# Patient Record
Sex: FEMALE | Race: WHITE | NOT HISPANIC OR LATINO | Employment: OTHER | URBAN - METROPOLITAN AREA
[De-identification: names, ages, dates, MRNs, and addresses within clinical notes are randomized per-mention and may not be internally consistent; named-entity substitution may affect disease eponyms.]

---

## 2017-02-13 ENCOUNTER — TELEPHONE (OUTPATIENT)
Dept: FAMILY MEDICINE | Facility: CLINIC | Age: 47
End: 2017-02-13

## 2017-02-13 ENCOUNTER — HOSPITAL ENCOUNTER (OUTPATIENT)
Dept: CT IMAGING | Facility: CLINIC | Age: 47
Discharge: HOME | End: 2017-02-13
Attending: NURSE PRACTITIONER | Admitting: NURSE PRACTITIONER
Payer: COMMERCIAL

## 2017-02-13 ENCOUNTER — OFFICE VISIT (OUTPATIENT)
Dept: FAMILY MEDICINE | Facility: CLINIC | Age: 47
End: 2017-02-13
Payer: COMMERCIAL

## 2017-02-13 VITALS
SYSTOLIC BLOOD PRESSURE: 137 MMHG | HEART RATE: 81 BPM | TEMPERATURE: 98.4 F | WEIGHT: 171 LBS | HEIGHT: 66 IN | BODY MASS INDEX: 27.48 KG/M2 | DIASTOLIC BLOOD PRESSURE: 82 MMHG

## 2017-02-13 DIAGNOSIS — R31.0 GROSS HEMATURIA: Primary | ICD-10-CM

## 2017-02-13 DIAGNOSIS — R31.0 GROSS HEMATURIA: ICD-10-CM

## 2017-02-13 DIAGNOSIS — R82.90 NONSPECIFIC FINDING ON EXAMINATION OF URINE: ICD-10-CM

## 2017-02-13 LAB
ALBUMIN UR-MCNC: 100 MG/DL
APPEARANCE UR: ABNORMAL
BILIRUB UR QL STRIP: NEGATIVE
COLOR UR AUTO: ABNORMAL
GLUCOSE UR STRIP-MCNC: NEGATIVE MG/DL
HGB UR QL STRIP: ABNORMAL
KETONES UR STRIP-MCNC: NEGATIVE MG/DL
LEUKOCYTE ESTERASE UR QL STRIP: ABNORMAL
NITRATE UR QL: NEGATIVE
PH UR STRIP: 6 PH (ref 5–7)
RBC #/AREA URNS AUTO: ABNORMAL /HPF (ref 0–2)
SP GR UR STRIP: 1.02 (ref 1–1.03)
URN SPEC COLLECT METH UR: ABNORMAL
UROBILINOGEN UR STRIP-ACNC: 0.2 EU/DL (ref 0.2–1)
WBC #/AREA URNS AUTO: ABNORMAL /HPF (ref 0–2)

## 2017-02-13 PROCEDURE — 87086 URINE CULTURE/COLONY COUNT: CPT | Performed by: NURSE PRACTITIONER

## 2017-02-13 PROCEDURE — 74176 CT ABD & PELVIS W/O CONTRAST: CPT

## 2017-02-13 PROCEDURE — 99214 OFFICE O/P EST MOD 30 MIN: CPT | Performed by: NURSE PRACTITIONER

## 2017-02-13 PROCEDURE — 81001 URINALYSIS AUTO W/SCOPE: CPT | Performed by: NURSE PRACTITIONER

## 2017-02-13 NOTE — PATIENT INSTRUCTIONS
Thank you for choosing [facility].  You may be receiving a survey in the mail from [employer] regarding your visit today.  Please take a few minutes to complete and return the survey to let us know how we are doing.      If you have questions or concerns, please contact us via [employer] or you can contact your care team at [phone].    Our Clinic hours are:  Monday 6:40 am  to 7:00 pm  Tuesday -Friday 6:40 am to 5:00 pm    The [place] outpatient lab hours are:  Monday - Friday 6:10 am to 4:45 pm  Saturdays 7:00 am to 11:00 am  Appointments are required, call [phone]    If you have clinical questions after hours or would like to schedule an appointment,  call the clinic at [phone].

## 2017-02-13 NOTE — TELEPHONE ENCOUNTER
"I called her, \"my urologist is only there on Tuesdays and Thursdays. I saw him last week and he said I have a stone and don't worry about it, but this is a lot of blood, and he is not in the office today, and I am freaking. \"  \"I can't get in with Maribeth until Wednesday, Dr Francisco always used to just order the CT if I called. She had talked to my urologist a lot. \"  Has noted blood today since the morning, \"it is getting worse as the day goes on, \" no pain anywhere. \"you would think if it is a stone there would be pain. \"    No urgency, no burning , no frequency, no urine sx, no fever. Just gross red blood. \"if I peed in a cup you wouldn't be able to see through it there is all red blood. \"    \"I saw the blood on the cysto, and I don't know why the urologist didn't order a CT scan\" \"I could call his office, they would page him and he would call me after he is done with surgery tonight, It is just easier if I get it done up here, \" \"I just don't want to go downtown, I would rather come up there for the test. \"    Advised she be seen and she declined to present to the ER.   Suggested she contact her urologist since she just saw him last week, and had the same then.      \"Well, Maribeth said she would order it, and she just needs to see me, but I can't see her til Wednesday and the blood might be gone. KUB's don't show up my stones, because they are phosphorus, and they need to be done on a CT,\"   A; gross blood in the urine, has kidney issues, and hx of stones. Is asking for a CT scan to be done, asap.   R: advised an appt/ eval or to contact her urologist who is very familiar with this situation, and she is declining. \"please ask Maribeth, it would just be so much easier for me, Dr Francisco always used to do it for me, \"  Encouraged her to schedule with a provider, and she agreed, \" she did tell me I would need to be seen, I will make the Wednesday appt with Maribeth then, I prefer to see her, let me know what she " "says. \"    Maribeth what to recommend? Is asking for a CT asap for gross hematuria. She did schedule an appt with you for Wednesday at 8 am. \"but the blood may be gone by then. \"    Leanna Hussein RNC        "

## 2017-02-13 NOTE — NURSING NOTE
"Chief Complaint   Patient presents with     Hematuria       Initial /82 (Cuff Size: Adult Regular)  Pulse 81  Temp 98.4  F (36.9  C) (Tympanic)  Ht 5' 6\" (1.676 m)  Wt 171 lb (77.6 kg)  LMP 07/02/2013  BMI 27.6 kg/m2 Estimated body mass index is 27.6 kg/(m^2) as calculated from the following:    Height as of this encounter: 5' 6\" (1.676 m).    Weight as of this encounter: 171 lb (77.6 kg).  Medication Reconciliation: complete  "

## 2017-02-13 NOTE — TELEPHONE ENCOUNTER
Reason for call:  Patient reporting a symptom    Symptom or request: hematuria    Duration (how long have symptoms been present): 1 week ago    Have you been treated for this before? Yes    Additional comments: pt calling stating she had blood in her urine last Monday the 6th. She went and seen her urologist who did a cysto and told her she was probably passing a stone. The blood stopped and then started up again today. She has no pain which she thought was odd because she said there is a lot of blood. She was wondering if she could get a ct ordered to see where the stone is.    Phone Number patient can be reached at:  Home number on file [phone] (home)    Best Time:  any    Can we leave a detailed message on this number:  YES    Call taken on 2/13/2017 at 1:51 PM by Peggy Maldonado

## 2017-02-13 NOTE — MR AVS SNAPSHOT
After Visit Summary   2/13/2017    Latoya Riddle    MRN: [id number]           Patient Information     Date Of Birth          1970        Visit Information        Provider Department      2/13/2017 3:20 PM Maribeth Avila APRN CNP [facility]        Today's Diagnoses     Gross hematuria    -  1    Nonspecific finding on examination of urine          Care Instructions          Thank you for choosing [facility].  You may be receiving a survey in the mail from [facility] regarding your visit today.  Please take a few minutes to complete and return the survey to let us know how we are doing.      If you have questions or concerns, please contact us via [employer] or you can contact your care team at [phone].    Our Clinic hours are:  Monday 6:40 am  to 7:00 pm  Tuesday -Friday 6:40 am to 5:00 pm    The [place] outpatient lab hours are:  Monday - Friday 6:10 am to 4:45 pm  Saturdays 7:00 am to 11:00 am  Appointments are required, call [phone]    If you have clinical questions after hours or would like to schedule an appointment,  call the clinic at [phone].          Follow-ups after your visit        Your next 10 appointments already scheduled     Feb 15, 2017  8:00 AM CST   SHORT with MICHELLE Jade CNP   [facility] ([facility])    [address] [facility] [id number] [phone]              Future tests that were ordered for you today     Open Future Orders        Priority Expected Expires Ordered    CT Abdomen Pelvis w/o Contrast STAT  2/13/2018 2/13/2017            Who to contact     If you have questions or need follow up information about today's clinic visit or your schedule please contact [facility] directly at [phone].  Normal or non-critical lab and imaging results will be communicated to you by MyChart, letter or phone within 4 business days after the clinic has  "received the results. If you do not hear from us within 7 days, please contact the clinic through [employer] or phone. If you have a critical or abnormal lab result, we will notify you by phone as soon as possible.  Submit refill requests through [employer] or call your pharmacy and they will forward the refill request to us. Please allow 3 business days for your refill to be completed.          Additional Information About Your Visit        [employer] Information     [employer] gives you secure access to your electronic health record. If you see a primary care provider, you can also send messages to your care team and make appointments. If you have questions, please call your primary care clinic.  If you do not have a primary care provider, please call [phone] and they will assist you.        Care EveryWhere ID     This is your Care EveryWhere ID. This could be used by other organizations to access your [place] medical records  [id number]        Your Vitals Were     Pulse Temperature Height Last Period BMI (Body Mass Index)       81 98.4  F (36.9  C) (Tympanic) 5' 6\" (1.676 m) 07/02/2013 27.6 kg/m2        Blood Pressure from Last 3 Encounters:   02/13/17 137/82   10/28/16 99/68   09/20/16 109/72    Weight from Last 3 Encounters:   02/13/17 171 lb (77.6 kg)   11/07/16 164 lb (74.4 kg)   10/28/16 164 lb (74.4 kg)              We Performed the Following     *UA reflex to Microscopic and Culture ([facility] (except [place] and [place])     Urine Culture Aerobic Bacterial     Urine Microscopic        Primary Care Provider Office Phone # Fax #    Maribeth MICHELLE Cm [phone]       [facility] [address] [facility] [address]        Thank you!     Thank you for choosing [facility]  for your care. Our goal is always to provide you with excellent care. Hearing back from our patients is one way we can continue to improve our services. " Please take a few minutes to complete the written survey that you may receive in the mail after your visit with us. Thank you!             Your Updated Medication List - Protect others around you: Learn how to safely use, store and throw away your medicines at www.disposemymeds.org.          This list is accurate as of: 2/13/17  5:08 PM.  Always use your most recent med list.                   Brand Name Dispense Instructions for use    ALEVE 220 MG tablet   Generic drug:  naproxen sodium      Take 220 mg by mouth 2 times daily as needed.       LORazepam 0.5 MG tablet    ATIVAN    30 tablet    Take 1 tablet (0.5 mg) by mouth every 8 hours as needed for anxiety       oxyCODONE-acetaminophen 5-325 MG per tablet    PERCOCET    60 tablet    Take 1-2 tablets by mouth every 4 hours as needed for pain (moderate to severe)       TORADOL ORAL 10 MG tablet   Generic drug:  ketorolac      1 TABLET EVERY 4 TO 6 HOURS AS NEEDED       ZYRTEC 10 MG tablet   Generic drug:  cetirizine      1 TABLET DAILY

## 2017-02-13 NOTE — PROGRESS NOTES
"  SUBJECTIVE:                                                    Latoya Riddle is a 46 year old female who presents to clinic today for the following health issues:      URINARY TRACT SYMPTOMS     Onset: this morning    Description:   Painful urination (Dysuria): no   Blood in urine (Hematuria): YES  Delay in urine (Hesitency): no     Intensity: severe    Progression of Symptoms: worse    Accompanying Signs & Symptoms:  Fever/chills: no   Flank pain no   Nausea and vomiting: no   Any vaginal symptoms: none  Abdominal/Pelvic Pain: no    History:   History of frequent UTI's: no   History of kidney stones: YES- saw urologist last week who confirmed a stone - usually passes one stone per week.  Sexually Active: YES  Possibility of pregnancy: No    Precipitating factors:   Kidney stone history         Therapies Tried and outcome:  Increase fluid intake    Hematuria from 2/6-2/8.  UA - showed RBCs, no WBCs.  Saw urologist 2/7 - had cystoscopy - saw blood coming from right ureter.    Hematuria restarted this morning - almost as bad as last week.    No pain last week; twinges on right side that are minor and intermittent.      Urologist is Dr Contreras - from [facility].    She is requesting a CT scan - wants results sent to her urologist.          Problem list and histories reviewed & adjusted, as indicated.  Additional history: as documented    Problem list, Medication list, Allergies, and Medical/Social/Surgical histories reviewed in [facility] and updated as appropriate.    ROS:  Constitutional, HEENT, cardiovascular, pulmonary, gi and gu systems are negative, except as otherwise noted.    OBJECTIVE:                                                    /82 (Cuff Size: Adult Regular)  Pulse 81  Temp 98.4  F (36.9  C) (Tympanic)  Ht 5' 6\" (1.676 m)  Wt 171 lb (77.6 kg)  LMP 07/02/2013  BMI 27.6 kg/m2  Body mass index is 27.6 kg/(m^2).  GENERAL: healthy, alert and no distress  NECK: no adenopathy, no asymmetry, " masses, or scars and thyroid normal to palpation  RESP: lungs clear to auscultation - no rales, rhonchi or wheezes  CV: regular rate and rhythm, normal S1 S2, no S3 or S4, no murmur, click or rub, no peripheral edema and peripheral pulses strong  ABDOMEN: soft, nontender, no hepatosplenomegaly, no masses and bowel sounds normal. No CVA tenderness    Diagnostic Test Results:  Results for orders placed or performed in visit on 02/13/17 (from the past 24 hour(s))   *UA reflex to Microscopic and Culture ([facility] (except [place] and [place])   Result Value Ref Range    Color Urine Red     Appearance Urine Cloudy     Glucose Urine Negative NEG mg/dL    Bilirubin Urine Negative NEG    Ketones Urine Negative NEG mg/dL    Specific Gravity Urine 1.025 1.003 - 1.035    Blood Urine Large (A) NEG    pH Urine 6.0 5.0 - 7.0 pH    Protein Albumin Urine 100 (A) NEG mg/dL    Urobilinogen Urine 0.2 0.2 - 1.0 EU/dL    Nitrite Urine Negative NEG    Leukocyte Esterase Urine Moderate (A) NEG    Source Midstream Urine    Urine Microscopic   Result Value Ref Range    WBC Urine 2-5 (A) 0 - 2 /HPF    RBC Urine >100  EXTREME NUMBERS   (A) 0 - 2 /HPF            CT ABDOMEN PELVIS W/O CONTRAST 2/13/2017 3:39 PM     HISTORY: Gross hematuria. Prior stones.     TECHNIQUE: CT imaging of the abdomen and pelvis is performed without  IV contrast.     This study is particularly suited for assessing for urolithiasis or  other pathological calcifications. Abdominal organs, pelvic organs,  bowel, aorta, retroperitoneum, and abdominal wall are also assessed to  the limits of no IV contrast. Pelvic anatomy is also assessed to the  limits of no IV contrast.     Radiation dose for this scan is reduced using automated exposure  control, adjustment of the mA and/ kV according to patient size, or  iterative reconstruction technique.      COMPARISON: 5/25/2013.     FINDINGS:     Abdomen: Scarring is present in the mid to lower pole  of the right  kidney. There is some mild intrarenal collecting system prominence on  the right but it is much less impressive compared to the dilatation  seen on the prior study. There is high density within the the mid and  lower aspect of this is modestly dilated intrarenal collecting system.  This could relate to debris, very tiny granular stones, and/or  hemorrhage. Trace medullary nephrocalcinosis involving the right  kidney is also again seen. Multiple nonobstructing calculi are present  in the left kidney. The largest one measures 5-6 mm in the lower pole.  Overall stone burden in the left kidney is similar to the prior study.  A cortical cyst in the left kidney is again demonstrated.     Pelvis : No bladder calculi are seen. Sigmoid diverticulosis is  present.         IMPRESSION:   1. There is mild dilatation of the right intrarenal collecting system.  This is much less impressive compared to the prior study and could  relate to some residual capaciousness from prior stone disease. No  ureteral calculi are demonstrated. There is high density material in  the right intrarenal collecting system, as described above.  2. Stable nonobstructing calculi in the left kidney.     DAVID OBRIEN MD      ASSESSMENT/PLAN:                                                          ICD-10-CM    1. Gross hematuria R31.0 Etiology unclear.  UA today has mostly RBCs, few WBC - will send for culture.  CT showing high density material in the right intrarenal collecting system - stones vs hemorrhage.  I faxed the CT to her urologist's office per her request.  We will wait for the urologist instructions.    *UA reflex to Microscopic and Culture ([facility] and [facility] (except [place] and [place])     CT Abdomen Pelvis w/o Contrast     Urine Microscopic   2. Nonspecific finding on examination of urine R82.90 Urine Culture Aerobic Bacterial           The risks, benefits and treatment options of prescribed medications  or other treatments have been discussed with the patient. The patient verbalized their understanding and should call or follow up if no improvement or if they develop further problems.    MICHELLE Lagos [facility]

## 2017-02-15 LAB
BACTERIA SPEC CULT: NORMAL
MICRO REPORT STATUS: NORMAL
SPECIMEN SOURCE: NORMAL

## 2017-02-17 ENCOUNTER — OFFICE VISIT (OUTPATIENT)
Dept: FAMILY MEDICINE | Facility: CLINIC | Age: 47
End: 2017-02-17
Payer: COMMERCIAL

## 2017-02-17 VITALS
BODY MASS INDEX: 27.06 KG/M2 | OXYGEN SATURATION: 98 % | HEART RATE: 80 BPM | DIASTOLIC BLOOD PRESSURE: 75 MMHG | HEIGHT: 66 IN | WEIGHT: 168.4 LBS | SYSTOLIC BLOOD PRESSURE: 105 MMHG | TEMPERATURE: 98.7 F

## 2017-02-17 DIAGNOSIS — R31.9 HEMATURIA: ICD-10-CM

## 2017-02-17 DIAGNOSIS — Z01.818 PREOP GENERAL PHYSICAL EXAM: Primary | ICD-10-CM

## 2017-02-17 DIAGNOSIS — R06.83 SNORING: ICD-10-CM

## 2017-02-17 DIAGNOSIS — E78.00 HYPERCHOLESTEROLEMIA: ICD-10-CM

## 2017-02-17 LAB — HGB BLD-MCNC: 15.3 G/DL (ref 11.7–15.7)

## 2017-02-17 PROCEDURE — 85018 HEMOGLOBIN: CPT | Performed by: INTERNAL MEDICINE

## 2017-02-17 PROCEDURE — 36416 COLLJ CAPILLARY BLOOD SPEC: CPT | Performed by: INTERNAL MEDICINE

## 2017-02-17 PROCEDURE — 99214 OFFICE O/P EST MOD 30 MIN: CPT | Performed by: INTERNAL MEDICINE

## 2017-02-17 RX ORDER — ATORVASTATIN CALCIUM 80 MG/1
80 TABLET, FILM COATED ORAL DAILY
Qty: 30 TABLET | Refills: 11 | Status: SHIPPED | OUTPATIENT
Start: 2017-02-17 | End: 2017-11-10

## 2017-02-17 NOTE — NURSING NOTE
"Chief Complaint   Patient presents with     Pre-Op Exam     DOS 2/20/17, cystoscopy @ [facility] w/ Dr. Tavo Contreras       Initial /75 (BP Location: Right arm, Patient Position: Chair, Cuff Size: Adult Regular)  Pulse 80  Temp 98.7  F (37.1  C) (Tympanic)  Ht 5' 6\" (1.676 m)  Wt 168 lb 6.4 oz (76.4 kg)  LMP 07/02/2013  SpO2 98%  BMI 27.18 kg/m2 Estimated body mass index is 27.18 kg/(m^2) as calculated from the following:    Height as of this encounter: 5' 6\" (1.676 m).    Weight as of this encounter: 168 lb 6.4 oz (76.4 kg).  Medication Reconciliation: complete   Zulma LANGSTON CMA (AAMA)    "

## 2017-02-17 NOTE — MR AVS SNAPSHOT
After Visit Summary   2/17/2017    Latoya Riddle    MRN: [id number]           Patient Information     Date Of Birth          1970        Visit Information        Provider Department      2/17/2017 12:40 PM Alvarez Gray MD [facility]        Today's Diagnoses     Preop general physical exam    -  1    Snoring        Hematuria        Hypercholesterolemia          Care Instructions      Before Your Surgery      Call your surgeon if there is any change in your health. This includes signs of a cold or flu (such as a sore throat, runny nose, cough, rash or fever).    Do not smoke, drink alcohol or take over the counter medicine (unless your surgeon or primary care doctor tells you to) for the 24 hours before and after surgery.    If you take prescribed drugs: Follow your doctor s orders about which medicines to take and which to stop until after surgery.    Eating and drinking prior to surgery: follow the instructions from your surgeon    Take a shower or bath the night before surgery. Use the soap your surgeon gave you to gently clean your skin. If you do not have soap from your surgeon, use your regular soap. Do not shave or scrub the surgery site.  Wear clean pajamas and have clean sheets on your bed.         Follow-ups after your visit        Additional Services     SLEEP EVALUATION & MANAGEMENT REFERRAL - ADULT       Please be aware that coverage of these services is subject to the terms and limitations of your health insurance plan.  Call member services at your health plan with any benefit or coverage questions.      Please bring the following to your appointment:    >>   List of current medications   >>   This referral request   >>   Any documents/labs given to you for this referral    [facility] / Lab  Ph [phone] (Age 2 and up)                  Future tests that were ordered for you today     Open Future Orders        Priority Expected Expires Ordered  "   **ALT FUTURE 2mo Routine 4/18/2017 6/17/2017 2/17/2017    **Lipid panel reflex to direct LDL FUTURE 2mo Routine 4/18/2017 6/17/2017 2/17/2017    SLEEP EVALUATION & MANAGEMENT REFERRAL - ADULT Routine  2/17/2018 2/17/2017            Who to contact     If you have questions or need follow up information about today's clinic visit or your schedule please contact [facility] directly at [phone].  Normal or non-critical lab and imaging results will be communicated to you by [employer], letter or phone within 4 business days after the clinic has received the results. If you do not hear from us within 7 days, please contact the clinic through [employer] or phone. If you have a critical or abnormal lab result, we will notify you by phone as soon as possible.  Submit refill requests through [employer] or call your pharmacy and they will forward the refill request to us. Please allow 3 business days for your refill to be completed.          Additional Information About Your Visit        [employer] Information     [employer] gives you secure access to your electronic health record. If you see a primary care provider, you can also send messages to your care team and make appointments. If you have questions, please call your primary care clinic.  If you do not have a primary care provider, please call [phone] and they will assist you.        Care EveryWhere ID     This is your Care EveryWhere ID. This could be used by other organizations to access your [place] medical records  [id number]        Your Vitals Were     Pulse Temperature Height Last Period Pulse Oximetry BMI (Body Mass Index)    80 98.7  F (37.1  C) (Tympanic) 5' 6\" (1.676 m) 07/02/2013 98% 27.18 kg/m2       Blood Pressure from Last 3 Encounters:   02/17/17 105/75   02/13/17 137/82   10/28/16 99/68    Weight from Last 3 Encounters:   02/17/17 168 lb 6.4 oz (76.4 kg)   02/13/17 171 lb (77.6 kg)   11/07/16 164 lb (74.4 kg)              We Performed the " Following     Hemoglobin          Today's Medication Changes          These changes are accurate as of: 2/17/17  1:17 PM.  If you have any questions, ask your nurse or doctor.               Start taking these medicines.        Dose/Directions    atorvastatin 80 MG tablet   Commonly known as:  LIPITOR   Used for:  Hypercholesterolemia   Started by:  Alvarez Gray MD        Dose:  80 mg   Take 1 tablet (80 mg) by mouth daily   Quantity:  30 tablet   Refills:  11            Where to get your medicines      These medications were sent to [facility] - [place], [place] - [address]   [address] , [facility] [address]     Phone:  [phone]     atorvastatin 80 MG tablet                Primary Care Provider Office Phone # Fax #    Maribeth MICHELLE Cm [facility] [phone]       [facility] [address] [facility] [address]        Thank you!     Thank you for choosing [facility]  for your care. Our goal is always to provide you with excellent care. Hearing back from our patients is one way we can continue to improve our services. Please take a few minutes to complete the written survey that you may receive in the mail after your visit with us. Thank you!             Your Updated Medication List - Protect others around you: Learn how to safely use, store and throw away your medicines at www.disposemymeds.org.          This list is accurate as of: 2/17/17  1:17 PM.  Always use your most recent med list.                   Brand Name Dispense Instructions for use    ALEVE 220 MG tablet   Generic drug:  naproxen sodium      Take 220 mg by mouth 2 times daily as needed.       atorvastatin 80 MG tablet    LIPITOR    30 tablet    Take 1 tablet (80 mg) by mouth daily       LORazepam 0.5 MG tablet    ATIVAN    30 tablet    Take 1 tablet (0.5 mg) by mouth every 8 hours as needed for anxiety       oxyCODONE-acetaminophen 5-325 MG per tablet    PERCOCET    60  tablet    Take 1-2 tablets by mouth every 4 hours as needed for pain (moderate to severe)       TORADOL ORAL 10 MG tablet   Generic drug:  ketorolac      1 TABLET EVERY 4 TO 6 HOURS AS NEEDED       ZYRTEC 10 MG tablet   Generic drug:  cetirizine      1 TABLET DAILY

## 2017-02-17 NOTE — PROGRESS NOTES
[facility]  [address] [facility] [address]  [phone]  Dept: [phone]    PRE-OP EVALUATION:  Today's date: 2017    Latoya Riddle (: 1970) presents for pre-operative evaluation assessment as requested by Dr. Tavo Contreras.  She requires evaluation and anesthesia risk assessment prior to undergoing surgery/procedure for treatment of kidney stone .  Proposed procedure: cystoscopy, stone removal     Date of Surgery/ Procedure: 17  Time of Surgery/ Procedure: 3:05 p.m.  Hospital/Surgical Facility: [place]   Fax number for surgical facility: [phone]  Primary Physician: Maribeth Avila  Type of Anesthesia Anticipated: General    Patient has a Health Care Directive or Living Will:  NO    1. NO - Do you have a history of heart attack, stroke, stent, bypass or surgery on an artery in the head, neck, heart or legs?  2. NO - Do you ever have any pain or discomfort in your chest?  3. NO - Do you have a history of  Heart Failure?  4. YES - ARE YOUR TROUBLED BY SHORTNESS OF BREATH WHEN WALKING ON THE LEVEL, UP A SLIGHT HILL OR AT NIGHT? Currently, for the past week, patient reports having shortness of breath.   5. NO - Do you currently have a cold, bronchitis or other respiratory infection?  6. YES - DO YOU HAVE A COUGH, SHORTNESS OF BREATH OR WHEEZING? Always has a cough, tickle in throat all the time, Shortness of breath  7. NO - Do you sometimes get pains in the calves of your legs when you walk?  8. YES - DO YOU OR ANYONE IN YOUR FAMILY HAVE PREVIOUS HISTORY OF BLOOD CLOTS? Father w/ history of blood clots   9. NO - Do you or does anyone in your family have a serious bleeding problem such as prolonged bleeding following surgeries or cuts?  10. NO - Have you ever had problems with anemia or been told to take iron pills?  11. YES - HAVE YOU HAD ANY ABNORMAL BLOOD LOSS SUCH AS BLACK, TARRY OR BLOODY STOOLS, OR ABNORMAL VAGINAL BLEEDING? Currently having  abnormal blood loss w/ urination   12. NO - Have you ever had a blood transfusion?  13. NO - Have you or any of your relatives ever had problems with anesthesia?  14. YES - DO YOU HAVE SLEEP APNEA, EXCESSIVE SNORING OR DAYTIME DROWSINESS? Excessive snoring  15. NO - Do you have any prosthetic heart valves?  16. NO - Do you have prosthetic joints?  17. NO - Is there any chance that you may be pregnant?      HPI:                                                      Brief HPI related to upcoming procedure:   Latoya has a history of recurrent kidney stones and recently has been having worsening hematuria.  She actually doesn't have much pain.  Recent CT scan shows trevor density material in the right intrarenal collecting system, so a cystoscopy is planned to investigate this further.  Latoya says that the hematuria is present with every urination and the blood is very dense in the urine.  She is feeling very fatigued, weak, and SOB with activity, and she wonders if this may be due to blood loss.          MEDICAL HISTORY:                                                      Patient Active Problem List    Diagnosis Date Noted     Anxiety 01/11/2016     Priority: Medium     Health Care Home 05/06/2013     Priority: Medium     EMERGENCY CARE PLAN  May 6, 2013: No current Care Coordination follow up planned. Please refer if Care Coordination services are needed.    Presenting Problem Signs and Symptoms Treatment Plan   Questions or concerns   during clinic hours   I will call my clinic directly:  [address] [address] [phone].   Questions or concerns outside clinic hours   I will call the 24 hour nurse line at   [phone] or [address] [place] [address].   Need to schedule an appointment   I will call the 24 hour scheduling team at [phone] or my clinic directly at [phone].   Same day treatment     I will call my clinic first, nurse line if after hours, urgent care and express care  if needed.   Clinic care coordination services (regular clinic hours)   I will call a clinic care coordinator directly:     Italia Keith, RN  [phone]    CHELSI Iglesias:    [phone]    Or call my clinic at [phone] and ask to speak with care coordination.   Crisis Services: Behavioral or Mental Health  [facility] 24 Hour Phone Line  [phone]    [facility] 24 Hour Crisis Services  [phone]    [facility] (Behavioral Health Providers) Network [phone]    [facility]   [phone]     Emergency treatment -- Immediately    [facility]All 911              UTI (urinary tract infection) 2012     Priority: Medium     Hyperlipidemia LDL goal <130 2011     Priority: Medium     Tobacco abuse 2010     Priority: Medium     Dyspnea and respiratory abnormality 2008     Priority: Medium     Sleep study 08: No CATHY. .5 minutes, sleep latency  normal 11.7 minutes. REM latency 154.5 minutes. Sleep efficiency at 89.4%. The sleep architecture was periodically disrupted with frequent sleep stage changes and arousals. Snoring: moderately loud. RDI 41.0,  AHI of 0.3. Lowest O2 93.0%. PLM index 0.0.  Problem list name updated by automated process. Provider to review       Calculus of kidney 2005     Priority: Medium     Due to RTA and medullary sponge kidney. [id number] ( See's Dr. Tavo Contreras)- calcium oxalate and calcium phosphate. S/p multiple procedures, >50       Congenital medullary sponge kidney 2005     Priority: Medium     Renal osteodystrophy 2005     Priority: Medium     Tubular acidosis        Past Medical History   Diagnosis Date     Hyperlipidemia      Kidney stone      Past Surgical History   Procedure Laterality Date     Surgical history of -             Surgical history of -   -present     Lithothypsy X 49     Surgical history of -        Percutaneous nephrostomy X2     Surgical history of -   2011      "Cystoscopy with bilateral ureteral pyeloscopy, stone basketing and stent placement     Genitourinary surgery       Excise toenail(s) Left 9/20/2016     Procedure: EXCISE TOENAIL(S);  Surgeon: Ady Mejia DPM;  Location: [facility] OR     Current Outpatient Prescriptions   Medication Sig Dispense Refill     atorvastatin (LIPITOR) 80 MG tablet Take 1 tablet (80 mg) by mouth daily 30 tablet 11     ZYRTEC 10 MG OR TABS 1 TABLET DAILY       oxyCODONE-acetaminophen (PERCOCET) 5-325 MG per tablet Take 1-2 tablets by mouth every 4 hours as needed for pain (moderate to severe) 60 tablet 0     LORazepam (ATIVAN) 0.5 MG tablet Take 1 tablet (0.5 mg) by mouth every 8 hours as needed for anxiety 30 tablet 1     naproxen sodium (ALEVE) 220 MG tablet Take 220 mg by mouth 2 times daily as needed.       TORADOL ORAL 10 MG OR TABS 1 TABLET EVERY 4 TO 6 HOURS AS NEEDED       OTC products: None, except as noted above    Allergies   Allergen Reactions     Gentamycin [Gentamicin Sulfate]       Latex Allergy: NO    Social History   Substance Use Topics     Smoking status: Current Every Day Smoker     Packs/day: 1.00     Years: 20.00     Types: Cigarettes     Smokeless tobacco: Never Used     Alcohol use No     History   Drug Use No       REVIEW OF SYSTEMS:                                                    Constitutional, HEENT, cardiovascular, pulmonary, gi and gu systems are negative, except as otherwise noted.    EXAM:                                                    /75 (BP Location: Right arm, Patient Position: Chair, Cuff Size: Adult Regular)  Pulse 80  Temp 98.7  F (37.1  C) (Tympanic)  Ht 5' 6\" (1.676 m)  Wt 168 lb 6.4 oz (76.4 kg)  LMP 07/02/2013  SpO2 98%  BMI 27.18 kg/m2      GENERAL APPEARANCE: healthy, alert, appears fatigued     HENT: normal ear canals and TMs, nose and mouth without ulcers or lesions     NECK: no adenopathy, no asymmetry, masses, or scars     RESP: lungs clear to auscultation - no rales, rhonchi " or wheezes     CV: regular rates and rhythm, normal S1 S2, no S3 or S4 and no murmur, click or rub -     ABDOMEN:  soft, nontender, no HSM or masses and bowel sounds normal     MS: extremities normal- no gross deformities noted, no evidence of inflammation in joints       NEURO: mentation intact and speech normal     PSYCH: mentation appears normal. and affect normal/bright     LYMPHATICS: No cervical or supraclavicular nodes      DIAGNOSTICS:                                                      Labs Resulted Today:   Results for orders placed or performed in visit on 02/17/17   Hemoglobin   Result Value Ref Range    Hemoglobin 15.3 11.7 - 15.7 g/dL       Recent Labs   Lab Test  10/28/16   1046  09/19/16   1001  10/16/14   1438   HGB  15.6   --   15.5   PLT  154   --   143*   INR   --    --   1.03   NA  142  140  143   POTASSIUM  3.9  4.3  3.9   CR  1.20*  1.27*  1.10*        IMPRESSION:                                                    Reason for surgery/procedure: Cystoscopy for hematuria  Diagnosis/reason for consult: Pre-op evel    The proposed surgical procedure is considered LOW risk.    REVISED CARDIAC RISK INDEX  The patient has the following serious cardiovascular risks for perioperative complications such as (MI, PE, VFib and 3  AV Block):  No serious cardiac risks  INTERPRETATION: 0 risks: Class I (very low risk - 0.4% complication rate)    The patient has the following additional risks for perioperative complications:  No identified additional risks      ICD-10-CM    1. Preop general physical exam Z01.818    2. Snoring R06.83 SLEEP EVALUATION & MANAGEMENT REFERRAL - ADULT   3. Hematuria R31.9 Hemoglobin   4. Hypercholesterolemia E78.00 atorvastatin (LIPITOR) 80 MG tablet     **ALT FUTURE 2mo     **Lipid panel reflex to direct LDL FUTURE 2mo     Recently cholesterol level returned elevated and she would like to restart cholesterol medication- atorvastatin ordered.      It has been 10 years she had a  sleep study (previously normal), and she would like this repeated, so referral placed.      RECOMMENDATIONS:                                                        Cardiovascular Risk  Performs at least 4 METs exercise without symptoms.  Had a normal stress test Sept 2016.  No further work-up needed.         --Patient is to take all scheduled medications on the day of surgery EXCEPT for modifications listed below.    -Hold naproxen 2-3 days prior.  Hold toradol one day prior.      Hemoglobin was checked due to bleeding and symptoms of fatigue and PRYOR, but this was normal.      Urine culture done on 2/13 showed 50,000-100,000 mixed facundo- likely contamination.      APPROVAL GIVEN to proceed with proposed procedure, without further diagnostic evaluation       Signed Electronically by: Alvarez Gray MD    Copy of this evaluation report is provided to requesting physician.    [place] Preop Guidelines

## 2017-02-20 ENCOUNTER — TRANSFERRED RECORDS (OUTPATIENT)
Dept: HEALTH INFORMATION MANAGEMENT | Facility: CLINIC | Age: 47
End: 2017-02-20

## 2017-02-22 ENCOUNTER — TRANSFERRED RECORDS (OUTPATIENT)
Dept: HEALTH INFORMATION MANAGEMENT | Facility: CLINIC | Age: 47
End: 2017-02-22

## 2017-02-23 ENCOUNTER — MYC MEDICAL ADVICE (OUTPATIENT)
Dept: FAMILY MEDICINE | Facility: CLINIC | Age: 47
End: 2017-02-23

## 2017-02-24 ENCOUNTER — TRANSFERRED RECORDS (OUTPATIENT)
Dept: HEALTH INFORMATION MANAGEMENT | Facility: CLINIC | Age: 47
End: 2017-02-24

## 2017-02-24 NOTE — TELEPHONE ENCOUNTER
Maribeth,    Patient sent a FYI for you to update you on her health.  Thank you, Ara VALENTINE RN

## 2017-02-27 NOTE — TELEPHONE ENCOUNTER
Thank you.  Please have her surgeon send us op reports and path reports so we can update her chart when a final diagnosis is made.  Maribeth Avila, CNP

## 2017-03-01 ENCOUNTER — TELEPHONE (OUTPATIENT)
Dept: NURSING | Facility: CLINIC | Age: 47
End: 2017-03-01

## 2017-03-01 NOTE — TELEPHONE ENCOUNTER
Call Type: Triage Call    Presenting Problem: Pt calling she just got out of the hospital for  having kidney surgery and she was told to call if her temp was over  101.5 and it is and she went to [facility] and I instructed her on how  to reach her on call provider.  Triage Note:  Guideline Title: No Guideline - Advice Per Reference (Adult)  Recommended Disposition: Call Provider Immediately  Original Inclination: Would have called clinic  Override Disposition:  Intended Action: Call PCP/HCP  Physician Contacted: No  CALL PROVIDER IMMEDIATELY ?  YES  SEE ED IMMEDIATELY ? NO  CALL POISON CENTER IMMEDIATELY ? NO  CALL LOCAL AGENCY IMMEDIATELY ? NO  SEE DENTIST WITHIN 4 HOURS ? NO  ACTIVATE  ? NO  SEE PROVIDER WITHIN 4 HOURS ? NO  Physician Instructions:  Care Advice:

## 2017-03-17 ENCOUNTER — OFFICE VISIT (OUTPATIENT)
Dept: FAMILY MEDICINE | Facility: CLINIC | Age: 47
End: 2017-03-17
Payer: COMMERCIAL

## 2017-03-17 VITALS
HEART RATE: 74 BPM | DIASTOLIC BLOOD PRESSURE: 60 MMHG | WEIGHT: 163 LBS | SYSTOLIC BLOOD PRESSURE: 93 MMHG | TEMPERATURE: 98.2 F | BODY MASS INDEX: 26.31 KG/M2

## 2017-03-17 DIAGNOSIS — L08.9 LOCAL INFECTION OF SKIN AND SUBCUTANEOUS TISSUE: Primary | ICD-10-CM

## 2017-03-17 LAB
ANION GAP SERPL CALCULATED.3IONS-SCNC: 8 MMOL/L (ref 3–14)
BUN SERPL-MCNC: 23 MG/DL (ref 7–30)
CALCIUM SERPL-MCNC: 9.5 MG/DL (ref 8.5–10.1)
CHLORIDE SERPL-SCNC: 107 MMOL/L (ref 94–109)
CO2 SERPL-SCNC: 25 MMOL/L (ref 20–32)
CREAT SERPL-MCNC: 1.72 MG/DL (ref 0.52–1.04)
GFR SERPL CREATININE-BSD FRML MDRD: 32 ML/MIN/1.7M2
GLUCOSE SERPL-MCNC: 97 MG/DL (ref 70–99)
POTASSIUM SERPL-SCNC: 4.6 MMOL/L (ref 3.4–5.3)
SODIUM SERPL-SCNC: 140 MMOL/L (ref 133–144)

## 2017-03-17 PROCEDURE — 36415 COLL VENOUS BLD VENIPUNCTURE: CPT | Performed by: NURSE PRACTITIONER

## 2017-03-17 PROCEDURE — 99213 OFFICE O/P EST LOW 20 MIN: CPT | Performed by: NURSE PRACTITIONER

## 2017-03-17 PROCEDURE — 80048 BASIC METABOLIC PNL TOTAL CA: CPT | Performed by: NURSE PRACTITIONER

## 2017-03-17 NOTE — PROGRESS NOTES
SUBJECTIVE:                                                    Latoya Riddle is a 46 year old female who presents to clinic today for the following health issues:    Infection at surgical site    Noticed drainage and redness at incision site today - not present prior to today.  Right Kidney removal 2/25/17 at [facility]  Discussed issue with surgeon, was told to see PCP  No pain at site according to patient - but states that she is numb from hip to hip since surgery.  No fever or chills.  No body aches.  Drainage wasn't present yesterday - clear yellow.  Incision is getting itchy.      Problem list and histories reviewed & adjusted, as indicated.  Additional history: as documented      Reviewed and updated as needed this visit by clinical staff       Reviewed and updated as needed this visit by Provider         ROS:  Constitutional, HEENT, cardiovascular, pulmonary, gi and gu systems are negative, except as otherwise noted.    OBJECTIVE:                                                    BP 93/60  Pulse 74  Temp 98.2  F (36.8  C) (Tympanic)  Wt 163 lb (73.9 kg)  LMP 07/02/2013  BMI 26.31 kg/m2  Body mass index is 26.31 kg/(m^2).  GENERAL: healthy, alert and no distress  ABDOMEN: soft, nondistended. 3 lap sites are clean, dry and intact. One longer incision in the RLQ is intact - area of erythema surrounding the top half of the incision. No fluctuance palpable.          ASSESSMENT/PLAN:                                                        ICD-10-CM    1. Local infection of skin and subcutaneous tissue L08.9 cephalexin (KEFLEX) 250 MG capsule     Basic metabolic panel     Likely early skin infection surrounding incision.  Treat with Keflex.  Area marked.  Appointment with surgery scheduled for next week.      The risks, benefits and treatment options of prescribed medications or other treatments have been discussed with the patient. The patient verbalized their understanding and should call or follow up  if no improvement or if they develop further problems.    MICHELLE Lagos [facility]

## 2017-03-17 NOTE — MR AVS SNAPSHOT
After Visit Summary   3/17/2017    Latoya Riddle    MRN: [id number]           Patient Information     Date Of Birth          1970        Visit Information        Provider Department      3/17/2017 11:20 AM Maribeth Avila APRN CNP [facility]        Today's Diagnoses     Local infection of skin and subcutaneous tissue    -  1       Follow-ups after your visit        Your next 10 appointments already scheduled     Mar 20, 2017 12:40 PM CDT   SHORT with MICHELLE Jade CNP   [facility] ([facility]    [address] [address]   [phone]              Who to contact     If you have questions or need follow up information about today's clinic visit or your schedule please contact [facility] directly at [phone].  Normal or non-critical lab and imaging results will be communicated to you by MyChart, letter or phone within 4 business days after the clinic has received the results. If you do not hear from us within 7 days, please contact the clinic through MyChart or phone. If you have a critical or abnormal lab result, we will notify you by phone as soon as possible.  Submit refill requests through [facility] or call your pharmacy and they will forward the refill request to us. Please allow 3 business days for your refill to be completed.          Additional Information About Your Visit        MyChart Information     [facility] gives you secure access to your electronic health record. If you see a primary care provider, you can also send messages to your care team and make appointments. If you have questions, please call your primary care clinic.  If you do not have a primary care provider, please call [phone] and they will assist you.        Care EveryWhere ID     This is your Care EveryWhere ID. This could be used by other organizations to access your [facility]  records  [id number]        Your Vitals Were     Pulse Temperature Last Period BMI (Body Mass Index)          74 98.2  F (36.8  C) (Tympanic) 07/02/2013 26.31 kg/m2         Blood Pressure from Last 3 Encounters:   03/17/17 93/60   02/17/17 105/75   02/13/17 137/82    Weight from Last 3 Encounters:   03/17/17 163 lb (73.9 kg)   02/17/17 168 lb 6.4 oz (76.4 kg)   02/13/17 171 lb (77.6 kg)              We Performed the Following     Basic metabolic panel          Today's Medication Changes          These changes are accurate as of: 3/17/17 12:03 PM.  If you have any questions, ask your nurse or doctor.               Start taking these medicines.        Dose/Directions    cephalexin 250 MG capsule   Commonly known as:  KEFLEX   Used for:  Local infection of skin and subcutaneous tissue   Started by:  Maribeth Avila APRN CNP        Dose:  250 mg   Take 1 capsule (250 mg) by mouth 4 times daily   Quantity:  40 capsule   Refills:  0            Where to get your medicines      These medications were sent to [facility] [address]   [address] [facility] [address]     Phone:  [phone]     cephalexin 250 MG capsule                Primary Care Provider Office Phone # Fax #    MICHELLE Jade [phone]       [address] [address]        Thank you!     Thank you for choosing [facility]  for your care. Our goal is always to provide you with excellent care. Hearing back from our patients is one way we can continue to improve our services. Please take a few minutes to complete the written survey that you may receive in the mail after your visit with us. Thank you!             Your Updated Medication List - Protect others around you: Learn how to safely use, store and throw away your medicines at www.disposemymeds.org.          This list is accurate as of: 3/17/17 12:03 PM.  Always  use your most recent med list.                   Brand Name Dispense Instructions for use    ALEVE 220 MG tablet   Generic drug:  naproxen sodium      Take 220 mg by mouth 2 times daily as needed.       atorvastatin 80 MG tablet    LIPITOR    30 tablet    Take 1 tablet (80 mg) by mouth daily       cephalexin 250 MG capsule    KEFLEX    40 capsule    Take 1 capsule (250 mg) by mouth 4 times daily       LORazepam 0.5 MG tablet    ATIVAN    30 tablet    Take 1 tablet (0.5 mg) by mouth every 8 hours as needed for anxiety       oxyCODONE-acetaminophen 5-325 MG per tablet    PERCOCET    60 tablet    Take 1-2 tablets by mouth every 4 hours as needed for pain (moderate to severe)       STOOL SOFTENER PO          TORADOL ORAL 10 MG tablet   Generic drug:  ketorolac      Reported on 3/17/2017       ZYRTEC 10 MG tablet   Generic drug:  cetirizine      1 TABLET DAILY

## 2017-07-17 ENCOUNTER — MYC REFILL (OUTPATIENT)
Dept: FAMILY MEDICINE | Facility: CLINIC | Age: 47
End: 2017-07-17

## 2017-07-17 DIAGNOSIS — F41.9 ANXIETY: ICD-10-CM

## 2017-07-17 RX ORDER — LORAZEPAM 0.5 MG/1
0.5 TABLET ORAL EVERY 8 HOURS PRN
Qty: 30 TABLET | Refills: 1 | Status: CANCELLED | OUTPATIENT
Start: 2017-07-17

## 2017-07-18 NOTE — TELEPHONE ENCOUNTER
Message from MyChart:  Original authorizing provider: MICHELLE Lagos CNP would like a refill of the following medications:  LORazepam (ATIVAN) 0.5 MG tablet [MICHELLE Lagos CNP]    Preferred pharmacy: [id number] - [place], [place] - [address]    Comment:

## 2017-07-25 ENCOUNTER — OFFICE VISIT (OUTPATIENT)
Dept: FAMILY MEDICINE | Facility: CLINIC | Age: 47
End: 2017-07-25
Payer: COMMERCIAL

## 2017-07-25 VITALS
HEART RATE: 65 BPM | BODY MASS INDEX: 26.73 KG/M2 | WEIGHT: 166.3 LBS | SYSTOLIC BLOOD PRESSURE: 100 MMHG | TEMPERATURE: 97.9 F | HEIGHT: 66 IN | DIASTOLIC BLOOD PRESSURE: 69 MMHG

## 2017-07-25 DIAGNOSIS — F41.9 ANXIETY: ICD-10-CM

## 2017-07-25 PROCEDURE — 99213 OFFICE O/P EST LOW 20 MIN: CPT | Performed by: NURSE PRACTITIONER

## 2017-07-25 RX ORDER — LORAZEPAM 0.5 MG/1
0.5 TABLET ORAL EVERY 8 HOURS PRN
Qty: 30 TABLET | Refills: 1 | Status: SHIPPED | OUTPATIENT
Start: 2017-07-25 | End: 2019-04-05

## 2017-07-25 ASSESSMENT — ANXIETY QUESTIONNAIRES
6. BECOMING EASILY ANNOYED OR IRRITABLE: NEARLY EVERY DAY
IF YOU CHECKED OFF ANY PROBLEMS ON THIS QUESTIONNAIRE, HOW DIFFICULT HAVE THESE PROBLEMS MADE IT FOR YOU TO DO YOUR WORK, TAKE CARE OF THINGS AT HOME, OR GET ALONG WITH OTHER PEOPLE: SOMEWHAT DIFFICULT
7. FEELING AFRAID AS IF SOMETHING AWFUL MIGHT HAPPEN: NOT AT ALL
2. NOT BEING ABLE TO STOP OR CONTROL WORRYING: SEVERAL DAYS
3. WORRYING TOO MUCH ABOUT DIFFERENT THINGS: SEVERAL DAYS
5. BEING SO RESTLESS THAT IT IS HARD TO SIT STILL: NOT AT ALL
GAD7 TOTAL SCORE: 7
1. FEELING NERVOUS, ANXIOUS, OR ON EDGE: SEVERAL DAYS

## 2017-07-25 ASSESSMENT — PATIENT HEALTH QUESTIONNAIRE - PHQ9: 5. POOR APPETITE OR OVEREATING: SEVERAL DAYS

## 2017-07-25 NOTE — NURSING NOTE
"Chief Complaint   Patient presents with     Anxiety     refill of lorazepam needed     Medication Reconciliation     patient has not statted taking Atorvastatin 80mg daily        Initial /69 (BP Location: Left arm, Patient Position: Chair, Cuff Size: Adult Regular)  Pulse 65  Temp 97.9  F (36.6  C) (Tympanic)  Ht 5' 6\" (1.676 m)  Wt 166 lb 4.8 oz (75.4 kg)  LMP 07/02/2013  Breastfeeding? No  BMI 26.84 kg/m2 Estimated body mass index is 26.84 kg/(m^2) as calculated from the following:    Height as of this encounter: 5' 6\" (1.676 m).    Weight as of this encounter: 166 lb 4.8 oz (75.4 kg).  Medication Reconciliation: complete    "

## 2017-07-25 NOTE — MR AVS SNAPSHOT
After Visit Summary   7/25/2017    Latoya Riddle    MRN: [id number]           Patient Information     Date Of Birth          1970        Visit Information        Provider Department      7/25/2017 10:00 AM Maribeth Avila APRN CNP [facility]        Today's Diagnoses     Anxiety          Care Instructions          Thank you for choosing [facility].  You may be receiving a survey in the mail from Ramu Wick regarding your visit today.  Please take a few minutes to complete and return the survey to let us know how we are doing.      If you have questions or concerns, please contact us via COPsync or you can contact your care team at [phone].    Our Clinic hours are:  Monday 6:40 am  to 7:00 pm  Tuesday -Friday 6:40 am to 5:00 pm    The [place] outpatient lab hours are:  Monday - Friday 6:10 am to 4:45 pm  Saturdays 7:00 am to 11:00 am  Appointments are required, call [phone]    If you have clinical questions after hours or would like to schedule an appointment,  call the clinic at [phone].            Follow-ups after your visit        Who to contact     If you have questions or need follow up information about today's clinic visit or your schedule please contact [facility] directly at [phone].  Normal or non-critical lab and imaging results will be communicated to you by Listnerdhart, letter or phone within 4 business days after the clinic has received the results. If you do not hear from us within 7 days, please contact the clinic through joizt or phone. If you have a critical or abnormal lab result, we will notify you by phone as soon as possible.  Submit refill requests through COPsync or call your pharmacy and they will forward the refill request to us. Please allow 3 business days for your refill to be completed.          Additional Information About Your Visit        ListnerdHudson Information     COPsync gives you secure access to  "your electronic health record. If you see a primary care provider, you can also send messages to your care team and make appointments. If you have questions, please call your primary care clinic.  If you do not have a primary care provider, please call [phone] and they will assist you.        Care EveryWhere ID     This is your Care EveryWhere ID. This could be used by other organizations to access your [place] medical records  [id number]        Your Vitals Were     Pulse Temperature Height Last Period Breastfeeding? BMI (Body Mass Index)    65 97.9  F (36.6  C) (Tympanic) 5' 6\" (1.676 m) 07/02/2013 No 26.84 kg/m2       Blood Pressure from Last 3 Encounters:   07/25/17 100/69   03/17/17 93/60   02/17/17 105/75    Weight from Last 3 Encounters:   07/25/17 166 lb 4.8 oz (75.4 kg)   03/17/17 163 lb (73.9 kg)   02/17/17 168 lb 6.4 oz (76.4 kg)              Today, you had the following     No orders found for display         Where to get your medicines      Some of these will need a paper prescription and others can be bought over the counter.  Ask your nurse if you have questions.     Bring a paper prescription for each of these medications     LORazepam 0.5 MG tablet          Primary Care Provider Office Phone # Fax #    Maribeth Pino Kalie Avila, APRN -[phone]       [place] [address]        Equal Access to Services     LUIZA PAREDES AH: Hadii aad ku hadasho Soomaali, waaxda luqadaha, qaybta kaalmada adeegyada, carmen concepcion. So [facility] [phone].    ATENCIÓN: Si habla español, tiene a bustos disposición servicios gratuitos de asistencia lingüística. Llame al [phone].    We comply with applicable federal civil rights laws and [place] laws. We do not discriminate on the basis of race, color, national origin, age, disability sex, sexual orientation or gender identity.            Thank you!     Thank you for choosing " [facility]  for your care. Our goal is always to provide you with excellent care. Hearing back from our patients is one way we can continue to improve our services. Please take a few minutes to complete the written survey that you may receive in the mail after your visit with us. Thank you!             Your Updated Medication List - Protect others around you: Learn how to safely use, store and throw away your medicines at www.disposemymeds.org.          This list is accurate as of: 7/25/17 10:21 AM.  Always use your most recent med list.                   Brand Name Dispense Instructions for use Diagnosis    ALEVE 220 MG tablet   Generic drug:  naproxen sodium      Take 220 mg by mouth 2 times daily as needed.        atorvastatin 80 MG tablet    LIPITOR    30 tablet    Take 1 tablet (80 mg) by mouth daily    Hypercholesterolemia       LORazepam 0.5 MG tablet    ATIVAN    30 tablet    Take 1 tablet (0.5 mg) by mouth every 8 hours as needed for anxiety    Anxiety       oxyCODONE-acetaminophen 5-325 MG per tablet    PERCOCET    60 tablet    Take 1-2 tablets by mouth every 4 hours as needed for pain (moderate to severe)    Ingrowing left great toenail       TORADOL ORAL 10 MG tablet   Generic drug:  ketorolac      Reported on 3/17/2017 1 tablet daily when needed per patient        ZYRTEC 10 MG tablet   Generic drug:  cetirizine      1 TABLET DAILY

## 2017-07-25 NOTE — PATIENT INSTRUCTIONS
Thank you for choosing [facility].  You may be receiving a survey in the mail from Ramu Wick regarding your visit today.  Please take a few minutes to complete and return the survey to let us know how we are doing.      If you have questions or concerns, please contact us via [employer] or you can contact your care team at [phone].    Our Clinic hours are:  Monday 6:40 am  to 7:00 pm  Tuesday -Friday 6:40 am to 5:00 pm    The [place] outpatient lab hours are:  Monday - Friday 6:10 am to 4:45 pm  Saturdays 7:00 am to 11:00 am  Appointments are required, call [phone]    If you have clinical questions after hours or would like to schedule an appointment,  call the clinic at [phone].

## 2017-07-26 ASSESSMENT — PATIENT HEALTH QUESTIONNAIRE - PHQ9: SUM OF ALL RESPONSES TO PHQ QUESTIONS 1-9: 7

## 2017-07-26 ASSESSMENT — ANXIETY QUESTIONNAIRES: GAD7 TOTAL SCORE: 7

## 2017-11-10 ENCOUNTER — APPOINTMENT (OUTPATIENT)
Dept: CT IMAGING | Facility: CLINIC | Age: 47
End: 2017-11-10
Attending: PHYSICIAN ASSISTANT
Payer: COMMERCIAL

## 2017-11-10 ENCOUNTER — HOSPITAL ENCOUNTER (EMERGENCY)
Facility: CLINIC | Age: 47
Discharge: ANOTHER HOSPITAL | End: 2017-11-10
Attending: PHYSICIAN ASSISTANT | Admitting: PHYSICIAN ASSISTANT
Payer: COMMERCIAL

## 2017-11-10 ENCOUNTER — SURGERY (OUTPATIENT)
Age: 47
End: 2017-11-10

## 2017-11-10 ENCOUNTER — ANESTHESIA (OUTPATIENT)
Dept: SURGERY | Facility: CLINIC | Age: 47
DRG: 660 | End: 2017-11-10
Payer: COMMERCIAL

## 2017-11-10 ENCOUNTER — HOSPITAL ENCOUNTER (INPATIENT)
Facility: CLINIC | Age: 47
LOS: 1 days | Discharge: HOME | DRG: 660 | End: 2017-11-11
Attending: UROLOGY | Admitting: UROLOGY
Payer: COMMERCIAL

## 2017-11-10 ENCOUNTER — APPOINTMENT (OUTPATIENT)
Dept: GENERAL RADIOLOGY | Facility: CLINIC | Age: 47
DRG: 660 | End: 2017-11-10
Attending: UROLOGY
Payer: COMMERCIAL

## 2017-11-10 ENCOUNTER — ANESTHESIA EVENT (OUTPATIENT)
Dept: SURGERY | Facility: CLINIC | Age: 47
DRG: 660 | End: 2017-11-10
Payer: COMMERCIAL

## 2017-11-10 VITALS
TEMPERATURE: 98.4 F | DIASTOLIC BLOOD PRESSURE: 61 MMHG | OXYGEN SATURATION: 98 % | SYSTOLIC BLOOD PRESSURE: 108 MMHG | RESPIRATION RATE: 18 BRPM

## 2017-11-10 DIAGNOSIS — N17.9 ACUTE KIDNEY INJURY (H): ICD-10-CM

## 2017-11-10 DIAGNOSIS — E87.5 HYPERKALEMIA: ICD-10-CM

## 2017-11-10 DIAGNOSIS — N13.2 HYDRONEPHROSIS WITH URINARY OBSTRUCTION DUE TO URETERAL CALCULUS: Primary | ICD-10-CM

## 2017-11-10 DIAGNOSIS — N20.0 CALCULUS OF KIDNEY: Primary | ICD-10-CM

## 2017-11-10 PROBLEM — N20.1 URETERAL STONE: Status: ACTIVE | Noted: 2017-11-10

## 2017-11-10 LAB
ALBUMIN SERPL-MCNC: 3.3 G/DL (ref 3.4–5)
ALP SERPL-CCNC: 168 U/L (ref 40–150)
ALT SERPL W P-5'-P-CCNC: 27 U/L (ref 0–50)
ANION GAP SERPL CALCULATED.3IONS-SCNC: 8 MMOL/L (ref 3–14)
AST SERPL W P-5'-P-CCNC: 49 U/L (ref 0–45)
BASOPHILS # BLD AUTO: 0 10E9/L (ref 0–0.2)
BASOPHILS NFR BLD AUTO: 0.1 %
BILIRUB SERPL-MCNC: 0.6 MG/DL (ref 0.2–1.3)
BUN SERPL-MCNC: 29 MG/DL (ref 7–30)
CALCIUM SERPL-MCNC: 8.8 MG/DL (ref 8.5–10.1)
CHLORIDE SERPL-SCNC: 110 MMOL/L (ref 94–109)
CO2 SERPL-SCNC: 19 MMOL/L (ref 20–32)
CREAT SERPL-MCNC: 2.32 MG/DL (ref 0.52–1.04)
DIFFERENTIAL METHOD BLD: ABNORMAL
EOSINOPHIL # BLD AUTO: 0 10E9/L (ref 0–0.7)
EOSINOPHIL NFR BLD AUTO: 0.1 %
ERYTHROCYTE [DISTWIDTH] IN BLOOD BY AUTOMATED COUNT: 15.1 % (ref 10–15)
GFR SERPL CREATININE-BSD FRML MDRD: 23 ML/MIN/1.7M2
GLUCOSE SERPL-MCNC: 122 MG/DL (ref 70–99)
HCT VFR BLD AUTO: 46.6 % (ref 35–47)
HGB BLD-MCNC: 15.2 G/DL (ref 11.7–15.7)
IMM GRANULOCYTES # BLD: 0 10E9/L (ref 0–0.4)
IMM GRANULOCYTES NFR BLD: 0.2 %
LYMPHOCYTES # BLD AUTO: 1.4 10E9/L (ref 0.8–5.3)
LYMPHOCYTES NFR BLD AUTO: 8.6 %
MCH RBC QN AUTO: 28.5 PG (ref 26.5–33)
MCHC RBC AUTO-ENTMCNC: 32.6 G/DL (ref 31.5–36.5)
MCV RBC AUTO: 87 FL (ref 78–100)
MONOCYTES # BLD AUTO: 0.7 10E9/L (ref 0–1.3)
MONOCYTES NFR BLD AUTO: 4.4 %
NEUTROPHILS # BLD AUTO: 14.2 10E9/L (ref 1.6–8.3)
NEUTROPHILS NFR BLD AUTO: 86.6 %
PLATELET # BLD AUTO: 156 10E9/L (ref 150–450)
POTASSIUM SERPL-SCNC: 5.6 MMOL/L (ref 3.4–5.3)
PROT SERPL-MCNC: 7.4 G/DL (ref 6.8–8.8)
RBC # BLD AUTO: 5.33 10E12/L (ref 3.8–5.2)
SODIUM SERPL-SCNC: 137 MMOL/L (ref 133–144)
WBC # BLD AUTO: 16.4 10E9/L (ref 4–11)

## 2017-11-10 PROCEDURE — 51798 US URINE CAPACITY MEASURE: CPT

## 2017-11-10 PROCEDURE — 25000128 H RX IP 250 OP 636: Performed by: NURSE ANESTHETIST, CERTIFIED REGISTERED

## 2017-11-10 PROCEDURE — 96374 THER/PROPH/DIAG INJ IV PUSH: CPT

## 2017-11-10 PROCEDURE — 25000128 H RX IP 250 OP 636: Performed by: PHYSICIAN ASSISTANT

## 2017-11-10 PROCEDURE — 40000170 ZZH STATISTIC PRE-PROCEDURE ASSESSMENT II: Performed by: UROLOGY

## 2017-11-10 PROCEDURE — 71000014 ZZH RECOVERY PHASE 1 LEVEL 2 FIRST HR: Performed by: UROLOGY

## 2017-11-10 PROCEDURE — 85025 COMPLETE CBC W/AUTO DIFF WBC: CPT | Performed by: PHYSICIAN ASSISTANT

## 2017-11-10 PROCEDURE — 80053 COMPREHEN METABOLIC PANEL: CPT | Performed by: PHYSICIAN ASSISTANT

## 2017-11-10 PROCEDURE — 96375 TX/PRO/DX INJ NEW DRUG ADDON: CPT

## 2017-11-10 PROCEDURE — C1769 GUIDE WIRE: HCPCS | Performed by: UROLOGY

## 2017-11-10 PROCEDURE — 27210794 ZZH OR GENERAL SUPPLY STERILE: Performed by: UROLOGY

## 2017-11-10 PROCEDURE — 25000128 H RX IP 250 OP 636

## 2017-11-10 PROCEDURE — 74176 CT ABD & PELVIS W/O CONTRAST: CPT

## 2017-11-10 PROCEDURE — 40000278 XR SURGERY CARM FLUORO LESS THAN 5 MIN: Mod: TC

## 2017-11-10 PROCEDURE — 36000053 ZZH SURGERY LEVEL 2 EA 15 ADDTL MIN - UMMC: Performed by: UROLOGY

## 2017-11-10 PROCEDURE — 99285 EMERGENCY DEPT VISIT HI MDM: CPT | Mod: 25

## 2017-11-10 PROCEDURE — 37000008 ZZH ANESTHESIA TECHNICAL FEE, 1ST 30 MIN: Performed by: UROLOGY

## 2017-11-10 PROCEDURE — 25000566 ZZH SEVOFLURANE, EA 15 MIN: Performed by: UROLOGY

## 2017-11-10 PROCEDURE — 37000009 ZZH ANESTHESIA TECHNICAL FEE, EACH ADDTL 15 MIN: Performed by: UROLOGY

## 2017-11-10 PROCEDURE — 0T748DZ DILATION OF LEFT KIDNEY PELVIS WITH INTRALUMINAL DEVICE, VIA NATURAL OR ARTIFICIAL OPENING ENDOSCOPIC: ICD-10-PCS | Performed by: UROLOGY

## 2017-11-10 PROCEDURE — 36000055 ZZH SURGERY LEVEL 2 W FLUORO 1ST 30 MIN - UMMC: Performed by: UROLOGY

## 2017-11-10 PROCEDURE — 25800025 ZZH RX 258: Performed by: UROLOGY

## 2017-11-10 PROCEDURE — 87088 URINE BACTERIA CULTURE: CPT | Performed by: UROLOGY

## 2017-11-10 PROCEDURE — 96361 HYDRATE IV INFUSION ADD-ON: CPT | Mod: 59

## 2017-11-10 PROCEDURE — C2617 STENT, NON-COR, TEM W/O DEL: HCPCS | Performed by: UROLOGY

## 2017-11-10 PROCEDURE — 25500064 ZZH RX 255 OP 636: Performed by: UROLOGY

## 2017-11-10 PROCEDURE — 25000128 H RX IP 250 OP 636: Performed by: UROLOGY

## 2017-11-10 PROCEDURE — 87186 SC STD MICRODIL/AGAR DIL: CPT | Performed by: UROLOGY

## 2017-11-10 PROCEDURE — 71000015 ZZH RECOVERY PHASE 1 LEVEL 2 EA ADDTL HR: Performed by: UROLOGY

## 2017-11-10 PROCEDURE — 87086 URINE CULTURE/COLONY COUNT: CPT | Performed by: UROLOGY

## 2017-11-10 PROCEDURE — 12000001 ZZH R&B MED SURG/OB UMMC

## 2017-11-10 PROCEDURE — 99285 EMERGENCY DEPT VISIT HI MDM: CPT | Mod: Z6 | Performed by: EMERGENCY MEDICINE

## 2017-11-10 PROCEDURE — 96376 TX/PRO/DX INJ SAME DRUG ADON: CPT

## 2017-11-10 PROCEDURE — 25000125 ZZHC RX 250: Performed by: NURSE ANESTHETIST, CERTIFIED REGISTERED

## 2017-11-10 DEVICE — STENT URETERAL PERCUFLEX PLUS 7FRX26CM M0061752730: Type: IMPLANTABLE DEVICE | Site: URETER | Status: FUNCTIONAL

## 2017-11-10 RX ORDER — LIDOCAINE HYDROCHLORIDE 20 MG/ML
INJECTION, SOLUTION INFILTRATION; PERINEURAL PRN
Status: DISCONTINUED | OUTPATIENT
Start: 2017-11-10 | End: 2017-11-10

## 2017-11-10 RX ORDER — ACETAMINOPHEN 325 MG/1
975 TABLET ORAL EVERY 8 HOURS
Status: DISCONTINUED | OUTPATIENT
Start: 2017-11-10 | End: 2017-11-11 | Stop reason: HOSPADM

## 2017-11-10 RX ORDER — SODIUM CHLORIDE 9 MG/ML
1000 INJECTION, SOLUTION INTRAVENOUS CONTINUOUS
Status: DISCONTINUED | OUTPATIENT
Start: 2017-11-10 | End: 2017-11-10 | Stop reason: HOSPADM

## 2017-11-10 RX ORDER — NALOXONE HYDROCHLORIDE 0.4 MG/ML
.1-.4 INJECTION, SOLUTION INTRAMUSCULAR; INTRAVENOUS; SUBCUTANEOUS
Status: DISCONTINUED | OUTPATIENT
Start: 2017-11-10 | End: 2017-11-11 | Stop reason: HOSPADM

## 2017-11-10 RX ORDER — LORAZEPAM 0.5 MG/1
0.5 TABLET ORAL EVERY 8 HOURS PRN
Status: DISCONTINUED | OUTPATIENT
Start: 2017-11-10 | End: 2017-11-11 | Stop reason: HOSPADM

## 2017-11-10 RX ORDER — ONDANSETRON 2 MG/ML
INJECTION INTRAMUSCULAR; INTRAVENOUS PRN
Status: DISCONTINUED | OUTPATIENT
Start: 2017-11-10 | End: 2017-11-10

## 2017-11-10 RX ORDER — MEPERIDINE HYDROCHLORIDE 25 MG/ML
12.5 INJECTION INTRAMUSCULAR; INTRAVENOUS; SUBCUTANEOUS
Status: DISCONTINUED | OUTPATIENT
Start: 2017-11-10 | End: 2017-11-10 | Stop reason: HOSPADM

## 2017-11-10 RX ORDER — PROCHLORPERAZINE MALEATE 5 MG
10 TABLET ORAL EVERY 6 HOURS PRN
Status: DISCONTINUED | OUTPATIENT
Start: 2017-11-10 | End: 2017-11-11 | Stop reason: HOSPADM

## 2017-11-10 RX ORDER — ONDANSETRON 4 MG/1
4 TABLET, ORALLY DISINTEGRATING ORAL EVERY 6 HOURS PRN
Status: DISCONTINUED | OUTPATIENT
Start: 2017-11-10 | End: 2017-11-11 | Stop reason: HOSPADM

## 2017-11-10 RX ORDER — OXYCODONE HYDROCHLORIDE 5 MG/1
5-10 TABLET ORAL
Status: DISCONTINUED | OUTPATIENT
Start: 2017-11-10 | End: 2017-11-11 | Stop reason: HOSPADM

## 2017-11-10 RX ORDER — ONDANSETRON 4 MG/1
4 TABLET, ORALLY DISINTEGRATING ORAL EVERY 30 MIN PRN
Status: DISCONTINUED | OUTPATIENT
Start: 2017-11-10 | End: 2017-11-10 | Stop reason: HOSPADM

## 2017-11-10 RX ORDER — CEFAZOLIN SODIUM 1 G/3ML
1 INJECTION, POWDER, FOR SOLUTION INTRAMUSCULAR; INTRAVENOUS
Status: COMPLETED | OUTPATIENT
Start: 2017-11-10 | End: 2017-11-10

## 2017-11-10 RX ORDER — SODIUM CHLORIDE 9 MG/ML
INJECTION, SOLUTION INTRAVENOUS CONTINUOUS PRN
Status: DISCONTINUED | OUTPATIENT
Start: 2017-11-10 | End: 2017-11-10

## 2017-11-10 RX ORDER — CIPROFLOXACIN 500 MG/1
500 TABLET, FILM COATED ORAL EVERY 12 HOURS SCHEDULED
Status: DISCONTINUED | OUTPATIENT
Start: 2017-11-11 | End: 2017-11-11

## 2017-11-10 RX ORDER — HYDROMORPHONE HYDROCHLORIDE 1 MG/ML
.3-.5 INJECTION, SOLUTION INTRAMUSCULAR; INTRAVENOUS; SUBCUTANEOUS
Status: DISCONTINUED | OUTPATIENT
Start: 2017-11-10 | End: 2017-11-11 | Stop reason: HOSPADM

## 2017-11-10 RX ORDER — PHENYLEPHRINE HCL IN 0.9% NACL 1 MG/10 ML
SYRINGE (ML) INTRAVENOUS PRN
Status: DISCONTINUED | OUTPATIENT
Start: 2017-11-10 | End: 2017-11-10

## 2017-11-10 RX ORDER — EPHEDRINE SULFATE 50 MG/ML
INJECTION, SOLUTION INTRAMUSCULAR; INTRAVENOUS; SUBCUTANEOUS PRN
Status: DISCONTINUED | OUTPATIENT
Start: 2017-11-10 | End: 2017-11-10

## 2017-11-10 RX ORDER — HYDROMORPHONE HYDROCHLORIDE 1 MG/ML
0.5 INJECTION, SOLUTION INTRAMUSCULAR; INTRAVENOUS; SUBCUTANEOUS
Status: COMPLETED | OUTPATIENT
Start: 2017-11-10 | End: 2017-11-10

## 2017-11-10 RX ORDER — CEFAZOLIN SODIUM 1 G/3ML
1 INJECTION, POWDER, FOR SOLUTION INTRAMUSCULAR; INTRAVENOUS SEE ADMIN INSTRUCTIONS
Status: DISCONTINUED | OUTPATIENT
Start: 2017-11-10 | End: 2017-11-10 | Stop reason: HOSPADM

## 2017-11-10 RX ORDER — LIDOCAINE 40 MG/G
CREAM TOPICAL
Status: DISCONTINUED | OUTPATIENT
Start: 2017-11-10 | End: 2017-11-11 | Stop reason: HOSPADM

## 2017-11-10 RX ORDER — PROPOFOL 10 MG/ML
INJECTION, EMULSION INTRAVENOUS PRN
Status: DISCONTINUED | OUTPATIENT
Start: 2017-11-10 | End: 2017-11-10

## 2017-11-10 RX ORDER — FENTANYL CITRATE 50 UG/ML
25-50 INJECTION, SOLUTION INTRAMUSCULAR; INTRAVENOUS
Status: DISCONTINUED | OUTPATIENT
Start: 2017-11-10 | End: 2017-11-10 | Stop reason: HOSPADM

## 2017-11-10 RX ORDER — KETOROLAC TROMETHAMINE 30 MG/ML
30 INJECTION, SOLUTION INTRAMUSCULAR; INTRAVENOUS ONCE
Status: COMPLETED | OUTPATIENT
Start: 2017-11-10 | End: 2017-11-10

## 2017-11-10 RX ORDER — HYDROMORPHONE HYDROCHLORIDE 1 MG/ML
INJECTION, SOLUTION INTRAMUSCULAR; INTRAVENOUS; SUBCUTANEOUS
Status: COMPLETED
Start: 2017-11-10 | End: 2017-11-10

## 2017-11-10 RX ORDER — ACETAMINOPHEN 325 MG/1
650 TABLET ORAL EVERY 4 HOURS PRN
Status: DISCONTINUED | OUTPATIENT
Start: 2017-11-13 | End: 2017-11-11 | Stop reason: HOSPADM

## 2017-11-10 RX ORDER — ONDANSETRON 2 MG/ML
4 INJECTION INTRAMUSCULAR; INTRAVENOUS EVERY 6 HOURS PRN
Status: DISCONTINUED | OUTPATIENT
Start: 2017-11-10 | End: 2017-11-11 | Stop reason: HOSPADM

## 2017-11-10 RX ORDER — NALOXONE HYDROCHLORIDE 0.4 MG/ML
.1-.4 INJECTION, SOLUTION INTRAMUSCULAR; INTRAVENOUS; SUBCUTANEOUS
Status: DISCONTINUED | OUTPATIENT
Start: 2017-11-10 | End: 2017-11-10 | Stop reason: HOSPADM

## 2017-11-10 RX ORDER — METOCLOPRAMIDE HYDROCHLORIDE 5 MG/ML
10 INJECTION INTRAMUSCULAR; INTRAVENOUS EVERY 6 HOURS PRN
Status: DISCONTINUED | OUTPATIENT
Start: 2017-11-10 | End: 2017-11-11 | Stop reason: HOSPADM

## 2017-11-10 RX ORDER — FENTANYL CITRATE 50 UG/ML
INJECTION, SOLUTION INTRAMUSCULAR; INTRAVENOUS PRN
Status: DISCONTINUED | OUTPATIENT
Start: 2017-11-10 | End: 2017-11-10

## 2017-11-10 RX ORDER — SODIUM CHLORIDE 9 MG/ML
INJECTION, SOLUTION INTRAVENOUS CONTINUOUS
Status: DISCONTINUED | OUTPATIENT
Start: 2017-11-10 | End: 2017-11-11 | Stop reason: HOSPADM

## 2017-11-10 RX ORDER — METOCLOPRAMIDE 5 MG/1
10 TABLET ORAL EVERY 6 HOURS PRN
Status: DISCONTINUED | OUTPATIENT
Start: 2017-11-10 | End: 2017-11-11 | Stop reason: HOSPADM

## 2017-11-10 RX ORDER — SODIUM CHLORIDE, SODIUM LACTATE, POTASSIUM CHLORIDE, CALCIUM CHLORIDE 600; 310; 30; 20 MG/100ML; MG/100ML; MG/100ML; MG/100ML
INJECTION, SOLUTION INTRAVENOUS CONTINUOUS
Status: DISCONTINUED | OUTPATIENT
Start: 2017-11-10 | End: 2017-11-10 | Stop reason: HOSPADM

## 2017-11-10 RX ORDER — ONDANSETRON 2 MG/ML
4 INJECTION INTRAMUSCULAR; INTRAVENOUS EVERY 30 MIN PRN
Status: DISCONTINUED | OUTPATIENT
Start: 2017-11-10 | End: 2017-11-10 | Stop reason: HOSPADM

## 2017-11-10 RX ORDER — HYDROMORPHONE HYDROCHLORIDE 1 MG/ML
.3-.5 INJECTION, SOLUTION INTRAMUSCULAR; INTRAVENOUS; SUBCUTANEOUS EVERY 10 MIN PRN
Status: DISCONTINUED | OUTPATIENT
Start: 2017-11-10 | End: 2017-11-10 | Stop reason: HOSPADM

## 2017-11-10 RX ORDER — DIPHENHYDRAMINE HYDROCHLORIDE 50 MG/ML
25 INJECTION INTRAMUSCULAR; INTRAVENOUS EVERY 6 HOURS PRN
Status: DISCONTINUED | OUTPATIENT
Start: 2017-11-10 | End: 2017-11-11 | Stop reason: HOSPADM

## 2017-11-10 RX ORDER — DIPHENHYDRAMINE HCL 25 MG
25 CAPSULE ORAL EVERY 6 HOURS PRN
Status: DISCONTINUED | OUTPATIENT
Start: 2017-11-10 | End: 2017-11-11 | Stop reason: HOSPADM

## 2017-11-10 RX ADMIN — Medication 50 MCG: at 19:54

## 2017-11-10 RX ADMIN — KETOROLAC TROMETHAMINE 30 MG: 30 INJECTION, SOLUTION INTRAMUSCULAR at 11:01

## 2017-11-10 RX ADMIN — CEFAZOLIN 1 G: 1 INJECTION, POWDER, FOR SOLUTION INTRAMUSCULAR; INTRAVENOUS at 19:38

## 2017-11-10 RX ADMIN — MIDAZOLAM HYDROCHLORIDE 2 MG: 1 INJECTION, SOLUTION INTRAMUSCULAR; INTRAVENOUS at 19:24

## 2017-11-10 RX ADMIN — Medication 100 MCG: at 19:41

## 2017-11-10 RX ADMIN — ONDANSETRON 4 MG: 2 INJECTION INTRAMUSCULAR; INTRAVENOUS at 11:01

## 2017-11-10 RX ADMIN — IOTHALAMATE MEGLUMINE 5 ML: 600 INJECTION INTRAVASCULAR at 20:15

## 2017-11-10 RX ADMIN — Medication 100 MCG: at 19:51

## 2017-11-10 RX ADMIN — Medication 100 MCG: at 19:48

## 2017-11-10 RX ADMIN — Medication 50 MCG: at 19:55

## 2017-11-10 RX ADMIN — HYDROMORPHONE HYDROCHLORIDE 0.5 MG: 1 INJECTION, SOLUTION INTRAMUSCULAR; INTRAVENOUS; SUBCUTANEOUS at 15:28

## 2017-11-10 RX ADMIN — SODIUM CHLORIDE 900 ML: 900 IRRIGANT IRRIGATION at 20:15

## 2017-11-10 RX ADMIN — HYDROMORPHONE HYDROCHLORIDE 0.5 MG: 1 INJECTION, SOLUTION INTRAMUSCULAR; INTRAVENOUS; SUBCUTANEOUS at 13:36

## 2017-11-10 RX ADMIN — SODIUM CHLORIDE 1000 ML: 9 INJECTION, SOLUTION INTRAVENOUS at 13:33

## 2017-11-10 RX ADMIN — Medication 100 MCG: at 19:42

## 2017-11-10 RX ADMIN — HYDROMORPHONE HYDROCHLORIDE 0.5 MG: 1 INJECTION, SOLUTION INTRAMUSCULAR; INTRAVENOUS; SUBCUTANEOUS at 17:43

## 2017-11-10 RX ADMIN — SODIUM CHLORIDE 1000 ML: 9 INJECTION, SOLUTION INTRAVENOUS at 11:00

## 2017-11-10 RX ADMIN — SODIUM CHLORIDE: 9 INJECTION, SOLUTION INTRAVENOUS at 19:24

## 2017-11-10 RX ADMIN — FENTANYL CITRATE 50 MCG: 50 INJECTION, SOLUTION INTRAMUSCULAR; INTRAVENOUS at 19:34

## 2017-11-10 RX ADMIN — LIDOCAINE HYDROCHLORIDE 100 MG: 20 INJECTION, SOLUTION INFILTRATION; PERINEURAL at 19:34

## 2017-11-10 RX ADMIN — PROPOFOL 150 MG: 10 INJECTION, EMULSION INTRAVENOUS at 19:34

## 2017-11-10 RX ADMIN — ONDANSETRON 4 MG: 2 INJECTION INTRAMUSCULAR; INTRAVENOUS at 19:59

## 2017-11-10 RX ADMIN — Medication 5 MG: at 19:42

## 2017-11-10 RX ADMIN — Medication 100 MCG: at 19:39

## 2017-11-10 RX ADMIN — HYDROMORPHONE HYDROCHLORIDE 0.5 MG: 1 INJECTION, SOLUTION INTRAMUSCULAR; INTRAVENOUS; SUBCUTANEOUS at 11:04

## 2017-11-10 ASSESSMENT — ENCOUNTER SYMPTOMS
CONSTITUTIONAL NEGATIVE: 1
FEVER: 0
FLANK PAIN: 1
NEUROLOGICAL NEGATIVE: 1
DIFFICULTY URINATING: 1
NAUSEA: 1
RESPIRATORY NEGATIVE: 1
VOMITING: 0
ABDOMINAL PAIN: 0
CARDIOVASCULAR NEGATIVE: 1
BACK PAIN: 1

## 2017-11-10 ASSESSMENT — LIFESTYLE VARIABLES: TOBACCO_USE: 1

## 2017-11-10 NOTE — ED NOTES
PT OOB to bathroom to attempt to provide urine sample.  PT unable to urinate at this time.   All needs are being met and all comfort measures are being addressed. Awaiting Transfer at this time. I will continue to assess and monitor PT.

## 2017-11-10 NOTE — IP AVS SNAPSHOT
[facility]    [address]    [place] [address]    Phone:  [phone]                                       After Visit Summary   11/10/2017    Latoya Riddle    MRN: [id number]           After Visit Summary Signature Page     I have received my discharge instructions, and my questions have been answered. I have discussed any challenges I see with this plan with the nurse or doctor.    ..........................................................................................................................................  Patient/Patient Representative Signature      ..........................................................................................................................................  Patient Representative Print Name and Relationship to Patient    ..................................................               ................................................  Date                                            Time    ..........................................................................................................................................  Reviewed by Signature/Title    ...................................................              ..............................................  Date                                                            Time

## 2017-11-10 NOTE — ED NOTES
Report called to J[facility] C[facility]SAR [facility]  and all questions answered. She is to assume care of PT in [facility] upon arrival.

## 2017-11-10 NOTE — IP AVS SNAPSHOT
MRN:[id number]                      After Visit Summary   11/10/2017    Latoya Riddle    MRN: [id number]           Thank you!     Thank you for choosing [facility] for your care. Our goal is always to provide you with excellent care. Hearing back from our patients is one way we can continue to improve our services. Please take a few minutes to complete the written survey that you may receive in the mail after you visit with us. Thank you!        Patient Information     Date Of Birth          1970        About your hospital stay     You were admitted on:  November 10, 2017 You last received care in the:  [facility]    You were discharged on:  November 11, 2017        Reason for your hospital stay       Kidney stone                  Who to Call     For medical emergencies, please call 911.  For non-urgent questions about your medical care, please call your primary care provider or clinic, [phone]  For questions related to your surgery, please call your surgery clinic        Attending Provider     Provider Yg Stuart MD Urology       Primary Care Provider Office Phone # Fax #    Maribeth MICHELLE Cm [facility] [phone]      After Care Instructions     Discharge Instructions       DIET:  -Regular    DISCHARGE ACTIVITY  - return to normal activity only limited by hematuria (blood in urine) that gets worse with increased activity due to the stent - this is normal  - Do not drive until you can press the brake pedal quickly and fully without pain.   - Do not operate a motor vehicle while taking narcotic pain medications.     MEDICATIONS   - Do not take any additional narcotics while using oxycodone or dilaudid   - Do not take more than 4,000mg of Tylenol (acetaminophen) in any 24 hour period, as this can cause liver damage.  - Take stool softeners such as Senna while you are using narcotics, but stop if you develop diarrhea.   - Wean yourself off  of narcotic pain medications.   - flomax may help you with your stent symptoms    STENT  INSTRUCTIONS:   - You have a ureteral stent in place. You can expect some flank discomfort on that side, possibly worse with urination, and you may experience the urge to void more frequently. You may experience burning in your urethra with urination as well. Continue to take medications as prescribed to reduce stent discomfort. Drink 2-3L of water a day to keep your urine clear to light pink in color. Some blood in your urine can be expected but should clear with increased water consumption as instructed. Call for thickening red urine, or inability to void.     FOLLOW-UP:   - Follow up with Dr. Rodriguez in 2-4 weeks for removal of your ureteral stone. Ureteral stents, if left in long term, can result in numerous complications, including encrustation, infection, renal failure and even possibly death. Hence it is critical that you follow up to have your stent removed.  - Call your primary care provider to touch base regarding your recent admission.     - Call or return sooner than your regularly scheduled visit if you develop any of the following:  fever, uncontrolled pain, uncontrolled nausea or vomiting, as well as worsening blood in the urine or inability to urinate (pee). You may call the Urology Clinic during daytime hours at [phone].  If after hours, call [phone] and ask to speak with the Urology resident on call.                  Pending Results     Date and Time Order Name Status Description    11/10/2017 1952 Urine Culture Aerobic Bacterial Preliminary             Statement of Approval     Ordered          11/11/17 1118  I have reviewed and agree with all the recommendations and orders detailed in this document.  EFFECTIVE NOW     Approved and electronically signed by:  Misael Winston MD             Admission Information     Date & Time Provider Department Dept. Phone    11/10/2017 Yg Rodriguez MD [facility]  [facility] [phone]      Your Vitals Were     Blood Pressure Temperature Respirations Last Period Pulse Oximetry       116/67 (BP Location: Right arm) 98.8  F (37.1  C) (Oral) 18 07/02/2013 97%       [employer] Information     [employer] gives you secure access to your electronic health record. If you see a primary care provider, you can also send messages to your care team and make appointments. If you have questions, please call your primary care clinic.  If you do not have a primary care provider, please call [phone] and they will assist you.        Care EveryWhere ID     This is your Care EveryWhere ID. This could be used by other organizations to access your [place] medical records  [id number]        Equal Access to Services     LUIZA PAREDES : Candis Dennis, fabiana butler, eboni mckenna, carmen concepcion. So [facility] [phone].    ATENCIÓN: Si habla español, tiene a bustos disposición servicios gratuitos de asistencia lingüística. Llame al [phone].    We comply with applicable federal civil rights laws and [place] laws. We do not discriminate on the basis of race, color, national origin, age, disability, sex, sexual orientation, or gender identity.               Review of your medicines      START taking        Dose / Directions    acetaminophen 325 MG tablet   Commonly known as:  TYLENOL   Used for:  Calculus of kidney        Dose:  325-650 mg   Take 1-2 tablets (325-650 mg) by mouth every 6 hours as needed for other (surgical pain)   Quantity:  60 tablet   Refills:  0       ciprofloxacin 250 MG tablet   Commonly known as:  CIPRO   Indication:  Urinary Tract Infection   Used for:  Calculus of kidney        Dose:  250 mg   Take 1 tablet (250 mg) by mouth daily for 10 days   Quantity:  10 tablet   Refills:  0       oxyCODONE IR 5 MG tablet   Commonly known as:  ROXICODONE   Used for:  Calculus of kidney        Dose:  5-10 mg   Take 1-2 tablets (5-10 mg) by  mouth every 6 hours as needed for moderate to severe pain Do NOT drive or use additional narcotics while using this medication   Quantity:  20 tablet   Refills:  0         CONTINUE these medicines which have NOT CHANGED        Dose / Directions    ALEVE 220 MG tablet   Generic drug:  naproxen sodium        Dose:  220 mg   Take 220 mg by mouth 2 times daily as needed.   Refills:  0       LORazepam 0.5 MG tablet   Commonly known as:  ATIVAN   Used for:  Anxiety        Dose:  0.5 mg   Take 1 tablet (0.5 mg) by mouth every 8 hours as needed for anxiety   Quantity:  30 tablet   Refills:  1       ZYRTEC 10 MG tablet   Generic drug:  cetirizine        1 TABLET DAILY   Refills:  0         STOP taking     oxyCODONE-acetaminophen 5-325 MG per tablet   Commonly known as:  PERCOCET                Where to get your medicines      These medications were sent to [facility] [place], [place] - [address]  [address] [facility] [address] [address]     Phone:  [phone]     acetaminophen 325 MG tablet    ciprofloxacin 250 MG tablet         Some of these will need a paper prescription and others can be bought over the counter. Ask your nurse if you have questions.     Bring a paper prescription for each of these medications     oxyCODONE IR 5 MG tablet               ANTIBIOTIC INSTRUCTION     You've Been Prescribed an Antibiotic - Now What?  Your healthcare team thinks that you or your loved one might have an infection. Some infections can be treated with antibiotics, which are powerful, life-saving drugs. Like all medications, antibiotics have side effects and should only be used when necessary. There are some important things you should know about your antibiotic treatment.      Your healthcare team may run tests before you start taking an antibiotic.    Your team may take samples (e.g., from your blood, urine or other areas) to run tests to look for bacteria. These test can be important to determine if  you need an antibiotic at all and, if you do, which antibiotic will work best.      Within a few days, your healthcare team might change or even stop your antibiotic.    Your team may start you on an antibiotic while they are working to find out what is making you sick.    Your team might change your antibiotic because test results show that a different antibiotic would be better to treat your infection.    In some cases, once your team has more information, they learn that you do not need an antibiotic at all. They may find out that you don't have an infection, or that the antibiotic you're taking won't work against your infection. For example, an infection caused by a virus can't be treated with antibiotics. Staying on an antibiotic when you don't need it is more likely to be harmful than helpful.      You may experience side effects from your antibiotic.    Like all medications, antibiotics have side effects. Some of these can be serious.    Let you healthcare team know if you have any known allergies when you are admitted to the hospital.    One significant side effect of nearly all antibiotics is the risk of severe and sometimes deadly diarrhea caused by Clostridium difficile (C. Difficile). This occurs when a person takes antibiotics because some good germs are destroyed. Antibiotic use allows C. diificile to take over, putting patients at high risk for this serious infection.    As a patient or caregiver, it is important to understand your or your loved one's antibiotic treatment. It is especially important for caregivers to speak up when patients can't speak for themselves. Here are some important questions to ask your healthcare team.    What infection is this antibiotic treating and how do you know I have that infection?    What side effects might occur from this antibiotic?    How long will I need to take this antibiotic?    Is it safe to take this antibiotic with other medications or supplements (e.g.,  vitamins) that I am taking?     Are there any special directions I need to know about taking this antibiotic? For example, should I take it with food?    How will I be monitored to know whether my infection is responding to the antibiotic?    What tests may help to make sure the right antibiotic is prescribed for me?      Information provided by:  www.cdc.gov/getsmart  U.S. Department of Health and Human Services  Centers for disease Control and Prevention  National Center for Emerging and Zoonotic Infectious Diseases  Division of Healthcare Quality Promotion         Protect others around you: Learn how to safely use, store and throw away your medicines at www.disposemymeds.org.             Medication List: This is a list of all your medications and when to take them. Check marks below indicate your daily home schedule. Keep this list as a reference.      Medications           Morning Afternoon Evening Bedtime As Needed    acetaminophen 325 MG tablet   Commonly known as:  TYLENOL   Take 1-2 tablets (325-650 mg) by mouth every 6 hours as needed for other (surgical pain)   Last time this was given:  975 mg on 11/11/2017  5:30 AM                                   ALEVE 220 MG tablet   Take 220 mg by mouth 2 times daily as needed.   Generic drug:  naproxen sodium                                   ciprofloxacin 250 MG tablet   Commonly known as:  CIPRO   Take 1 tablet (250 mg) by mouth daily for 10 days   Last time this was given:  500 mg on 11/11/2017  8:12 AM                                   LORazepam 0.5 MG tablet   Commonly known as:  ATIVAN   Take 1 tablet (0.5 mg) by mouth every 8 hours as needed for anxiety                                   oxyCODONE IR 5 MG tablet   Commonly known as:  ROXICODONE   Take 1-2 tablets (5-10 mg) by mouth every 6 hours as needed for moderate to severe pain Do NOT drive or use additional narcotics while using this medication                                   ZYRTEC 10 MG tablet   1  TABLET DAILY   Generic drug:  cetirizine

## 2017-11-10 NOTE — ED NOTES
Report received from JULIO CÉSAR Thornton and all questions answered.   Entered PT room and found PT resting in in position of comfort. Spouse at bedside. PT is noted to be A&OX4, denies having pain at this time. Plan of care to transfer Patient has been discussed with patient and spouse and both have verbalized understanding.

## 2017-11-10 NOTE — ED PROVIDER NOTES
"  History     Chief Complaint   Patient presents with     Flank Pain     L side flank pain, feels like kidney stone has had multiple ones before.  Pain pills not working.       HPI  Latoya Riddle is a 46 year old female with long history of recurrent nephrolithiasis who presents with complaints of sudden-onset left flank pain at 5 AM this morning.  Patient states the flank pain has been constant since its onset and is nonradiating.  She complains of associated nausea but no vomiting.  Pt denies any abdominal pain.  She states she has only urinated once since the onset of her pain and there is no hematuria or dysuria.  She has not had the urge to urinate since that time.  Pt denies fevers or chills.  Patient states she has a long history of nephrolithiasis and surgeries related to this including stenting and lithotripsy.  She also had her right kidney removed in March of this year as she was having persisting kidney stones and gross hematuria and there was a mass in this kidney (concerning for malignancy).  This mass ended up being benign.  Patient is followed by a urologist through [facility].  Patient has had infected kidney stones in the past.  She states that she \"always has an infection in my bladder.\"  She states she has Cipro at home to take at the first sign of urinary symptoms.  She states she has not taken any antibiotics for about the past week.    Problem List:    Patient Active Problem List    Diagnosis Date Noted     Anxiety 01/11/2016     Priority: Medium     Health Care Home 05/06/2013     Priority: Medium     EMERGENCY CARE PLAN  May 6, 2013: No current Care Coordination follow up planned. Please refer if Care Coordination services are needed.    Presenting Problem Signs and Symptoms Treatment Plan   Questions or concerns   during clinic hours   I will call my clinic directly:  [facility]  [address], [place] [id number] [phone].   Questions or concerns " outside clinic hours   I will call the 24 hour nurse line at   [phone] or [phone].   Need to schedule an appointment   I will call the 24 hour scheduling team at [phone] or my clinic directly at [phone].   Same day treatment     I will call my clinic first, nurse line if after hours, urgent care and express care if needed.   Clinic care coordination services (regular clinic hours)   I will call a clinic care coordinator directly:     Italia Keith RN  [phone]    Rosette Valle SW:    [phone]    Or call my clinic at [phone] and ask to speak with care coordination.   Crisis Services: Behavioral or Mental Health  [facility] 24 Hour Phone Line  [phone]    [facility] 24 Hour Crisis Services  [phone]    [facility] [phone]    [facility]   [phone]     Emergency treatment -- Immediately    [facility]All 911              UTI (urinary tract infection) 09/04/2012     Priority: Medium     Hyperlipidemia LDL goal <130 01/06/2011     Priority: Medium     Tobacco abuse 04/19/2010     Priority: Medium     Dyspnea and respiratory abnormality 04/07/2008     Priority: Medium     Sleep study 4/1/08: No CATHY. .5 minutes, sleep latency  normal 11.7 minutes. REM latency 154.5 minutes. Sleep efficiency at 89.4%. The sleep architecture was periodically disrupted with frequent sleep stage changes and arousals. Snoring: moderately loud. RDI 41.0,  AHI of 0.3. Lowest O2 93.0%. PLM index 0.0.  Problem list name updated by automated process. Provider to review       Calculus of kidney 06/20/2005     Priority: Medium     Due to RTA and medullary sponge kidney. [id number] ( See's Dr. Tavo Contreras)- calcium oxalate and calcium phosphate. S/p multiple procedures, >50       Congenital medullary sponge kidney 06/20/2005     Priority: Medium     Renal osteodystrophy 06/20/2005     Priority: Medium     Tubular acidosis          Past  Medical History:    Past Medical History:   Diagnosis Date     Hyperlipidemia      Kidney stone        Past Surgical History:    Past Surgical History:   Procedure Laterality Date     EXCISE TOENAIL(S) Left 2016    Procedure: EXCISE TOENAIL(S);  Surgeon: Ady Mejia DPM;  Location: [facility] OR     GENITOURINARY SURGERY       SURGICAL HISTORY OF -            SURGICAL HISTORY OF -   -present    Lithothypsy X 49     SURGICAL HISTORY OF -       Percutaneous nephrostomy X2     SURGICAL HISTORY OF -   2011    Cystoscopy with bilateral ureteral pyeloscopy, stone basketing and stent placement       Family History:    Family History   Problem Relation Age of Onset     Respiratory Father      CATHY     C.A.D. Father      Heart Failure Father      Coronary Artery Disease Father      Hypertension Father      Depression Father      Anxiety Disorder Father      DIABETES Father      type 2     Lipids Mother      high cholesterol     Allergies Mother      Arthritis Mother      RA     Hyperlipidemia Mother      Lipids Sister      high cholesterol     Allergies Sister      Asthma Sister      Hypertension Sister      Allergies Sister      Respiratory Sister      cathy     Genitourinary Problems Maternal Grandmother      passed from complications from renal failure     Arthritis Maternal Grandfather      rheumatoid     DIABETES Paternal Grandmother      adult onset     Cardiovascular Paternal Grandfather      AAA     Hypertension Sister      Hyperlipidemia Sister      Anxiety Disorder Sister      Asthma Sister        Social History:  Marital Status:   [2]  Social History   Substance Use Topics     Smoking status: Current Every Day Smoker     Packs/day: 1.00     Years: 20.00     Types: Cigarettes     Smokeless tobacco: Never Used      Comment: 18 declined , tried quit plan- did not help per patient      Alcohol use No        Medications:      LORazepam (ATIVAN) 0.5 MG tablet    oxyCODONE-acetaminophen (PERCOCET) 5-325 MG per tablet   naproxen sodium (ALEVE) 220 MG tablet   ZYRTEC 10 MG OR TABS         Review of Systems   Constitutional: Negative.  Negative for fever.   Respiratory: Negative.    Cardiovascular: Negative.    Gastrointestinal: Positive for nausea. Negative for abdominal pain and vomiting.   Genitourinary: Positive for difficulty urinating and flank pain (left).   Musculoskeletal: Positive for back pain (left).   Skin: Negative.    Neurological: Negative.    All other systems reviewed and are negative.      Physical Exam   BP: 114/70  Heart Rate: 72  Temp: 98  F (36.7  C)  Resp: 16  SpO2: 93 %      Physical Exam   Constitutional: She appears well-developed and well-nourished. She appears distressed.   Patient appears uncomfortable, in pain   HENT:   Head: Normocephalic and atraumatic.   Eyes: Conjunctivae are normal.   Neck: Normal range of motion. Neck supple.   Cardiovascular: Normal rate, regular rhythm and normal heart sounds.    Pulmonary/Chest: Effort normal and breath sounds normal.   Abdominal: Soft. She exhibits no distension. There is no tenderness. There is CVA tenderness. There is no rebound and no guarding.   Diffuse left flank tenderness to palpation   Musculoskeletal: Normal range of motion.   Neurological: She is alert.   Skin: Skin is warm and dry. No rash noted.       ED Course     ED Course     Procedures      Results for orders placed or performed during the hospital encounter of 11/10/17   CT Abdomen Pelvis WITHOUT Contrast (stone protocol)    Narrative    CT ABDOMEN AND PELVIS WITHOUT CONTRAST  11/10/2017 12:28 PM     HISTORY: Left flank pain, unable to urinate. Long history  nephrolithiasis.    COMPARISON: CT abdomen and pelvis 2/13/2017.    TECHNIQUE: Axial images are obtained from the lung bases to the  symphysis without oral or IV contrast.  Coronal reformatted images are  also generated.  Radiation dose for this scan was reduced using  automated  exposure control, adjustment of the mA and/or kV according  to patient size, or iterative reconstruction technique.    FINDINGS: The lung bases are clear.    Abdomen: There is marked left hydronephrosis with five nonobstructing  left renal collecting system stones in the left kidney measuring up to  0.3 cm. The hydroureter leads down to a distal left ureteral stone  measuring 0.4 cm on series 2, image 66. Right kidney is not  visualized. Patient has undergone interval right nephrectomy.    The liver, spleen, gallbladder, pancreas and adrenal glands are  otherwise unremarkable allowing for the noncontrast technique. No  enlarged lymph nodes. Scattered calcified plaque is noted in the  aorta. No evidence of aneurysm. The bowel is normal in caliber without  obstruction or diverticulitis. Appendix is normal.    Pelvis: The bladder is decompressed. The uterus, adnexal regions and  rectum are unremarkable. No enlarged pelvic lymph nodes or free fluid.  Bone window examination is within normal limits.      Impression    IMPRESSION:  1. Obstructing 0.4 cm distal left ureteral stone. Marked left  hydronephrosis and perinephric stranding is present. Additional tiny  nonobstructing left renal collecting system stones are noted.  2. Interval right nephrectomy when compared to prior CT.  3. Abdominal and pelvic organs are otherwise within normal limits. No  bowel obstruction, diverticulitis or appendicitis.    ELIZABETH ENRIQUEZ MD   CBC with platelets differential   Result Value Ref Range    WBC 16.4 (H) 4.0 - 11.0 10e9/L    RBC Count 5.33 (H) 3.8 - 5.2 10e12/L    Hemoglobin 15.2 11.7 - 15.7 g/dL    Hematocrit 46.6 35.0 - 47.0 %    MCV 87 78 - 100 fl    MCH 28.5 26.5 - 33.0 pg    MCHC 32.6 31.5 - 36.5 g/dL    RDW 15.1 (H) 10.0 - 15.0 %    Platelet Count 156 150 - 450 10e9/L    Diff Method Automated Method     % Neutrophils 86.6 %    % Lymphocytes 8.6 %    % Monocytes 4.4 %    % Eosinophils 0.1 %    % Basophils 0.1 %    % Immature  Granulocytes 0.2 %    Absolute Neutrophil 14.2 (H) 1.6 - 8.3 10e9/L    Absolute Lymphocytes 1.4 0.8 - 5.3 10e9/L    Absolute Monocytes 0.7 0.0 - 1.3 10e9/L    Absolute Eosinophils 0.0 0.0 - 0.7 10e9/L    Absolute Basophils 0.0 0.0 - 0.2 10e9/L    Abs Immature Granulocytes 0.0 0 - 0.4 10e9/L   Comprehensive metabolic panel   Result Value Ref Range    Sodium 137 133 - 144 mmol/L    Potassium 5.6 (H) 3.4 - 5.3 mmol/L    Chloride 110 (H) 94 - 109 mmol/L    Carbon Dioxide 19 (L) 20 - 32 mmol/L    Anion Gap 8 3 - 14 mmol/L    Glucose 122 (H) 70 - 99 mg/dL    Urea Nitrogen 29 7 - 30 mg/dL    Creatinine 2.32 (H) 0.52 - 1.04 mg/dL    GFR Estimate 23 (L) >60 mL/min/1.7m2    GFR Estimate If Black 27 (L) >60 mL/min/1.7m2    Calcium 8.8 8.5 - 10.1 mg/dL    Bilirubin Total 0.6 0.2 - 1.3 mg/dL    Albumin 3.3 (L) 3.4 - 5.0 g/dL    Protein Total 7.4 6.8 - 8.8 g/dL    Alkaline Phosphatase 168 (H) 40 - 150 U/L    ALT 27 0 - 50 U/L    AST 49 (H) 0 - 45 U/L         Assessments & Plan (with Medical Decision Making)     Pt is a 46 year old female with long history of recurrent nephrolithiasis who presents with complaints of sudden-onset left flank pain at 5 AM this morning.  Patient states the flank pain has been constant since its onset and is nonradiating.  She complains of associated nausea but no vomiting.  Pt denies any abdominal pain.  She states she has only urinated once since the onset of her pain and there is no hematuria or dysuria.  She has not had the urge to urinate since that time.  Pt denies fevers or chills.  Patient states she has a long history of nephrolithiasis and surgeries related to this including stenting and lithotripsy.  She also had her right kidney removed in March of this year as she was having persisting kidney stones and gross hematuria and there was a mass in this kidney (concerning for malignancy).  This mass ended up being benign.  Patient is followed by a urologist through [facility].  Pt is  afebrile on arrival.  Exam as above.  The patient appears uncomfortable on arrival.  Her labs were immediately obtained and pain was controlled with Dilaudid.  Concern for kidney stone as patient has a long history of this and has similar symptoms today.  She has been unable to provide a urine sample and there is concern for possible infected kidney stone given an elevated WBC count of 16,000, and her current symptoms suggestive of a stone (as well as her history).  Therefore a CT abdomen/pelvis without contrast was obtained and revealed an obstructing 0.4 cm distal left ureteral stone with marked left hydronephrosis and perinephric stranding.  Patient's potassium was elevated at 5.6.  Her creatinine was bumped to 2.32 (up from 1.2) a month ago.  Her GFR was 23.  She was still unable to urinate despite receiving just over a liter of normal saline and a bladder scan continued to show only 13 cc of urine in her bladder.  Patient only has one kidney, and the ureter on this side is currently obstructed by her ureteral stone.  She also is showing evidence of acute kidney injury to this kidney given her bump in creatinine, and she is unable to urinate.  Therefore discussed with patient's urologist, Dr. Contreras, via phone, and he recommended patient be taken to the OR by one of our urologists in order to place a stent.  I therefore contacted the [facility] urologist on-call, Dr. Rodriguez, and discussed patient's case.  He accepted transfer and care of the patient and will bring her directly to the operating room for management of the stone upon her arrival.  Discussed results and plan for transfer with patient, and she is in agreement with this plan.    Discussed pt's case with the ED physician on staff (Dr. BRIANNA Leong).  He agreed with the assessment, diagnosis, treatment, and plan of the patient.    I have reviewed the nursing notes.    I have reviewed the findings, diagnosis, plan with the patient.       ED to  Inpatient Handoff:    Discussed with Dr. Rodriguez (Urologist)  Patient accepted for Transfer  Pending studies include None  Code Status: Full Code           Discharge Medication List as of 11/10/2017  5:47 PM          Final diagnoses:   Hydronephrosis with urinary obstruction due to ureteral calculus   Acute kidney injury (H)   Hyperkalemia       11/10/2017   [facility] EMERGENCY DEPARTMENT     Ashley Joyner PA-C  11/10/17 1842       Ashley Joyner PA-C  11/10/17 1849

## 2017-11-11 VITALS
RESPIRATION RATE: 18 BRPM | SYSTOLIC BLOOD PRESSURE: 116 MMHG | DIASTOLIC BLOOD PRESSURE: 67 MMHG | TEMPERATURE: 98.8 F | OXYGEN SATURATION: 97 %

## 2017-11-11 LAB
ANION GAP SERPL CALCULATED.3IONS-SCNC: 8 MMOL/L (ref 3–14)
BUN SERPL-MCNC: 37 MG/DL (ref 7–30)
CALCIUM SERPL-MCNC: 8.2 MG/DL (ref 8.5–10.1)
CHLORIDE SERPL-SCNC: 114 MMOL/L (ref 94–109)
CO2 SERPL-SCNC: 20 MMOL/L (ref 20–32)
CREAT SERPL-MCNC: 3.54 MG/DL (ref 0.52–1.04)
ERYTHROCYTE [DISTWIDTH] IN BLOOD BY AUTOMATED COUNT: 15.1 % (ref 10–15)
GFR SERPL CREATININE-BSD FRML MDRD: 14 ML/MIN/1.7M2
GLUCOSE SERPL-MCNC: 112 MG/DL (ref 70–99)
HCT VFR BLD AUTO: 41.5 % (ref 35–47)
HGB BLD-MCNC: 13.3 G/DL (ref 11.7–15.7)
MCH RBC QN AUTO: 28.4 PG (ref 26.5–33)
MCHC RBC AUTO-ENTMCNC: 32 G/DL (ref 31.5–36.5)
MCV RBC AUTO: 89 FL (ref 78–100)
PLATELET # BLD AUTO: 105 10E9/L (ref 150–450)
POTASSIUM SERPL-SCNC: 5.1 MMOL/L (ref 3.4–5.3)
RBC # BLD AUTO: 4.68 10E12/L (ref 3.8–5.2)
SODIUM SERPL-SCNC: 142 MMOL/L (ref 133–144)
WBC # BLD AUTO: 14.6 10E9/L (ref 4–11)

## 2017-11-11 PROCEDURE — 25000128 H RX IP 250 OP 636: Performed by: UROLOGY

## 2017-11-11 PROCEDURE — 85027 COMPLETE CBC AUTOMATED: CPT | Performed by: UROLOGY

## 2017-11-11 PROCEDURE — 36415 COLL VENOUS BLD VENIPUNCTURE: CPT | Performed by: UROLOGY

## 2017-11-11 PROCEDURE — 80048 BASIC METABOLIC PNL TOTAL CA: CPT | Performed by: UROLOGY

## 2017-11-11 PROCEDURE — 25000132 ZZH RX MED GY IP 250 OP 250 PS 637: Performed by: UROLOGY

## 2017-11-11 RX ORDER — OXYCODONE HYDROCHLORIDE 5 MG/1
5-10 TABLET ORAL EVERY 6 HOURS PRN
Qty: 20 TABLET | Refills: 0 | Status: SHIPPED | OUTPATIENT
Start: 2017-11-11 | End: 2021-05-10

## 2017-11-11 RX ORDER — OXYCODONE HYDROCHLORIDE 5 MG/1
5-10 TABLET ORAL EVERY 6 HOURS PRN
Qty: 30 TABLET | Refills: 0 | Status: SHIPPED | OUTPATIENT
Start: 2017-11-11 | End: 2017-11-11

## 2017-11-11 RX ORDER — ACETAMINOPHEN 325 MG/1
325-650 TABLET ORAL EVERY 6 HOURS PRN
Qty: 60 TABLET | Refills: 0 | Status: SHIPPED | OUTPATIENT
Start: 2017-11-11 | End: 2017-11-29

## 2017-11-11 RX ORDER — CIPROFLOXACIN 250 MG/1
250 TABLET, FILM COATED ORAL DAILY
Qty: 10 TABLET | Refills: 0 | Status: SHIPPED | OUTPATIENT
Start: 2017-11-11 | End: 2017-11-21

## 2017-11-11 RX ORDER — CIPROFLOXACIN 250 MG/1
250 TABLET, FILM COATED ORAL DAILY
Status: DISCONTINUED | OUTPATIENT
Start: 2017-11-12 | End: 2017-11-11 | Stop reason: HOSPADM

## 2017-11-11 RX ADMIN — ACETAMINOPHEN 975 MG: 325 TABLET, FILM COATED ORAL at 05:30

## 2017-11-11 RX ADMIN — CIPROFLOXACIN HYDROCHLORIDE 500 MG: 500 TABLET, FILM COATED ORAL at 08:12

## 2017-11-11 RX ADMIN — ACETAMINOPHEN 975 MG: 325 TABLET, FILM COATED ORAL at 12:02

## 2017-11-11 RX ADMIN — SODIUM CHLORIDE: 9 INJECTION, SOLUTION INTRAVENOUS at 10:31

## 2017-11-11 ASSESSMENT — ACTIVITIES OF DAILY LIVING (ADL)
ADLS_ACUITY_SCORE: 15

## 2017-11-11 NOTE — PROGRESS NOTES
Urology Progress Note    No acute overnight events. Feeling well this morning. Eager for discharge home. Afebrile overnight.     Afebrile, mildly tachycardic, soft BPs  UOP: 500/950    NAD  RRR  Breathing non-labored on room air  Abdomen soft, NT/ND.   Kay with clear urine in bag    Labs: Cr 3.1 (from 2.7). WBC decreased to 14 from 16.     A/P:   47F POD#1 from stent placement for left ureteral stone in a solitary kidney. LINDEN+ and leukocytosis+ on presentation    -Kay out this morning  -Will discharge on cipro 250 mg PO daily x5 days  -Patient was offered follow up in our system but prefers to follow up with Dr. Contreras, her home urologist. She will arrange this herself on Monday. No further follow up needed with [facility] urology.     Patient seen on rounds with Dr Michael Augustine, PGY-5    Please use the following job codes to reach the Urology Team. Note that you must use an in house phone and that job codes cannot receive text pages.    On weekdays, dial [phone] (or star-star-star [phone] on the new [employer] telephones) then [phone] to reach the Adult Urology resident or PA on call    On weekdays, dial [phone] (or star-star-star [phone] on the new [employer] telephones) then [phone] to reach the Pediatric Urology resident    On weeknights and weekends, dial [phone] (or star-star-star 777 on the new [employer] telephones) then [phone] to reach the Urology resident on call (for both Adult and Pediatrics)

## 2017-11-11 NOTE — OR NURSING
PACU to Inpatient Nursing Handoff    Patient Latoya Riddle is a 46 year old female who speaks English.   Procedure Procedure(s):  Cystoscopy, Left Ureteral Stent Placement - Wound Class: II-Clean Contaminated   Surgeon(s) Primary: Yg Rodriguez MD  Resident - Assisting: Misael Winston MD     Allergies   Allergen Reactions     Gentamycin [Gentamicin Sulfate]        Isolation  No active isolations    Past Medical History   has a past medical history of Hyperlipidemia and Kidney stone. She also has no past medical history of Basal cell carcinoma or Squamous cell carcinoma.    Anesthesia General   Dermatome Level     Preop Meds ketorolac (Toradol) - time given: 1101  2000 ml bolus of normal saline - time given: 1100 and 1300   Nerve block Not applicable   Intraop Meds 50 mg of fentanyl at 1934, and dilaudid 1.5 at 1743, epedrine 5mg and phenylphrine    Local Meds No   Antibiotics cefazolin (Ancef) - last given at 1938     Pain Patient Currently in Pain: denies   PACU meds  Not applicable   PCA / epidural No   Capnography     Telemetry ECG Rhythm: Sinus tachycardia      Labs Glucose Lab Results   Component Value Date     11/10/2017       Hgb Lab Results   Component Value Date    HGB 15.2 11/10/2017       INR Lab Results   Component Value Date    INR 1.03 10/16/2014      PACU Imaging Not applicable     Wound/Incision     CMS Peripheral Neurovascular WDL: WDL (11/10/17 2010)      Equipment simmons cath in place, sediment   Other LDA       IV Access Peripheral IV 11/10/17 Left Hand (Active)   Site Assessment [facility] 11/10/2017  9:00 PM   Line Status Infusing;Checked every 1 hour 11/10/2017  9:00 PM   Phlebitis Scale 0-->no symptoms 11/10/2017  9:00 PM   Number of days:0      Blood Products Not applicable EBL 1   mL   Intake/Output Date 11/10/17 0700 - 11/11/17 0659   Shift 3288-7150 2270-8365 9541-7706 24 Hour Total   I  N  T  A  K  E   P.O.  440  440    I.V.  100  100    Shift Total  540  540   O  U  T  P  U  T    Blood  1  1    Shift Total  1  1   Weight (kg)            Drains / Kay Urethral Catheter Straight-tip;Silver coated 16 fr (Active)   Tube Description UTV 11/10/2017  9:00 PM   Collection Container Standard 11/10/2017  9:00 PM   Securement Method Securing device (Describe) 11/10/2017  9:00 PM   Rationale for Continued Use /GI/GYN Pelvic Procedure 11/10/2017  9:00 PM   Number of days:0      Time of void PreOp Void Prior to Procedure: 0700 (11/10/17 1902)    PostOp     Bladder Scan      mL (11/10/17 2100)  tolerating sips and crackers     Vitals    B/P: 98/68  T: 98.8  F (37.1  C)    Temp src: Oral  P:       Heart Rate: 95 (11/10/17 2115)     R: 13  O2:  SpO2: 98 %    O2 Device: None (Room air) (11/10/17 2115)    Oxygen Delivery: 8 LPM (11/10/17 2010)         Family/support present no   Patient belongings Patient Belongings: clothing;shoes   Patient transported on cart and air mat   DC meds/scripts (obs/outpt) Not applicable     Special needs/considerations None   Tasks needing completion None       Anel Ge, JULIO CÉSAR  ASCOM [id number]

## 2017-11-11 NOTE — PLAN OF CARE
Problem: Patient Care Overview  Goal: Plan of Care/Patient Progress Review  Outcome: No Change  Pt. admitted from PACU at 2200. Pt. accompanied by staff and arrived with personal belongings. Report was taken from Anel in PACU.      Pt. is A&Ox4.  VSS. Anterior lung sounds are clear bilaterally. Bowel sounds are normoactive in all 4 quadrants. CMS and Neuros are intact. Denies numbness and tingling in all extremities. Pt denies pain. Pt. denies nausea, CP, SOB, lightheadedness, and dizziness. Pt is on a full liquid.      Kay Catheter is patent and had pink colored urine output. PIV is patent and infusing in L arm. PCDs are in place to BLEs. Pt. educated on use and purpose of the incentive spirometer. Pt. is oriented to the room and call light system and the call light is within reach. Continue to monitor.

## 2017-11-11 NOTE — DISCHARGE SUMMARY
Urology Discharge Summary    Patient:   Latoya Riddle  MRN:   [id number]  :   1970    Date of Admission: 11/10/2017  Date of Discharge: 2017  1:00 PM  Admitting Physician: Yg Rodriguez MD  Discharge Physician: Misael Winston MD          Admission Diagnoses:   Ureteral stone          Discharge Diagnosis:   Ureteral stone          Procedures:   11/10/17: cysto, left ureteral stent placement         Consultations:   No consultations were requested during this admission         Brief History of Illness:   Latoya Riddle is a(n) 46 year old female with a history of solitary left kidney and recurrent nephrolithiasis (has passed >50 stones) who presented to an outside ER earlier today with left flank pain. Workup at [facility] revealed a 4mm distal obstructing ureteral stone with leukocytosis to 16 and LINDEN. She was subsequently transferred here for stent placement.          Hospital Course:   The patient was admitted to the hospital and underwent the above named procedures.  For specific details, please refer to the dictated operative report in the patient's chart.  In summary, she tolerated the procedure well and there were no intraoperative complications.  Following the procedure she was admitted to the floor for routine post operative care.      The patient's overall hospital course was uneventful, and she remained hemodynamically stable and afebrile for the hospital stay.  She had downtrending leukocytosis.  She had excellent UOP overnight and her simmons was removed.  At this point she was deemed stable for discharge on a 1 week course of ciprofloxacin.            Discharge Medications:     Discharge Medication List as of 2017 11:37 AM      START taking these medications    Details   acetaminophen (TYLENOL) 325 MG tablet Take 1-2 tablets (325-650 mg) by mouth every 6 hours as needed for other (surgical pain), Disp-60 tablet, R-0, E-Prescribe      ciprofloxacin (CIPRO) 250 MG tablet  Take 1 tablet (250 mg) by mouth daily for 10 days, Disp-10 tablet, R-0, E-PrescribePharmacy to renally dose for 10 days         CONTINUE these medications which have CHANGED    Details   oxyCODONE IR (ROXICODONE) 5 MG tablet Take 1-2 tablets (5-10 mg) by mouth every 6 hours as needed for moderate to severe pain Do NOT drive or use additional narcotics while using this medication, Disp-20 tablet, R-0, Local Print         CONTINUE these medications which have NOT CHANGED    Details   LORazepam (ATIVAN) 0.5 MG tablet Take 1 tablet (0.5 mg) by mouth every 8 hours as needed for anxiety, Disp-30 tablet, R-1, Local Print      naproxen sodium (ALEVE) 220 MG tablet Take 220 mg by mouth 2 times daily as needed., 220 mg, Oral, 2 TIMES DAILY PRN, Until Discontinued, Historical      ZYRTEC 10 MG OR TABS 1 TABLET DAILY, Oral, Historical         STOP taking these medications       oxyCODONE-acetaminophen (PERCOCET) 5-325 MG per tablet Comments:   Reason for Stopping:                     Discharge Instructions:   See discharge navigator         Discharge Follow-Up:   Follow up with her outside urologist; patient declined establishing urologic care within the [facility]         Discharge Disposition:   Discharged to home         Misael Winston MD  Urology PGY4

## 2017-11-11 NOTE — ANESTHESIA CARE TRANSFER NOTE
Patient: Latoya Riddle    Procedure(s):  Cystoscopy, Left Ureteral Stent Placement - Wound Class: II-Clean Contaminated    Diagnosis: See H&P  Diagnosis Additional Information: No value filed.    Anesthesia Type:   No value filed.     Note:  Airway :Face Mask  Patient transferred to:PACU  Comments: Patient awake and comfortable.  Transferred to peds pacu.  Monitors attached.  VSS.  Transfer of care with report to JULIO CÉSAR Peralta.    Italia Tanner CRNAHandoff Report: Identifed the Patient, Identified the Reponsible Provider, Reviewed the pertinent medical history, Discussed the surgical course, Reviewed Intra-OP anesthesia mangement and issues during anesthesia, Set expectations for post-procedure period and Allowed opportunity for questions and acknowledgement of understanding      Vitals: (Last set prior to Anesthesia Care Transfer)    CRNA VITALS  11/10/2017 1935 - 11/10/2017 2011      11/10/2017             Pulse: 107    SpO2: 100 %                Electronically Signed By: MICHELLE Tomlinson CRNA  November 10, 2017  8:11 PM

## 2017-11-11 NOTE — PLAN OF CARE
Problem: Patient Care Overview  Goal: Plan of Care/Patient Progress Review  Outcome: No Change  Alert and oriented X4. Appears to be resting comfortably between cares. Vitals are stable. Bowel sounds are normoactive- full liquid diet. CMS/neuros intact. Denies pain. Kay patent and draining clear urine. PIV in left arm infusing Normal saline at 100mL/hr. Able to make needs known. Will continue with plan of care.

## 2017-11-11 NOTE — PLAN OF CARE
Problem: Patient Care Overview  Goal: Plan of Care/Patient Progress Review  Outcome: Adequate for Discharge Date Met:  11/11/17  Patient A&O, lungs CTA, bowel sounds active-passing gas. Pt denies pain. No numbness, tingling, weakness or SOB per pt report. Fluids promoted, IV removed for discharge.  Able to make needs known and uses call light appropriately.  Will continue to monitor through discharge.     Pt stated clear understanding of all discharge teachings and medication administration instructions. Pt discharging to home with family.

## 2017-11-11 NOTE — OP NOTE
OPERATIVE NOTE    PREOPERATIVE DIAGNOSIS:  Left ureteral stone    POSTOPERATIVE DIAGNOSIS:  Same    PROCEDURES PERFORMED:   1. Cystourethroscopy  2. Left  retrograde pyelogram with interpretation of intraoperative fluoroscopic imaging  3. Left ureteral stent placement    STAFF SURGEON:  Dr. Yg Rodriguez, present for the entire case.   ASSISTANT:   Misael Winston MD  ANESTHESIA:  General    ESTIMATED BLOOD LOSS: 1 cc  DRAINS/TUBES:  7 Somali x 26 cm double-J ureteral stent     16 Fr simmons catheter with 10cc in balloon    IV FLUIDS:   Please see dictated anesthesia record  COMPLICATIONS:  None.   SPECIMEN:   Left renal pelvis urine for culture    SIGNIFICANT FINDINGS: Significant left hydronephrosis with immediate release of purulent urine upon advancement of wire.  Proximal curl of the ureteral stent seen in the renal pelvis under fluoroscopy and distal curl seen in the bladder fluoroscopically and under direct vision.     BRIEF OPERATIVE INDICATIONS:  Latoya Riddle is a(n) 46 year old female with a history of solitary left kidney and recurrent nephrolithiasis who presented to an outside ER with flank pain and was found to have a 4mm obstructing ureteral stone with LINDEN and leukocytosis. After a discussion of all risks, benefits, and alternatives, the patient elected to proceed with ureteral stent placement.     DESCRIPTION OF PROCEDURE:  After informed consent was obtained, the patient was transported to the operating room & placed supine on the table. Pneumoboots were applied.  After adequate anesthesia was induced, she was placed in lithotomy and prepped and draped in the usual sterile fashion. A timeout was taken to confirm correct patient, procedure and laterality. Pre-operative IV antibiotics were administered.     A 22-Somali rigid cystoscope was inserted into a well-lubricated urethra. Upon entry into the bladder the urine was noted to be turbid with debris. The left ureteral orifice was identified and  cannulated with a Sensor wire and 5 Senegalese open-ended catheter. There was immediate drainage of purulent urine from the kidney. A urine specimen was obtained from the renal pelvis and sent for culture. A retrograde pyelogram was obtained which showed severe hydronephrosis. The wire was replaced and a 7 Fr x 26-cm double-J stent was advanced over the wire. Good proximal curl was seen in the renal pelvis fluoroscopically and the distal curl was seen in the bladder fluoroscopically and under direct vision.     The scope was removed and a simmons catheter placed with 10cc in the balloon.  The patient tolerated the procedure well and there were no apparent complications. The patient  was transported to the postanesthesia care unit in stable condition.     I was present for the entire procedure.  Yg Rodriguez MD  Urology Staff

## 2017-11-11 NOTE — ANESTHESIA POSTPROCEDURE EVALUATION
Patient: Latoya Riddle    Procedure(s):  Cystoscopy, Left Ureteral Stent Placement - Wound Class: II-Clean Contaminated    Diagnosis:See H&P  Diagnosis Additional Information: No value filed.    Anesthesia Type:  General, LMA    Note:  Anesthesia Post Evaluation    Patient location during evaluation: PACU  Patient participation: Able to fully participate in evaluation  Level of consciousness: awake and alert  Pain management: adequate  Airway patency: patent  Cardiovascular status: acceptable  Respiratory status: acceptable  Hydration status: acceptable  PONV: none             Last vitals:  Vitals:    11/10/17 2010 11/10/17 2015 11/10/17 2045   BP: 114/64 99/63 99/63   Resp: 16 18 18   Temp: 38.1  C (100.5  F)  37.1  C (98.8  F)   SpO2: 100% 99% 96%         Electronically Signed By: Anel Banuelos MD  November 10, 2017  9:20 PM

## 2017-11-11 NOTE — ANESTHESIA PREPROCEDURE EVALUATION
Anesthesia Evaluation     . Pt has had prior anesthetic. Type: General    No history of anesthetic complications          ROS/MED HX    ENT/Pulmonary:     (+)tobacco use, Current use , . .    Neurologic:       Cardiovascular:     (+) Dyslipidemia, ----. : . . . :. .       METS/Exercise Tolerance:  >4 METS   Hematologic:         Musculoskeletal:         GI/Hepatic:         Renal/Genitourinary:     (+) chronic renal disease, Nephrolithiasis , Other Renal/ Genitourinary, UTI      Endo:         Psychiatric:     (+) psychiatric history anxiety      Infectious Disease:         Malignancy:         Other:                     Physical Exam  Normal systems: cardiovascular and pulmonary    Airway   Mallampati: I  Neck ROM: full    Dental   (+) missing  Comment: Poor dentition     Cardiovascular       Pulmonary                     Anesthesia Plan      History & Physical Review      ASA Status:  2 .    NPO Status:  > 8 hours    Plan for General and LMA with Intravenous induction. Maintenance will be Inhalation.    PONV prophylaxis:  Ondansetron (or other 5HT-3) and Dexamethasone or Solumedrol       Postoperative Care  Postoperative pain management:  Multi-modal analgesia.      Consents  Anesthetic plan, risks, benefits and alternatives discussed with:  Patient..                          .

## 2017-11-13 ENCOUNTER — TELEPHONE (OUTPATIENT)
Dept: FAMILY MEDICINE | Facility: CLINIC | Age: 47
End: 2017-11-13

## 2017-11-13 LAB
BACTERIA SPEC CULT: ABNORMAL
SPECIMEN SOURCE: ABNORMAL

## 2017-11-13 NOTE — PROGRESS NOTES
Dear Latoya,   Here are your recent results.     A small amount of bacteria were seen in the urine.  Please forward results to your urologist who will be treating the stone!  Thank You  Please let me know if you have any questions.     Yg Rodriguez MD

## 2017-11-13 NOTE — TELEPHONE ENCOUNTER
"ED / Discharge Outreach Protocol    Patient Contact    Attempt # 1    Was call answered?  Yes.  \"May I please speak with <patient name>\"  Is patient available?   Yes    Hospital/TCU/ED for chronic condition Discharge Protocol    \"Hi, my name is Jadyn Guillory, a registered nurse, and I am calling from [facility].  I am calling to follow up and see how things are going for you after your recent emergency visit/hospital/TCU stay.\"    Tell me how you are doing now that you are home?\" Doing much better.  Had ureteral stent placed for kidney stones.  Pt was discharged on Cipro and she is to follow up with her urologist, [facility].        Discharge Instructions    \"Let's review your discharge instructions.  What is/are the follow-up recommendations?  Pt. Response: Reviewed discharge instructions with pt.  She is supposed to follow up with her urologist from [facility], Dr Tavo Contreras.    \"Has an appointment with your primary care provider been scheduled?\"   No (schedule appointment)  Pt says that she called Dr Contreras's office today but that she is waiting for them to call her back.  Reminded pt that she should be seen this week.    \"When you see the provider, I would recommend that you bring your medications with you.\"    Medications    \"Tell me what changed about your medicines when you discharged?\"    Changes to chronic meds?    0-1    \"What questions do you have about your medications?\"    None     New diagnoses of heart failure, COPD, diabetes, or MI?    No    Medication reconciliation completed? Yes  Was MTM referral placed (*Make sure to put transitions as reason for referral)?   No    Call Summary    \"What questions or concerns do you have about your recent visit and your follow-up care?\"     none    \"If you have questions or things don't continue to improve, we encourage you contact us through the main clinic number (give number).  Even if the clinic is not open, triage nurses are available 24/7 to " "help you.     We would like you to know that our clinic has extended hours (provide information).  We also have urgent care (provide details on closest location and hours/contact info)\"      \"Thank you for your time and take care!\"           "

## 2017-11-29 ENCOUNTER — OFFICE VISIT (OUTPATIENT)
Dept: FAMILY MEDICINE | Facility: CLINIC | Age: 47
End: 2017-11-29
Payer: COMMERCIAL

## 2017-11-29 VITALS
SYSTOLIC BLOOD PRESSURE: 108 MMHG | HEART RATE: 77 BPM | HEIGHT: 66 IN | TEMPERATURE: 98.2 F | BODY MASS INDEX: 26.84 KG/M2 | DIASTOLIC BLOOD PRESSURE: 70 MMHG | WEIGHT: 167 LBS

## 2017-11-29 DIAGNOSIS — Z01.818 PREOP GENERAL PHYSICAL EXAM: Primary | ICD-10-CM

## 2017-11-29 DIAGNOSIS — N20.1 URETERAL STONE: ICD-10-CM

## 2017-11-29 LAB
ANION GAP SERPL CALCULATED.3IONS-SCNC: 8 MMOL/L (ref 3–14)
BUN SERPL-MCNC: 22 MG/DL (ref 7–30)
CALCIUM SERPL-MCNC: 9.1 MG/DL (ref 8.5–10.1)
CHLORIDE SERPL-SCNC: 109 MMOL/L (ref 94–109)
CO2 SERPL-SCNC: 23 MMOL/L (ref 20–32)
CREAT SERPL-MCNC: 2.15 MG/DL (ref 0.52–1.04)
ERYTHROCYTE [DISTWIDTH] IN BLOOD BY AUTOMATED COUNT: 14.9 % (ref 10–15)
GFR SERPL CREATININE-BSD FRML MDRD: 25 ML/MIN/1.7M2
GLUCOSE SERPL-MCNC: 86 MG/DL (ref 70–99)
HCT VFR BLD AUTO: 45.7 % (ref 35–47)
HGB BLD-MCNC: 14.5 G/DL (ref 11.7–15.7)
MCH RBC QN AUTO: 28.3 PG (ref 26.5–33)
MCHC RBC AUTO-ENTMCNC: 31.7 G/DL (ref 31.5–36.5)
MCV RBC AUTO: 89 FL (ref 78–100)
PLATELET # BLD AUTO: 178 10E9/L (ref 150–450)
POTASSIUM SERPL-SCNC: 4.1 MMOL/L (ref 3.4–5.3)
RBC # BLD AUTO: 5.13 10E12/L (ref 3.8–5.2)
SODIUM SERPL-SCNC: 140 MMOL/L (ref 133–144)
WBC # BLD AUTO: 9.3 10E9/L (ref 4–11)

## 2017-11-29 PROCEDURE — 85027 COMPLETE CBC AUTOMATED: CPT | Performed by: NURSE PRACTITIONER

## 2017-11-29 PROCEDURE — 80048 BASIC METABOLIC PNL TOTAL CA: CPT | Performed by: NURSE PRACTITIONER

## 2017-11-29 PROCEDURE — 99214 OFFICE O/P EST MOD 30 MIN: CPT | Performed by: NURSE PRACTITIONER

## 2017-11-29 PROCEDURE — 36415 COLL VENOUS BLD VENIPUNCTURE: CPT | Performed by: NURSE PRACTITIONER

## 2017-11-29 RX ORDER — CIPROFLOXACIN 250 MG/1
250 TABLET, FILM COATED ORAL 2 TIMES DAILY
COMMUNITY
Start: 2017-11-21 | End: 2017-12-05

## 2017-11-29 NOTE — MR AVS SNAPSHOT
After Visit Summary   11/29/2017    Latoya Riddle    MRN: [id number]           Patient Information     Date Of Birth          1970        Visit Information        Provider Department      11/29/2017 2:00 PM Maribeth Avila APRN CNP [facility]        Today's Diagnoses     Preop general physical exam    -  1    Ureteral stone          Care Instructions      Before Your Surgery      Call your surgeon if there is any change in your health. This includes signs of a cold or flu (such as a sore throat, runny nose, cough, rash or fever).    Do not smoke, drink alcohol or take over the counter medicine (unless your surgeon or primary care doctor tells you to) for the 24 hours before and after surgery.    If you take prescribed drugs: Follow your doctor s orders about which medicines to take and which to stop until after surgery.    Eating and drinking prior to surgery: follow the instructions from your surgeon    Take a shower or bath the night before surgery. Use the soap your surgeon gave you to gently clean your skin. If you do not have soap from your surgeon, use your regular soap. Do not shave or scrub the surgery site.  Wear clean pajamas and have clean sheets on your bed.           Follow-ups after your visit        Who to contact     If you have questions or need follow up information about today's clinic visit or your schedule please contact [facility] directly at [phone].  Normal or non-critical lab and imaging results will be communicated to you by MyChart, letter or phone within 4 business days after the clinic has received the results. If you do not hear from us within 7 days, please contact the clinic through MyChart or phone. If you have a critical or abnormal lab result, we will notify you by phone as soon as possible.  Submit refill requests through [employer] or call your pharmacy and they will forward the refill request to us. Please allow  "3 business days for your refill to be completed.          Additional Information About Your Visit        MyChart Information     [facility] [place] gives you secure access to your electronic health record. If you see a primary care provider, you can also send messages to your care team and make appointments. If you have questions, please call your primary care clinic.  If you do not have a primary care provider, please call [phone] and they will assist you.        Care EveryWhere ID     This is your Care EveryWhere ID. This could be used by other organizations to access your [place] medical records  [id number]        Your Vitals Were     Pulse Temperature Height Last Period BMI (Body Mass Index)       77 98.2  F (36.8  C) (Oral) 5' 6\" (1.676 m) 07/02/2013 26.95 kg/m2        Blood Pressure from Last 3 Encounters:   11/29/17 108/70   11/11/17 116/67   11/10/17 108/61    Weight from Last 3 Encounters:   11/29/17 167 lb (75.8 kg)   07/25/17 166 lb 4.8 oz (75.4 kg)   03/17/17 163 lb (73.9 kg)              We Performed the Following     Basic metabolic panel     CBC with platelets        Primary Care Provider Office Phone # Fax #    Maribeth Pino Kalie Avila, MICHELLE [facility] [phone] [address] [facility] [address]        Equal Access to Services     LUIZA PAREDES : Hadii aad ku hadasho Soomaali, waaxda luqadaha, qaybta kaalmada adeegyada, carmen concepcion. So [facility] [phone].    ATENCIÓN: Si habla español, tiene a bustos disposición servicios gratuitos de asistencia lingüística. Llame al [phone].    We comply with applicable federal civil rights laws and [place] laws. We do not discriminate on the basis of race, color, national origin, age, disability, sex, sexual orientation, or gender identity.            Thank you!     Thank you for choosing [facility]  for your care. Our goal is always to provide you with excellent care. Hearing back from our patients is " one way we can continue to improve our services. Please take a few minutes to complete the written survey that you may receive in the mail after your visit with us. Thank you!             Your Updated Medication List - Protect others around you: Learn how to safely use, store and throw away your medicines at www.disposemymeds.org.          This list is accurate as of: 11/29/17  9:14 PM.  Always use your most recent med list.                   Brand Name Dispense Instructions for use Diagnosis    ALEVE 220 MG tablet   Generic drug:  naproxen sodium      Take 220 mg by mouth 2 times daily as needed.        ciprofloxacin 250 MG tablet    CIPRO     Take 250 mg by mouth 2 times daily    Preop general physical exam, Ureteral stone       LORazepam 0.5 MG tablet    ATIVAN    30 tablet    Take 1 tablet (0.5 mg) by mouth every 8 hours as needed for anxiety    Anxiety       oxyCODONE IR 5 MG tablet    ROXICODONE    20 tablet    Take 1-2 tablets (5-10 mg) by mouth every 6 hours as needed for moderate to severe pain Do NOT drive or use additional narcotics while using this medication    Calculus of kidney       ZYRTEC 10 MG tablet   Generic drug:  cetirizine      1 TABLET DAILY

## 2017-11-29 NOTE — PROGRESS NOTES
[facility]  [address] [address]  [phone]  Dept: [phone]    PRE-OP EVALUATION:  Today's date: 2017    Latoya Riddle (: 1970) presents for pre-operative evaluation assessment as requested by Dr. Tavo Contreras.  She requires evaluation and anesthesia risk assessment prior to undergoing surgery/procedure for treatment of left ureteral stone.  Proposed procedure: Cystoscopy with left ureteroscopy, holmium laser lithotripsy, stone basketing and left ureteral stent placement      Date of Surgery/ Procedure: 2017  Time of Surgery/ Procedure: 730  Hospital/Surgical Facility: [facility]  Fax number for surgical facility: [phone]  Primary Physician: Maribeth Avila  Type of Anesthesia Anticipated: General    Patient has a Health Care Directive or Living Will:  NO    1. NO - Do you have a history of heart attack, stroke, stent, bypass or surgery on an artery in the head, neck, heart or legs?  2. NO - Do you ever have any pain or discomfort in your chest?  3. NO - Do you have a history of  Heart Failure?  4. YES - ARE YOUR TROUBLED BY SHORTNESS OF BREATH WHEN WALKING ON THE LEVEL, UP A SLIGHT HILL OR AT NIGHT? smoking  5. NO - Do you currently have a cold, bronchitis or other respiratory infection?  6. YES - DO YOU HAVE A COUGH, SHORTNESS OF BREATH OR WHEEZING? smoker  7. NO - Do you sometimes get pains in the calves of your legs when you walk?  8. YES - DO YOU OR ANYONE IN YOUR FAMILY HAVE PREVIOUS HISTORY OF BLOOD CLOTS? Father with h/o blood clots  9. NO - Do you or does anyone in your family have a serious bleeding problem such as prolonged bleeding following surgeries or cuts?  10. NO - Have you ever had problems with anemia or been told to take iron pills?  11. NO - Have you had any abnormal blood loss such as black, tarry or bloody stools, or abnormal vaginal bleeding?  12. NO - Have you ever had a blood transfusion?  13. NO - Have  you or any of your relatives ever had problems with anesthesia?  14. YES - DO YOU HAVE SLEEP APNEA, EXCESSIVE SNORING OR DAYTIME DROWSINESS? snores  15. NO - Do you have any prosthetic heart valves?  16. NO - Do you have prosthetic joints?  17. NO - Is there any chance that you may be pregnant?        HPI:                                                      Brief HPI related to upcoming procedure:   Recurrent stones.        MEDICAL HISTORY:                                                    Patient Active Problem List    Diagnosis Date Noted     Ureteral stone 11/10/2017     Priority: Medium     Anxiety 01/11/2016     Priority: Medium     Health Care Home 05/06/2013     Priority: Medium     EMERGENCY CARE PLAN  May 6, 2013: No current Care Coordination follow up planned. Please refer if Care Coordination services are needed.    Presenting Problem Signs and Symptoms Treatment Plan   Questions or concerns   during clinic hours   I will call my clinic directly:  [facility]  [address] [place] [address] [id number] [phone].   Questions or concerns outside clinic hours   I will call the 24 hour nurse line at   [phone] or [address].   Need to schedule an appointment   I will call the 24 hour scheduling team at [phone] or my clinic directly at [phone].   Same day treatment     I will call my clinic first, nurse line if after hours, urgent care and express care if needed.   Clinic care coordination services (regular clinic hours)   I will call a clinic care coordinator directly:     Italia Keith RN  [phone]    Rosette Valle SW:    [phone]    Or call my clinic at [phone] and ask to speak with care coordination.   Crisis Services: Behavioral or Mental Health  [facility] Connection 24 Hour Phone Line  [phone]    [facility] 24 Hour Crisis Services  [phone]    [facility] ([facility] Network [phone]    [facility]    [phone]     Emergency treatment -- Immediately    CAll 911              UTI (urinary tract infection) 2012     Priority: Medium     Hyperlipidemia LDL goal <130 2011     Priority: Medium     Tobacco abuse 2010     Priority: Medium     Dyspnea and respiratory abnormality 2008     Priority: Medium     Sleep study 08: No CATHY. .5 minutes, sleep latency  normal 11.7 minutes. REM latency 154.5 minutes. Sleep efficiency at 89.4%. The sleep architecture was periodically disrupted with frequent sleep stage changes and arousals. Snoring: moderately loud. RDI 41.0,  AHI of 0.3. Lowest O2 93.0%. PLM index 0.0.  Problem list name updated by automated process. Provider to review       Calculus of kidney 2005     Priority: Medium     Due to RTA and medullary sponge kidney. [id number] ( See's Dr. Tavo Contreras)- calcium oxalate and calcium phosphate. S/p multiple procedures, >50       Congenital medullary sponge kidney 2005     Priority: Medium     Renal osteodystrophy 2005     Priority: Medium     Tubular acidosis        Past Medical History:   Diagnosis Date     Hyperlipidemia      Kidney stone      Past Surgical History:   Procedure Laterality Date     COMBINED CYSTOSCOPY, INSERT STENT URETER(S) Left 11/10/2017    Procedure: COMBINED CYSTOSCOPY, INSERT STENT URETER(S);  Cystoscopy, Left Ureteral Stent Placement;  Surgeon: Yg Rodriguez MD;  [facility]     EXCISE TOENAIL(S) Left 2016    Procedure: EXCISE TOENAIL(S);  Surgeon: Ady Mejia DPM;  Location: [facility]     GENITOURINARY SURGERY       SURGICAL HISTORY OF -            SURGICAL HISTORY OF -   -present    Lithothypsy X 49     SURGICAL HISTORY OF -       Percutaneous nephrostomy X2     SURGICAL HISTORY OF -   2011    Cystoscopy with bilateral ureteral pyeloscopy, stone basketing and stent placement     Current Outpatient Prescriptions   Medication Sig Dispense Refill  "    ciprofloxacin (CIPRO) 250 MG tablet Take 250 mg by mouth 2 times daily       oxyCODONE IR (ROXICODONE) 5 MG tablet Take 1-2 tablets (5-10 mg) by mouth every 6 hours as needed for moderate to severe pain Do NOT drive or use additional narcotics while using this medication 20 tablet 0     LORazepam (ATIVAN) 0.5 MG tablet Take 1 tablet (0.5 mg) by mouth every 8 hours as needed for anxiety 30 tablet 1     naproxen sodium (ALEVE) 220 MG tablet Take 220 mg by mouth 2 times daily as needed.       ZYRTEC 10 MG OR TABS 1 TABLET DAILY       OTC products: None, except as noted above    Allergies   Allergen Reactions     Gentamycin [Gentamicin Sulfate]       Latex Allergy: NO    Social History   Substance Use Topics     Smoking status: Current Every Day Smoker     Packs/day: 1.00     Years: 20.00     Types: Cigarettes     Smokeless tobacco: Never Used      Comment: 7/25/17 declined , tried quit plan- did not help per patient      Alcohol use No     History   Drug Use No       REVIEW OF SYSTEMS:                                                    Constitutional, neuro, ENT, endocrine, pulmonary, cardiac, gastrointestinal, genitourinary, musculoskeletal, integument and psychiatric systems are negative, except as otherwise noted.      EXAM:                                                    /70 (BP Location: Right arm)  Pulse 77  Temp 98.2  F (36.8  C) (Oral)  Ht 5' 6\" (1.676 m)  Wt 167 lb (75.8 kg)  LMP 07/02/2013  BMI 26.95 kg/m2    GENERAL APPEARANCE: healthy, alert and no distress     EYES: EOMI, PERRL     HENT: ear canals and TM's normal and nose and mouth without ulcers or lesions     NECK: no adenopathy, no asymmetry, masses, or scars and thyroid normal to palpation     RESP: lungs clear to auscultation - no rales, rhonchi or wheezes     CV: regular rates and rhythm, normal S1 S2, no S3 or S4 and no murmur, click or rub     ABDOMEN:  soft, nontender, no HSM or masses and bowel sounds normal     MS: " extremities normal- no gross deformities noted, no evidence of inflammation in joints, FROM in all extremities.     SKIN: no suspicious lesions or rashes     NEURO: Normal strength and tone, sensory exam grossly normal, mentation intact and speech normal     PSYCH: mentation appears normal. and affect normal/bright     LYMPHATICS: No axillary, cervical, or supraclavicular nodes    DIAGNOSTICS:                                                      Results for orders placed or performed in visit on 11/29/17   CBC with platelets   Result Value Ref Range    WBC 9.3 4.0 - 11.0 10e9/L    RBC Count 5.13 3.8 - 5.2 10e12/L    Hemoglobin 14.5 11.7 - 15.7 g/dL    Hematocrit 45.7 35.0 - 47.0 %    MCV 89 78 - 100 fl    MCH 28.3 26.5 - 33.0 pg    MCHC 31.7 31.5 - 36.5 g/dL    RDW 14.9 10.0 - 15.0 %    Platelet Count 178 150 - 450 10e9/L   Basic metabolic panel   Result Value Ref Range    Sodium 140 133 - 144 mmol/L    Potassium 4.1 3.4 - 5.3 mmol/L    Chloride 109 94 - 109 mmol/L    Carbon Dioxide 23 20 - 32 mmol/L    Anion Gap 8 3 - 14 mmol/L    Glucose 86 70 - 99 mg/dL    Urea Nitrogen 22 7 - 30 mg/dL    Creatinine 2.15 (H) 0.52 - 1.04 mg/dL    GFR Estimate 25 (L) >60 mL/min/1.7m2    GFR Estimate If Black 30 (L) >60 mL/min/1.7m2    Calcium 9.1 8.5 - 10.1 mg/dL         Recent Labs   Lab Test  11/11/17   0513  11/10/17   1040   10/16/14   1438   HGB  13.3  15.2   < >  15.5   PLT  105*  156   < >  143*   INR   --    --    --   1.03   NA  142  137   < >  143   POTASSIUM  5.1  5.6*   < >  3.9   CR  3.54*  2.32*   < >  1.10*    < > = values in this interval not displayed.      Normal stress test Sept 2016    IMPRESSION:                                                    Reason for surgery/procedure: ureteral stone  Diagnosis/reason for consult: pre-op evaluation    The proposed surgical procedure is considered LOW risk.    REVISED CARDIAC RISK INDEX  The patient has the following serious cardiovascular risks for perioperative  complications such as (MI, PE, VFib and 3  AV Block):  No serious cardiac risks  INTERPRETATION: 0 risks: Class I (very low risk - 0.4% complication rate)    The patient has the following additional risks for perioperative complications:  smoker      ICD-10-CM    1. Preop general physical exam Z01.818 ciprofloxacin (CIPRO) 250 MG tablet     CBC with platelets     Basic metabolic panel   2. Ureteral stone N20.1 ciprofloxacin (CIPRO) 250 MG tablet     CBC with platelets     Basic metabolic panel       RECOMMENDATIONS:                                                        Cardiovascular Risk  Performs 4 METs exercise without symptoms (Light housework (dusting, washing dishes), Climb a flight of stairs, Walk on level ground at 15 minutes per mile (4 miles/hour) and Bicycling at 8.5 minutes per mile (7 miles/hour)) .         --Patient is to take all scheduled medications on the day of surgery    APPROVAL GIVEN to proceed with proposed procedure, without further diagnostic evaluation       Signed Electronically by: MICHELLE Lagos CNP    Copy of this evaluation report is provided to requesting physician.    [facility] Preop Guidelines

## 2017-11-29 NOTE — NURSING NOTE
"Chief Complaint   Patient presents with     Pre-Op Exam       Initial /70 (BP Location: Right arm)  Pulse 77  Temp 98.2  F (36.8  C) (Oral)  Ht 5' 6\" (1.676 m)  Wt 167 lb (75.8 kg)  LMP 07/02/2013  BMI 26.95 kg/m2 Estimated body mass index is 26.95 kg/(m^2) as calculated from the following:    Height as of this encounter: 5' 6\" (1.676 m).    Weight as of this encounter: 167 lb (75.8 kg).  Medication Reconciliation: complete  "

## 2018-01-02 NOTE — PROGRESS NOTES
"  SUBJECTIVE:                                                    Latoya Riddle is a 46 year old female who presents to clinic today for the following health issues:    Chief Complaint   Patient presents with     Anxiety     refill of lorazepam needed     Medication Reconciliation     patient has not statted taking Atorvastatin 80mg daily        Anxiety Follow-Up    Status since last visit: No change    Other associated symptoms:None    Complicating factors:   Significant life event: No   Current substance abuse: None  Depression symptoms: Yes-  ALFREDITO-7 SCORE 7/18/2013 11/24/2015   Total Score 9 -   Total Score - 9     Uses the ativan rarely for social situations.  Never combines it with the opioid.      Amount of exercise or physical activity: active at home     Problems taking medications regularly: No    Medication side effects: none    Diet: regular (no restrictions)        Problem list and histories reviewed & adjusted, as indicated.  Additional history: as documented      Reviewed and updated as needed this visit by clinical staff  Tobacco  Allergies  Meds  Med Hx  Surg Hx  Fam Hx  Soc Hx      Reviewed and updated as needed this visit by Provider         ROS:  Constitutional, HEENT, cardiovascular, pulmonary, gi and gu systems are negative, except as otherwise noted.      OBJECTIVE:   /69 (BP Location: Left arm, Patient Position: Chair, Cuff Size: Adult Regular)  Pulse 65  Temp 97.9  F (36.6  C) (Tympanic)  Ht 5' 6\" (1.676 m)  Wt 166 lb 4.8 oz (75.4 kg)  LMP 07/02/2013  Breastfeeding? No  BMI 26.84 kg/m2  Body mass index is 26.84 kg/(m^2).  GENERAL: healthy, alert and no distress  PSYCH: mentation appears normal, affect normal/bright        ASSESSMENT/PLAN:       ICD-10-CM    1. Anxiety F41.9 LORazepam (ATIVAN) 0.5 MG tablet       The risks, benefits and treatment options of prescribed medications or other treatments have been discussed with the patient. The patient verbalized their " Message from [employer]:  Original authorizing provider: MD Gautam Griffin GORDONRuth Kelly would like a refill of the following medications:  fentaNYL (DURAGESIC) 100 mcg/hr 72 hr patch [Juni Roberson MD]    Preferred pharmacy: [facility] [place] - [address] [place] [address]    Comment:     understanding and should call or follow up if no improvement or if they develop further problems.    MICHELLE Lagos [facility]

## 2018-02-09 ENCOUNTER — OFFICE VISIT (OUTPATIENT)
Dept: SLEEP MEDICINE | Facility: CLINIC | Age: 48
End: 2018-02-09
Attending: INTERNAL MEDICINE
Payer: COMMERCIAL

## 2018-02-09 VITALS
OXYGEN SATURATION: 97 % | SYSTOLIC BLOOD PRESSURE: 115 MMHG | WEIGHT: 172 LBS | BODY MASS INDEX: 27.64 KG/M2 | HEART RATE: 70 BPM | HEIGHT: 66 IN | DIASTOLIC BLOOD PRESSURE: 70 MMHG

## 2018-02-09 DIAGNOSIS — G47.33 OSA (OBSTRUCTIVE SLEEP APNEA): Primary | ICD-10-CM

## 2018-02-09 DIAGNOSIS — G47.10 HYPERSOMNIA: ICD-10-CM

## 2018-02-09 DIAGNOSIS — R06.83 SNORING: ICD-10-CM

## 2018-02-09 PROCEDURE — 99205 OFFICE O/P NEW HI 60 MIN: CPT | Performed by: FAMILY MEDICINE

## 2018-02-09 NOTE — PROGRESS NOTES
"  Sleep Consultation:    Date on this visit: 2/9/2018    Latoya Riddle is sent by [facility] Gray for a sleep consultation regarding persistent snoring, observed apnea, daytime fatigue.    Primary Physician: Maribeth Avila     CC: \"I am still snoring, gasping and always tired.\"    HPI:  Latoya Riddle is a pleasant 46 yo F with history of recurrent kidney stones (passed 50+ stones), solitary left kidney, anxiety who presents to discuss potential sleep disordered breathing.     She was seen in our clinic in 2008 following PSG performed 4/1/2008 with weight 180 lbs, AHI 0.3, RDI 41, mary SpO2 93%, PLMI 0, arousal index 57.9.  At that time, given AHI < 5, no specific treatment pursued.    She returns today under recommendation given continued snoring, observed apnea, frequent choking / gasping arousals, and chronic daytime fatigue.  Weight stable, no new cardiac or pulmonary concerns.  Strong family history of CATHY in father and both sisters.    Most nights in bed around 11pm and asleep in ~10 minutes, will awaken 4-5+ times 2/2 choking or gasping but will usually fall back asleep quickly, up naturally between 8:30-9:30am.  No regular daytime naps.     Latoya denies any sleep walking and dream enactment.    Patient's [place] Sleepiness score 10/24 borderline with excessive  daytime sleepiness.      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    Allergies:    Allergies   Allergen Reactions     Gentamycin [Gentamicin Sulfate]        Medications:    Current Outpatient Prescriptions   Medication Sig Dispense Refill     oxyCODONE IR (ROXICODONE) 5 MG tablet Take 1-2 tablets (5-10 mg) by mouth every 6 hours as needed for moderate to severe pain Do NOT drive or use additional narcotics while using this medication 20 tablet 0     LORazepam (ATIVAN) 0.5 MG tablet Take 1 tablet (0.5 mg) by mouth every 8 hours as needed for " anxiety 30 tablet 1     naproxen sodium (ALEVE) 220 MG tablet Take 220 mg by mouth 2 times daily as needed.       ZYRTEC 10 MG OR TABS 1 TABLET DAILY       conjugated estrogens (PREMARIN) cream Place 1 g vaginally         Problem List:  Patient Active Problem List    Diagnosis Date Noted     Ureteral stone 11/10/2017     Priority: Medium     Anxiety 01/11/2016     Priority: Medium     Health Care Home 05/06/2013     Priority: Medium     EMERGENCY CARE PLAN  May 6, 2013: No current Care Coordination follow up planned. Please refer if Care Coordination services are needed.    Presenting Problem Signs and Symptoms Treatment Plan   Questions or concerns   during clinic hours   I will call my clinic directly:  [facility]  [address] [place] [address]  [phone].   Questions or concerns outside clinic hours   I will call the 24 hour nurse line at   [phone] or [facility].   Need to schedule an appointment   I will call the 24 hour scheduling team at [phone] or my clinic directly at [phone].   Same day treatment     I will call my clinic first, nurse line if after hours, urgent care and express care if needed.   Clinic care coordination services (regular clinic hours)   I will call a clinic care coordinator directly:     Italia Keith RN  [phone]    CHELSI Iglesias:    [phone]    Or call my clinic at [phone] and ask to speak with care coordination.   Crisis Services: Behavioral or Mental Health  Crisis Connection 24 Hour Phone Line  [phone]    [facility] 24 Hour Crisis Services  [phone]    [facility] ([facility] Health Providers) Network [phone]    [facility]   [phone]     Emergency treatment -- Immediately    CAll 911              UTI (urinary tract infection) 09/04/2012     Priority: Medium     Hyperlipidemia LDL goal <130 01/06/2011     Priority: Medium     Tobacco abuse 04/19/2010     Priority: Medium     Dyspnea and  respiratory abnormality 2008     Priority: Medium     Sleep study 08: No CATHY. .5 minutes, sleep latency  normal 11.7 minutes. REM latency 154.5 minutes. Sleep efficiency at 89.4%. The sleep architecture was periodically disrupted with frequent sleep stage changes and arousals. Snoring: moderately loud. RDI 41.0,  AHI of 0.3. Lowest O2 93.0%. PLM index 0.0.  Problem list name updated by automated process. Provider to review       Calculus of kidney 2005     Priority: Medium     Due to RTA and medullary sponge kidney. [id number] ( See's Dr. Tavo Contreras)- calcium oxalate and calcium phosphate. S/p multiple procedures, >50       Congenital medullary sponge kidney 2005     Priority: Medium     Renal osteodystrophy 2005     Priority: Medium     Tubular acidosis          Past Medical/Surgical History:  Past Medical History:   Diagnosis Date     Hyperlipidemia      Kidney stone      Past Surgical History:   Procedure Laterality Date     COMBINED CYSTOSCOPY, INSERT STENT URETER(S) Left 11/10/2017    Procedure: COMBINED CYSTOSCOPY, INSERT STENT URETER(S);  Cystoscopy, Left Ureteral Stent Placement;  Surgeon: Yg Rodriguez MD;  [facility]     EXCISE TOENAIL(S) Left 2016    Procedure: EXCISE TOENAIL(S);  Surgeon: Ady Mejia DPM;  Location: [facility]     GENITOURINARY SURGERY       SURGICAL HISTORY OF -            SURGICAL HISTORY OF -   -present    Lithothypsy X 49     SURGICAL HISTORY OF -       Percutaneous nephrostomy X2     SURGICAL HISTORY OF -   2011    Cystoscopy with bilateral ureteral pyeloscopy, stone basketing and stent placement       Social History:  Social History     Social History     Marital status:      Spouse name: N/A     Number of children: 1     Years of education: N/A     Occupational History      Unemployed      Disabled     due to kidney disease     Social History Main Topics     Smoking status: Current Every Day  Smoker     Packs/day: 1.00     Years: 20.00     Types: Cigarettes     Smokeless tobacco: Never Used      Comment: 7/25/17 declined , tried quit plan- did not help per patient      Alcohol use No     Drug use: No     Sexual activity: Yes     Partners: Male     Birth control/ protection: None     Other Topics Concern     Parent/Sibling W/ Cabg, Mi Or Angioplasty Before 65f 55m? Yes     Social History Narrative       Family History:  Family History   Problem Relation Age of Onset     Respiratory Father      CATHY     C.A.D. Father      Heart Failure Father      Coronary Artery Disease Father      Hypertension Father      Depression Father      Anxiety Disorder Father      DIABETES Father      type 2     Lipids Mother      high cholesterol     Allergies Mother      Arthritis Mother      RA     Hyperlipidemia Mother      Lipids Sister      high cholesterol     Allergies Sister      Asthma Sister      Hypertension Sister      Allergies Sister      Respiratory Sister      cathy     Genitourinary Problems Maternal Grandmother      passed from complications from renal failure     Arthritis Maternal Grandfather      rheumatoid     DIABETES Paternal Grandmother      adult onset     Cardiovascular Paternal Grandfather      AAA     Hypertension Sister      Hyperlipidemia Sister      Anxiety Disorder Sister      Asthma Sister        Review of Systems:  A complete review of systems reviewed by me is negative with the exeption of what has been mentioned in the history of present illness.  CONSTITUTIONAL:  POSITIVE for  drug allergies see list  EYES: NEGATIVE for changes in vision, blind spots, double vision.  ENT: NEGATIVE for ear pain, sore throat, sinus pain, post-nasal drip, runny nose, bloody nose  CARDIAC: NEGATIVE for fast heartbeats or fluttering in chest, chest pain or pressure, breathlessness when lying flat, swollen legs or swollen feet.  NEUROLOGIC:  POSITIVE for  headaches  DERMATOLOGIC: NEGATIVE for rashes, new moles or  "change in mole(s)  PULMONARY:  POSITIVE for  SOB with activity, dry cough, productive cough and wheezing   GASTROINTESTINAL:  POSITIVE for  loose or watery stools and constipation  GENITOURINARY:  POSITIVE for  pain during urination  MUSCULOSKELETAL: NEGATIVE for muscle pain, bone or joint pain, swollen joints.  ENDOCRINE: NEGATIVE for increased thirst or urination, diabetes.  LYMPHATIC: NEGATIVE for swollen lymph nodes, lumps or bumps in the breasts or nipple discharge.    Physical Examination:  Vitals: /70  Pulse 70  Ht 1.676 m (5' 6\")  Wt 78 kg (172 lb)  LMP 07/02/2013  SpO2 97%  BMI 27.76 kg/m2  BMI= Body mass index is 27.76 kg/(m^2).    Neck Cir (cm): 35 cm    [place] Total Score 2/9/2018   Total score - [place] 10     GENERAL APPEARANCE: healthy, alert, no distress, over weight and significant tooth loss  RESP: lungs clear to auscultation - no rales, rhonchi or wheezes  CV: regular rates and rhythm, normal S1 S2, no S3 or S4 and no murmur, click or rub  PSYCH: mentation appears normal and affect normal/bright    Impression/Plan:    Latoya Riddle is a pleasant 46 yo F with history of recurrent kidney stones (passed 50+ stones), solitary left kidney, anxiety who presents to discuss potential sleep disordered breathing.    1.)  Mild to moderate CATHY without sleep-associated hypoxemia   - PSG performed 4/1/2008 with weight 180 lbs, AHI 0.3, RDI 41, mary SpO2 93%, PLMI 0, arousal index 57.9.   - We discussed that we would likely interpret her sleep study today as consistent with mild to moderate CATHY given that a significant percentage of RERA's would likely be labeled as hypopneas today.   - Did not see strong indication to repeat testing given weight stable, no new cardiac or pulmonary concerns   - It seems reasonable to pursue treatment given primary concerns of fatigue, snoring, observed apnea, gasping arousals.   - Likely does not have enough remaining dentition for oral appliance.   - Will " proceed with CPAP auto-titrate 5-15 cm H2O    Plan as given to patient as above.    I have spent 60 minutes with this patient today in which greater than 50% of this time was spent in the counseling / coordination of care regarding CATHY.    Polysomnography reviewed.  Obstructive sleep apnea reviewed.  Complications of untreated sleep apnea were reviewed.    Magdiel Purvis MD    CC: [facility]

## 2018-02-09 NOTE — NURSING NOTE
"Chief Complaint   Patient presents with     Consult For     Consult per Dr. Avila RE: snoring,witnessed apneas and fatigue       Initial /70  Pulse 70  Ht 1.676 m (5' 6\")  Wt 78 kg (172 lb)  LMP 07/02/2013  SpO2 97%  BMI 27.76 kg/m2 Estimated body mass index is 27.76 kg/(m^2) as calculated from the following:    Height as of this encounter: 1.676 m (5' 6\").    Weight as of this encounter: 78 kg (172 lb).  Medication Reconciliation: complete  "

## 2018-02-09 NOTE — MR AVS SNAPSHOT
After Visit Summary   2/9/2018    Latoya Riddle    MRN: [id number]           Patient Information     Date Of Birth          1970        Visit Information        Provider Department      2/9/2018 8:30 AM Magdiel Purvis MD [facility]        Today's Diagnoses     CATHY (obstructive sleep apnea)    -  1    Snoring        Hypersomnia          Care Instructions      Your BMI is Body mass index is 27.76 kg/(m^2).  Weight management is a personal decision.  If you are interested in exploring weight loss strategies, the following discussion covers the approaches that may be successful. Body mass index (BMI) is one way to tell whether you are at a healthy weight, overweight, or obese. It measures your weight in relation to your height.  A BMI of 18.5 to 24.9 is in the healthy range. A person with a BMI of 25 to 29.9 is considered overweight, and someone with a BMI of 30 or greater is considered obese. More than two-thirds of American adults are considered overweight or obese.  Being overweight or obese increases the risk for further weight gain. Excess weight may lead to heart disease and diabetes.  Creating and following plans for healthy eating and physical activity may help you improve your health.  Weight control is part of healthy lifestyle and includes exercise, emotional health, and healthy eating habits. Careful eating habits lifelong are the mainstay of weight control. Though there are significant health benefits from weight loss, long-term weight loss with diet alone may be very difficult to achieve- studies show long-term success with dietary management in less than 10% of people. Attaining a healthy weight may be especially difficult to achieve in those with severe obesity. In some cases, medications, devices and surgical management might be considered.  What can you do?  If you are overweight or obese and are interested in methods for weight loss, you should  discuss this with your provider.     Consider reducing daily calorie intake by 500 calories.     Keep a food journal.     Avoiding skipping meals, consider cutting portions instead.    Diet combined with exercise helps maintain muscle while optimizing fat loss. Strength training is particularly important for building and maintaining muscle mass. Exercise helps reduce stress, increase energy, and improves fitness. Increasing exercise without diet control, however, may not burn enough calories to loose weight.       Start walking three days a week 10-20 minutes at a time    Work towards walking thirty minutes five days a week     Eventually, increase the speed of your walking for 1-2 minutes at time    In addition, we recommend that you review healthy lifestyles and methods for weight loss available through the National Institutes of Health patient information sites:  http://win.niddk.nih.gov/publications/index.htm    And look into health and wellness programs that may be available through your health insurance provider, employer, local community center, or yamile club.    Weight management plan: Patient was referred to their PCP to discuss a diet and exercise plan.            Follow-ups after your visit        Follow-up notes from your care team     Return in about 4 weeks (around 3/9/2018), or if symptoms worsen or fail to improve.      Who to contact     If you have questions or need follow up information about today's clinic visit or your schedule please contact [facility] directly at [phone].  Normal or non-critical lab and imaging results will be communicated to you by MyChart, letter or phone within 4 business days after the clinic has received the results. If you do not hear from us within 7 days, please contact the clinic through MyChart or phone. If you have a critical or abnormal lab result, we will notify you by phone as soon as possible.  Submit refill requests through [employer] or  "call your pharmacy and they will forward the refill request to us. Please allow 3 business days for your refill to be completed.          Additional Information About Your Visit        MyChart Information     [employer] gives you secure access to your electronic health record. If you see a primary care provider, you can also send messages to your care team and make appointments. If you have questions, please call your primary care clinic.  If you do not have a primary care provider, please call [phone] and they will assist you.        Care EveryWhere ID     This is your Care EveryWhere ID. This could be used by other organizations to access your [place] medical records  [id number]        Your Vitals Were     Pulse Height Last Period Pulse Oximetry BMI (Body Mass Index)       70 1.676 m (5' 6\") 07/02/2013 97% 27.76 kg/m2        Blood Pressure from Last 3 Encounters:   02/09/18 115/70   11/29/17 108/70   11/11/17 116/67    Weight from Last 3 Encounters:   02/09/18 78 kg (172 lb)   11/29/17 75.8 kg (167 lb)   07/25/17 75.4 kg (166 lb 4.8 oz)              We Performed the Following     Comprehensive DME     SLEEP EVALUATION & MANAGEMENT REFERRAL - ADULT        Primary Care Provider Office Phone # Fax #    Maribeth MICHELLE Cm [facility] [phone] [address] [facility] [address]        Equal Access to Services     [facility] Hospital[facility] : Hadii aad ku hadasho Soomaali, waaxda luqadaha, qaybta kaalmada adeegyada, carmen giles . So [facility] [phone].    ATENCIÓN: Si habla español, tiene a bustos disposición servicios gratuitos de asistencia lingüística. Llame al [phone].    We comply with applicable federal civil rights laws and [place] laws. We do not discriminate on the basis of race, color, national origin, age, disability, sex, sexual orientation, or gender identity.            Thank you!     Thank you for choosing [facility]  for your care. " Our goal is always to provide you with excellent care. Hearing back from our patients is one way we can continue to improve our services. Please take a few minutes to complete the written survey that you may receive in the mail after your visit with us. Thank you!             Your Updated Medication List - Protect others around you: Learn how to safely use, store and throw away your medicines at www.disposemymeds.org.          This list is accurate as of 2/9/18  9:19 AM.  Always use your most recent med list.                   Brand Name Dispense Instructions for use Diagnosis    ALEVE 220 MG tablet   Generic drug:  naproxen sodium      Take 220 mg by mouth 2 times daily as needed.        conjugated estrogens cream    PREMARIN     Place 1 g vaginally        LORazepam 0.5 MG tablet    ATIVAN    30 tablet    Take 1 tablet (0.5 mg) by mouth every 8 hours as needed for anxiety    Anxiety       oxyCODONE IR 5 MG tablet    ROXICODONE    20 tablet    Take 1-2 tablets (5-10 mg) by mouth every 6 hours as needed for moderate to severe pain Do NOT drive or use additional narcotics while using this medication    Calculus of kidney       ZYRTEC 10 MG tablet   Generic drug:  cetirizine      1 TABLET DAILY

## 2018-02-09 NOTE — PATIENT INSTRUCTIONS

## 2018-02-13 ENCOUNTER — TELEPHONE (OUTPATIENT)
Dept: SLEEP MEDICINE | Facility: CLINIC | Age: 48
End: 2018-02-13

## 2018-02-16 ENCOUNTER — DOCUMENTATION ONLY (OUTPATIENT)
Dept: SLEEP MEDICINE | Facility: CLINIC | Age: 48
End: 2018-02-16
Payer: COMMERCIAL

## 2018-02-16 DIAGNOSIS — G47.33 OSA (OBSTRUCTIVE SLEEP APNEA): Primary | ICD-10-CM

## 2018-02-16 NOTE — PROGRESS NOTES
Patient was offered choice of vendor and chose [facility].  Patient Latoya Riddle was set up at [facility]  on February 16, 2018. Patient received a Resmed AirSense 10 Auto. Pressures were set at 5-15 cm H2O.   Patient s ramp is 5 cm H2O for Auto and FLEX/EPR is EPR, 2.  Patient received a Resmed Mask name: AIRFIT P10  Pillow mask Size For Her, Medium, heated tubing and heated humidifier.  Patient is enrolled in the STM Program and does need to meet compliance. Patient has a follow up on 03/26/2018 with Dr. Purvis.    Lucinda Fischer

## 2018-02-19 ENCOUNTER — DOCUMENTATION ONLY (OUTPATIENT)
Dept: SLEEP MEDICINE | Facility: CLINIC | Age: 48
End: 2018-02-19
Payer: COMMERCIAL

## 2018-02-19 NOTE — PROGRESS NOTES
3 DAY STM VISIT    Patient contacted for 3 day STM visit  Message left for patient to return call     Device type: Auto-CPAP  PAP settings from order:: CPAP min 5 cm  H20     CPAP max 15 cm  H20  Device settings from machine    Assessment: Nightly usage over four hours.  Action plan: Pt to have f/u 14 day STM visit.

## 2018-03-02 ENCOUNTER — DOCUMENTATION ONLY (OUTPATIENT)
Dept: SLEEP MEDICINE | Facility: CLINIC | Age: 48
End: 2018-03-02
Payer: COMMERCIAL

## 2018-03-02 NOTE — PROGRESS NOTES
14 DAY STM VISIT    Message left for patient to return call     Assessment: Pt meeting objective benchmarks.     Action plan: Waiting for patient to return call and pt to have 30 day STM visit.    Device type: Auto-CPAP  PAP settings:  CPAP min 5 cm  H20      CPAP max 15 cm  H20     95th% pressure 8 cm     Objective measures: 14 day rolling measures      Compliance  100 %      Leak  11.45 lpm  last  upload      AHI 0.75   last  upload      Average number of minutes 480    Diagnostic AHI: 0.3 in 2008    Objective measure goal  Compliance   Goal >70%  Leak   Goal < 24 lpm  AHI  Goal < 5  Usage  Goal >240

## 2018-03-15 ENCOUNTER — TRANSFERRED RECORDS (OUTPATIENT)
Dept: HEALTH INFORMATION MANAGEMENT | Facility: CLINIC | Age: 48
End: 2018-03-15

## 2018-03-19 ENCOUNTER — DOCUMENTATION ONLY (OUTPATIENT)
Dept: SLEEP MEDICINE | Facility: CLINIC | Age: 48
End: 2018-03-19
Payer: COMMERCIAL

## 2018-03-19 NOTE — PROGRESS NOTES
30 DAY [facility] VISIT    Message left for patient to return call     Assessment: Pt meeting objective benchmarks.     Action plan: Waiting for patient to return call.   Patient has a follow up visit with Dr. Purvis on 3/26/2018.   Device type: Auto-CPAP  PAP settings: CPAP min 5 cm  H20     CPAP max 15 cm  H20    95th% pressure 8.6 cm  H20   Objective measures: 14 day rolling measures      Compliance  100 %      Leak  11.04 lpm  last  upload      AHI 0.85   last  upload      Average number of minutes 431    Diagnostic AHI: 0.3        Objective measure goal  Compliance   Goal >70%  Leak   Goal < 24 lpm  AHI  Goal < 5  Usage  Goal >240

## 2018-03-26 ENCOUNTER — OFFICE VISIT (OUTPATIENT)
Dept: SLEEP MEDICINE | Facility: CLINIC | Age: 48
End: 2018-03-26
Payer: COMMERCIAL

## 2018-03-26 VITALS
HEIGHT: 66 IN | HEART RATE: 76 BPM | SYSTOLIC BLOOD PRESSURE: 107 MMHG | DIASTOLIC BLOOD PRESSURE: 72 MMHG | WEIGHT: 180 LBS | RESPIRATION RATE: 16 BRPM | OXYGEN SATURATION: 99 % | BODY MASS INDEX: 28.93 KG/M2

## 2018-03-26 DIAGNOSIS — G47.33 OSA (OBSTRUCTIVE SLEEP APNEA): Primary | ICD-10-CM

## 2018-03-26 PROCEDURE — 99214 OFFICE O/P EST MOD 30 MIN: CPT | Performed by: FAMILY MEDICINE

## 2018-03-26 NOTE — PATIENT INSTRUCTIONS

## 2018-03-26 NOTE — MR AVS SNAPSHOT
After Visit Summary   3/26/2018    Latoya Riddle    MRN: [id number]           Patient Information     Date Of Birth          1970        Visit Information        Provider Department      3/26/2018 2:00 PM Magdiel Purvis MD [facility]        Today's Diagnoses     CATHY (obstructive sleep apnea)    -  1      Care Instructions      Your BMI is Body mass index is 29.05 kg/(m^2).  Weight management is a personal decision.  If you are interested in exploring weight loss strategies, the following discussion covers the approaches that may be successful. Body mass index (BMI) is one way to tell whether you are at a healthy weight, overweight, or obese. It measures your weight in relation to your height.  A BMI of 18.5 to 24.9 is in the healthy range. A person with a BMI of 25 to 29.9 is considered overweight, and someone with a BMI of 30 or greater is considered obese. More than two-thirds of American adults are considered overweight or obese.  Being overweight or obese increases the risk for further weight gain. Excess weight may lead to heart disease and diabetes.  Creating and following plans for healthy eating and physical activity may help you improve your health.  Weight control is part of healthy lifestyle and includes exercise, emotional health, and healthy eating habits. Careful eating habits lifelong are the mainstay of weight control. Though there are significant health benefits from weight loss, long-term weight loss with diet alone may be very difficult to achieve- studies show long-term success with dietary management in less than 10% of people. Attaining a healthy weight may be especially difficult to achieve in those with severe obesity. In some cases, medications, devices and surgical management might be considered.  What can you do?  If you are overweight or obese and are interested in methods for weight loss, you should discuss this with your provider.      Consider reducing daily calorie intake by 500 calories.     Keep a food journal.     Avoiding skipping meals, consider cutting portions instead.    Diet combined with exercise helps maintain muscle while optimizing fat loss. Strength training is particularly important for building and maintaining muscle mass. Exercise helps reduce stress, increase energy, and improves fitness. Increasing exercise without diet control, however, may not burn enough calories to loose weight.       Start walking three days a week 10-20 minutes at a time    Work towards walking thirty minutes five days a week     Eventually, increase the speed of your walking for 1-2 minutes at time    In addition, we recommend that you review healthy lifestyles and methods for weight loss available through the National Institutes of Health patient information sites:  http://win.niddk.nih.gov/publications/index.htm    And look into health and wellness programs that may be available through your health insurance provider, employer, local community center, or yamile club.    Weight management plan: Patient was referred to their PCP to discuss a diet and exercise plan.              Follow-ups after your visit        Who to contact     If you have questions or need follow up information about today's clinic visit or your schedule please contact [facility] directly at [phone].  Normal or non-critical lab and imaging results will be communicated to you by MyChart, letter or phone within 4 business days after the clinic has received the results. If you do not hear from us within 7 days, please contact the clinic through [employer] or phone. If you have a critical or abnormal lab result, we will notify you by phone as soon as possible.  Submit refill requests through [employer] or call your pharmacy and they will forward the refill request to us. Please allow 3 business days for your refill to be completed.          Additional Information  "About Your Visit        MyChart Information     [employer] gives you secure access to your electronic health record. If you see a primary care provider, you can also send messages to your care team and make appointments. If you have questions, please call your primary care clinic.  If you do not have a primary care provider, please call [phone] and they will assist you.        Care EveryWhere ID     This is your Care EveryWhere ID. This could be used by other organizations to access your [facility] medical records  [id number]        Your Vitals Were     Pulse Respirations Height Last Period Pulse Oximetry BMI (Body Mass Index)    76 16 1.676 m (5' 6\") 07/02/2013 99% 29.05 kg/m2       Blood Pressure from Last 3 Encounters:   03/26/18 107/72   02/09/18 115/70   11/29/17 108/70    Weight from Last 3 Encounters:   03/26/18 81.6 kg (180 lb)   02/09/18 78 kg (172 lb)   11/29/17 75.8 kg (167 lb)              Today, you had the following     No orders found for display       Primary Care Provider Office Phone # Fax #    Maribeth Pino Kalie Avila, APRN [facility] [phone] [address] [address]        Equal Access to Services     LUIZA PAREDES : Hadii aad ku hadasho Soomaali, waaxda luqadaha, qaybta kaalmada adeegyada, waxay idiin hayaan adegeeta khalia ladickson . So [facility] [phone].    ATENCIÓN: Si habla español, tiene a bustos disposición servicios gratuitos de asistencia lingüística. Llame al [phone].    We comply with applicable federal civil rights laws and [place] laws. We do not discriminate on the basis of race, color, national origin, age, disability, sex, sexual orientation, or gender identity.            Thank you!     Thank you for choosing [facility]  for your care. Our goal is always to provide you with excellent care. Hearing back from our patients is one way we can continue to improve our services. Please take a few minutes to complete the written survey " that you may receive in the mail after your visit with us. Thank you!             Your Updated Medication List - Protect others around you: Learn how to safely use, store and throw away your medicines at www.disposemymeds.org.          This list is accurate as of 3/26/18 11:59 PM.  Always use your most recent med list.                   Brand Name Dispense Instructions for use Diagnosis    ALEVE 220 MG tablet   Generic drug:  naproxen sodium      Take 220 mg by mouth 2 times daily as needed.        LORazepam 0.5 MG tablet    ATIVAN    30 tablet    Take 1 tablet (0.5 mg) by mouth every 8 hours as needed for anxiety    Anxiety       oxyCODONE IR 5 MG tablet    ROXICODONE    20 tablet    Take 1-2 tablets (5-10 mg) by mouth every 6 hours as needed for moderate to severe pain Do NOT drive or use additional narcotics while using this medication    Calculus of kidney       ZYRTEC 10 MG tablet   Generic drug:  cetirizine      1 TABLET DAILY

## 2018-03-26 NOTE — NURSING NOTE
"Chief Complaint   Patient presents with     CPAP Follow Up       Initial /72  Pulse 76  Resp 16  Ht 1.676 m (5' 6\")  Wt 81.6 kg (180 lb)  LMP 07/02/2013  SpO2 99%  BMI 29.05 kg/m2 Estimated body mass index is 29.05 kg/(m^2) as calculated from the following:    Height as of this encounter: 1.676 m (5' 6\").    Weight as of this encounter: 81.6 kg (180 lb).  Medication Reconciliation: complete   Angelica Doan [facility]      "

## 2018-03-26 NOTE — PROGRESS NOTES
"Sleep Follow-Up Visit:    Date on this visit: 3/26/2018    Latoya Riddle is a 47 y.o woman with a PMH of CATHY, recurrent kidney stones, solitary left kidney s/p right nephrectomy, tobacco use disorder and anxiety comes in today for follow-up of CPAP treatment.      Latoya was started on CPAP auto-titrate 5-15 cm H2O after visit on 2018. Doing well with CPAP. Reports that since starting CPAP, she has been sleeping better at night and feels \"more functional\" during the day. Typically in bed from 11:00 PM until 7:00 AM. Prior to starting CPAP was in bed 11:00 PM until 10:00 AM, but still not feeling rested upon waking.  reports that she hasn't been snoring since starting CPAP. Latoya tried going one night without using CPAP, but was unable to sleep, so she decided to use her CPAP.     Does note that it is difficult for her to get used to the mask on her face and that it is uncomfortable when she is falling asleep. Able to initiate sleep in 15 minutes. [place] sleepiness scale 4/24. Decreased from 10/24 at last visit.      CPAP download 2018-3/20/2018 on auto-titrate 5-15 cm H2O. Median pressure 6.4, 95th percentile 8.3, maximum 9.6 cm H2O. Average daily usage 7:31, used > = 4 hours all nights. AHI 0.9.    Prior sleep testin2008- Diagnostic PSG. Weight 180 lbs. AHI 0.3. RDI 41, mary SpO2 93%, PLMI 0, arousal index 57.9. No specific treatment pursued at that time.     Problem List:  Patient Active Problem List    Diagnosis Date Noted     CATHY (obstructive sleep apnea) 2018     Priority: Medium     Mild to moderate CATHY without sleep-associated hypoxemia   - PSG performed 2008 with weight 180 lbs, AHI 0.3, RDI 41, mary SpO2 93%, PLMI 0, arousal index 57.9.       Ureteral stone 11/10/2017     Priority: Medium     Anxiety 2016     Priority: Medium     Health Care Home 2013     Priority: Medium     EMERGENCY CARE PLAN  May 6, 2013: No current Care Coordination follow up " planned. Please refer if Care Coordination services are needed.    Presenting Problem Signs and Symptoms Treatment Plan   Questions or concerns   during clinic hours   I will call my clinic directly:  [facility]  [address] [place] [address]  [phone].   Questions or concerns outside clinic hours   I will call the 24 hour nurse line at   [phone] or [phone].   Need to schedule an appointment   I will call the 24 hour scheduling team at [phone] or my clinic directly at [phone].   Same day treatment     I will call my clinic first, nurse line if after hours, urgent care and express care if needed.   Clinic care coordination services (regular clinic hours)   I will call a clinic care coordinator directly:     Italia Keith RN  [phone]    Rosette Valle SW:    [phone]    Or call my clinic at [phone] and ask to speak with care coordination.   Crisis Services: Behavioral or Mental Health  [facility] 24 Hour Phone Line  [phone]    [facility] 24 Hour Crisis Services  [phone]    [facility] (Behavioral Health Providers) [facility] [phone]    [facility]   [phone]     Emergency treatment -- Immediately    [facility] 911              UTI (urinary tract infection) 09/04/2012     Priority: Medium     Hyperlipidemia LDL goal <130 01/06/2011     Priority: Medium     Tobacco abuse 04/19/2010     Priority: Medium     Dyspnea and respiratory abnormality 04/07/2008     Priority: Medium     Sleep study 4/1/08: No CATHY. .5 minutes, sleep latency  normal 11.7 minutes. REM latency 154.5 minutes. Sleep efficiency at 89.4%. The sleep architecture was periodically disrupted with frequent sleep stage changes and arousals. Snoring: moderately loud. RDI 41.0,  AHI of 0.3. Lowest O2 93.0%. PLM index 0.0.  Problem list name updated by automated process. Provider to review       Calculus of kidney 06/20/2005     Priority: Medium     Due to RTA  and medullary sponge kidney. [id number] ( See's Dr. Tavo Contreras)- calcium oxalate and calcium phosphate. S/p multiple procedures, >50       Congenital medullary sponge kidney 06/20/2005     Priority: Medium     Renal osteodystrophy 06/20/2005     Priority: Medium     Tubular acidosis          Impression/Plan:  Latoya Riddle is a 47 y.o woman with a PMH of CATHY, recurrent kidney stones, solitary left kidney s/p right nephrectomy, tobacco use disorder and anxiety comes in today for follow-up of CPAP treatment.     1.) Mild to possibly low severe obstructive sleep apnea  - Well controlled with CPAP and Latoya notes improvement in symptoms since starting CPAP.   - Continue CPAP auto-titrate 5-15 cm H2O  - Follow up in 1 year      Scribe Disclosure:   I, Keiry Jamison, MS4, am serving as a scribe; to document services personally performed by Dr. Magdiel Purvis, based on data collection and the provider's statements to me.     Provider Disclosure:  I,Magdiel Purvis, agree with above History, Review of Systems, Physical exam and Plan.  I have reviewed the content of the documentation and have edited it as needed. I have personally performed the services documented here and the documentation accurately represents those services and the decisions I have made.      I have spent 25 minutes with this patient today in which greater than 50% of this time was spent in the counseling / coordination of care.      Magdiel Purvis MD      CC: Maribeth Avila

## 2018-06-27 ENCOUNTER — OFFICE VISIT (OUTPATIENT)
Dept: FAMILY MEDICINE | Facility: CLINIC | Age: 48
End: 2018-06-27
Payer: COMMERCIAL

## 2018-06-27 VITALS
BODY MASS INDEX: 28.66 KG/M2 | OXYGEN SATURATION: 100 % | HEIGHT: 67 IN | TEMPERATURE: 96.8 F | SYSTOLIC BLOOD PRESSURE: 116 MMHG | DIASTOLIC BLOOD PRESSURE: 73 MMHG | WEIGHT: 182.6 LBS | HEART RATE: 63 BPM

## 2018-06-27 DIAGNOSIS — Z12.31 ENCOUNTER FOR SCREENING MAMMOGRAM FOR BREAST CANCER: ICD-10-CM

## 2018-06-27 DIAGNOSIS — R53.82 CHRONIC FATIGUE: Primary | ICD-10-CM

## 2018-06-27 LAB
DEPRECATED CALCIDIOL+CALCIFEROL SERPL-MC: 13 UG/L (ref 20–75)
ERYTHROCYTE [DISTWIDTH] IN BLOOD BY AUTOMATED COUNT: 15.1 % (ref 10–15)
HCT VFR BLD AUTO: 51.5 % (ref 35–47)
HGB BLD-MCNC: 16.5 G/DL (ref 11.7–15.7)
MCH RBC QN AUTO: 28.6 PG (ref 26.5–33)
MCHC RBC AUTO-ENTMCNC: 32 G/DL (ref 31.5–36.5)
MCV RBC AUTO: 89 FL (ref 78–100)
PLATELET # BLD AUTO: 127 10E9/L (ref 150–450)
RBC # BLD AUTO: 5.77 10E12/L (ref 3.8–5.2)
TSH SERPL DL<=0.005 MIU/L-ACNC: 2.4 MU/L (ref 0.4–4)
WBC # BLD AUTO: 7.8 10E9/L (ref 4–11)

## 2018-06-27 PROCEDURE — 85027 COMPLETE CBC AUTOMATED: CPT | Performed by: NURSE PRACTITIONER

## 2018-06-27 PROCEDURE — 82306 VITAMIN D 25 HYDROXY: CPT | Performed by: NURSE PRACTITIONER

## 2018-06-27 PROCEDURE — 99214 OFFICE O/P EST MOD 30 MIN: CPT | Performed by: NURSE PRACTITIONER

## 2018-06-27 PROCEDURE — 36415 COLL VENOUS BLD VENIPUNCTURE: CPT | Performed by: NURSE PRACTITIONER

## 2018-06-27 PROCEDURE — 84443 ASSAY THYROID STIM HORMONE: CPT | Performed by: NURSE PRACTITIONER

## 2018-06-27 NOTE — MR AVS SNAPSHOT
After Visit Summary   6/27/2018    Latoya Riddle    MRN: [id number]           Patient Information     Date Of Birth          1970        Visit Information        Provider Department      6/27/2018 9:20 AM Maribeth Avila APRN CNP [facility]        Today's Diagnoses     Chronic fatigue    -  1    Encounter for screening mammogram for breast cancer          Care Instructions          Thank you for choosing [facility].  You may be receiving a survey in the mail from [facility] regarding your visit today.  Please take a few minutes to complete and return the survey to let us know how we are doing.      If you have questions or concerns, please contact us via [employer] or you can contact your care team at [phone].    Our Clinic hours are:  Monday 6:40 am  to 7:00 pm  Tuesday -Friday 6:40 am to 5:00 pm    The [place] outpatient lab hours are:  Monday - Friday 6:10 am to 4:45 pm  Saturdays 7:00 am to 11:00 am  Appointments are required, call [phone]    If you have clinical questions after hours or would like to schedule an appointment,  call the clinic at [phone].            Follow-ups after your visit        Future tests that were ordered for you today     Open Future Orders        Priority Expected Expires Ordered    *MA Screening Digital Bilateral Routine  6/27/2019 6/27/2018            Who to contact     If you have questions or need follow up information about today's clinic visit or your schedule please contact [facility] directly at [phone].  Normal or non-critical lab and imaging results will be communicated to you by MyChart, letter or phone within 4 business days after the clinic has received the results. If you do not hear from us within 7 days, please contact the clinic through [employer] or phone. If you have a critical or abnormal lab result, we will notify you by phone as soon as possible.  Submit refill requests through  "MyChart or call your pharmacy and they will forward the refill request to us. Please allow 3 business days for your refill to be completed.          Additional Information About Your Visit        MyChart Information     [employer] gives you secure access to your electronic health record. If you see a primary care provider, you can also send messages to your care team and make appointments. If you have questions, please call your primary care clinic.  If you do not have a primary care provider, please call [phone] and they will assist you.        Care EveryWhere ID     This is your Care EveryWhere ID. This could be used by other organizations to access your [place] medical records  [id number]        Your Vitals Were     Pulse Temperature Height Last Period Pulse Oximetry BMI (Body Mass Index)    63 96.8  F (36  C) (Tympanic) 5' 6.5\" (1.689 m) 07/02/2013 100% 29.03 kg/m2       Blood Pressure from Last 3 Encounters:   06/27/18 116/73   03/26/18 107/72   02/09/18 115/70    Weight from Last 3 Encounters:   06/27/18 182 lb 9.6 oz (82.8 kg)   03/26/18 180 lb (81.6 kg)   02/09/18 172 lb (78 kg)              We Performed the Following     CBC with platelets     TSH with free T4 reflex     Vitamin D Deficiency        Primary Care Provider Office Phone # Fax #    Maribeth Yanesdustin Avila, APRN [phone]       [address] [facility] [address]        Equal Access to Services     [facility] : Hadii aad ku hadasho Soomaali, waaxda luqadaha, qaybta kaalmada adeegyada, waxay kemi giles . So [facility] [phone].    ATENCIÓN: Si habla español, tiene a bustos disposición servicios gratuitos de asistencia lingüística. Llame al [phone].    We comply with applicable federal civil rights laws and [place] laws. We do not discriminate on the basis of race, color, national origin, age, disability, sex, sexual orientation, or gender identity.            Thank you!     Thank you for " choosing [facility]  for your care. Our goal is always to provide you with excellent care. Hearing back from our patients is one way we can continue to improve our services. Please take a few minutes to complete the written survey that you may receive in the mail after your visit with us. Thank you!             Your Updated Medication List - Protect others around you: Learn how to safely use, store and throw away your medicines at www.disposemymeds.org.          This list is accurate as of 6/27/18 10:12 AM.  Always use your most recent med list.                   Brand Name Dispense Instructions for use Diagnosis    ALEVE 220 MG tablet   Generic drug:  naproxen sodium      Take 220 mg by mouth 2 times daily as needed.        LORazepam 0.5 MG tablet    ATIVAN    30 tablet    Take 1 tablet (0.5 mg) by mouth every 8 hours as needed for anxiety    Anxiety       oxyCODONE IR 5 MG tablet    ROXICODONE    20 tablet    Take 1-2 tablets (5-10 mg) by mouth every 6 hours as needed for moderate to severe pain Do NOT drive or use additional narcotics while using this medication    Calculus of kidney       ZYRTEC 10 MG tablet   Generic drug:  cetirizine      1 TABLET DAILY

## 2018-06-27 NOTE — PATIENT INSTRUCTIONS
Thank you for choosing [facility].  You may be receiving a survey in the mail from [employer] regarding your visit today.  Please take a few minutes to complete and return the survey to let us know how we are doing.      If you have questions or concerns, please contact us via [employer] or you can contact your care team at [phone].    Our Clinic hours are:  Monday 6:40 am  to 7:00 pm  Tuesday -Friday 6:40 am to 5:00 pm    The [place] outpatient lab hours are:  Monday - Friday 6:10 am to 4:45 pm  Saturdays 7:00 am to 11:00 am  Appointments are required, call [phone]    If you have clinical questions after hours or would like to schedule an appointment,  call the clinic at [phone].

## 2018-06-27 NOTE — PROGRESS NOTES
"  SUBJECTIVE:   Latoya Riddle is a 47 year old female who presents to clinic today for the following health issues:      Concern - faigue  Onset: \"forever\"  Seems to be worse    Description:   Patient c/o chronic fatigue which is getting worse  Weight gain - 20 lbs since the Fall    Intensity: moderate    Progression of Symptoms:  worsening    Accompanying Signs & Symptoms:  Nothing  No blood loss    Previous history of similar problem:   chronic    Precipitating factors:   Worsened by: unknown    Alleviating factors:  Improved by: nothing    Therapies Tried and outcome: has cpap now - got it in Feb, but fatigue continues  Not exercising  Not watching diet        Problem list and histories reviewed & adjusted, as indicated.  Additional history: as documented    Reviewed and updated as needed this visit by clinical staff  Tobacco  Allergies  Meds       Reviewed and updated as needed this visit by Provider         ROS:  Constitutional, HEENT, cardiovascular, pulmonary, gi and gu systems are negative, except as otherwise noted.    OBJECTIVE:     /73 (BP Location: Left arm)  Pulse 63  Temp 96.8  F (36  C) (Tympanic)  Ht 5' 6.5\" (1.689 m)  Wt 182 lb 9.6 oz (82.8 kg)  LMP 07/02/2013  SpO2 100%  BMI 29.03 kg/m2  Body mass index is 29.03 kg/(m^2).  GENERAL: healthy, alert and no distress  RESP: lungs clear to auscultation - no rales, rhonchi or wheezes  CV: regular rate and rhythm, normal S1 S2, no S3 or S4, no murmur, click or rub, no peripheral edema and peripheral pulses strong      ASSESSMENT/PLAN:       ICD-10-CM    1. Chronic fatigue R53.82 Etiology unclear.  Lab check today:  CBC with platelets     TSH with free T4 reflex     Vitamin D Deficiency    May need to refer her back to sleep clinic to have CPAP re-evaluated.    We also discussed regular exercise.  3 day diet recall revealed an excess of carbs and processed foods - discussed minimizing carbs and eating more fresh foods.     2. Encounter " for screening mammogram for breast cancer Z12.31 *MA Screening Digital Bilateral         The risks, benefits and treatment options of prescribed medications or other treatments have been discussed with the patient. The patient verbalized their understanding and should call or follow up if no improvement or if they develop further problems.    MICHELLE Lagos [facility]

## 2018-08-15 ENCOUNTER — DOCUMENTATION ONLY (OUTPATIENT)
Dept: SLEEP MEDICINE | Facility: CLINIC | Age: 48
End: 2018-08-15
Payer: COMMERCIAL

## 2018-08-15 NOTE — PROGRESS NOTES
6 month [facility] Recheck Visit     Diagnostic AHI: 0.3  PSG    Data only recheck     Assessment: Pt meeting objective benchmarks.  Patient compliance is 81% the last 180 days.   Action plan:   Pt to follow up per provider request (1-2 yrs)       Device type: Auto-CPAP  PAP settings: CPAP min 5.0 cm  H20     CPAP max 15.0 cm  H20    CPAP fixed 0 cm  H20    95th% pressure 8.3 cm  H20   Objective measures: 14 day rolling measures      Compliance  85 %      Leak  14 lpm  last  upload      AHI 0.84   last  upload      Average number of minutes 400      Objective measure goal  Compliance   Goal >70%  Leak   Goal < 24 lpm  AHI  Goal < 5  Usage  Goal >240

## 2018-10-29 ENCOUNTER — OFFICE VISIT (OUTPATIENT)
Dept: FAMILY MEDICINE | Facility: CLINIC | Age: 48
End: 2018-10-29
Payer: COMMERCIAL

## 2018-10-29 ENCOUNTER — RADIANT APPOINTMENT (OUTPATIENT)
Dept: GENERAL RADIOLOGY | Facility: CLINIC | Age: 48
End: 2018-10-29
Attending: NURSE PRACTITIONER
Payer: COMMERCIAL

## 2018-10-29 VITALS
SYSTOLIC BLOOD PRESSURE: 120 MMHG | WEIGHT: 188 LBS | BODY MASS INDEX: 29.51 KG/M2 | HEIGHT: 67 IN | TEMPERATURE: 96.7 F | HEART RATE: 79 BPM | OXYGEN SATURATION: 98 % | DIASTOLIC BLOOD PRESSURE: 76 MMHG

## 2018-10-29 DIAGNOSIS — Q61.5 CONGENITAL MEDULLARY SPONGE KIDNEY: ICD-10-CM

## 2018-10-29 DIAGNOSIS — R10.9 FLANK PAIN: Primary | ICD-10-CM

## 2018-10-29 DIAGNOSIS — R10.9 FLANK PAIN: ICD-10-CM

## 2018-10-29 DIAGNOSIS — R82.90 NONSPECIFIC FINDING ON EXAMINATION OF URINE: ICD-10-CM

## 2018-10-29 DIAGNOSIS — N20.0 CALCULUS OF KIDNEY: ICD-10-CM

## 2018-10-29 LAB
ALBUMIN UR-MCNC: 30 MG/DL
APPEARANCE UR: ABNORMAL
BACTERIA #/AREA URNS HPF: ABNORMAL /HPF
BILIRUB UR QL STRIP: NEGATIVE
COLOR UR AUTO: YELLOW
GLUCOSE UR STRIP-MCNC: NEGATIVE MG/DL
HGB UR QL STRIP: ABNORMAL
KETONES UR STRIP-MCNC: NEGATIVE MG/DL
LEUKOCYTE ESTERASE UR QL STRIP: ABNORMAL
NITRATE UR QL: POSITIVE
PH UR STRIP: 6 PH (ref 5–7)
RBC #/AREA URNS AUTO: ABNORMAL /HPF
SOURCE: ABNORMAL
SP GR UR STRIP: 1.02 (ref 1–1.03)
UROBILINOGEN UR STRIP-ACNC: 0.2 EU/DL (ref 0.2–1)
WBC #/AREA URNS AUTO: >100 /HPF

## 2018-10-29 PROCEDURE — 81001 URINALYSIS AUTO W/SCOPE: CPT | Performed by: NURSE PRACTITIONER

## 2018-10-29 PROCEDURE — 99214 OFFICE O/P EST MOD 30 MIN: CPT | Performed by: NURSE PRACTITIONER

## 2018-10-29 PROCEDURE — 36415 COLL VENOUS BLD VENIPUNCTURE: CPT | Performed by: NURSE PRACTITIONER

## 2018-10-29 PROCEDURE — 74019 RADEX ABDOMEN 2 VIEWS: CPT | Mod: FY

## 2018-10-29 PROCEDURE — 80048 BASIC METABOLIC PNL TOTAL CA: CPT | Performed by: NURSE PRACTITIONER

## 2018-10-29 PROCEDURE — 87086 URINE CULTURE/COLONY COUNT: CPT | Performed by: NURSE PRACTITIONER

## 2018-10-29 NOTE — PATIENT INSTRUCTIONS
Schedule mammogram [phone]        Thank you for choosing [facility].  You may be receiving a survey in the mail from [employer] regarding your visit today.  Please take a few minutes to complete and return the survey to let us know how we are doing.      If you have questions or concerns, please contact us via [employer] or you can contact your care team at [phone].    Our Clinic hours are:  Monday 6:40 am  to 7:00 pm  Tuesday -Friday 6:40 am to 5:00 pm    The [place] outpatient lab hours are:  Monday - Friday 6:10 am to 4:45 pm  Saturdays 7:00 am to 11:00 am  Appointments are required, call [phone]    If you have clinical questions after hours or would like to schedule an appointment,  call the clinic at [phone].

## 2018-10-29 NOTE — PROGRESS NOTES
"  SUBJECTIVE:   Latoya Riddle is a 47 year old female who presents to clinic today for the following health issues:      ABDOMINAL PAIN/ Flank pain     Onset: one week    Description:   Character: Sharp and Dull ache  Location: left flank  Radiation: to left groin    Intensity: mild to moderate    Progression of Symptoms:  same ;  Waxes and wanes    Accompanying Signs & Symptoms:  Fever/Chills?: no   Gas/Bloating: no   Nausea: no   Vomitting: no   Diarrhea?: YES- yesterday and last night  Constipation:no   Dysuria or Hematuria: no    History:   Trauma: no   Previous similar pain: YES   History of frequent kidney stones  This is a little different  Previous tests done: none    Precipitating factors:   Does the pain change with:     Food: no      BM: no     Urination: no     Alleviating factors:  nothing    Therapies Tried and outcome: oxycodone when the pain gets really bad    LMP:  not applicable       Has complicated urology history - follows with urology.  Has a chronic infections with multi drug resistance. Patient states that her UA is always positive and there is no way to treat it.  S/p right nephrectomy.        Problem list and histories reviewed & adjusted, as indicated.  Additional history: as documented    Reviewed and updated as needed this visit by clinical staff  Tobacco  Allergies  Meds       Reviewed and updated as needed this visit by Provider         ROS:  Constitutional, HEENT, cardiovascular, pulmonary, gi and gu systems are negative, except as otherwise noted.    OBJECTIVE:     /76 (BP Location: Right arm)  Pulse 79  Temp 96.7  F (35.9  C) (Tympanic)  Ht 5' 6.5\" (1.689 m)  Wt 188 lb (85.3 kg)  LMP 07/02/2013  SpO2 98%  BMI 29.89 kg/m2  Body mass index is 29.89 kg/(m^2).  GENERAL: healthy, alert and no distress  RESP: lungs clear to auscultation - no rales, rhonchi or wheezes  CV: regular rate and rhythm, normal S1 S2, no S3 or S4, no murmur, click or rub, no peripheral edema " and peripheral pulses strong  ABDOMEN: soft, nontender, no hepatosplenomegaly, no masses and bowel sounds normal    Diagnostic Test Results:  Results for orders placed or performed in visit on 10/29/18 (from the past 24 hour(s))   *UA reflex to Microscopic and Culture ([facility] (except [place] and [place])   Result Value Ref Range    Color Urine Yellow     Appearance Urine Turbid     Glucose Urine Negative NEG^Negative mg/dL    Bilirubin Urine Negative NEG^Negative    Ketones Urine Negative NEG^Negative mg/dL    Specific Gravity Urine 1.020 1.003 - 1.035    Blood Urine Large (A) NEG^Negative    pH Urine 6.0 5.0 - 7.0 pH    Protein Albumin Urine 30 (A) NEG^Negative mg/dL    Urobilinogen Urine 0.2 0.2 - 1.0 EU/dL    Nitrite Urine Positive (A) NEG^Negative    Leukocyte Esterase Urine Moderate (A) NEG^Negative    Source Midstream Urine    Urine Microscopic   Result Value Ref Range    WBC Urine >100 (A) OTO5^0 - 5 /HPF    RBC Urine 10-25 (A) OTO2^O - 2 /HPF    Bacteria Urine Many (A) NEG^Negative /HPF       ASSESSMENT/PLAN:       ICD-10-CM    1. Flank pain R10.9 *UA reflex to Microscopic and Culture ([facility] (except [place] and [place])     Urine Microscopic     XR KUB   2. Nonspecific finding on examination of urine R82.90 Urine Culture Aerobic Bacterial   3. Congenital medullary sponge kidney Q61.5 Basic metabolic panel     XR KUB   4. Calculus of kidney N20.0 XR KUB     CANCELED: XR KUB       Patient states that UA is always positive and she has an uncurable chronic infection that is drug resistant.  Will await UC prior to initiating antibiotics - will treat if different organism than her chronic issue.  KUB negative for obstructing stone.  Advised patient to contact her urologist about the pain.      Patient Instructions   Schedule mammogram [phone]          The risks, benefits and treatment options of prescribed medications or other treatments have been discussed  with the patient. The patient verbalized their understanding and should call or follow up if no improvement or if they develop further problems.    MICHELLE Lagos [facility]

## 2018-10-29 NOTE — MR AVS SNAPSHOT
After Visit Summary   10/29/2018    Latoya Riddle    MRN: [id number]           Patient Information     Date Of Birth          1970        Visit Information        Provider Department      10/29/2018 2:20 PM Maribeth Avila APRN CNP [facility]        Today's Diagnoses     Flank pain    -  1    Nonspecific finding on examination of urine        Congenital medullary sponge kidney        Calculus of kidney          Care Instructions    Schedule mammogram [phone]        Thank you for choosing [facility].  You may be receiving a survey in the mail from [employer] regarding your visit today.  Please take a few minutes to complete and return the survey to let us know how we are doing.      If you have questions or concerns, please contact us via [employer] or you can contact your care team at [phone].    Our Clinic hours are:  Monday 6:40 am  to 7:00 pm  Tuesday -Friday 6:40 am to 5:00 pm    The [place] outpatient lab hours are:  Monday - Friday 6:10 am to 4:45 pm  Saturdays 7:00 am to 11:00 am  Appointments are required, call [phone]    If you have clinical questions after hours or would like to schedule an appointment,  call the clinic at [phone].            Follow-ups after your visit        Follow-up notes from your care team     Return in about 1 week (around 11/5/2018), or if symptoms worsen or fail to improve.      Who to contact     If you have questions or need follow up information about today's clinic visit or your schedule please contact [facility] directly at [phone].  Normal or non-critical lab and imaging results will be communicated to you by MyChart, letter or phone within 4 business days after the clinic has received the results. If you do not hear from us within 7 days, please contact the clinic through [email or web address] or phone. If you have a critical or abnormal lab result, we will notify you by phone as soon as  "possible.  Submit refill requests through [employer] or call your pharmacy and they will forward the refill request to us. Please allow 3 business days for your refill to be completed.          Additional Information About Your Visit        [employer] Information     [employer] gives you secure access to your electronic health record. If you see a primary care provider, you can also send messages to your care team and make appointments. If you have questions, please call your primary care clinic.  If you do not have a primary care provider, please call [phone] and they will assist you.        Care EveryWhere ID     This is your Care EveryWhere ID. This could be used by other organizations to access your [place] medical records  [id number]        Your Vitals Were     Pulse Temperature Height Last Period Pulse Oximetry BMI (Body Mass Index)    79 96.7  F (35.9  C) (Tympanic) 5' 6.5\" (1.689 m) 07/02/2013 98% 29.89 kg/m2       Blood Pressure from Last 3 Encounters:   10/29/18 120/76   06/27/18 116/73   03/26/18 107/72    Weight from Last 3 Encounters:   10/29/18 188 lb (85.3 kg)   06/27/18 182 lb 9.6 oz (82.8 kg)   03/26/18 180 lb (81.6 kg)              We Performed the Following     *UA reflex to Microscopic and Culture ([place] [facility] (except [place] and [place])     Basic metabolic panel     Urine Culture Aerobic Bacterial     Urine Microscopic        Primary Care Provider Office Phone # Fax #    Maribeth Pino Kalie Avila, APRN [facility] [phone] [address] [facility] [address]        Equal Access to Services     [facility] : Hadii aad ku hadasho Soomaali, waaxda luqadaha, qaybta kaalmada adeegyada, carmen concepcion. So [facility] [phone].    ATENCIÓN: Si habla español, tiene a bustos disposición servicios gratuitos de asistencia lingüística. Axel al [phone].    We comply with applicable federal civil rights laws and [place] laws. We do not " discriminate on the basis of race, color, national origin, age, disability, sex, sexual orientation, or gender identity.            Thank you!     Thank you for choosing [facility]  for your care. Our goal is always to provide you with excellent care. Hearing back from our patients is one way we can continue to improve our services. Please take a few minutes to complete the written survey that you may receive in the mail after your visit with us. Thank you!             Your Updated Medication List - Protect others around you: Learn how to safely use, store and throw away your medicines at www.disposemymeds.org.          This list is accurate as of 10/29/18 11:59 PM.  Always use your most recent med list.                   Brand Name Dispense Instructions for use Diagnosis    ALEVE 220 MG tablet   Generic drug:  naproxen sodium      Take 220 mg by mouth 2 times daily as needed.        LORazepam 0.5 MG tablet    ATIVAN    30 tablet    Take 1 tablet (0.5 mg) by mouth every 8 hours as needed for anxiety    Anxiety       oxyCODONE IR 5 MG tablet    ROXICODONE    20 tablet    Take 1-2 tablets (5-10 mg) by mouth every 6 hours as needed for moderate to severe pain Do NOT drive or use additional narcotics while using this medication    Calculus of kidney       ZYRTEC 10 MG tablet   Generic drug:  cetirizine      1 TABLET DAILY

## 2018-10-30 LAB
ANION GAP SERPL CALCULATED.3IONS-SCNC: 7 MMOL/L (ref 3–14)
BACTERIA SPEC CULT: NORMAL
BACTERIA SPEC CULT: NORMAL
BUN SERPL-MCNC: 27 MG/DL (ref 7–30)
CALCIUM SERPL-MCNC: 9.4 MG/DL (ref 8.5–10.1)
CHLORIDE SERPL-SCNC: 105 MMOL/L (ref 94–109)
CO2 SERPL-SCNC: 27 MMOL/L (ref 20–32)
CREAT SERPL-MCNC: 1.74 MG/DL (ref 0.52–1.04)
GFR SERPL CREATININE-BSD FRML MDRD: 31 ML/MIN/1.7M2
GLUCOSE SERPL-MCNC: 89 MG/DL (ref 70–99)
Lab: NORMAL
POTASSIUM SERPL-SCNC: 4.2 MMOL/L (ref 3.4–5.3)
SODIUM SERPL-SCNC: 139 MMOL/L (ref 133–144)
SPECIMEN SOURCE: NORMAL

## 2019-04-05 ENCOUNTER — OFFICE VISIT (OUTPATIENT)
Dept: FAMILY MEDICINE | Facility: CLINIC | Age: 49
End: 2019-04-05
Payer: COMMERCIAL

## 2019-04-05 VITALS
OXYGEN SATURATION: 98 % | WEIGHT: 193 LBS | DIASTOLIC BLOOD PRESSURE: 80 MMHG | HEART RATE: 63 BPM | HEIGHT: 67 IN | BODY MASS INDEX: 30.29 KG/M2 | TEMPERATURE: 97.8 F | SYSTOLIC BLOOD PRESSURE: 110 MMHG

## 2019-04-05 DIAGNOSIS — Q61.5 CONGENITAL MEDULLARY SPONGE KIDNEY: ICD-10-CM

## 2019-04-05 DIAGNOSIS — F41.9 ANXIETY: Primary | ICD-10-CM

## 2019-04-05 DIAGNOSIS — R20.0 LEFT ARM NUMBNESS: ICD-10-CM

## 2019-04-05 LAB
ANION GAP SERPL CALCULATED.3IONS-SCNC: 2 MMOL/L (ref 3–14)
BUN SERPL-MCNC: 22 MG/DL (ref 7–30)
CALCIUM SERPL-MCNC: 9.3 MG/DL (ref 8.5–10.1)
CHLORIDE SERPL-SCNC: 109 MMOL/L (ref 94–109)
CO2 SERPL-SCNC: 27 MMOL/L (ref 20–32)
CREAT SERPL-MCNC: 1.73 MG/DL (ref 0.52–1.04)
GFR SERPL CREATININE-BSD FRML MDRD: 34 ML/MIN/{1.73_M2}
GLUCOSE SERPL-MCNC: 81 MG/DL (ref 70–99)
POTASSIUM SERPL-SCNC: 4.4 MMOL/L (ref 3.4–5.3)
SODIUM SERPL-SCNC: 138 MMOL/L (ref 133–144)

## 2019-04-05 PROCEDURE — 99214 OFFICE O/P EST MOD 30 MIN: CPT | Performed by: NURSE PRACTITIONER

## 2019-04-05 PROCEDURE — 36415 COLL VENOUS BLD VENIPUNCTURE: CPT | Performed by: NURSE PRACTITIONER

## 2019-04-05 PROCEDURE — 80048 BASIC METABOLIC PNL TOTAL CA: CPT | Performed by: NURSE PRACTITIONER

## 2019-04-05 RX ORDER — LORAZEPAM 0.5 MG/1
0.5 TABLET ORAL EVERY 8 HOURS PRN
Qty: 30 TABLET | Refills: 1 | Status: SHIPPED | OUTPATIENT
Start: 2019-04-05 | End: 2020-04-09

## 2019-04-05 ASSESSMENT — ANXIETY QUESTIONNAIRES
6. BECOMING EASILY ANNOYED OR IRRITABLE: NEARLY EVERY DAY
7. FEELING AFRAID AS IF SOMETHING AWFUL MIGHT HAPPEN: NOT AT ALL
1. FEELING NERVOUS, ANXIOUS, OR ON EDGE: SEVERAL DAYS
IF YOU CHECKED OFF ANY PROBLEMS ON THIS QUESTIONNAIRE, HOW DIFFICULT HAVE THESE PROBLEMS MADE IT FOR YOU TO DO YOUR WORK, TAKE CARE OF THINGS AT HOME, OR GET ALONG WITH OTHER PEOPLE: NOT DIFFICULT AT ALL
2. NOT BEING ABLE TO STOP OR CONTROL WORRYING: SEVERAL DAYS
GAD7 TOTAL SCORE: 8
5. BEING SO RESTLESS THAT IT IS HARD TO SIT STILL: SEVERAL DAYS
3. WORRYING TOO MUCH ABOUT DIFFERENT THINGS: SEVERAL DAYS

## 2019-04-05 ASSESSMENT — MIFFLIN-ST. JEOR: SCORE: 1535.13

## 2019-04-05 ASSESSMENT — PATIENT HEALTH QUESTIONNAIRE - PHQ9: 5. POOR APPETITE OR OVEREATING: SEVERAL DAYS

## 2019-04-05 NOTE — PROGRESS NOTES
SUBJECTIVE:   Latoya Riddle is a 48 year old female who presents to clinic today for the following health issues:      FLANK   PAIN     Onset: couple months    Description:   Character: Dull ache  Location: left flank  Radiation: None    Intensity: usually mild- some days moderate    Progression of Symptoms:  same    Accompanying Signs & Symptoms:  Fever/Chills?: no   Gas/Bloating: no   Nausea: no   Vomitting: no   Diarrhea?: no   Constipation:no   Dysuria or Hematuria: chronic bladder infection symptoms    She just wants her kidney function checked today.   History:   Trauma: no   Previous similar pain: YES   Previous tests done: none  Has complicated urology history - follows with urology.  Has a chronic infections with multi drug resistance. Patient states that her UA is always positive and there is no way to treat it.  S/p right nephrectomy.    Precipitating factors:   Does the pain change with:     Food: no      BM: no     Urination: no     Alleviating factors:  nothing    Therapies Tried and outcome: nothing    LMP:  not applicable       Anxiety Follow-Up  Refill ativan - last script given 7/0218    Status since last visit: No change    Has been a bad week    Other associated symptoms:None    Complicating factors:   Significant life event: No   Current substance abuse: None  Depression symptoms: No  ALFREDITO-7 SCORE 7/18/2013 11/24/2015 7/25/2017   Total Score 9 - -   Total Score - 9 7         Patient also mentions that her left arm goes numb at times:  Started a couple weeks ago.  Happens several times per day.  Lasts for a few minutes and then resolves.  Sometimes stretching helps.  No pain or weakness.  No chest pain or shortness of breath.  No injury or cause for symptoms.  She isn't worried about it, just thought she'd mention it.          Problem list and histories reviewed & adjusted, as indicated.  Additional history: as documented    Reviewed and updated as needed this visit by clinical  "staff  Tobacco  Allergies  Meds  Med Hx  Surg Hx  Fam Hx  Soc Hx      Reviewed and updated as needed this visit by Provider         ROS:  Constitutional, HEENT, cardiovascular, pulmonary, gi and gu systems are negative, except as otherwise noted.    OBJECTIVE:     /80 (BP Location: Right arm, Cuff Size: Adult Large)   Pulse 63   Temp 97.8  F (36.6  C) (Tympanic)   Ht 1.689 m (5' 6.5\")   Wt 87.5 kg (193 lb)   LMP 07/02/2013   SpO2 98%   Breastfeeding? No   BMI 30.68 kg/m    Body mass index is 30.68 kg/m .  GENERAL: healthy, alert and no distress  NECK: no adenopathy, no asymmetry, masses, or scars and thyroid normal to palpation  RESP: lungs clear to auscultation - no rales, rhonchi or wheezes  CV: regular rate and rhythm, normal S1 S2, no S3 or S4, no murmur, click or rub, no peripheral edema and peripheral pulses strong  MS: neck exam: normal C-spine, no tenderness, full range of motion without pain  shoulder exam: appearance normal, range of motion normal, strength 5/5. No tenderness to palpation. Neers and Bergman negative.      ASSESSMENT/PLAN:       ICD-10-CM    1. Anxiety F41.9 LORazepam (ATIVAN) 0.5 MG tablet - uses rarely     2. Congenital medullary sponge kidney Q61.5 Basic metabolic panel - recheck today     3. Left arm numbness R20.0 Discussed stretching/exercises for neck and upper arm.   Follow up if changes or worsens over the next few weeks.       Patient Instructions   Your clinic record indicates that you are due for:   Pap and physical exam  Td immunization every 10 years.  Due now.   You are due for a screening Mammogram.  Please contact the Diagnostics Registration Department at: [phone] to schedule this appointment.        The risks, benefits and treatment options of prescribed medications or other treatments have been discussed with the patient. The patient verbalized their understanding and should call or follow up if no improvement or if they develop further " problems.    MICHELLE Lagos [facility] [place]

## 2019-04-05 NOTE — PATIENT INSTRUCTIONS
Your clinic record indicates that you are due for:   Pap and physical exam  Td immunization every 10 years.  Due now.   You are due for a screening Mammogram.  Please contact the Diagnostics Registration Department at: [phone] to schedule this appointment.          Thank you for choosing [facility].  You may be receiving an email and/or telephone survey request from [facility] Customer Experience regarding your visit today.  Please take a few minutes to respond to the survey to let us know how we are doing.      If you have questions or concerns, please contact us via Healthcare Bluebook or you can contact your care team at [phone].    Our Clinic hours are:  Monday 6:40 am  to 7:00 pm  Tuesday -Friday 6:40 am to 5:00 pm    The [place] outpatient lab hours are:  Monday - Friday 6:10 am to 4:45 pm  Saturdays 7:00 am to 11:00 am  Appointments are required, call [phone]    If you have clinical questions after hours or would like to schedule an appointment,  call the clinic at [phone].

## 2019-04-05 NOTE — LETTER
April 5, 2019      Latoya Riddle  [address] [place] [address]  [place] [address]        Dear ,    We are writing to inform you of your test results. Your lab results are within normal limits.     Resulted Orders   Basic metabolic panel   Result Value Ref Range    Sodium 138 133 - 144 mmol/L    Potassium 4.4 3.4 - 5.3 mmol/L    Chloride 109 94 - 109 mmol/L    Carbon Dioxide 27 20 - 32 mmol/L    Anion Gap 2 (L) 3 - 14 mmol/L    Glucose 81 70 - 99 mg/dL    Urea Nitrogen 22 7 - 30 mg/dL    Creatinine 1.73 (H) 0.52 - 1.04 mg/dL    GFR Estimate 34 (L) >60 mL/min/[1.73_m2]      Comment:      Non  GFR Calc  Starting 12/18/2018, serum creatinine based estimated GFR (eGFR) will be   calculated using the Chronic Kidney Disease Epidemiology Collaboration   (CKD-EPI) equation.      GFR Estimate If Black 40 (L) >60 mL/min/[1.73_m2]      Comment:       GFR Calc  Starting 12/18/2018, serum creatinine based estimated GFR (eGFR) will be   calculated using the Chronic Kidney Disease Epidemiology Collaboration   (CKD-EPI) equation.      Calcium 9.3 8.5 - 10.1 mg/dL       If you have any questions or concerns, please call the clinic at the number listed above.       Sincerely,        MICHELLE Lagos CNP

## 2019-04-06 ASSESSMENT — ANXIETY QUESTIONNAIRES: GAD7 TOTAL SCORE: 8

## 2019-05-09 ENCOUNTER — APPOINTMENT (OUTPATIENT)
Dept: CT IMAGING | Facility: CLINIC | Age: 49
End: 2019-05-09
Attending: EMERGENCY MEDICINE
Payer: COMMERCIAL

## 2019-05-09 ENCOUNTER — HOSPITAL ENCOUNTER (EMERGENCY)
Facility: CLINIC | Age: 49
Discharge: HOME | End: 2019-05-10
Attending: EMERGENCY MEDICINE | Admitting: EMERGENCY MEDICINE
Payer: COMMERCIAL

## 2019-05-09 ENCOUNTER — TELEPHONE (OUTPATIENT)
Dept: FAMILY MEDICINE | Facility: CLINIC | Age: 49
End: 2019-05-09

## 2019-05-09 VITALS
WEIGHT: 182 LBS | DIASTOLIC BLOOD PRESSURE: 100 MMHG | OXYGEN SATURATION: 98 % | TEMPERATURE: 98.8 F | SYSTOLIC BLOOD PRESSURE: 142 MMHG | BODY MASS INDEX: 28.56 KG/M2 | HEIGHT: 67 IN

## 2019-05-09 DIAGNOSIS — K57.32 DIVERTICULITIS OF COLON: ICD-10-CM

## 2019-05-09 LAB
ALBUMIN SERPL-MCNC: 3.3 G/DL (ref 3.4–5)
ALP SERPL-CCNC: 189 U/L (ref 40–150)
ALT SERPL W P-5'-P-CCNC: 42 U/L (ref 0–50)
ANION GAP SERPL CALCULATED.3IONS-SCNC: 8 MMOL/L (ref 3–14)
AST SERPL W P-5'-P-CCNC: 34 U/L (ref 0–45)
BASOPHILS # BLD AUTO: 0 10E9/L (ref 0–0.2)
BASOPHILS NFR BLD AUTO: 0.3 %
BILIRUB SERPL-MCNC: 0.5 MG/DL (ref 0.2–1.3)
BUN SERPL-MCNC: 18 MG/DL (ref 7–30)
CALCIUM SERPL-MCNC: 8.8 MG/DL (ref 8.5–10.1)
CHLORIDE SERPL-SCNC: 111 MMOL/L (ref 94–109)
CO2 SERPL-SCNC: 23 MMOL/L (ref 20–32)
CREAT SERPL-MCNC: 1.62 MG/DL (ref 0.52–1.04)
DIFFERENTIAL METHOD BLD: ABNORMAL
EOSINOPHIL # BLD AUTO: 0.1 10E9/L (ref 0–0.7)
EOSINOPHIL NFR BLD AUTO: 1.3 %
ERYTHROCYTE [DISTWIDTH] IN BLOOD BY AUTOMATED COUNT: 14.3 % (ref 10–15)
GFR SERPL CREATININE-BSD FRML MDRD: 37 ML/MIN/{1.73_M2}
GLUCOSE SERPL-MCNC: 108 MG/DL (ref 70–99)
HCT VFR BLD AUTO: 49.3 % (ref 35–47)
HGB BLD-MCNC: 15.7 G/DL (ref 11.7–15.7)
IMM GRANULOCYTES # BLD: 0.1 10E9/L (ref 0–0.4)
IMM GRANULOCYTES NFR BLD: 0.7 %
LIPASE SERPL-CCNC: 185 U/L (ref 73–393)
LYMPHOCYTES # BLD AUTO: 2.6 10E9/L (ref 0.8–5.3)
LYMPHOCYTES NFR BLD AUTO: 24.2 %
MCH RBC QN AUTO: 28.2 PG (ref 26.5–33)
MCHC RBC AUTO-ENTMCNC: 31.8 G/DL (ref 31.5–36.5)
MCV RBC AUTO: 89 FL (ref 78–100)
MONOCYTES # BLD AUTO: 0.5 10E9/L (ref 0–1.3)
MONOCYTES NFR BLD AUTO: 5.1 %
NEUTROPHILS # BLD AUTO: 7.2 10E9/L (ref 1.6–8.3)
NEUTROPHILS NFR BLD AUTO: 68.4 %
NRBC # BLD AUTO: 0 10*3/UL
NRBC BLD AUTO-RTO: 0 /100
PLATELET # BLD AUTO: 130 10E9/L (ref 150–450)
POTASSIUM SERPL-SCNC: 4 MMOL/L (ref 3.4–5.3)
PROT SERPL-MCNC: 7 G/DL (ref 6.8–8.8)
RBC # BLD AUTO: 5.57 10E12/L (ref 3.8–5.2)
SODIUM SERPL-SCNC: 142 MMOL/L (ref 133–144)
WBC # BLD AUTO: 10.5 10E9/L (ref 4–11)

## 2019-05-09 PROCEDURE — 25000125 ZZHC RX 250: Performed by: EMERGENCY MEDICINE

## 2019-05-09 PROCEDURE — 99285 EMERGENCY DEPT VISIT HI MDM: CPT | Mod: 25 | Performed by: EMERGENCY MEDICINE

## 2019-05-09 PROCEDURE — 80053 COMPREHEN METABOLIC PANEL: CPT | Performed by: EMERGENCY MEDICINE

## 2019-05-09 PROCEDURE — 74176 CT ABD & PELVIS W/O CONTRAST: CPT

## 2019-05-09 PROCEDURE — 99284 EMERGENCY DEPT VISIT MOD MDM: CPT | Mod: Z6 | Performed by: EMERGENCY MEDICINE

## 2019-05-09 PROCEDURE — 25000128 H RX IP 250 OP 636: Performed by: EMERGENCY MEDICINE

## 2019-05-09 PROCEDURE — 85025 COMPLETE CBC W/AUTO DIFF WBC: CPT | Performed by: EMERGENCY MEDICINE

## 2019-05-09 PROCEDURE — 83690 ASSAY OF LIPASE: CPT | Performed by: EMERGENCY MEDICINE

## 2019-05-09 RX ORDER — IOPAMIDOL 755 MG/ML
89 INJECTION, SOLUTION INTRAVASCULAR ONCE
Status: DISCONTINUED | OUTPATIENT
Start: 2019-05-09 | End: 2019-05-10 | Stop reason: HOSPADM

## 2019-05-09 ASSESSMENT — MIFFLIN-ST. JEOR: SCORE: 1489.21

## 2019-05-09 NOTE — TELEPHONE ENCOUNTER
"S-(situation): Pt reports that she is doubled over in pain today.  She cannot walk without pain and had to have help getting dressed due to lower abdominal/pelvic pain.  Pt states \"it feels like the painful area is sitting on top of my bladder.\"    Pt says that she has history of IBS. They were driving home from a road trip to [place] yesterday and she felt her symptoms starting.  She says that the IBS usually starts with diarrhea that progresses to bloody diarrhea which is what occurred yesterday.  She adds in Imodium and her symptoms begin to improve over the next 3-4 days.  This time is different because she has never had this much pain before.  The pain is rated about an 8 of 10; severe.  Pt says it hurts to walk, move, or bend.  No diarrhea today.    Pt says that she is very gassy but nothing is moving thru.  Fever yesterday, no fever today.  Chills today.  Denies nausea or vomiting.  Pt is drinking gingerale.    Pt is urinating without difficulties. No dysuria or visible blood.  Some flank pain yesterday but not today.    B-(background): h/o kidney stones and diverticulosis in addition to IBS.    A-(assessment): acute, severe lower abdominal pain with bloody diarrhea x 24 hours.    R-(recommendations): Advised ED for further evaluation.  Pt agrees and will have her  drive her.    Jadyn Guillory RN    "

## 2019-05-09 NOTE — ED AVS SNAPSHOT
[facility] Emergency Department  [address] [address]  Phone:  [phone]  Fax:  [phone]                                    Latoya Riddle   MRN: [id number]    Department:  [facility] Emergency Department   Date of Visit:  5/9/2019           After Visit Summary Signature Page    I have received my discharge instructions, and my questions have been answered. I have discussed any challenges I see with this plan with the nurse or doctor.    ..........................................................................................................................................  Patient/Patient Representative Signature      ..........................................................................................................................................  Patient Representative Print Name and Relationship to Patient    ..................................................               ................................................  Date                                   Time    ..........................................................................................................................................  Reviewed by Signature/Title    ...................................................              ..............................................  Date                                               Time          22EPIC Rev 08/18

## 2019-05-10 PROCEDURE — 25000132 ZZH RX MED GY IP 250 OP 250 PS 637: Performed by: EMERGENCY MEDICINE

## 2019-05-10 RX ORDER — METRONIDAZOLE 500 MG/1
500 TABLET ORAL ONCE
Status: COMPLETED | OUTPATIENT
Start: 2019-05-10 | End: 2019-05-10

## 2019-05-10 RX ORDER — METRONIDAZOLE 500 MG/1
500 TABLET ORAL 4 TIMES DAILY
Qty: 28 TABLET | Refills: 0 | Status: SHIPPED | OUTPATIENT
Start: 2019-05-10 | End: 2019-06-14

## 2019-05-10 RX ORDER — CIPROFLOXACIN 500 MG/1
500 TABLET, FILM COATED ORAL 2 TIMES DAILY
Qty: 14 TABLET | Refills: 0 | Status: SHIPPED | OUTPATIENT
Start: 2019-05-10 | End: 2019-06-14

## 2019-05-10 RX ORDER — CIPROFLOXACIN 500 MG/1
500 TABLET, FILM COATED ORAL ONCE
Status: COMPLETED | OUTPATIENT
Start: 2019-05-10 | End: 2019-05-10

## 2019-05-10 RX ADMIN — CIPROFLOXACIN HYDROCHLORIDE 500 MG: 500 TABLET, FILM COATED ORAL at 00:18

## 2019-05-10 RX ADMIN — METRONIDAZOLE 500 MG: 500 TABLET ORAL at 00:18

## 2019-05-10 ASSESSMENT — ENCOUNTER SYMPTOMS
ABDOMINAL DISTENTION: 1
DIAPHORESIS: 1
CHEST TIGHTNESS: 0
FATIGUE: 1
FEVER: 1
DIARRHEA: 1
DYSURIA: 0
BLOOD IN STOOL: 1
NAUSEA: 0
HEADACHES: 0
FLANK PAIN: 0
LIGHT-HEADEDNESS: 0
COUGH: 0
APPETITE CHANGE: 1
SHORTNESS OF BREATH: 0
CHILLS: 1
ABDOMINAL PAIN: 1
VOMITING: 0
BACK PAIN: 0

## 2019-05-10 NOTE — ED PROVIDER NOTES
History     Chief Complaint   Patient presents with     Abdominal Pain     started yesterday, hx of IBS, bloody stools yesterday     HPI  Latoya Riddle is a 48 year old female with a history of urinary tract infections, obstructive sleep apnea, and surgical removal of a kidney presenting for evaluation of abdominal pain, bloating, and diarrhea.  Patient reports yesterday she had significant abdominal cramping followed by multiple episodes of watery and bloody diarrhea.  Yesterday evening around 11 PM she took a single dose of Imodium and has had no further diarrhea episodes.  She has however had worsening abdominal bloating and pain.  The pain is diffuse and crampy in nature with no specific localization.  Denies associated nausea or vomiting.  Has not had much appetite today.  Reports subjective fever with chills yesterday which has improved.  Denies rashes.  Denies chest pain or difficulty breathing.  Denies similar symptoms in the past.  No known sick contacts.  No known bad food exposure.  No recent travel outside of the country    Allergies:  Allergies   Allergen Reactions     Gentamycin [Gentamicin Sulfate]        Problem List:    Patient Active Problem List    Diagnosis Date Noted     CATHY (obstructive sleep apnea) 02/09/2018     Priority: Medium     Mild to moderate CATHY without sleep-associated hypoxemia   - PSG performed 4/1/2008 with weight 180 lbs, AHI 0.3, RDI 41, mary SpO2 93%, PLMI 0, arousal index 57.9.       Ureteral stone 11/10/2017     Priority: Medium     Anxiety 01/11/2016     Priority: Medium     Health Care Home 05/06/2013     Priority: Medium     EMERGENCY CARE PLAN  May 6, 2013: No current Care Coordination follow up planned. Please refer if Care Coordination services are needed.    Presenting Problem Signs and Symptoms Treatment Plan   Questions or concerns   during clinic hours   I will call my clinic directly:  [facility]  [address] [place]  [address]  [phone].   Questions or concerns outside clinic hours   I will call the 24 hour nurse line at   [phone] or [phone].   Need to schedule an appointment   I will call the 24 hour scheduling team at [phone] or my clinic directly at [phone].   Same day treatment     I will call my clinic first, nurse line if after hours, urgent care and express care if needed.   Clinic care coordination services (regular clinic hours)   I will call a clinic care coordinator directly:     Italia Keith RN  [phone]    Rosette Valle, SW:    [phone]    Or call my clinic at [phone] and ask to speak with care coordination.   Crisis Services: Behavioral or Mental Health  [facility] 24 Hour Phone Line  [phone]    [facility] 24 Hour Crisis Services  [phone]    [facility] (Behavioral Health Providers) Network [phone]    [facility]   [phone]     Emergency treatment -- Immediately    [facility] 911              UTI (urinary tract infection) 09/04/2012     Priority: Medium     Hyperlipidemia LDL goal <130 01/06/2011     Priority: Medium     Tobacco abuse 04/19/2010     Priority: Medium     Dyspnea and respiratory abnormality 04/07/2008     Priority: Medium     Sleep study 4/1/08: No CATHY. .5 minutes, sleep latency  normal 11.7 minutes. REM latency 154.5 minutes. Sleep efficiency at 89.4%. The sleep architecture was periodically disrupted with frequent sleep stage changes and arousals. Snoring: moderately loud. RDI 41.0,  AHI of 0.3. Lowest O2 93.0%. PLM index 0.0.  Problem list name updated by automated process. Provider to review       Calculus of kidney 06/20/2005     Priority: Medium     Due to RTA and medullary sponge kidney. [id number] ( See's Dr. Tavo Contreras)- calcium oxalate and calcium phosphate. S/p multiple procedures, >50       Congenital medullary sponge kidney 06/20/2005     Priority: Medium     Renal osteodystrophy 06/20/2005      Priority: Medium     Tubular acidosis          Past Medical History:    Past Medical History:   Diagnosis Date     Hyperlipidemia      Kidney stone        Past Surgical History:    Past Surgical History:   Procedure Laterality Date     COMBINED CYSTOSCOPY, INSERT STENT URETER(S) Left 11/10/2017    Procedure: COMBINED CYSTOSCOPY, INSERT STENT URETER(S);  Cystoscopy, Left Ureteral Stent Placement;  Surgeon: Yg Rodriguez MD;  Location: [facility] OR     EXCISE TOENAIL(S) Left 2016    Procedure: EXCISE TOENAIL(S);  Surgeon: Ady Mejia DPM;  Location: [facility] OR     GENITOURINARY SURGERY       SURGICAL HISTORY OF -            SURGICAL HISTORY OF -   -present    Lithothypsy X 49     SURGICAL HISTORY OF -       Percutaneous nephrostomy X2     SURGICAL HISTORY OF -   2011    Cystoscopy with bilateral ureteral pyeloscopy, stone basketing and stent placement       Family History:    Family History   Problem Relation Age of Onset     Respiratory Father         MOSHE     C.A.D. Father      Heart Failure Father      Coronary Artery Disease Father      Hypertension Father      Depression Father      Anxiety Disorder Father      Diabetes Father         type 2     Lipids Mother         high cholesterol     Allergies Mother      Arthritis Mother         RA     Hyperlipidemia Mother      Lipids Sister         high cholesterol     Allergies Sister      Asthma Sister      Hypertension Sister      Allergies Sister      Respiratory Sister         moshe     Genitourinary Problems Maternal Grandmother         passed from complications from renal failure     Arthritis Maternal Grandfather         rheumatoid     Diabetes Paternal Grandmother         adult onset     Cardiovascular Paternal Grandfather         AAA     Hypertension Sister      Hyperlipidemia Sister      Anxiety Disorder Sister      Asthma Sister        Social History:  Marital Status:   [2]  Social History     Tobacco Use     Smoking status:  "Current Every Day Smoker     Packs/day: 1.00     Years: 20.00     Pack years: 20.00     Types: Cigarettes     Smokeless tobacco: Never Used     Tobacco comment: 7/25/17 declined , tried quit plan- did not help per patient    Substance Use Topics     Alcohol use: No     Drug use: No        Medications:      ciprofloxacin (CIPRO) 500 MG tablet   metroNIDAZOLE (FLAGYL) 500 MG tablet   LORazepam (ATIVAN) 0.5 MG tablet   naproxen sodium (ALEVE) 220 MG tablet   oxyCODONE IR (ROXICODONE) 5 MG tablet   ZYRTEC 10 MG OR TABS         Review of Systems   Constitutional: Positive for appetite change, chills, diaphoresis, fatigue and fever (subjective).   HENT: Negative for congestion.    Respiratory: Negative for cough, chest tightness and shortness of breath.    Cardiovascular: Negative for chest pain.   Gastrointestinal: Positive for abdominal distention, abdominal pain, blood in stool and diarrhea. Negative for nausea and vomiting.   Genitourinary: Negative for dysuria and flank pain.   Musculoskeletal: Negative for back pain.   Neurological: Negative for light-headedness and headaches.   All other systems reviewed and are negative.      Physical Exam   BP: (!) 142/100  Heart Rate: 102  Temp: 98.8  F (37.1  C)  Height: 169.5 cm (5' 6.75\")  Weight: 82.6 kg (182 lb)  SpO2: 98 %      Physical Exam   Constitutional: She appears well-developed and well-nourished. No distress.   HENT:   Head: Normocephalic and atraumatic.   Mouth/Throat: Oropharynx is clear and moist.   Eyes: Conjunctivae are normal.   Cardiovascular: Normal rate and regular rhythm.   Pulmonary/Chest: Effort normal.   Abdominal: Soft. She exhibits distension.   Diminished bowel sounds   Skin: She is not diaphoretic.   Nursing note and vitals reviewed.      ED Course        Procedures           Results for orders placed or performed during the hospital encounter of 05/09/19 (from the past 24 hour(s))   CBC with platelets differential   Result Value Ref Range    WBC " 10.5 4.0 - 11.0 10e9/L    RBC Count 5.57 (H) 3.8 - 5.2 10e12/L    Hemoglobin 15.7 11.7 - 15.7 g/dL    Hematocrit 49.3 (H) 35.0 - 47.0 %    MCV 89 78 - 100 fl    MCH 28.2 26.5 - 33.0 pg    MCHC 31.8 31.5 - 36.5 g/dL    RDW 14.3 10.0 - 15.0 %    Platelet Count 130 (L) 150 - 450 10e9/L    Diff Method Automated Method     % Neutrophils 68.4 %    % Lymphocytes 24.2 %    % Monocytes 5.1 %    % Eosinophils 1.3 %    % Basophils 0.3 %    % Immature Granulocytes 0.7 %    Nucleated RBCs 0 0 /100    Absolute Neutrophil 7.2 1.6 - 8.3 10e9/L    Absolute Lymphocytes 2.6 0.8 - 5.3 10e9/L    Absolute Monocytes 0.5 0.0 - 1.3 10e9/L    Absolute Eosinophils 0.1 0.0 - 0.7 10e9/L    Absolute Basophils 0.0 0.0 - 0.2 10e9/L    Abs Immature Granulocytes 0.1 0 - 0.4 10e9/L    Absolute Nucleated RBC 0.0    Comprehensive metabolic panel   Result Value Ref Range    Sodium 142 133 - 144 mmol/L    Potassium 4.0 3.4 - 5.3 mmol/L    Chloride 111 (H) 94 - 109 mmol/L    Carbon Dioxide 23 20 - 32 mmol/L    Anion Gap 8 3 - 14 mmol/L    Glucose 108 (H) 70 - 99 mg/dL    Urea Nitrogen 18 7 - 30 mg/dL    Creatinine 1.62 (H) 0.52 - 1.04 mg/dL    GFR Estimate 37 (L) >60 mL/min/[1.73_m2]    GFR Estimate If Black 43 (L) >60 mL/min/[1.73_m2]    Calcium 8.8 8.5 - 10.1 mg/dL    Bilirubin Total 0.5 0.2 - 1.3 mg/dL    Albumin 3.3 (L) 3.4 - 5.0 g/dL    Protein Total 7.0 6.8 - 8.8 g/dL    Alkaline Phosphatase 189 (H) 40 - 150 U/L    ALT 42 0 - 50 U/L    AST 34 0 - 45 U/L   Lipase   Result Value Ref Range    Lipase 185 73 - 393 U/L   CT Abdomen Pelvis w/o Contrast    Narrative    CT ABDOMEN PELVIS W/O CONTRAST  5/9/2019 11:05 PM     HISTORY: Diffuse abdominal pain, bloody diarrhea.    TECHNIQUE: Noncontrast CT abdomen and pelvis was performed. Radiation  dose for this scan was reduced using automated exposure control,  adjustment of the mA and/or kV according to patient size, or iterative  reconstruction technique.    COMPARISON: 11/10/2017.    FINDINGS:  Abdomen: The  lung bases are unremarkable. The heart size is normal.  Evaluation of the solid abdominal organs is limited by the lack of  intravenous contrast. The liver, spleen, pancreas and adrenal glands  are normal in appearance. The gallbladder is contracted. No calcified  stones. There is a solitary left kidney with several cortical and  parapelvic cysts. There are two stones in the lower pole of the left  kidney measuring up to 0.7 cm. There is no ureteral stone or  hydronephrosis. There is atherosclerotic calcification of the aorta  and its branches. No aneurysm. No abdominal or pelvic lymph node  enlargement.    Pelvis: There are sigmoid diverticula. There is a segment of  thick-walled and inflamed colon extending from distal descending colon  through mid sigmoid colon. This is a longer segment than typical of  diverticulitis. No bowel obstruction. No free intraperitoneal gas or  fluid. The uterus and adnexa are normal.      Impression    IMPRESSION: Inflammation of the colon extending from distal descending  colon through mid sigmoid colon. The segment is longer than typical of  diverticulitis and this may be a nonspecific colitis.       Medications   iopamidol (ISOVUE-370) solution 89 mL (0 mLs Intravenous Not Given 5/9/19 2251)   Saline Flush (0 mLs Intravenous Not Given 5/9/19 2252)   ciprofloxacin (CIPRO) tablet 500 mg (has no administration in time range)   metroNIDAZOLE (FLAGYL) tablet 500 mg (has no administration in time range)     12:05 AM; patient reassessed.  Symptoms manageable.  No nausea or significant pain currently.  Advised of CT findings suggestive of diverticulitis and plan for treatment with antibiotics.  Encouraged close outpatient follow-up with her primary care provider to schedule a colonoscopy when better.    Assessments & Plan (with Medical Decision Making)  48-year-old female pending for evaluation of abdominal cramping following a day of watery and bloody diarrhea yesterday.  Well-appearing  no distress with mild diffuse abdominal tenderness on exam.  She had no white count or left shift on blood work does show chronic kidney disease which is known.  CT without contrast showed evidence of extensive diverticular disease versus nonspecific colitis.  Treated with Cipro and Flagyl with prescription for outpatient therapy.  Patient advised of the need for close primary care follow-up and outpatient colonoscopy     I have reviewed the nursing notes.    I have reviewed the findings, diagnosis, plan and need for follow up with the patient.          Medication List      Started    ciprofloxacin 500 MG tablet  Commonly known as:  CIPRO  500 mg, Oral, 2 TIMES DAILY     metroNIDAZOLE 500 MG tablet  Commonly known as:  FLAGYL  500 mg, Oral, 4 TIMES DAILY            Final diagnoses:   Diverticulitis of colon       5/9/2019   [facility] EMERGENCY DEPARTMENT     Fletcher, Solitario Soto MD  05/10/19 0014

## 2019-05-13 ENCOUNTER — MEDICAL CORRESPONDENCE (OUTPATIENT)
Dept: ULTRASOUND IMAGING | Facility: CLINIC | Age: 49
End: 2019-05-13

## 2019-05-13 ENCOUNTER — OFFICE VISIT (OUTPATIENT)
Dept: FAMILY MEDICINE | Facility: CLINIC | Age: 49
End: 2019-05-13
Payer: COMMERCIAL

## 2019-05-13 VITALS
DIASTOLIC BLOOD PRESSURE: 80 MMHG | OXYGEN SATURATION: 98 % | HEART RATE: 71 BPM | RESPIRATION RATE: 16 BRPM | WEIGHT: 192 LBS | TEMPERATURE: 97.8 F | BODY MASS INDEX: 30.3 KG/M2 | SYSTOLIC BLOOD PRESSURE: 110 MMHG

## 2019-05-13 DIAGNOSIS — K52.9 COLITIS: Primary | ICD-10-CM

## 2019-05-13 PROCEDURE — 99213 OFFICE O/P EST LOW 20 MIN: CPT | Performed by: NURSE PRACTITIONER

## 2019-05-13 NOTE — PROGRESS NOTES
"  SUBJECTIVE:   Latoya Riddle is a 48 year old female who presents to clinic today for the following   health issues:            ED/UC Followup:    Facility:  [facility]  Date of visit: 5/9/2019  Reason for visit: abdominal pain, bloating and diarrhea  Diagnosis:  Diverticulitis of colon  Treated with cipro and flagyl  Current Status: feels better  Tired and a little lightheaded/ woozy from the antibiotics.  Abdominal pain is better - just feeling \"sore\"  No nausea, vomiting or diarrhea.  Tolerating fluids and food.  No fever or chills.    Would like to review CT scan with provider             Additional history: as documented    Reviewed  and updated as needed this visit by clinical staff  Tobacco  Allergies  Meds  Med Hx  Surg Hx  Fam Hx  Soc Hx        Reviewed and updated as needed this visit by Provider           ROS:  Constitutional, HEENT, cardiovascular, pulmonary, gi and gu systems are negative, except as otherwise noted.    OBJECTIVE:     /80 (BP Location: Right arm)   Pulse 71   Temp 97.8  F (36.6  C) (Tympanic)   Resp 16   Wt 87.1 kg (192 lb)   LMP 07/02/2013   SpO2 98%   BMI 30.30 kg/m    Body mass index is 30.3 kg/m .  GENERAL: healthy, alert and no distress  RESP: lungs clear to auscultation - no rales, rhonchi or wheezes  CV: regular rate and rhythm, normal S1 S2, no S3 or S4, no murmur, click or rub, no peripheral edema and peripheral pulses strong  ABDOMEN: soft, nondistended, without hepatosplenomegaly or masses and bowel sounds normal. Tender LLQ      ASSESSMENT/PLAN:       ICD-10-CM    1. Colitis K52.9      Symptoms resolving with antibiotics.  Advised to complete full course.  Discussed fluid intake.  She is follow up with her urologist about the kidney cysts.    Follow up with me in one week if abdominal pain isn't resolved.      Patient Instructions    You are due for a screening Mammogram.  Please contact the Diagnostics Registration Department at: [phone] to " schedule this appointment.    Your clinic record indicates that you are due for:   Pap and physical exam      The risks, benefits and treatment options of prescribed medications or other treatments have been discussed with the patient. The patient verbalized their understanding and should call or follow up if no improvement or if they develop further problems.    MICHELLE Sheehan [facility]

## 2019-05-13 NOTE — PATIENT INSTRUCTIONS
You are due for a screening Mammogram.  Please contact the Diagnostics Registration Department at: [phone] to schedule this appointment.    Your clinic record indicates that you are due for:   Pap and physical exam        Thank you for choosing [facility].  You may be receiving an email and/or telephone survey request from [facility] Customer Experience regarding your visit today.  Please take a few minutes to respond to the survey to let us know how we are doing.      If you have questions or concerns, please contact us via SNADEC or you can contact your care team at [phone].    Our Clinic hours are:  Monday 6:40 am  to 7:00 pm  Tuesday -Friday 6:40 am to 5:00 pm    The [place] outpatient lab hours are:  Monday - Friday 6:10 am to 4:45 pm  Saturdays 7:00 am to 11:00 am  Appointments are required, call [phone]    If you have clinical questions after hours or would like to schedule an appointment,  call the clinic at [phone].

## 2019-05-14 ENCOUNTER — HOSPITAL ENCOUNTER (OUTPATIENT)
Dept: ULTRASOUND IMAGING | Facility: CLINIC | Age: 49
Discharge: HOME | End: 2019-05-14
Attending: SPECIALIST | Admitting: SPECIALIST
Payer: COMMERCIAL

## 2019-05-14 DIAGNOSIS — N28.1 RENAL CYST, LEFT: ICD-10-CM

## 2019-05-14 PROCEDURE — 76770 US EXAM ABDO BACK WALL COMP: CPT

## 2019-06-13 ENCOUNTER — APPOINTMENT (OUTPATIENT)
Dept: GENERAL RADIOLOGY | Facility: CLINIC | Age: 49
End: 2019-06-13
Attending: EMERGENCY MEDICINE
Payer: COMMERCIAL

## 2019-06-13 ENCOUNTER — HOSPITAL ENCOUNTER (EMERGENCY)
Facility: CLINIC | Age: 49
Discharge: HOME | End: 2019-06-13
Attending: EMERGENCY MEDICINE | Admitting: EMERGENCY MEDICINE
Payer: COMMERCIAL

## 2019-06-13 VITALS
SYSTOLIC BLOOD PRESSURE: 148 MMHG | OXYGEN SATURATION: 96 % | DIASTOLIC BLOOD PRESSURE: 99 MMHG | TEMPERATURE: 98.4 F | RESPIRATION RATE: 16 BRPM

## 2019-06-13 DIAGNOSIS — M79.604 BILATERAL LEG PAIN: ICD-10-CM

## 2019-06-13 DIAGNOSIS — M54.2 NECK PAIN: ICD-10-CM

## 2019-06-13 DIAGNOSIS — M79.605 BILATERAL LEG PAIN: ICD-10-CM

## 2019-06-13 PROCEDURE — 99285 EMERGENCY DEPT VISIT HI MDM: CPT | Performed by: EMERGENCY MEDICINE

## 2019-06-13 PROCEDURE — 73562 X-RAY EXAM OF KNEE 3: CPT | Mod: RT

## 2019-06-13 PROCEDURE — 25000132 ZZH RX MED GY IP 250 OP 250 PS 637: Performed by: EMERGENCY MEDICINE

## 2019-06-13 PROCEDURE — 73590 X-RAY EXAM OF LOWER LEG: CPT | Mod: 50

## 2019-06-13 PROCEDURE — 99284 EMERGENCY DEPT VISIT MOD MDM: CPT | Mod: Z6 | Performed by: EMERGENCY MEDICINE

## 2019-06-13 PROCEDURE — 72040 X-RAY EXAM NECK SPINE 2-3 VW: CPT

## 2019-06-13 RX ORDER — ACETAMINOPHEN 325 MG/1
650 TABLET ORAL
Status: COMPLETED | OUTPATIENT
Start: 2019-06-13 | End: 2019-06-13

## 2019-06-13 RX ORDER — IBUPROFEN 400 MG/1
800 TABLET, FILM COATED ORAL
Status: DISCONTINUED | OUTPATIENT
Start: 2019-06-13 | End: 2019-06-13 | Stop reason: HOSPADM

## 2019-06-13 RX ADMIN — ACETAMINOPHEN 650 MG: 325 TABLET, FILM COATED ORAL at 15:50

## 2019-06-13 ASSESSMENT — ENCOUNTER SYMPTOMS
CARDIOVASCULAR NEGATIVE: 1
ALLERGIC/IMMUNOLOGIC NEGATIVE: 1
PSYCHIATRIC NEGATIVE: 1
RESPIRATORY NEGATIVE: 1
HEMATOLOGIC/LYMPHATIC NEGATIVE: 1
ENDOCRINE NEGATIVE: 1
EYES NEGATIVE: 1
NEUROLOGICAL NEGATIVE: 1
NECK PAIN: 1
CONSTITUTIONAL NEGATIVE: 1
GASTROINTESTINAL NEGATIVE: 1
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
## 2019-06-13 NOTE — DISCHARGE INSTRUCTIONS
1) Your evaluation in the department today did not show any life-threatening process or broken bones . You have bony injury hat is likely resulting in pain and discomfort in your right lower leg.  We have agreed to allow you to go home with plan to continue supportive care including taking warm baths, taking Tylenol ibuprofen for pain and comfort and using Percocet which you report you have for history of kidney stones.    2) we have reviewed x-ray imaging obtained today of your neck both the right knee in both legs which did not show any broken bones.    3) you can expect some generalized soreness over the next 3 days which should improve with measures we discussed and reviewed.    4) Return if new concerns arise for reevaluation you may also follow-up in clinic with your primary care provider for recheck about your symptoms after your visit today if not better in the next 3 to 5 days.

## 2019-06-13 NOTE — ED AVS SNAPSHOT
[facility] Emergency Department  [address] [address]  Phone:  [phone]  Fax:  [phone]                                    Latoya Riddle   MRN: [id number]    Department:  [facility] Emergency Department   Date of Visit:  6/13/2019           After Visit Summary Signature Page    I have received my discharge instructions, and my questions have been answered. I have discussed any challenges I see with this plan with the nurse or doctor.    ..........................................................................................................................................  Patient/Patient Representative Signature      ..........................................................................................................................................  Patient Representative Print Name and Relationship to Patient    ..................................................               ................................................  Date                                   Time    ..........................................................................................................................................  Reviewed by Signature/Title    ...................................................              ..............................................  Date                                               Time          22EPIC Rev 08/18

## 2019-06-14 ENCOUNTER — ANCILLARY PROCEDURE (OUTPATIENT)
Dept: GENERAL RADIOLOGY | Facility: CLINIC | Age: 49
End: 2019-06-14
Attending: FAMILY MEDICINE
Payer: COMMERCIAL

## 2019-06-14 ENCOUNTER — OFFICE VISIT (OUTPATIENT)
Dept: FAMILY MEDICINE | Facility: CLINIC | Age: 49
End: 2019-06-14
Payer: COMMERCIAL

## 2019-06-14 VITALS
HEIGHT: 67 IN | OXYGEN SATURATION: 97 % | BODY MASS INDEX: 29.35 KG/M2 | WEIGHT: 187 LBS | DIASTOLIC BLOOD PRESSURE: 62 MMHG | TEMPERATURE: 98.8 F | SYSTOLIC BLOOD PRESSURE: 106 MMHG | RESPIRATION RATE: 14 BRPM | HEART RATE: 81 BPM

## 2019-06-14 DIAGNOSIS — R07.81 RIB PAIN ON RIGHT SIDE: ICD-10-CM

## 2019-06-14 DIAGNOSIS — R07.81 RIB PAIN ON RIGHT SIDE: Primary | ICD-10-CM

## 2019-06-14 PROCEDURE — 99213 OFFICE O/P EST LOW 20 MIN: CPT | Performed by: FAMILY MEDICINE

## 2019-06-14 PROCEDURE — 71101 X-RAY EXAM UNILAT RIBS/CHEST: CPT | Mod: RT

## 2019-06-14 RX ORDER — CYCLOBENZAPRINE HCL 5 MG
5 TABLET ORAL 3 TIMES DAILY PRN
Qty: 42 TABLET | Refills: 0 | Status: SHIPPED | OUTPATIENT
Start: 2019-06-14 | End: 2020-03-18

## 2019-06-14 ASSESSMENT — MIFFLIN-ST. JEOR: SCORE: 1507.92
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
## 2019-06-14 NOTE — PATIENT INSTRUCTIONS
Thank you for choosing [facility].  You may be receiving an email and/or telephone survey request from [facility] [employer] regarding your visit today.  Please take a few minutes to respond to the survey to let us know how we are doing.      If you have questions or concerns, please contact us via [employer] or you can contact your care team at [phone].    Our Clinic hours are:  Monday 6:40 am  to 7:00 pm  Tuesday -Friday 6:40 am to 5:00 pm    The [place] outpatient lab hours are:  Monday - Friday 6:10 am to 4:45 pm  Saturdays 7:00 am to 11:00 am  Appointments are required, call [phone]    If you have clinical questions after hours or would like to schedule an appointment,  call the clinic at [phone].

## 2019-06-17 ENCOUNTER — TELEPHONE (OUTPATIENT)
Dept: FAMILY MEDICINE | Facility: CLINIC | Age: 49
End: 2019-06-17
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
## 2019-06-18 ENCOUNTER — OFFICE VISIT (OUTPATIENT)
Dept: FAMILY MEDICINE | Facility: CLINIC | Age: 49
End: 2019-06-18
Payer: COMMERCIAL

## 2019-06-18 VITALS
WEIGHT: 187 LBS | OXYGEN SATURATION: 99 % | TEMPERATURE: 97 F | HEIGHT: 67 IN | HEART RATE: 79 BPM | BODY MASS INDEX: 29.35 KG/M2 | SYSTOLIC BLOOD PRESSURE: 122 MMHG | DIASTOLIC BLOOD PRESSURE: 86 MMHG | RESPIRATION RATE: 12 BRPM

## 2019-06-18 DIAGNOSIS — M79.661 PAIN IN BOTH LOWER LEGS: ICD-10-CM
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
## 2019-06-18 DIAGNOSIS — M79.662 PAIN IN BOTH LOWER LEGS: ICD-10-CM

## 2019-06-18 DIAGNOSIS — S16.1XXD STRAIN OF NECK MUSCLE, SUBSEQUENT ENCOUNTER: ICD-10-CM

## 2019-06-18 PROCEDURE — 99213 OFFICE O/P EST LOW 20 MIN: CPT | Performed by: NURSE PRACTITIONER

## 2019-06-18 ASSESSMENT — PAIN SCALES - GENERAL: PAINLEVEL: MODERATE PAIN (4)

## 2019-06-18 ASSESSMENT — MIFFLIN-ST. JEOR: SCORE: 1507.92
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
## 2019-06-24 ENCOUNTER — HOSPITAL ENCOUNTER (OUTPATIENT)
Dept: PHYSICAL THERAPY | Facility: CLINIC | Age: 49
Setting detail: THERAPIES SERIES
End: 2019-06-24
Attending: NURSE PRACTITIONER
Payer: COMMERCIAL
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
## 2019-06-24 DIAGNOSIS — S16.1XXD STRAIN OF NECK MUSCLE, SUBSEQUENT ENCOUNTER: ICD-10-CM

## 2019-06-24 PROCEDURE — 97162 PT EVAL MOD COMPLEX 30 MIN: CPT | Mod: GP | Performed by: PHYSICAL THERAPIST

## 2019-06-24 PROCEDURE — 97014 ELECTRIC STIMULATION THERAPY: CPT | Mod: GP | Performed by: PHYSICAL THERAPIST

## 2019-06-24 PROCEDURE — 97110 THERAPEUTIC EXERCISES: CPT | Mod: GP | Performed by: PHYSICAL THERAPIST
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
## 2019-06-28 ENCOUNTER — HOSPITAL ENCOUNTER (OUTPATIENT)
Dept: PHYSICAL THERAPY | Facility: CLINIC | Age: 49
Setting detail: THERAPIES SERIES
End: 2019-06-28
Attending: NURSE PRACTITIONER
Payer: COMMERCIAL

## 2019-06-28 PROCEDURE — 97110 THERAPEUTIC EXERCISES: CPT | Mod: GP | Performed by: PHYSICAL THERAPIST

## 2019-06-28 PROCEDURE — 97140 MANUAL THERAPY 1/> REGIONS: CPT | Mod: GP | Performed by: PHYSICAL THERAPIST

## 2019-07-01 ENCOUNTER — HOSPITAL ENCOUNTER (OUTPATIENT)
Dept: PHYSICAL THERAPY | Facility: CLINIC | Age: 49
Setting detail: THERAPIES SERIES
End: 2019-07-01
Attending: NURSE PRACTITIONER
Payer: COMMERCIAL

## 2019-07-01 ENCOUNTER — TELEPHONE (OUTPATIENT)
Dept: FAMILY MEDICINE | Facility: CLINIC | Age: 49
End: 2019-07-01
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
## 2019-07-01 DIAGNOSIS — S16.1XXD STRAIN OF NECK MUSCLE, SUBSEQUENT ENCOUNTER: ICD-10-CM

## 2019-07-01 PROCEDURE — 97110 THERAPEUTIC EXERCISES: CPT | Mod: GP | Performed by: PHYSICAL THERAPIST

## 2019-07-01 PROCEDURE — 97140 MANUAL THERAPY 1/> REGIONS: CPT | Mod: GP | Performed by: PHYSICAL THERAPIST
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
## 2019-07-03 ENCOUNTER — HOSPITAL ENCOUNTER (OUTPATIENT)
Dept: PHYSICAL THERAPY | Facility: CLINIC | Age: 49
Setting detail: THERAPIES SERIES
End: 2019-07-03
Attending: NURSE PRACTITIONER
Payer: COMMERCIAL

## 2019-07-03 PROCEDURE — 97110 THERAPEUTIC EXERCISES: CPT | Mod: GP | Performed by: PHYSICAL THERAPIST

## 2019-07-03 PROCEDURE — 97140 MANUAL THERAPY 1/> REGIONS: CPT | Mod: GP | Performed by: PHYSICAL THERAPIST

## 2019-07-08 ENCOUNTER — HOSPITAL ENCOUNTER (OUTPATIENT)
Dept: PHYSICAL THERAPY | Facility: CLINIC | Age: 49
Setting detail: THERAPIES SERIES
End: 2019-07-08
Attending: NURSE PRACTITIONER
Payer: COMMERCIAL

## 2019-07-08 PROCEDURE — 97110 THERAPEUTIC EXERCISES: CPT | Mod: GP | Performed by: PHYSICAL THERAPIST

## 2019-07-08 PROCEDURE — 97140 MANUAL THERAPY 1/> REGIONS: CPT | Mod: GP | Performed by: PHYSICAL THERAPIST

## 2019-07-10 ENCOUNTER — HOSPITAL ENCOUNTER (OUTPATIENT)
Dept: PHYSICAL THERAPY | Facility: CLINIC | Age: 49
Setting detail: THERAPIES SERIES
End: 2019-07-10
Attending: NURSE PRACTITIONER
Payer: COMMERCIAL

## 2019-07-10 PROCEDURE — 97140 MANUAL THERAPY 1/> REGIONS: CPT | Mod: GP | Performed by: PHYSICAL THERAPIST

## 2019-07-10 PROCEDURE — 97535 SELF CARE MNGMENT TRAINING: CPT | Mod: GP | Performed by: PHYSICAL THERAPIST

## 2019-07-16 ENCOUNTER — HOSPITAL ENCOUNTER (OUTPATIENT)
Dept: PHYSICAL THERAPY | Facility: CLINIC | Age: 49
Setting detail: THERAPIES SERIES
End: 2019-07-16
Attending: NURSE PRACTITIONER
Payer: COMMERCIAL

## 2019-07-16 DIAGNOSIS — G47.33 OSA (OBSTRUCTIVE SLEEP APNEA): Primary | ICD-10-CM

## 2019-07-16 PROCEDURE — 97110 THERAPEUTIC EXERCISES: CPT | Mod: GP | Performed by: PHYSICAL THERAPIST

## 2019-07-16 PROCEDURE — 97140 MANUAL THERAPY 1/> REGIONS: CPT | Mod: GP | Performed by: PHYSICAL THERAPIST

## 2019-07-18 ENCOUNTER — TELEPHONE (OUTPATIENT)
Dept: FAMILY MEDICINE | Facility: CLINIC | Age: 49
End: 2019-07-18

## 2019-07-18 NOTE — LETTER
July 18, 2019      Latoya Riddle  [address] [facility]  [place] [address]        Dear Latoya Riddle, [id number]    At [facility] we care about your health and are committed to providing quality patient care, which includes staying current on preventative cancer screenings.  You can increase your chances of finding and treating cancers through regular screenings.      Our records show that you are due for the following screening(s):    Mammogram for breast cancer - [phone] to schedule  Recommended every 1-2 years for women age 50 and older  Mammograms help detect breast cancer, which is the most common cancer among women in the [place].  You may need to start having mammograms earlier and more often if you have had breast cancer, breast problems, or a family history of breast cancer.     Pap Smear for cervical cancer - [phone] to schedule  Recommended every three years for women 21 and older  A Pap test is used to detect cervical cancer.  The test should be taken at least once every three years but women who are at a greater risk for cervical cancer may need to have the test more often.      You are at a greater risk for cervical cancer if:   - You have had a sexually transmitted disease   - You have had more than one sex partner   - You have had an abnormal pap test in the past    If you have a My-Chart Account, you also can schedule this appointment through there.    If you have already had one or all of the above screening tests at another facility, please call us so that we may update your chart.      Your partners in health,      Quality Committee /[facility]

## 2019-07-18 NOTE — TELEPHONE ENCOUNTER
Panel Management Review           Composite cancer screening  Chart review shows that this patient is due/due soon for the following Pap Smear and Mammogram  Summary:    Patient is due/failing the following:   MAMMOGRAM and PAP    Action needed:   Patient needs office visit for physical and pap smear  Schedule mammogram.    Type of outreach:    Sent letter.    Questions for provider review:    None                                                                                                                                    PATIENCE QUIROGA

## 2019-07-23 ENCOUNTER — HOSPITAL ENCOUNTER (OUTPATIENT)
Dept: PHYSICAL THERAPY | Facility: CLINIC | Age: 49
Setting detail: THERAPIES SERIES
End: 2019-07-23
Attending: NURSE PRACTITIONER
Payer: COMMERCIAL

## 2019-07-23 PROCEDURE — 97140 MANUAL THERAPY 1/> REGIONS: CPT | Mod: GP | Performed by: PHYSICAL THERAPIST

## 2019-07-30 ENCOUNTER — HOSPITAL ENCOUNTER (OUTPATIENT)
Dept: PHYSICAL THERAPY | Facility: CLINIC | Age: 49
Setting detail: THERAPIES SERIES
End: 2019-07-30
Attending: NURSE PRACTITIONER
Payer: COMMERCIAL

## 2019-07-30 PROCEDURE — 97140 MANUAL THERAPY 1/> REGIONS: CPT | Mod: GP | Performed by: PHYSICAL THERAPIST

## 2019-07-30 PROCEDURE — 97110 THERAPEUTIC EXERCISES: CPT | Mod: GP | Performed by: PHYSICAL THERAPIST
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
## 2019-08-06 ENCOUNTER — HOSPITAL ENCOUNTER (OUTPATIENT)
Dept: PHYSICAL THERAPY | Facility: CLINIC | Age: 49
Setting detail: THERAPIES SERIES
End: 2019-08-06
Attending: NURSE PRACTITIONER
Payer: COMMERCIAL

## 2019-08-06 PROCEDURE — 97110 THERAPEUTIC EXERCISES: CPT | Mod: GP | Performed by: PHYSICAL THERAPIST

## 2019-08-06 PROCEDURE — 97140 MANUAL THERAPY 1/> REGIONS: CPT | Mod: GP | Performed by: PHYSICAL THERAPIST

## 2019-08-14 ENCOUNTER — HOSPITAL ENCOUNTER (OUTPATIENT)
Dept: PHYSICAL THERAPY | Facility: CLINIC | Age: 49
Setting detail: THERAPIES SERIES
End: 2019-08-14
Attending: NURSE PRACTITIONER
Payer: COMMERCIAL

## 2019-08-14 PROCEDURE — 97140 MANUAL THERAPY 1/> REGIONS: CPT | Mod: GP | Performed by: PHYSICAL THERAPIST

## 2019-08-21 ENCOUNTER — TELEPHONE (OUTPATIENT)
Dept: FAMILY MEDICINE | Facility: CLINIC | Age: 49
End: 2019-08-21

## 2019-08-21 ENCOUNTER — OFFICE VISIT (OUTPATIENT)
Dept: FAMILY MEDICINE | Facility: CLINIC | Age: 49
End: 2019-08-21
Payer: COMMERCIAL

## 2019-08-21 VITALS
WEIGHT: 187 LBS | HEART RATE: 68 BPM | RESPIRATION RATE: 12 BRPM | DIASTOLIC BLOOD PRESSURE: 80 MMHG | BODY MASS INDEX: 29.35 KG/M2 | TEMPERATURE: 97.6 F | OXYGEN SATURATION: 98 % | SYSTOLIC BLOOD PRESSURE: 114 MMHG | HEIGHT: 67 IN
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
## 2019-08-21 DIAGNOSIS — M54.2 NECK PAIN: Primary | ICD-10-CM

## 2019-08-21 PROCEDURE — 99213 OFFICE O/P EST LOW 20 MIN: CPT | Performed by: NURSE PRACTITIONER

## 2019-08-21 ASSESSMENT — MIFFLIN-ST. JEOR: SCORE: 1507.92

## 2019-08-21 NOTE — TELEPHONE ENCOUNTER
Patient called stating that she was seen today 08/21-- an MRI was ordered and scheduled for tomorrow  08/22 at [facility]-- however she is claustrophobic. She is requesting something she can take for mild sedation. She reports she has ride to and from imaging.      Pharmacy: [facility] pharmacy    [place]  [place]

## 2019-08-21 NOTE — PATIENT INSTRUCTIONS
Schedule MRI: [phone]         You are due for a screening Mammogram.  Please contact the Diagnostics Registration Department at: [phone] to schedule this appointment.  Your clinic record indicates that you are due for:   Pap and physical exam        Thank you for choosing [facility].  You may be receiving an email and/or telephone survey request from [employer] [facility] beds at the [facility] Customer Experience regarding your visit today.  Please take a few minutes to respond to the survey to let us know how we are doing.      If you have questions or concerns, please contact us via [employer] or you can contact your care team at [phone].    Our Clinic hours are:  Monday 6:40 am  to 7:00 pm  Tuesday -Friday 6:40 am to 5:00 pm    The [place] outpatient lab hours are:  Monday - Friday 6:10 am to 4:45 pm  Saturdays 7:00 am to 11:00 am  Appointments are required, call [phone]    If you have clinical questions after hours or would like to schedule an appointment,  call the clinic at [phone].
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
## 2019-08-21 NOTE — TELEPHONE ENCOUNTER
Patient is called and she already has ativan on hand.  To take this about 20 mins prior to the MRI. Ara VALENTINE RN

## 2019-08-22 ENCOUNTER — HOSPITAL ENCOUNTER (OUTPATIENT)
Dept: MRI IMAGING | Facility: CLINIC | Age: 49
Discharge: HOME | End: 2019-08-22
Attending: NURSE PRACTITIONER | Admitting: NURSE PRACTITIONER
Payer: COMMERCIAL
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
## 2019-08-22 DIAGNOSIS — M54.2 NECK PAIN: ICD-10-CM

## 2019-08-22 PROCEDURE — 72141 MRI NECK SPINE W/O DYE: CPT

## 2019-08-23 NOTE — RESULT ENCOUNTER NOTE
Covering for primary/ordering provider  The MRI shows arthritis at the spine, but more focused at C6-C7.  There is moderate to severe left-sided narrowing of the bony canal and moderate right-sided narrowing of the bony canal.  Recommend clinic appointment with primary care provider for further discussion of the next above the neck pain

## 2019-08-28 ENCOUNTER — HOSPITAL ENCOUNTER (OUTPATIENT)
Dept: PHYSICAL THERAPY | Facility: CLINIC | Age: 49
Setting detail: THERAPIES SERIES
End: 2019-08-28
Attending: NURSE PRACTITIONER
Payer: COMMERCIAL

## 2019-08-28 PROCEDURE — 97112 NEUROMUSCULAR REEDUCATION: CPT | Mod: GP | Performed by: PHYSICAL THERAPIST

## 2019-08-28 PROCEDURE — 97140 MANUAL THERAPY 1/> REGIONS: CPT | Mod: GP | Performed by: PHYSICAL THERAPIST

## 2019-09-04 ENCOUNTER — HOSPITAL ENCOUNTER (OUTPATIENT)
Dept: PHYSICAL THERAPY | Facility: CLINIC | Age: 49
Setting detail: THERAPIES SERIES
End: 2019-09-04
Attending: NURSE PRACTITIONER
Payer: COMMERCIAL

## 2019-09-04 ENCOUNTER — OFFICE VISIT (OUTPATIENT)
Dept: FAMILY MEDICINE | Facility: CLINIC | Age: 49
End: 2019-09-04
Payer: COMMERCIAL

## 2019-09-04 VITALS
WEIGHT: 188 LBS | TEMPERATURE: 98.7 F | OXYGEN SATURATION: 98 % | BODY MASS INDEX: 29.89 KG/M2 | DIASTOLIC BLOOD PRESSURE: 70 MMHG | HEART RATE: 72 BPM | SYSTOLIC BLOOD PRESSURE: 104 MMHG

## 2019-09-04 DIAGNOSIS — M48.02 FORAMINAL STENOSIS OF CERVICAL REGION: Primary | ICD-10-CM

## 2019-09-04 DIAGNOSIS — M54.12 CERVICAL RADICULOPATHY: ICD-10-CM
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
## 2019-09-04 PROCEDURE — 99214 OFFICE O/P EST MOD 30 MIN: CPT | Performed by: NURSE PRACTITIONER

## 2019-09-04 PROCEDURE — 97140 MANUAL THERAPY 1/> REGIONS: CPT | Mod: GP | Performed by: PHYSICAL THERAPIST

## 2019-09-04 PROCEDURE — 97110 THERAPEUTIC EXERCISES: CPT | Mod: GP | Performed by: PHYSICAL THERAPIST

## 2019-09-04 NOTE — PATIENT INSTRUCTIONS
Continue PT.  Continue chiropractor.      Schedule epidural steroid injection.        Thank you for choosing [facility].  You may be receiving an email and/or telephone survey request from [facility] Customer Experience regarding your visit today.  Please take a few minutes to respond to the survey to let us know how we are doing.      If you have questions or concerns, please contact us via [employer] or you can contact your care team at [phone].    Our Clinic hours are:  Monday 6:40 am  to 7:00 pm  Tuesday -Friday 6:40 am to 5:00 pm    The [place] outpatient lab hours are:  Monday - Friday 6:10 am to 4:45 pm  Saturdays 7:00 am to 11:00 am  Appointments are required, call [phone]    If you have clinical questions after hours or would like to schedule an appointment,  call the clinic at [phone].
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
## 2019-09-13 ENCOUNTER — HOSPITAL ENCOUNTER (OUTPATIENT)
Dept: PHYSICAL THERAPY | Facility: CLINIC | Age: 49
Setting detail: THERAPIES SERIES
End: 2019-09-13
Attending: NURSE PRACTITIONER
Payer: COMMERCIAL

## 2019-09-13 PROCEDURE — 97110 THERAPEUTIC EXERCISES: CPT | Mod: GP | Performed by: PHYSICAL THERAPIST

## 2019-09-13 PROCEDURE — 97140 MANUAL THERAPY 1/> REGIONS: CPT | Mod: GP | Performed by: PHYSICAL THERAPIST
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
## 2019-09-20 ENCOUNTER — HOSPITAL ENCOUNTER (OUTPATIENT)
Dept: PHYSICAL THERAPY | Facility: CLINIC | Age: 49
Setting detail: THERAPIES SERIES
End: 2019-09-20
Attending: NURSE PRACTITIONER
Payer: COMMERCIAL

## 2019-09-20 PROCEDURE — 97110 THERAPEUTIC EXERCISES: CPT | Mod: GP | Performed by: PHYSICAL THERAPIST

## 2019-09-20 PROCEDURE — 97140 MANUAL THERAPY 1/> REGIONS: CPT | Mod: GP | Performed by: PHYSICAL THERAPIST

## 2019-10-02 ENCOUNTER — HEALTH MAINTENANCE LETTER (OUTPATIENT)
Age: 49
End: 2019-10-02

## 2019-10-02 ENCOUNTER — HOSPITAL ENCOUNTER (OUTPATIENT)
Dept: PHYSICAL THERAPY | Facility: CLINIC | Age: 49
Setting detail: THERAPIES SERIES
End: 2019-10-02
Attending: NURSE PRACTITIONER
Payer: COMMERCIAL

## 2019-10-02 PROCEDURE — 97140 MANUAL THERAPY 1/> REGIONS: CPT | Mod: GP | Performed by: PHYSICAL THERAPIST

## 2019-10-02 PROCEDURE — 97110 THERAPEUTIC EXERCISES: CPT | Mod: GP | Performed by: PHYSICAL THERAPIST

## 2019-10-09 ENCOUNTER — HOSPITAL ENCOUNTER (OUTPATIENT)
Dept: PHYSICAL THERAPY | Facility: CLINIC | Age: 49
Setting detail: THERAPIES SERIES
End: 2019-10-09
Attending: NURSE PRACTITIONER
Payer: COMMERCIAL

## 2019-10-09 PROCEDURE — 97110 THERAPEUTIC EXERCISES: CPT | Mod: GP | Performed by: PHYSICAL THERAPIST

## 2019-10-09 PROCEDURE — 97140 MANUAL THERAPY 1/> REGIONS: CPT | Mod: GP | Performed by: PHYSICAL THERAPIST

## 2019-10-23 ENCOUNTER — HOSPITAL ENCOUNTER (OUTPATIENT)
Dept: PHYSICAL THERAPY | Facility: CLINIC | Age: 49
Setting detail: THERAPIES SERIES
End: 2019-10-23
Attending: NURSE PRACTITIONER
Payer: COMMERCIAL

## 2019-10-23 PROCEDURE — 97140 MANUAL THERAPY 1/> REGIONS: CPT | Mod: GP | Performed by: PHYSICAL THERAPIST

## 2019-10-23 PROCEDURE — 97110 THERAPEUTIC EXERCISES: CPT | Mod: GP | Performed by: PHYSICAL THERAPIST

## 2019-11-05 ENCOUNTER — HOSPITAL ENCOUNTER (EMERGENCY)
Facility: CLINIC | Age: 49
Discharge: HOME | End: 2019-11-05
Attending: FAMILY MEDICINE | Admitting: FAMILY MEDICINE
Payer: COMMERCIAL

## 2019-11-05 VITALS
SYSTOLIC BLOOD PRESSURE: 122 MMHG | HEIGHT: 67 IN | DIASTOLIC BLOOD PRESSURE: 74 MMHG | BODY MASS INDEX: 29.03 KG/M2 | OXYGEN SATURATION: 96 % | TEMPERATURE: 97.6 F | WEIGHT: 185 LBS | RESPIRATION RATE: 16 BRPM | HEART RATE: 57 BPM

## 2019-11-05 DIAGNOSIS — H81.399 PERIPHERAL VERTIGO, UNSPECIFIED LATERALITY: ICD-10-CM

## 2019-11-05 LAB
ALBUMIN SERPL-MCNC: 3.6 G/DL (ref 3.4–5)
ALP SERPL-CCNC: 160 U/L (ref 40–150)
ALT SERPL W P-5'-P-CCNC: 21 U/L (ref 0–50)
ANION GAP SERPL CALCULATED.3IONS-SCNC: 7 MMOL/L (ref 3–14)
AST SERPL W P-5'-P-CCNC: 25 U/L (ref 0–45)
BASOPHILS # BLD AUTO: 0.1 10E9/L (ref 0–0.2)
BASOPHILS NFR BLD AUTO: 0.5 %
BILIRUB SERPL-MCNC: 0.4 MG/DL (ref 0.2–1.3)
BUN SERPL-MCNC: 22 MG/DL (ref 7–30)
CALCIUM SERPL-MCNC: 9 MG/DL (ref 8.5–10.1)
CHLORIDE SERPL-SCNC: 111 MMOL/L (ref 94–109)
CO2 SERPL-SCNC: 21 MMOL/L (ref 20–32)
CREAT SERPL-MCNC: 1.4 MG/DL (ref 0.52–1.04)
DIFFERENTIAL METHOD BLD: ABNORMAL
EOSINOPHIL # BLD AUTO: 0.1 10E9/L (ref 0–0.7)
EOSINOPHIL NFR BLD AUTO: 0.8 %
ERYTHROCYTE [DISTWIDTH] IN BLOOD BY AUTOMATED COUNT: 14.6 % (ref 10–15)
GFR SERPL CREATININE-BSD FRML MDRD: 44 ML/MIN/{1.73_M2}
GLUCOSE SERPL-MCNC: 99 MG/DL (ref 70–99)
HCT VFR BLD AUTO: 48.9 % (ref 35–47)
HGB BLD-MCNC: 15.1 G/DL (ref 11.7–15.7)
IMM GRANULOCYTES # BLD: 0.1 10E9/L (ref 0–0.4)
IMM GRANULOCYTES NFR BLD: 1 %
LYMPHOCYTES # BLD AUTO: 2.5 10E9/L (ref 0.8–5.3)
LYMPHOCYTES NFR BLD AUTO: 24.1 %
MCH RBC QN AUTO: 28.2 PG (ref 26.5–33)
MCHC RBC AUTO-ENTMCNC: 30.9 G/DL (ref 31.5–36.5)
MCV RBC AUTO: 91 FL (ref 78–100)
MONOCYTES # BLD AUTO: 0.4 10E9/L (ref 0–1.3)
MONOCYTES NFR BLD AUTO: 3.9 %
NEUTROPHILS # BLD AUTO: 7.3 10E9/L (ref 1.6–8.3)
NEUTROPHILS NFR BLD AUTO: 69.7 %
NRBC # BLD AUTO: 0 10*3/UL
NRBC BLD AUTO-RTO: 0 /100
PLATELET # BLD AUTO: 141 10E9/L (ref 150–450)
POTASSIUM SERPL-SCNC: 5.2 MMOL/L (ref 3.4–5.3)
PROT SERPL-MCNC: 7.4 G/DL (ref 6.8–8.8)
RBC # BLD AUTO: 5.36 10E12/L (ref 3.8–5.2)
SODIUM SERPL-SCNC: 139 MMOL/L (ref 133–144)
TROPONIN I SERPL-MCNC: <0.015 UG/L (ref 0–0.04)
WBC # BLD AUTO: 10.5 10E9/L (ref 4–11)

## 2019-11-05 PROCEDURE — 99284 EMERGENCY DEPT VISIT MOD MDM: CPT | Mod: Z6 | Performed by: FAMILY MEDICINE

## 2019-11-05 PROCEDURE — 93005 ELECTROCARDIOGRAM TRACING: CPT | Performed by: FAMILY MEDICINE

## 2019-11-05 PROCEDURE — 96375 TX/PRO/DX INJ NEW DRUG ADDON: CPT | Performed by: FAMILY MEDICINE

## 2019-11-05 PROCEDURE — 85025 COMPLETE CBC W/AUTO DIFF WBC: CPT | Performed by: EMERGENCY MEDICINE

## 2019-11-05 PROCEDURE — 25800030 ZZH RX IP 258 OP 636: Performed by: FAMILY MEDICINE

## 2019-11-05 PROCEDURE — 84484 ASSAY OF TROPONIN QUANT: CPT | Performed by: EMERGENCY MEDICINE

## 2019-11-05 PROCEDURE — 96374 THER/PROPH/DIAG INJ IV PUSH: CPT | Performed by: FAMILY MEDICINE

## 2019-11-05 PROCEDURE — 96361 HYDRATE IV INFUSION ADD-ON: CPT | Performed by: FAMILY MEDICINE

## 2019-11-05 PROCEDURE — 99284 EMERGENCY DEPT VISIT MOD MDM: CPT | Mod: 25 | Performed by: FAMILY MEDICINE

## 2019-11-05 PROCEDURE — 80053 COMPREHEN METABOLIC PANEL: CPT | Performed by: EMERGENCY MEDICINE

## 2019-11-05 PROCEDURE — 25000128 H RX IP 250 OP 636: Performed by: FAMILY MEDICINE

## 2019-11-05 RX ORDER — DIPHENHYDRAMINE HYDROCHLORIDE 50 MG/ML
25 INJECTION INTRAMUSCULAR; INTRAVENOUS ONCE
Status: COMPLETED | OUTPATIENT
Start: 2019-11-05 | End: 2019-11-05

## 2019-11-05 RX ORDER — MECLIZINE HYDROCHLORIDE 25 MG/1
25 TABLET ORAL 3 TIMES DAILY PRN
Qty: 21 TABLET | Refills: 0 | Status: SHIPPED | OUTPATIENT
Start: 2019-11-05 | End: 2020-03-18

## 2019-11-05 RX ORDER — ONDANSETRON 4 MG/1
4-8 TABLET, ORALLY DISINTEGRATING ORAL EVERY 8 HOURS PRN
Qty: 12 TABLET | Refills: 0 | Status: SHIPPED | OUTPATIENT
Start: 2019-11-05 | End: 2020-04-09

## 2019-11-05 RX ORDER — ONDANSETRON 2 MG/ML
8 INJECTION INTRAMUSCULAR; INTRAVENOUS ONCE
Status: COMPLETED | OUTPATIENT
Start: 2019-11-05 | End: 2019-11-05

## 2019-11-05 RX ADMIN — DIPHENHYDRAMINE HYDROCHLORIDE 25 MG: 50 INJECTION, SOLUTION INTRAMUSCULAR; INTRAVENOUS at 21:47

## 2019-11-05 RX ADMIN — SODIUM CHLORIDE 1000 ML: 9 INJECTION, SOLUTION INTRAVENOUS at 21:04

## 2019-11-05 RX ADMIN — ONDANSETRON 8 MG: 2 INJECTION INTRAMUSCULAR; INTRAVENOUS at 21:47

## 2019-11-05 ASSESSMENT — MIFFLIN-ST. JEOR: SCORE: 1506.78

## 2019-11-05 NOTE — ED AVS SNAPSHOT
[facility] Emergency Department  [address] [address]  Phone:  [phone]  Fax:  [phone]                                    Latoya Riddle   MRN: [id number]    Department:  [facility] Emergency Department   Date of Visit:  11/5/2019           After Visit Summary Signature Page    I have received my discharge instructions, and my questions have been answered. I have discussed any challenges I see with this plan with the nurse or doctor.    ..........................................................................................................................................  Patient/Patient Representative Signature      ..........................................................................................................................................  Patient Representative Print Name and Relationship to Patient    ..................................................               ................................................  Date                                   Time    ..........................................................................................................................................  Reviewed by Signature/Title    ...................................................              ..............................................  Date                                               Time          22EPIC Rev 08/18

## 2019-11-06 NOTE — ED PROVIDER NOTES
"  HPI   The patient is a 48-year-old female presenting with dizziness.  Symptoms came on abruptly at about 6:00 PM today.  She was at work at the time and she stood up.  As she stood up she recognized that she felt off balance like she was going to fall down.  She describes some lightheadedness and \"feeling woozy.\"  Her symptoms have been distinct and lasting for seconds at a time when present.  They are reproducible with head position change or body position change.  She has been nauseous intermittently.  She denies spinning or vertigo, specifically.  She denies having a headache or neck pain that is new or different.  She denies new weakness or incoordination.  She denies recent trauma or injury.  She denies fever or obvious illness.  She denies ear pain or tinnitus.  She denies facial pressure or pain or green nasal discharge.  No dental pain.  She has not had similar symptoms previously.        Allergies:  Allergies   Allergen Reactions     Gentamycin [Gentamicin Sulfate]      Problem List:    Patient Active Problem List    Diagnosis Date Noted     CATHY (obstructive sleep apnea) 02/09/2018     Priority: Medium     Mild to moderate CATHY without sleep-associated hypoxemia   - PSG performed 4/1/2008 with weight 180 lbs, AHI 0.3, RDI 41, mary SpO2 93%, PLMI 0, arousal index 57.9.       Ureteral stone 11/10/2017     Priority: Medium     Anxiety 01/11/2016     Priority: Medium     Health Care Home 05/06/2013     Priority: Medium     EMERGENCY CARE PLAN  May 6, 2013: No current Care Coordination follow up planned. Please refer if Care Coordination services are needed.    Presenting Problem Signs and Symptoms Treatment Plan   Questions or concerns   during clinic hours   I will call my clinic directly:  [facility]  [address] [place] [address] [phone].   Questions or concerns outside clinic hours   I will call the 24 hour nurse line at   [phone] or 054-[place].   Need to schedule an " appointment   I will call the 24 hour scheduling team at [phone] or my clinic directly at [phone].   Same day treatment     I will call my clinic first, nurse line if after hours, urgent care and express care if needed.   Clinic care coordination services (regular clinic hours)   I will call a clinic care coordinator directly:     Italia Keith RN  [phone]    CHELSI Iglesias:    [phone]    Or call my clinic at [phone] and ask to speak with care coordination.   Crisis Services: Behavioral or Mental Health  [facility] 24 Hour Phone Line  [phone]    [facility] 24 Hour Crisis Services  [phone]    [facility]al [facility] Providers[facility] [phone]    [facility]   [phone]     Emergency treatment -- Immediately    [facility] 911              UTI (urinary tract infection) 09/04/2012     Priority: Medium     Hyperlipidemia LDL goal <130 01/06/2011     Priority: Medium     Tobacco abuse 04/19/2010     Priority: Medium     Dyspnea and respiratory abnormality 04/07/2008     Priority: Medium     Sleep study 4/1/08: No CATHY. .5 minutes, sleep latency  normal 11.7 minutes. REM latency 154.5 minutes. Sleep efficiency at 89.4%. The sleep architecture was periodically disrupted with frequent sleep stage changes and arousals. Snoring: moderately loud. RDI 41.0,  AHI of 0.3. Lowest O2 93.0%. PLM index 0.0.  Problem list name updated by automated process. Provider to review       Calculus of kidney 06/20/2005     Priority: Medium     Due to RTA and medullary sponge kidney. [id number] ( See's Dr. Tavo Contreras)- calcium oxalate and calcium phosphate. S/p multiple procedures, >50       Congenital medullary sponge kidney 06/20/2005     Priority: Medium     Renal osteodystrophy 06/20/2005     Priority: Medium     Tubular acidosis        Past Medical History:    Past Medical History:   Diagnosis Date     Hyperlipidemia      Kidney stone      Past Surgical  History:    Past Surgical History:   Procedure Laterality Date     COMBINED CYSTOSCOPY, INSERT STENT URETER(S) Left 11/10/2017    Procedure: COMBINED CYSTOSCOPY, INSERT STENT URETER(S);  Cystoscopy, Left Ureteral Stent Placement;  Surgeon: Yg Rodriguez MD;  Location: [facility] OR     EXCISE TOENAIL(S) Left 2016    Procedure: EXCISE TOENAIL(S);  Surgeon: Ady Mejia DPM;  Location: [facility] OR     GENITOURINARY SURGERY       SURGICAL HISTORY OF -            SURGICAL HISTORY OF -   -present    Lithothypsy X 49     SURGICAL HISTORY OF -       Percutaneous nephrostomy X2     SURGICAL HISTORY OF -   2011    Cystoscopy with bilateral ureteral pyeloscopy, stone basketing and stent placement     Family History:    Family History   Problem Relation Age of Onset     Respiratory Father         MOSHE     C.A.D. Father      Heart Failure Father      Coronary Artery Disease Father      Hypertension Father      Depression Father      Anxiety Disorder Father      Diabetes Father         type 2     Lipids Mother         high cholesterol     Allergies Mother      Arthritis Mother         RA     Hyperlipidemia Mother      Lipids Sister         high cholesterol     Allergies Sister      Asthma Sister      Hypertension Sister      Allergies Sister      Respiratory Sister         moshe     Genitourinary Problems Maternal Grandmother         passed from complications from renal failure     Arthritis Maternal Grandfather         rheumatoid     Diabetes Paternal Grandmother         adult onset     Cardiovascular Paternal Grandfather         AAA     Hypertension Sister      Hyperlipidemia Sister      Anxiety Disorder Sister      Asthma Sister      Social History:  Marital Status:   [2]  Social History     Tobacco Use     Smoking status: Current Every Day Smoker     Packs/day: 1.00     Years: 20.00     Pack years: 20.00     Types: Cigarettes     Smokeless tobacco: Never Used     Tobacco comment: 18  "declined , tried quit plan- did not help per patient    Substance Use Topics     Alcohol use: No     Drug use: No      Medications:    meclizine (ANTIVERT) 25 MG tablet  ondansetron (ZOFRAN ODT) 4 MG ODT tab  cyclobenzaprine (FLEXERIL) 5 MG tablet  LORazepam (ATIVAN) 0.5 MG tablet  naproxen sodium (ALEVE) 220 MG tablet  order for DME  oxyCODONE IR (ROXICODONE) 5 MG tablet  ZYRTEC 10 MG OR TABS      Review of Systems   All other systems reviewed and are negative.      PE   BP: (!) 153/87  Heart Rate: 66  Temp: 97.6  F (36.4  C)  Resp: 16  Height: 170.2 cm (5' 7\")  Weight: 83.9 kg (185 lb)  SpO2: 99 %  Physical Exam  Vitals signs reviewed.   Constitutional:       General: She is not in acute distress.     Appearance: She is well-developed.      Comments: Concerned, cooperative.  Answering questions appropriately.   HENT:      Head: Normocephalic and atraumatic.      Right Ear: Tympanic membrane, ear canal and external ear normal.      Left Ear: Tympanic membrane, ear canal and external ear normal.      Nose: Nose normal.      Mouth/Throat:      Mouth: Mucous membranes are moist.   Eyes:      Conjunctiva/sclera: Conjunctivae normal.   Neck:      Musculoskeletal: Normal range of motion.   Cardiovascular:      Rate and Rhythm: Normal rate and regular rhythm.   Pulmonary:      Effort: Pulmonary effort is normal.   Abdominal:      Palpations: Abdomen is soft.      Tenderness: There is no tenderness.   Musculoskeletal: Normal range of motion.   Skin:     General: Skin is warm and dry.   Neurological:      Mental Status: She is alert and oriented to person, place, and time.      Comments: Alert.  No dysarthria or dysphasia.  CN II-VIII intact grossly.  She does have some mild strabismus at baseline and this is seen on examination.  Moving U/L extremities B.  Strength 5/5 U/L extremities B.  Sensation intact grossly to touch (equal).  Rapid alternating movements intact.  Negative Rhomberg.  Normal finger-to-nose movments. "   Psychiatric:         Behavior: Behavior normal.         ED COURSE and [facility]   2139.  Patient has new dizziness.  This is most consistent with vertigo.  She has feelings of off balance that are reproducible with position change.  She has associated nausea.  She does not feel like she is going to faint.  She does not have chest pain or ACS type symptoms.  Her lab values are encouraging and unremarkable.  Benadryl and Zofran will be provided.  The patient will be discharged to follow-up with ENT as needed.  Meclizine and Zofran will be given.    LABS  Labs Ordered and Resulted from Time of ED Arrival Up to the Time of Departure from the ED   CBC WITH PLATELETS DIFFERENTIAL - Abnormal; Notable for the following components:       Result Value    RBC Count 5.36 (*)     Hematocrit 48.9 (*)     MCHC 30.9 (*)     Platelet Count 141 (*)     All other components within normal limits   COMPREHENSIVE METABOLIC PANEL - Abnormal; Notable for the following components:    Chloride 111 (*)     Creatinine 1.40 (*)     GFR Estimate 44 (*)     GFR Estimate If Black 51 (*)     Alkaline Phosphatase 160 (*)     All other components within normal limits   TROPONIN I       IMAGING  Images reviewed by me.  Radiology report also reviewed.  No orders to display       Procedures    Medications   0.9% sodium chloride BOLUS (1,000 mLs Intravenous New Bag 11/5/19 2104)   diphenhydrAMINE (BENADRYL) injection 25 mg (25 mg Intravenous Given 11/5/19 2147)   ondansetron (ZOFRAN) injection 8 mg (8 mg Intravenous Given 11/5/19 2147)         IMPRESSION       ICD-10-CM    1. Peripheral vertigo, unspecified laterality H81.399             Medication List      Started    meclizine 25 MG tablet  Commonly known as:  ANTIVERT  25 mg, Oral, 3 TIMES DAILY PRN     ondansetron 4 MG ODT tab  Commonly known as:  ZOFRAN ODT  4-8 mg, Oral, EVERY 8 HOURS PRN                          Pawan Rodriguez MD  11/05/19 0104

## 2019-11-06 NOTE — ED NOTES
"Intermittent dizziness \"like I'm going to pass out\" since about 1730. Some chest tightness at times. No nausea, vomiting, headache. Does admit to some fatigue  "

## 2019-11-06 NOTE — DISCHARGE INSTRUCTIONS
Return to the Emergency Room if the following occurs:     Worsened spinning/dizziness, fever >101, vomiting/dehydration, severe headache, or for any concern at anytime.    Or, follow-up with the following provider as we discussed:     Return to the ENT (Ear, Nose, and Throat) Clinic if not improved over the next 7-10 days.  Call [phone] to schedule an appointment.  Consider Physical Therapy if not improved over the next 7-10 days.  Call [phone] to schedule an appointment.    Medications discussed:    Meclizine for vertigo, as needed.  Zofran for nausea, as needed.    If you received pain-relieving or sedating medication during your time in the ER, avoid alcohol, driving automobiles, or working with machinery.  Also, a responsible adult must stay with you.      If you had X-rays or labs done we will attempt to contact you if there is a change needed in your care.      Call the Nurse Advice Line at [phone] or [phone] for any concern at anytime.
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
## 2019-11-29 ENCOUNTER — HOSPITAL ENCOUNTER (EMERGENCY)
Facility: CLINIC | Age: 49
Discharge: HOME | End: 2019-11-29
Attending: NURSE PRACTITIONER | Admitting: NURSE PRACTITIONER
Payer: COMMERCIAL

## 2019-11-29 VITALS
RESPIRATION RATE: 16 BRPM | SYSTOLIC BLOOD PRESSURE: 134 MMHG | BODY MASS INDEX: 28.25 KG/M2 | WEIGHT: 180 LBS | TEMPERATURE: 99.4 F | OXYGEN SATURATION: 96 % | HEIGHT: 67 IN | DIASTOLIC BLOOD PRESSURE: 87 MMHG

## 2019-11-29 DIAGNOSIS — J20.9 ACUTE BRONCHITIS WITH SYMPTOMS > 10 DAYS: ICD-10-CM

## 2019-11-29 DIAGNOSIS — H66.002 NON-RECURRENT ACUTE SUPPURATIVE OTITIS MEDIA OF LEFT EAR WITHOUT SPONTANEOUS RUPTURE OF TYMPANIC MEMBRANE: ICD-10-CM

## 2019-11-29 PROCEDURE — 99214 OFFICE O/P EST MOD 30 MIN: CPT | Mod: Z6 | Performed by: NURSE PRACTITIONER

## 2019-11-29 PROCEDURE — G0463 HOSPITAL OUTPT CLINIC VISIT: HCPCS | Performed by: NURSE PRACTITIONER

## 2019-11-29 RX ORDER — PREDNISONE 20 MG/1
TABLET ORAL
Qty: 10 TABLET | Refills: 0 | Status: SHIPPED | OUTPATIENT
Start: 2019-11-29 | End: 2020-01-08

## 2019-11-29 RX ORDER — AMOXICILLIN 875 MG
875 TABLET ORAL 2 TIMES DAILY
Qty: 20 TABLET | Refills: 0 | Status: SHIPPED | OUTPATIENT
Start: 2019-11-29 | End: 2020-04-09

## 2019-11-29 ASSESSMENT — ENCOUNTER SYMPTOMS
DIZZINESS: 0
COUGH: 1
SORE THROAT: 1
WHEEZING: 1
FEVER: 0
NAUSEA: 0
FATIGUE: 0
CHILLS: 0
VOMITING: 0
SHORTNESS OF BREATH: 0
LIGHT-HEADEDNESS: 0
HEADACHES: 0

## 2019-11-29 ASSESSMENT — MIFFLIN-ST. JEOR: SCORE: 1475.13

## 2019-11-29 NOTE — ED PROVIDER NOTES
History     Chief Complaint   Patient presents with     URI     left ear pain into throat pain, ill for a couple weeks now     HPI  Latoya Riddle is a 49 year old female who presents urgent care for evaluation of cough, congestion, and left ear pain.  Symptoms started 2 weeks ago.  Left ear pain started 3 days ago.  No significant fevers.  No nausea or vomiting.  Cough is nonproductive.  Patient is a current everyday smoker.    Allergies:  Allergies   Allergen Reactions     Gentamycin [Gentamicin Sulfate]        Problem List:    Patient Active Problem List    Diagnosis Date Noted     CATHY (obstructive sleep apnea) 02/09/2018     Priority: Medium     Mild to moderate CATHY without sleep-associated hypoxemia   - PSG performed 4/1/2008 with weight 180 lbs, AHI 0.3, RDI 41, mary SpO2 93%, PLMI 0, arousal index 57.9.       Ureteral stone 11/10/2017     Priority: Medium     Anxiety 01/11/2016     Priority: Medium     Health Care Home 05/06/2013     Priority: Medium     EMERGENCY CARE PLAN  May 6, 2013: No current Care Coordination follow up planned. Please refer if Care Coordination services are needed.    Presenting Problem Signs and Symptoms Treatment Plan   Questions or concerns   during clinic hours   I will call my clinic directly:  [facility]  [address] [place] [address]  [phone].   Questions or concerns outside clinic hours   I will call the 24 hour nurse line at   [phone] or [address] [place] [address].   Need to schedule an appointment   I will call the 24 hour scheduling team at [phone] or my clinic directly at [phone].   Same day treatment     I will call my clinic first, nurse line if after hours, urgent care and express care if needed.   Clinic care coordination services (regular clinic hours)   I will call a clinic care coordinator directly:     Italia Keith RN  [phone]    Rosette Valle SW:    [phone]    Or call my clinic at [phone] and ask to speak  with care coordination.   Crisis Services: Behavioral or Mental Health  [facility] 24 Hour Phone Line  [phone]    [facility] 24 Hour Crisis Services  [phone]    [facility] [phone]    [facility]   [phone]     Emergency treatment -- Immediately    [facility] 911              UTI (urinary tract infection) 09/04/2012     Priority: Medium     Hyperlipidemia LDL goal <130 01/06/2011     Priority: Medium     Tobacco abuse 04/19/2010     Priority: Medium     Dyspnea and respiratory abnormality 04/07/2008     Priority: Medium     Sleep study 4/1/08: No CATHY. .5 minutes, sleep latency  normal 11.7 minutes. REM latency 154.5 minutes. Sleep efficiency at 89.4%. The sleep architecture was periodically disrupted with frequent sleep stage changes and arousals. Snoring: moderately loud. RDI 41.0,  AHI of 0.3. Lowest O2 93.0%. PLM index 0.0.  Problem list name updated by automated process. Provider to review       Calculus of kidney 06/20/2005     Priority: Medium     Due to RTA and medullary sponge kidney. [id number] ( See's Dr. Tavo Contreras)- calcium oxalate and calcium phosphate. S/p multiple procedures, >50       Congenital medullary sponge kidney 06/20/2005     Priority: Medium     Renal osteodystrophy 06/20/2005     Priority: Medium     Tubular acidosis          Past Medical History:    Past Medical History:   Diagnosis Date     Hyperlipidemia      Kidney stone        Past Surgical History:    Past Surgical History:   Procedure Laterality Date     COMBINED CYSTOSCOPY, INSERT STENT URETER(S) Left 11/10/2017    Procedure: COMBINED CYSTOSCOPY, INSERT STENT URETER(S);  Cystoscopy, Left Ureteral Stent Placement;  Surgeon: Yg Rodriguez MD;  Location: [facility]     EXCISE TOENAIL(S) Left 9/20/2016    Procedure: EXCISE TOENAIL(S);  Surgeon: Ady Mejia DPM;  Location: [facility] OR     GENITOURINARY SURGERY       SURGICAL HISTORY OF -   1994          SURGICAL HISTORY OF -   -present    Lithothypsy X 49     SURGICAL HISTORY OF -       Percutaneous nephrostomy X2     SURGICAL HISTORY OF -   2011    Cystoscopy with bilateral ureteral pyeloscopy, stone basketing and stent placement       Family History:    Family History   Problem Relation Age of Onset     Respiratory Father         CATHY     C.A.D. Father      Heart Failure Father      Coronary Artery Disease Father      Hypertension Father      Depression Father      Anxiety Disorder Father      Diabetes Father         type 2     Lipids Mother         high cholesterol     Allergies Mother      Arthritis Mother         RA     Hyperlipidemia Mother      Lipids Sister         high cholesterol     Allergies Sister      Asthma Sister      Hypertension Sister      Allergies Sister      Respiratory Sister         cathy     Genitourinary Problems Maternal Grandmother         passed from complications from renal failure     Arthritis Maternal Grandfather         rheumatoid     Diabetes Paternal Grandmother         adult onset     Cardiovascular Paternal Grandfather         AAA     Hypertension Sister      Hyperlipidemia Sister      Anxiety Disorder Sister      Asthma Sister        Social History:  Marital Status:   [2]  Social History     Tobacco Use     Smoking status: Current Every Day Smoker     Packs/day: 1.00     Years: 20.00     Pack years: 20.00     Types: Cigarettes     Smokeless tobacco: Never Used     Tobacco comment: 18 declined , tried quit plan- did not help per patient    Substance Use Topics     Alcohol use: No     Drug use: No        Medications:    amoxicillin (AMOXIL) 875 MG tablet  predniSONE (DELTASONE) 20 MG tablet  cyclobenzaprine (FLEXERIL) 5 MG tablet  LORazepam (ATIVAN) 0.5 MG tablet  meclizine (ANTIVERT) 25 MG tablet  naproxen sodium (ALEVE) 220 MG tablet  order for DME  oxyCODONE IR (ROXICODONE) 5 MG tablet  ZYRTEC 10 MG OR TABS          Review of Systems  "  Constitutional: Negative for chills, fatigue and fever.   HENT: Positive for congestion, ear pain and sore throat.    Respiratory: Positive for cough and wheezing. Negative for shortness of breath.    Cardiovascular: Negative for chest pain.   Gastrointestinal: Negative for nausea and vomiting.   Neurological: Negative for dizziness, light-headedness and headaches.       Physical Exam   BP: 134/87  Heart Rate: 98  Temp: 99.4  F (37.4  C)  Resp: 16  Height: 169.5 cm (5' 6.75\")  Weight: 81.6 kg (180 lb)  SpO2: 96 %      Physical Exam    GENERAL APPEARANCE: alert and oriented. NAD.   EYES: conjunctiva clear  HENT: bilateral ear canals clear, intact, and without inflammation. Right TM normal. Left TM erythema, bulging, effusion with cloudy fluid. Nose normal.  Posterior oropharynx erythema without exudate.  NECK: supple, nontender, no lymphadenopathy  RESP: Expiratory wheezing throughout.  No tachypnea.  Speaking full sentences.  CV: regular rates and rhythm, normal S1 S2, no murmur noted      ED Course        Procedures          No results found for this or any previous visit (from the past 24 hour(s)).    Medications - No data to display    Assessments & Plan (with Medical Decision Making)   Acute bronchitis with wheezing-- likely reactive and could be viral process. I have low suspicion for pneumonia since she has no fever/chills, no tachypnea, no tachycardia, and no hypoxia. Pt does have a left AOM-suppurative on exam. Patient will be treated with amoxicillin for her ear infection, and this would cover most bacterial bronchitis as well. Rx for a course of prednisone for wheezing.  Worrisome reasons to recheck discussed.    I have reviewed the nursing notes.    I have reviewed the findings, diagnosis, plan and need for follow up with the patient.      Discharge Medication List as of 11/29/2019  3:38 PM      START taking these medications    Details   amoxicillin (AMOXIL) 875 MG tablet Take 1 tablet (875 mg) by " mouth 2 times daily for 10 days, Disp-20 tablet, R-0, E-Prescribe      predniSONE (DELTASONE) 20 MG tablet Take two tablets (= 40mg) each day for 5 (five) days, Disp-10 tablet, R-0, E-Prescribe             Final diagnoses:   Acute bronchitis with symptoms > 10 days   Non-recurrent acute suppurative otitis media of left ear without spontaneous rupture of tympanic membrane       11/29/2019   [facility] EMERGENCY DEPARTMENT     Kaya Butt APRN CNP  11/29/19 155

## 2019-11-29 NOTE — ED AVS SNAPSHOT
[facility] Emergency Department  [address] [address]  Phone:  [phone]  Fax:  [phone]                                    Latoya Riddle   MRN: [id number]    Department:  [facility] Emergency Department   Date of Visit:  11/29/2019           After Visit Summary Signature Page    I have received my discharge instructions, and my questions have been answered. I have discussed any challenges I see with this plan with the nurse or doctor.    ..........................................................................................................................................  Patient/Patient Representative Signature      ..........................................................................................................................................  Patient Representative Print Name and Relationship to Patient    ..................................................               ................................................  Date                                   Time    ..........................................................................................................................................  Reviewed by Signature/Title    ...................................................              ..............................................  Date                                               Time          22EPIC Rev 08/18

## 2019-11-29 NOTE — DISCHARGE INSTRUCTIONS
Prednisone 40 mg daily for 5 days.  Amoxicillin 875 mg twice a day for 10 days.  Drink plenty of fluids to stay hydrated.  Recheck for worsening symptoms.

## 2019-12-16 ENCOUNTER — HEALTH MAINTENANCE LETTER (OUTPATIENT)
Age: 49
End: 2019-12-16

## 2020-01-08 ENCOUNTER — OFFICE VISIT (OUTPATIENT)
Dept: FAMILY MEDICINE | Facility: CLINIC | Age: 50
End: 2020-01-08
Payer: COMMERCIAL

## 2020-01-08 VITALS
DIASTOLIC BLOOD PRESSURE: 76 MMHG | HEART RATE: 64 BPM | HEIGHT: 67 IN | WEIGHT: 187 LBS | SYSTOLIC BLOOD PRESSURE: 104 MMHG | OXYGEN SATURATION: 98 % | BODY MASS INDEX: 29.35 KG/M2 | TEMPERATURE: 96.2 F

## 2020-01-08 DIAGNOSIS — M54.12 CERVICAL RADICULOPATHY: ICD-10-CM

## 2020-01-08 DIAGNOSIS — M48.02 FORAMINAL STENOSIS OF CERVICAL REGION: Primary | ICD-10-CM
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
## 2020-01-08 PROCEDURE — 99213 OFFICE O/P EST LOW 20 MIN: CPT | Performed by: NURSE PRACTITIONER

## 2020-01-08 RX ORDER — PREDNISONE 20 MG/1
40 TABLET ORAL DAILY
Qty: 10 TABLET | Refills: 0 | Status: SHIPPED | OUTPATIENT
Start: 2020-01-08 | End: 2020-04-09

## 2020-01-08 RX ORDER — CYCLOBENZAPRINE HCL 10 MG
10 TABLET ORAL 3 TIMES DAILY PRN
Qty: 30 TABLET | Refills: 1 | Status: SHIPPED | OUTPATIENT
Start: 2020-01-08 | End: 2022-07-12

## 2020-01-08 ASSESSMENT — MIFFLIN-ST. JEOR: SCORE: 1506.89

## 2020-01-08 NOTE — PATIENT INSTRUCTIONS
Follow up with [facility] neurology.    Take prednisone 2 tabs daily for 5 days.  May take cyclobenzaprine one tablet every 8 hours if needed.        You are due for a screening Mammogram.  Please contact the Diagnostics Registration Department at: [phone] to schedule this appointment.  Your clinic record indicates that you are due for:   Pap and physical exam      Thank you for choosing [facility].  You may be receiving an email and/or telephone survey request from [facility] Customer Experience regarding your visit today.  Please take a few minutes to respond to the survey to let us know how we are doing.      If you have questions or concerns, please contact us via [employer] or you can contact your care team at [phone].    Our Clinic hours are:  Monday 6:40 am  to 7:00 pm  Tuesday -Friday 6:40 am to 5:00 pm    The [place] outpatient lab hours are:  Monday - Friday 6:10 am to 4:45 pm  Saturdays 7:00 am to 11:00 am  Appointments are required, call [phone]    If you have clinical questions after hours or would like to schedule an appointment,  call the clinic at [phone].
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
## 2020-02-27 ENCOUNTER — TELEPHONE (OUTPATIENT)
Dept: FAMILY MEDICINE | Facility: CLINIC | Age: 50
End: 2020-02-27

## 2020-02-27 NOTE — TELEPHONE ENCOUNTER
Please see note below.  Patient last had labs 11/5/19, she is concerned about her kidney function and taking gabapentin.    Routing to provider.    Lisset MCINTYRE Rn

## 2020-02-28 NOTE — TELEPHONE ENCOUNTER
She may take gabapentin, but will need to be on lower doses.  What dose did he prescribe?  Maribeth Avila, CNP

## 2020-02-28 NOTE — TELEPHONE ENCOUNTER
Notified patient of provider's recommendations.  Patient verbalized understanding.    Lisset MCINTYRE Rn

## 2020-02-28 NOTE — TELEPHONE ENCOUNTER
Patient reports:  She was prescribed Gabapentin 1, 100 mg capsules three times a day for 3 days, then 2 100 mg capsules three times a day, then 3 100 mg capsules three times a day, for a total of 900 mg a day.    Routing to provider.    Lisset MCINTYRE Rn

## 2020-02-28 NOTE — TELEPHONE ENCOUNTER
Her creatinine clearance is 65.8  Therefore the dose he gave her is safe for her kidney function.  Maribeth Avila, CNP

## 2020-03-18 ENCOUNTER — ANCILLARY PROCEDURE (OUTPATIENT)
Dept: GENERAL RADIOLOGY | Facility: CLINIC | Age: 50
End: 2020-03-18
Attending: NURSE PRACTITIONER
Payer: COMMERCIAL

## 2020-03-18 ENCOUNTER — OFFICE VISIT (OUTPATIENT)
Dept: FAMILY MEDICINE | Facility: CLINIC | Age: 50
End: 2020-03-18
Payer: COMMERCIAL

## 2020-03-18 VITALS
HEIGHT: 67 IN | SYSTOLIC BLOOD PRESSURE: 120 MMHG | DIASTOLIC BLOOD PRESSURE: 84 MMHG | WEIGHT: 192 LBS | HEART RATE: 74 BPM | OXYGEN SATURATION: 98 % | TEMPERATURE: 98.2 F | BODY MASS INDEX: 30.13 KG/M2

## 2020-03-18 DIAGNOSIS — M25.552 BILATERAL HIP PAIN: Primary | ICD-10-CM

## 2020-03-18 DIAGNOSIS — M25.552 BILATERAL HIP PAIN: ICD-10-CM

## 2020-03-18 DIAGNOSIS — M25.551 BILATERAL HIP PAIN: Primary | ICD-10-CM

## 2020-03-18 DIAGNOSIS — M25.551 BILATERAL HIP PAIN: ICD-10-CM

## 2020-03-18 PROCEDURE — 99213 OFFICE O/P EST LOW 20 MIN: CPT | Performed by: NURSE PRACTITIONER

## 2020-03-18 PROCEDURE — 73523 X-RAY EXAM HIPS BI 5/> VIEWS: CPT

## 2020-03-18 ASSESSMENT — MIFFLIN-ST. JEOR: SCORE: 1529.57

## 2020-03-18 NOTE — PATIENT INSTRUCTIONS
Your clinic record indicates that you are due for:   Mammogram and Pap and physical exam      Thank you for choosing [facility].  You may be receiving an email and/or telephone survey request from [facility] Customer Experience regarding your visit today.  Please take a few minutes to respond to the survey to let us know how we are doing.      If you have questions or concerns, please contact us via [employer] or you can contact your care team at [phone].    Our Clinic hours are:  Monday 6:40 am  to 7:00 pm  Tuesday -Friday 6:40 am to 5:00 pm    The [place] outpatient lab hours are:  Monday - Friday 6:10 am to 4:45 pm  Saturdays 7:00 am to 11:00 am  Appointments are required, call [phone]    If you have clinical questions after hours or would like to schedule an appointment,  call the clinic at [phone].

## 2020-03-18 NOTE — PROGRESS NOTES
"Subjective     Latoya Riddle is a 49 year old female who presents to clinic today for the following health issues:    HPI   Musculoskeletal problem/pain      Duration: January 2020    Description  Location: bilateral hip pain - in the lateral sides of the hips    Intensity:  Mild most of the time; walking makes it severe    Accompanying signs and symptoms: pain in lower back when walking    History  Previous similar problem: no   Previous evaluation:  none    Precipitating or alleviating factors:  Trauma or overuse: no ; not that she knows of  Aggravating factors include: walking    Therapies tried and outcome: nothing              Reviewed and updated as needed this visit by Provider  Tobacco  Allergies  Meds  Problems  Med Hx  Surg Hx  Fam Hx         Review of Systems   ROS COMP: Constitutional, HEENT, cardiovascular, pulmonary, gi and gu systems are negative, except as otherwise noted.      Objective    /84 (BP Location: Right arm)   Pulse 74   Temp 98.2  F (36.8  C) (Tympanic)   Ht 1.695 m (5' 6.75\")   Wt 87.1 kg (192 lb)   LMP 07/02/2013   SpO2 98%   BMI 30.30 kg/m    Body mass index is 30.3 kg/m .  Physical Exam   GENERAL: healthy, alert and no distress  MS: hips: normal appearance. No tenderness to palpation. ROM normal, however had pain with all ROM. Strength 4/5.            Assessment & Plan       ICD-10-CM    1. Bilateral hip pain  M25.551 XR Pelvis and Hip Bilateral 2 Views    M25.552      xrays today to r/o arthritis.  If xrays normal, she plans to see her chiropractor for an adjustment.  If that doesn't help, she will follow up with me.    Return in about 4 weeks (around 4/15/2020).    The risks, benefits and treatment options of prescribed medications or other treatments have been discussed with the patient. The patient verbalized their understanding and should call or follow up if no improvement or if they develop further problems.    MICHELLE Sheehan CNP " [facility]

## 2020-04-07 DIAGNOSIS — F41.9 ANXIETY: ICD-10-CM

## 2020-04-07 NOTE — TELEPHONE ENCOUNTER
Routing refill request to provider for review/approval because:  Drug not on the [facility] refill protocol     LOV 03/18/20 with PCP.      Jaky MORGAN BSN, RN

## 2020-04-08 RX ORDER — LORAZEPAM 0.5 MG/1
TABLET ORAL
Qty: 30 TABLET | Refills: 0 | OUTPATIENT
Start: 2020-04-08

## 2020-04-09 ENCOUNTER — VIRTUAL VISIT (OUTPATIENT)
Dept: FAMILY MEDICINE | Facility: CLINIC | Age: 50
End: 2020-04-09
Payer: COMMERCIAL

## 2020-04-09 DIAGNOSIS — F41.9 ANXIETY: ICD-10-CM

## 2020-04-09 PROCEDURE — 98966 PH1 ASSMT&MGMT NQHP 5-10: CPT | Performed by: NURSE PRACTITIONER

## 2020-04-09 RX ORDER — OXYCODONE HYDROCHLORIDE 5 MG/1
5-10 TABLET ORAL EVERY 6 HOURS PRN
Qty: 20 TABLET | Refills: 0 | Status: CANCELLED | OUTPATIENT
Start: 2020-04-09

## 2020-04-09 RX ORDER — LORAZEPAM 0.5 MG/1
0.5 TABLET ORAL EVERY 8 HOURS PRN
Qty: 30 TABLET | Refills: 1 | Status: SHIPPED | OUTPATIENT
Start: 2020-04-09 | End: 2021-09-23

## 2020-04-09 ASSESSMENT — ANXIETY QUESTIONNAIRES
6. BECOMING EASILY ANNOYED OR IRRITABLE: NEARLY EVERY DAY
3. WORRYING TOO MUCH ABOUT DIFFERENT THINGS: MORE THAN HALF THE DAYS
GAD7 TOTAL SCORE: 12
1. FEELING NERVOUS, ANXIOUS, OR ON EDGE: MORE THAN HALF THE DAYS
7. FEELING AFRAID AS IF SOMETHING AWFUL MIGHT HAPPEN: SEVERAL DAYS
2. NOT BEING ABLE TO STOP OR CONTROL WORRYING: NEARLY EVERY DAY
5. BEING SO RESTLESS THAT IT IS HARD TO SIT STILL: NOT AT ALL
IF YOU CHECKED OFF ANY PROBLEMS ON THIS QUESTIONNAIRE, HOW DIFFICULT HAVE THESE PROBLEMS MADE IT FOR YOU TO DO YOUR WORK, TAKE CARE OF THINGS AT HOME, OR GET ALONG WITH OTHER PEOPLE: SOMEWHAT DIFFICULT

## 2020-04-09 ASSESSMENT — PATIENT HEALTH QUESTIONNAIRE - PHQ9: 5. POOR APPETITE OR OVEREATING: SEVERAL DAYS

## 2020-04-09 NOTE — PROGRESS NOTES
"Chente Riddle is a 49 year old female who is being evaluated via a billable telephone visit.      The patient has been notified of following:     \"This telephone visit will be conducted via a call between you and your physician/provider. We have found that certain health care needs can be provided without the need for a physical exam.  This service lets us provide the care you need with a short phone conversation.  If a prescription is necessary we can send it directly to your pharmacy.  If lab work is needed we can place an order for that and you can then stop by our lab to have the test done at a later time.    Telephone visits are billed at different rates depending on your insurance coverage. During this emergency period, for some insurers they may be billed the same as an in-person visit.  Please reach out to your insurance provider with any questions.    If during the course of the call the physician/provider feels a telephone visit is not appropriate, you will not be charged for this service.\"    Patient has given verbal consent for Telephone visit?  Yes    How would you like to obtain your AVS? Radha    Latoya Riddle complains of   Chief Complaint   Patient presents with     Refill Request       ALLERGIES  Gentamycin [gentamicin sulfate]    Anxiety Follow-Up    How are you doing with your anxiety since your last visit? Worsened the last 3 weeks due to COVID19 crisis.    Are you having other symptoms that might be associated with anxiety? Yes:  a little irritable    Have you had a significant life event? Health Concerns     Are you feeling depressed? Yes:  .    Do you have any concerns with your use of alcohol or other drugs? No     Last script written one year ago    Uses rarely.    Doesn't want to start a serotonin specific reuptake inhibitor at this time.    Social History     Tobacco Use     Smoking status: Current Every Day Smoker     Packs/day: 1.00     Years: 20.00     Pack " years: 20.00     Types: Cigarettes     Smokeless tobacco: Never Used     Tobacco comment: 7/25/17 declined , tried quit plan- did not help per patient    Substance Use Topics     Alcohol use: No     Drug use: No     ALFREDITO-7 SCORE 7/25/2017 4/5/2019 4/9/2020   Total Score - - -   Total Score 7 8 12     PHQ 7/25/2017   PHQ-9 Total Score 7   Q9: Thoughts of better off dead/self-harm past 2 weeks Not at all           How many servings of fruits and vegetables do you eat daily?  2-3    On average, how many sweetened beverages do you drink each day (Examples: soda, juice, sweet tea, etc.  Do NOT count diet or artificially sweetened beverages)?   5    How many days per week do you exercise enough to make your heart beat faster?  Walking  3 days per week    How many minutes a day do you exercise enough to make your heart beat faster?30 minutes      How many days per week do you miss taking your medication? 0                Reviewed and updated as needed this visit by Provider  Tobacco  Allergies  Meds  Problems  Med Hx  Surg Hx  Fam Hx         Review of Systems          Objective   Reported vitals:  LMP 07/02/2013    Psych: Alert and oriented times 3; coherent speech, normal   rate and volume, able to articulate logical thoughts, able   to abstract reason, no tangential thoughts, no hallucinations   or delusions       Assessment/Plan:  1. Anxiety  Worsened lately due to COVID19 pandemic.  Rare use of lorazepam - refilled today.  Discussed with patient that if anxiety doesn't improve or worsens, she should consider starting a ssri - she will let me know.  Oxycodone is on her medication list - this is only for when she has a kidney stone - she will not use the lorazepam if she needs the oxycodone.  - LORazepam (ATIVAN) 0.5 MG tablet; Take 1 tablet (0.5 mg) by mouth every 8 hours as needed for anxiety  Dispense: 30 tablet; Refill: 1    Return in about 4 weeks (around 5/7/2020), or if symptoms worsen or fail to  improve.      Phone call duration:  10 minutes    MICHELLE Sheehan CNP

## 2020-04-09 NOTE — PATIENT INSTRUCTIONS
Thank you for choosing [facility].  You may be receiving an email and/or telephone survey request from [facility] Customer Experience regarding your visit today.  Please take a few minutes to respond to the survey to let us know how we are doing.      If you have questions or concerns, please contact us via [employer] or you can contact your care team at [phone].    Our Clinic hours are:  Monday 6:40 am  to 7:00 pm  Tuesday -Friday 6:40 am to 5:00 pm    The [place] outpatient lab hours are:  Monday - Friday 6:10 am to 4:45 pm  Saturdays 7:00 am to 11:00 am  Appointments are required, call [phone]    If you have clinical questions after hours or would like to schedule an appointment,  call the clinic at [phone].

## 2020-04-10 ASSESSMENT — ANXIETY QUESTIONNAIRES: GAD7 TOTAL SCORE: 12

## 2020-04-11 ENCOUNTER — APPOINTMENT (OUTPATIENT)
Dept: ULTRASOUND IMAGING | Facility: CLINIC | Age: 50
DRG: 392 | End: 2020-04-11
Attending: NURSE PRACTITIONER
Payer: COMMERCIAL

## 2020-04-11 ENCOUNTER — APPOINTMENT (OUTPATIENT)
Dept: CT IMAGING | Facility: CLINIC | Age: 50
DRG: 392 | End: 2020-04-11
Attending: NURSE PRACTITIONER
Payer: COMMERCIAL

## 2020-04-11 ENCOUNTER — HOSPITAL ENCOUNTER (INPATIENT)
Facility: CLINIC | Age: 50
LOS: 2 days | Discharge: HOME | DRG: 392 | End: 2020-04-13
Attending: NURSE PRACTITIONER | Admitting: FAMILY MEDICINE
Payer: COMMERCIAL

## 2020-04-11 DIAGNOSIS — R10.11 RUQ PAIN: Primary | ICD-10-CM

## 2020-04-11 DIAGNOSIS — R10.11 RIGHT UPPER QUADRANT PAIN: ICD-10-CM

## 2020-04-11 DIAGNOSIS — Z90.5 ACQUIRED ABSENCE OF KIDNEY: ICD-10-CM

## 2020-04-11 DIAGNOSIS — N10 PYELONEPHRITIS, ACUTE: ICD-10-CM

## 2020-04-11 DIAGNOSIS — R10.9 ACUTE RIGHT FLANK PAIN: ICD-10-CM

## 2020-04-11 DIAGNOSIS — Z87.442 PERSONAL HISTORY OF URINARY CALCULI: ICD-10-CM

## 2020-04-11 DIAGNOSIS — N12 PYELONEPHRITIS: ICD-10-CM

## 2020-04-11 PROBLEM — R31.0 GROSS HEMATURIA: Status: ACTIVE | Noted: 2017-02-15

## 2020-04-11 LAB
ALBUMIN SERPL-MCNC: 3.6 G/DL (ref 3.4–5)
ALBUMIN UR-MCNC: 30 MG/DL
ALP SERPL-CCNC: 172 U/L (ref 40–150)
ALT SERPL W P-5'-P-CCNC: 31 U/L (ref 0–50)
ANION GAP SERPL CALCULATED.3IONS-SCNC: 5 MMOL/L (ref 3–14)
APPEARANCE UR: ABNORMAL
AST SERPL W P-5'-P-CCNC: 31 U/L (ref 0–45)
BACTERIA #/AREA URNS HPF: ABNORMAL /HPF
BASOPHILS # BLD AUTO: 0 10E9/L (ref 0–0.2)
BASOPHILS NFR BLD AUTO: 0.4 %
BILIRUB SERPL-MCNC: 0.6 MG/DL (ref 0.2–1.3)
BILIRUB UR QL STRIP: NEGATIVE
BUN SERPL-MCNC: 24 MG/DL (ref 7–30)
CALCIUM SERPL-MCNC: 9.3 MG/DL (ref 8.5–10.1)
CHLORIDE SERPL-SCNC: 109 MMOL/L (ref 94–109)
CO2 SERPL-SCNC: 25 MMOL/L (ref 20–32)
COLOR UR AUTO: YELLOW
CREAT SERPL-MCNC: 1.81 MG/DL (ref 0.52–1.04)
DIFFERENTIAL METHOD BLD: ABNORMAL
EOSINOPHIL # BLD AUTO: 0 10E9/L (ref 0–0.7)
EOSINOPHIL NFR BLD AUTO: 0.4 %
ERYTHROCYTE [DISTWIDTH] IN BLOOD BY AUTOMATED COUNT: 14.2 % (ref 10–15)
GFR SERPL CREATININE-BSD FRML MDRD: 32 ML/MIN/{1.73_M2}
GLUCOSE SERPL-MCNC: 97 MG/DL (ref 70–99)
GLUCOSE UR STRIP-MCNC: NEGATIVE MG/DL
HCG UR QL: NEGATIVE
HCT VFR BLD AUTO: 49 % (ref 35–47)
HGB BLD-MCNC: 15.7 G/DL (ref 11.7–15.7)
HGB UR QL STRIP: ABNORMAL
IMM GRANULOCYTES # BLD: 0 10E9/L (ref 0–0.4)
IMM GRANULOCYTES NFR BLD: 0.4 %
KETONES UR STRIP-MCNC: NEGATIVE MG/DL
LEUKOCYTE ESTERASE UR QL STRIP: ABNORMAL
LIPASE SERPL-CCNC: 243 U/L (ref 73–393)
LYMPHOCYTES # BLD AUTO: 2.4 10E9/L (ref 0.8–5.3)
LYMPHOCYTES NFR BLD AUTO: 22.2 %
MCH RBC QN AUTO: 28.4 PG (ref 26.5–33)
MCHC RBC AUTO-ENTMCNC: 32 G/DL (ref 31.5–36.5)
MCV RBC AUTO: 89 FL (ref 78–100)
MONOCYTES # BLD AUTO: 0.7 10E9/L (ref 0–1.3)
MONOCYTES NFR BLD AUTO: 6.2 %
MUCOUS THREADS #/AREA URNS LPF: PRESENT /LPF
NEUTROPHILS # BLD AUTO: 7.5 10E9/L (ref 1.6–8.3)
NEUTROPHILS NFR BLD AUTO: 70.4 %
NITRATE UR QL: POSITIVE
NRBC # BLD AUTO: 0 10*3/UL
NRBC BLD AUTO-RTO: 0 /100
PH UR STRIP: 6 PH (ref 5–7)
PLATELET # BLD AUTO: 116 10E9/L (ref 150–450)
POTASSIUM SERPL-SCNC: 4.4 MMOL/L (ref 3.4–5.3)
PROT SERPL-MCNC: 7.7 G/DL (ref 6.8–8.8)
RBC # BLD AUTO: 5.53 10E12/L (ref 3.8–5.2)
RBC #/AREA URNS AUTO: 46 /HPF (ref 0–2)
SODIUM SERPL-SCNC: 139 MMOL/L (ref 133–144)
SOURCE: ABNORMAL
SP GR UR STRIP: 1.01 (ref 1–1.03)
SQUAMOUS #/AREA URNS AUTO: 2 /HPF (ref 0–1)
UROBILINOGEN UR STRIP-MCNC: 2 MG/DL (ref 0–2)
WBC # BLD AUTO: 10.7 10E9/L (ref 4–11)
WBC #/AREA URNS AUTO: >182 /HPF (ref 0–5)
WBC CLUMPS #/AREA URNS HPF: PRESENT /HPF

## 2020-04-11 PROCEDURE — 80053 COMPREHEN METABOLIC PANEL: CPT | Performed by: NURSE PRACTITIONER

## 2020-04-11 PROCEDURE — 99285 EMERGENCY DEPT VISIT HI MDM: CPT | Mod: 25 | Performed by: NURSE PRACTITIONER

## 2020-04-11 PROCEDURE — 81025 URINE PREGNANCY TEST: CPT | Performed by: NURSE PRACTITIONER

## 2020-04-11 PROCEDURE — 99284 EMERGENCY DEPT VISIT MOD MDM: CPT | Mod: Z6 | Performed by: NURSE PRACTITIONER

## 2020-04-11 PROCEDURE — 96365 THER/PROPH/DIAG IV INF INIT: CPT | Performed by: NURSE PRACTITIONER

## 2020-04-11 PROCEDURE — 76705 ECHO EXAM OF ABDOMEN: CPT

## 2020-04-11 PROCEDURE — 81001 URINALYSIS AUTO W/SCOPE: CPT | Performed by: NURSE PRACTITIONER

## 2020-04-11 PROCEDURE — 74176 CT ABD & PELVIS W/O CONTRAST: CPT

## 2020-04-11 PROCEDURE — 87086 URINE CULTURE/COLONY COUNT: CPT | Performed by: NURSE PRACTITIONER

## 2020-04-11 PROCEDURE — 25000128 H RX IP 250 OP 636: Performed by: NURSE PRACTITIONER

## 2020-04-11 PROCEDURE — 25000132 ZZH RX MED GY IP 250 OP 250 PS 637: Performed by: NURSE PRACTITIONER

## 2020-04-11 PROCEDURE — 85025 COMPLETE CBC W/AUTO DIFF WBC: CPT | Performed by: NURSE PRACTITIONER

## 2020-04-11 PROCEDURE — 12000000 ZZH R&B MED SURG/OB

## 2020-04-11 PROCEDURE — 83690 ASSAY OF LIPASE: CPT | Performed by: NURSE PRACTITIONER

## 2020-04-11 RX ORDER — SUCRALFATE ORAL 1 G/10ML
1 SUSPENSION ORAL
Status: DISCONTINUED | OUTPATIENT
Start: 2020-04-11 | End: 2020-04-13 | Stop reason: HOSPADM

## 2020-04-11 RX ORDER — CIPROFLOXACIN 2 MG/ML
400 INJECTION, SOLUTION INTRAVENOUS EVERY 12 HOURS
Status: DISCONTINUED | OUTPATIENT
Start: 2020-04-11 | End: 2020-04-11

## 2020-04-11 RX ORDER — CEFTRIAXONE SODIUM 1 G/50ML
1 INJECTION, SOLUTION INTRAVENOUS EVERY 24 HOURS
Status: DISCONTINUED | OUTPATIENT
Start: 2020-04-11 | End: 2020-04-13 | Stop reason: HOSPADM

## 2020-04-11 RX ADMIN — SUCRALFATE 1 G: 1 SUSPENSION ORAL at 23:18

## 2020-04-11 RX ADMIN — OMEPRAZOLE 20 MG: 20 CAPSULE, DELAYED RELEASE ORAL at 23:18

## 2020-04-11 RX ADMIN — CIPROFLOXACIN 400 MG: 2 INJECTION, SOLUTION INTRAVENOUS at 23:16

## 2020-04-11 ASSESSMENT — ENCOUNTER SYMPTOMS
HEADACHES: 0
HEMATURIA: 0
DIARRHEA: 0
JOINT SWELLING: 0
ARTHRALGIAS: 0
COUGH: 0
DIZZINESS: 0
WEAKNESS: 0
DYSURIA: 0
VOMITING: 0
NAUSEA: 0
NUMBNESS: 0
BLOOD IN STOOL: 0
FATIGUE: 0
FLANK PAIN: 1
EYE DISCHARGE: 0
CHILLS: 0
SHORTNESS OF BREATH: 0
FREQUENCY: 0
LIGHT-HEADEDNESS: 0
ABDOMINAL PAIN: 1
MYALGIAS: 0
EYE REDNESS: 0
CONSTIPATION: 0
DIFFICULTY URINATING: 0
FEVER: 1
ABDOMINAL DISTENTION: 1

## 2020-04-11 ASSESSMENT — MIFFLIN-ST. JEOR: SCORE: 1533.54

## 2020-04-12 PROBLEM — R10.11 RUQ PAIN: Status: ACTIVE | Noted: 2020-04-12

## 2020-04-12 LAB
ALBUMIN SERPL-MCNC: 3.1 G/DL (ref 3.4–5)
ALP SERPL-CCNC: 161 U/L (ref 40–150)
ALT SERPL W P-5'-P-CCNC: 36 U/L (ref 0–50)
ANION GAP SERPL CALCULATED.3IONS-SCNC: 5 MMOL/L (ref 3–14)
AST SERPL W P-5'-P-CCNC: 39 U/L (ref 0–45)
BACTERIA SPEC CULT: NORMAL
BILIRUB SERPL-MCNC: 0.5 MG/DL (ref 0.2–1.3)
BUN SERPL-MCNC: 22 MG/DL (ref 7–30)
CALCIUM SERPL-MCNC: 9 MG/DL (ref 8.5–10.1)
CHLORIDE SERPL-SCNC: 109 MMOL/L (ref 94–109)
CO2 SERPL-SCNC: 23 MMOL/L (ref 20–32)
CREAT SERPL-MCNC: 1.55 MG/DL (ref 0.52–1.04)
ERYTHROCYTE [DISTWIDTH] IN BLOOD BY AUTOMATED COUNT: 14.3 % (ref 10–15)
GFR SERPL CREATININE-BSD FRML MDRD: 39 ML/MIN/{1.73_M2}
GLUCOSE SERPL-MCNC: 105 MG/DL (ref 70–99)
HCT VFR BLD AUTO: 46.5 % (ref 35–47)
HGB BLD-MCNC: 14.7 G/DL (ref 11.7–15.7)
Lab: NORMAL
MCH RBC QN AUTO: 28.3 PG (ref 26.5–33)
MCHC RBC AUTO-ENTMCNC: 31.6 G/DL (ref 31.5–36.5)
MCV RBC AUTO: 89 FL (ref 78–100)
PLATELET # BLD AUTO: 103 10E9/L (ref 150–450)
POTASSIUM SERPL-SCNC: 4 MMOL/L (ref 3.4–5.3)
PROT SERPL-MCNC: 7 G/DL (ref 6.8–8.8)
RBC # BLD AUTO: 5.2 10E12/L (ref 3.8–5.2)
SODIUM SERPL-SCNC: 137 MMOL/L (ref 133–144)
SPECIMEN SOURCE: NORMAL
WBC # BLD AUTO: 8.6 10E9/L (ref 4–11)

## 2020-04-12 PROCEDURE — 99222 1ST HOSP IP/OBS MODERATE 55: CPT | Mod: 25 | Performed by: SURGERY

## 2020-04-12 PROCEDURE — 25000132 ZZH RX MED GY IP 250 OP 250 PS 637: Performed by: PHYSICIAN ASSISTANT

## 2020-04-12 PROCEDURE — 80053 COMPREHEN METABOLIC PANEL: CPT | Performed by: PHYSICIAN ASSISTANT

## 2020-04-12 PROCEDURE — 25000132 ZZH RX MED GY IP 250 OP 250 PS 637: Performed by: NURSE PRACTITIONER

## 2020-04-12 PROCEDURE — C9113 INJ PANTOPRAZOLE SODIUM, VIA: HCPCS | Performed by: FAMILY MEDICINE

## 2020-04-12 PROCEDURE — 25000132 ZZH RX MED GY IP 250 OP 250 PS 637: Performed by: SURGERY

## 2020-04-12 PROCEDURE — 25000128 H RX IP 250 OP 636: Performed by: FAMILY MEDICINE

## 2020-04-12 PROCEDURE — 25000128 H RX IP 250 OP 636: Performed by: NURSE PRACTITIONER

## 2020-04-12 PROCEDURE — 96367 TX/PROPH/DG ADDL SEQ IV INF: CPT | Performed by: NURSE PRACTITIONER

## 2020-04-12 PROCEDURE — G0378 HOSPITAL OBSERVATION PER HR: HCPCS

## 2020-04-12 PROCEDURE — 25000128 H RX IP 250 OP 636: Performed by: SURGERY

## 2020-04-12 PROCEDURE — 36415 COLL VENOUS BLD VENIPUNCTURE: CPT | Performed by: PHYSICIAN ASSISTANT

## 2020-04-12 PROCEDURE — 25800030 ZZH RX IP 258 OP 636: Performed by: PHYSICIAN ASSISTANT

## 2020-04-12 PROCEDURE — 99223 1ST HOSP IP/OBS HIGH 75: CPT | Mod: AI | Performed by: PHYSICIAN ASSISTANT

## 2020-04-12 PROCEDURE — 85027 COMPLETE CBC AUTOMATED: CPT | Performed by: PHYSICIAN ASSISTANT

## 2020-04-12 PROCEDURE — 12000000 ZZH R&B MED SURG/OB

## 2020-04-12 RX ORDER — ONDANSETRON 2 MG/ML
4 INJECTION INTRAMUSCULAR; INTRAVENOUS EVERY 6 HOURS PRN
Status: DISCONTINUED | OUTPATIENT
Start: 2020-04-12 | End: 2020-04-13 | Stop reason: HOSPADM

## 2020-04-12 RX ORDER — OXYCODONE HYDROCHLORIDE 5 MG/1
5-10 TABLET ORAL
Status: DISCONTINUED | OUTPATIENT
Start: 2020-04-12 | End: 2020-04-13 | Stop reason: HOSPADM

## 2020-04-12 RX ORDER — ONDANSETRON 4 MG/1
4 TABLET, ORALLY DISINTEGRATING ORAL EVERY 6 HOURS PRN
Status: DISCONTINUED | OUTPATIENT
Start: 2020-04-12 | End: 2020-04-13 | Stop reason: HOSPADM

## 2020-04-12 RX ORDER — POLYETHYLENE GLYCOL 3350 17 G/17G
17 POWDER, FOR SOLUTION ORAL DAILY PRN
Status: DISCONTINUED | OUTPATIENT
Start: 2020-04-12 | End: 2020-04-13 | Stop reason: HOSPADM

## 2020-04-12 RX ORDER — LORAZEPAM 0.5 MG/1
0.5 TABLET ORAL EVERY 8 HOURS PRN
Status: DISCONTINUED | OUTPATIENT
Start: 2020-04-12 | End: 2020-04-13 | Stop reason: HOSPADM

## 2020-04-12 RX ORDER — ACETAMINOPHEN 325 MG/1
650 TABLET ORAL EVERY 4 HOURS PRN
Status: DISCONTINUED | OUTPATIENT
Start: 2020-04-12 | End: 2020-04-12

## 2020-04-12 RX ORDER — ACETAMINOPHEN 650 MG/1
650 SUPPOSITORY RECTAL EVERY 4 HOURS PRN
Status: DISCONTINUED | OUTPATIENT
Start: 2020-04-12 | End: 2020-04-13 | Stop reason: HOSPADM

## 2020-04-12 RX ORDER — ONDANSETRON 2 MG/ML
4 INJECTION INTRAMUSCULAR; INTRAVENOUS EVERY 6 HOURS PRN
Status: DISCONTINUED | OUTPATIENT
Start: 2020-04-12 | End: 2020-04-12

## 2020-04-12 RX ORDER — NALOXONE HYDROCHLORIDE 0.4 MG/ML
.1-.4 INJECTION, SOLUTION INTRAMUSCULAR; INTRAVENOUS; SUBCUTANEOUS
Status: DISCONTINUED | OUTPATIENT
Start: 2020-04-12 | End: 2020-04-13 | Stop reason: HOSPADM

## 2020-04-12 RX ORDER — SODIUM CHLORIDE, SODIUM LACTATE, POTASSIUM CHLORIDE, CALCIUM CHLORIDE 600; 310; 30; 20 MG/100ML; MG/100ML; MG/100ML; MG/100ML
INJECTION, SOLUTION INTRAVENOUS CONTINUOUS
Status: ACTIVE | OUTPATIENT
Start: 2020-04-13 | End: 2020-04-13

## 2020-04-12 RX ORDER — ACETAMINOPHEN 325 MG/1
650 TABLET ORAL EVERY 4 HOURS PRN
Status: DISCONTINUED | OUTPATIENT
Start: 2020-04-12 | End: 2020-04-13 | Stop reason: HOSPADM

## 2020-04-12 RX ORDER — ONDANSETRON 4 MG/1
4 TABLET, ORALLY DISINTEGRATING ORAL EVERY 6 HOURS PRN
Status: DISCONTINUED | OUTPATIENT
Start: 2020-04-12 | End: 2020-04-12

## 2020-04-12 RX ORDER — PROCHLORPERAZINE MALEATE 5 MG
10 TABLET ORAL EVERY 6 HOURS PRN
Status: DISCONTINUED | OUTPATIENT
Start: 2020-04-12 | End: 2020-04-13 | Stop reason: HOSPADM

## 2020-04-12 RX ORDER — PROCHLORPERAZINE 25 MG
25 SUPPOSITORY, RECTAL RECTAL EVERY 12 HOURS PRN
Status: DISCONTINUED | OUTPATIENT
Start: 2020-04-12 | End: 2020-04-13 | Stop reason: HOSPADM

## 2020-04-12 RX ADMIN — CEFTRIAXONE SODIUM 1 G: 1 INJECTION, SOLUTION INTRAVENOUS at 23:35

## 2020-04-12 RX ADMIN — SUCRALFATE 1 G: 1 SUSPENSION ORAL at 11:02

## 2020-04-12 RX ADMIN — SUCRALFATE 1 G: 1 SUSPENSION ORAL at 21:38

## 2020-04-12 RX ADMIN — LORAZEPAM 0.5 MG: 0.5 TABLET ORAL at 01:13

## 2020-04-12 RX ADMIN — CEFTRIAXONE SODIUM 1 G: 1 INJECTION, SOLUTION INTRAVENOUS at 00:20

## 2020-04-12 RX ADMIN — OMEPRAZOLE 20 MG: 20 CAPSULE, DELAYED RELEASE ORAL at 08:33

## 2020-04-12 RX ADMIN — SUCRALFATE 1 G: 1 SUSPENSION ORAL at 16:05

## 2020-04-12 RX ADMIN — PANTOPRAZOLE SODIUM 40 MG: 40 INJECTION, POWDER, LYOPHILIZED, FOR SOLUTION INTRAVENOUS at 20:09

## 2020-04-12 RX ADMIN — SUCRALFATE 1 G: 1 SUSPENSION ORAL at 08:33

## 2020-04-12 RX ADMIN — SODIUM CHLORIDE, POTASSIUM CHLORIDE, SODIUM LACTATE AND CALCIUM CHLORIDE: 600; 310; 30; 20 INJECTION, SOLUTION INTRAVENOUS at 23:35

## 2020-04-12 RX ADMIN — PANTOPRAZOLE SODIUM 40 MG: 40 INJECTION, POWDER, LYOPHILIZED, FOR SOLUTION INTRAVENOUS at 11:02

## 2020-04-12 ASSESSMENT — ACTIVITIES OF DAILY LIVING (ADL)
RETIRED_COMMUNICATION: 0-->UNDERSTANDS/COMMUNICATES WITHOUT DIFFICULTY
AMBULATION: 0-->INDEPENDENT
TRANSFERRING: 0-->INDEPENDENT
SWALLOWING: 0-->SWALLOWS FOODS/LIQUIDS WITHOUT DIFFICULTY
FALL_HISTORY_WITHIN_LAST_SIX_MONTHS: NO
ADLS_ACUITY_SCORE: 11
RETIRED_EATING: 0-->INDEPENDENT
TOILETING: 0-->INDEPENDENT
COGNITION: 0 - NO COGNITION ISSUES REPORTED
DRESS: 0-->INDEPENDENT
BATHING: 0-->INDEPENDENT
ADLS_ACUITY_SCORE: 11
ADLS_ACUITY_SCORE: 13

## 2020-04-12 NOTE — PROGRESS NOTES
Continues to deny need for pain medicine, denies nausea.  Reports she is urinating without any problems.  EGD tomorrow.

## 2020-04-12 NOTE — H&P
[facility]    History and Physical  Hospital Medicine       Date of Admission:  4/11/2020  Date of Service: 4/12/2020     Assessment & Plan   Latoya Rdidle is a 49 year old female who presents on 4/11/2020 with right upper quadrant pain for 3 days.     Pyelonephritis  Urinalysis reveals positive nitrites, moderate amount of blood, large amount of leukocyte esterase, and few bacteria in urine.    - Urine culture has been sent.    - Creatinine on admission, 1.81. , this is slightly up from most recent but close to baseline   - Due to patient's history and rising creatinine the decision was made to admit patient for observation for IV antibiotics.    -IV Rocephin has been started.   -Check CBC and CMP in the morning.    CKD:   Has one kidney,  Creat is close to baseline at this time     RUQ Abdominal pain  Patient reports RUQ abdominal pain for the past three days. She notes the pain is worse with movement, especially while bending forward. In addition her pain is worse with food. She reports the pain wraps around her back on the right side.    - CT of abdomen revealed wall thickening of the distal stomach and/or duodenal bulb cannot be excluded    -Geminal ultrasound revealed mildly fatty infiltrated liver, gallbladder without stones or sludge, extrahepatic bile duct dilatation at 6 mm  - seems unlikely GB disease as cause, but significant PUD may cause this pain.  Will try a course of PPI and carafate.     - PRN tylenol and oxycodone for pain.    - Continue to monitor.   - would hold off on EGD for now.      Calculus of kidney  Patient has a history of recurrent kidney stones, resulting in surgical removal of her right kidney.  She does follow with urologist every 6 months.   - CT scan of the abdomen and pelvis revealed left renal calculi measuring up to 9 mm.   - Patient does not currently complain of left-sided flank pain or abdominal pain.   - PRN tylenol for pain.    - PRN  oxycodone for pain.     Anxiety  Patient takes lorazepam 0.5 mg at home as needed for anxiety.   -Continue lorazepam 0.5 mg.     Diet: Regular adult diet  DVT Prophylaxis: Low Risk/Ambulatory with no VTE prophylaxis indicated  Kay Catheter: not present  Code Status: Full code    Disposition Plan   Expected discharge: Tomorrow, recommended to prior living arrangement once adequate pain management/ tolerating PO medications and antibiotic plan established.  Entered: Mili King PA-C 04/12/2020, 12:24 AM     Status: Patient is appropriate for observation.   Mili King PA-C        The patient's care was discussed with the Attending Physician, Dr. Bonilla.    Primary Care Physician   Maribeth Avila [phone]    History is obtained from the patient, Latoya Riddle and review of old records via the EMR.    History of Present Illness   Latoya Riddle is a 49 year old female with past medical history of anxiety, HLD, kidney stones, congenital medullary sponge kidney who now presents on 4/11/2020 with right upper quadrant abdominal pain for the past 3 days. Patient describe the pain as intense and sharp that radiates to the right side of the back.  Patient notes the pain is worse with movement especially leaning forward and after she eats any food. She has a single left kidney due to surgical removal of the right kidney as a result of frequent and troublesome kidney stones. Patient is followed by urology every 6 months due to increased creatinine and decreased GFR. Patient denies fever, however temperature in the ED was 100.2.  Patient denies chest pain, shortness of breath, cough, nausea, vomiting, and diarrhea.  She reports no history of travel.  Reports no sick contacts.  Patient has a history of anxiety for which she takes Ativan for.    Review of Systems   CONSTITUTIONAL: negative for  fevers, chills and fatigue  HEENT:  negative for  nasal congestion, sore throat and hoarseness, positive  for allergies  RESPIRATORY:  negative for  dry cough, cough with sputum, dyspnea and wheezing  CARDIOVASCULAR:  negative for  chest pain, dyspnea, palpitations, edema  GASTROINTESTINAL:  positive for abdominal pain  negative for nausea, vomiting and change in bowel habits  GENITOURINARY:  positive for frequency, dysuria and nocturia  ENDOCRINE:  negative for heat intolerance, cold intolerance and tremor  MUSCULOSKELETAL:  negative for  myalgias, arthralgias, pain, stiff joints and decreased range of motion  NEUROLOGICAL:  negative for headaches, dizziness, memory problems and speech problems  BEHAVIOR/PSYCH:  positive for anxiety    Past Medical History    Past Medical History:   Diagnosis Date     Hyperlipidemia      Kidney stone      Patient Active Problem List    Diagnosis Date Noted     CATHY (obstructive sleep apnea) 02/09/2018     Priority: Medium     Mild to moderate CATHY without sleep-associated hypoxemia   - PSG performed 4/1/2008 with weight 180 lbs, AHI 0.3, RDI 41, mary SpO2 93%, PLMI 0, arousal index 57.9.       Gross hematuria 02/15/2017     Priority: Medium     Anxiety 01/11/2016     Priority: Medium     Acute kidney injury (H) 06/25/2015     Priority: Medium     Pyelonephritis 08/16/2013     Priority: Medium     Acute right flank pain 05/25/2013     Priority: Medium     Health Care Home 05/06/2013     Priority: Medium     EMERGENCY CARE PLAN  May 6, 2013: No current Care Coordination follow up planned. Please refer if Care Coordination services are needed.    Presenting Problem Signs and Symptoms Treatment Plan   Questions or concerns   during clinic hours   I will call my clinic directly:  [address] [id number] [phone].   Questions or concerns outside clinic hours   I will call the 24 hour nurse line at   [phone] or [address] [place] [address].   Need to schedule an appointment   I will call the 24 hour scheduling team at [phone] or my clinic directly at  [phone].   Same day treatment     I will call my clinic first, nurse line if after hours, urgent care and express care if needed.   Clinic care coordination services (regular clinic hours)   I will call a clinic care coordinator directly:     Italia Keith RN  [phone]    Rosette Valle SW:    [phone]    Or call my clinic at [phone] and ask to speak with care coordination.   Crisis Services: Behavioral or Mental Health  [facility] 24 Hour Phone Line  [phone]    [facility] 24 Hour Crisis Services  [phone]    [facility] (Behavioral Health Providers) Network [phone]    [facility]   [phone]     Emergency treatment -- Immediately    [facility] 911              Lower urinary tract infectious disease 05/12/2012     Priority: Medium     Overview:   Resistant   Pseudomonas        Hydronephrosis, right 05/12/2012     Priority: Medium     Hyperlipemia 01/06/2011     Priority: Medium     Tobacco abuse 04/19/2010     Priority: Medium     Calculus of ureter 11/19/2009     Priority: Medium     Dyspnea and respiratory abnormality 04/07/2008     Priority: Medium     Sleep study 4/1/08: No CATHY. .5 minutes, sleep latency  normal 11.7 minutes. REM latency 154.5 minutes. Sleep efficiency at 89.4%. The sleep architecture was periodically disrupted with frequent sleep stage changes and arousals. Snoring: moderately loud. RDI 41.0,  AHI of 0.3. Lowest O2 93.0%. PLM index 0.0.  Problem list name updated by automated process. Provider to review       Tobacco abuse disorder 05/25/2006     Priority: Medium     Calculus of kidney 06/20/2005     Priority: Medium     Due to RTA and medullary sponge kidney. [id number] ( See's Dr. Tavo Contreras)- calcium oxalate and calcium phosphate. S/p multiple procedures, >50       Congenital medullary sponge kidney 06/20/2005     Priority: Medium     Renal osteodystrophy 06/20/2005     Priority: Medium     Tubular acidosis          Past  Surgical History   Past Surgical History:   Procedure Laterality Date     COMBINED CYSTOSCOPY, INSERT STENT URETER(S) Left 11/10/2017    Procedure: COMBINED CYSTOSCOPY, INSERT STENT URETER(S);  Cystoscopy, Left Ureteral Stent Placement;  Surgeon: Yg Rodriguez MD;  Location: [facility]     EXCISE TOENAIL(S) Left 2016    Procedure: EXCISE TOENAIL(S);  Surgeon: Ady Mejia DPM;  Location: [facility]     GENITOURINARY SURGERY       SURGICAL HISTORY OF -            SURGICAL HISTORY OF -   -present    Lithothypsy X 49     SURGICAL HISTORY OF -       Percutaneous nephrostomy X2     SURGICAL HISTORY OF -   2011    Cystoscopy with bilateral ureteral pyeloscopy, stone basketing and stent placement        Prior to Admission Medications   Prior to Admission Medications   Prescriptions Last Dose Informant Patient Reported? Taking?   LORazepam (ATIVAN) 0.5 MG tablet   No No   Sig: Take 1 tablet (0.5 mg) by mouth every 8 hours as needed for anxiety   ZYRTEC 10 MG OR TABS  Self Yes No   Si TABLET DAILY   cyclobenzaprine (FLEXERIL) 10 MG tablet   No No   Sig: Take 1 tablet (10 mg) by mouth 3 times daily as needed for muscle spasms   naproxen sodium (ALEVE) 220 MG tablet  Self Yes No   Sig: Take 220 mg by mouth 2 times daily as needed.   order for DME   No No   Sig: Equipment being ordered: TENS   oxyCODONE IR (ROXICODONE) 5 MG tablet   No No   Sig: Take 1-2 tablets (5-10 mg) by mouth every 6 hours as needed for moderate to severe pain Do NOT drive or use additional narcotics while using this medication      Facility-Administered Medications: None     Allergies   Allergies   Allergen Reactions     Gentamycin [Gentamicin Sulfate]        Family History    Family History   Problem Relation Age of Onset     Respiratory Father         CATHY     C.A.D. Father      Heart Failure Father      Coronary Artery Disease Father      Hypertension Father      Depression Father      Anxiety Disorder Father       Diabetes Father         type 2     Lipids Mother         high cholesterol     Allergies Mother      Arthritis Mother         RA     Hyperlipidemia Mother      Lipids Sister         high cholesterol     Allergies Sister      Asthma Sister      Hypertension Sister      Allergies Sister      Respiratory Sister         moshe     Genitourinary Problems Maternal Grandmother         passed from complications from renal failure     Arthritis Maternal Grandfather         rheumatoid     Diabetes Paternal Grandmother         adult onset     Cardiovascular Paternal Grandfather         AAA     Hypertension Sister      Hyperlipidemia Sister      Anxiety Disorder Sister      Asthma Sister        Social History   Social History     Socioeconomic History     Marital status:      Spouse name: Not on file     Number of children: 1     Years of education: Not on file     Highest education level: Not on file   Occupational History     Employer: UNEMPLOYED     Employer: DISABLED     Comment: due to kidney disease   Social Needs     Financial resource strain: Not on file     Food insecurity     Worry: Not on file     Inability: Not on file     Transportation needs     Medical: Not on file     Non-medical: Not on file   Tobacco Use     Smoking status: Current Every Day Smoker     Packs/day: 1.00     Years: 20.00     Pack years: 20.00     Types: Cigarettes     Smokeless tobacco: Never Used     Tobacco comment: 7/25/17 declined , tried quit plan- did not help per patient    Substance and Sexual Activity     Alcohol use: No     Drug use: No     Sexual activity: Yes     Partners: Male     Birth control/protection: None   Lifestyle     Physical activity     Days per week: Not on file     Minutes per session: Not on file     Stress: Not on file   Relationships     Social connections     Talks on phone: Not on file     Gets together: Not on file     Attends [employer] service: Not on file     Active member of club or organization: Not on file  "    Attends meetings of clubs or organizations: Not on file     Relationship status: Not on file     Intimate partner violence     Fear of current or ex partner: Not on file     Emotionally abused: Not on file     Physically abused: Not on file     Forced sexual activity: Not on file   Other Topics Concern     Parent/sibling w/ CABG, MI or angioplasty before 65F 55M? Yes   Social History Narrative     Not on file   Patient lives at home with  and son.     Physical Exam   /81   Pulse (!) 10   Temp 100.2  F (37.9  C) (Oral)   Resp 16   Ht 1.702 m (5' 7\")   Wt 87.1 kg (192 lb)   LMP 07/02/2013   SpO2 99%   BMI 30.07 kg/m       Weight: 192 lbs 0 oz Body mass index is 30.07 kg/m .     Constitutional: Alert, oriented, cooperative, no apparent distress, appears nontoxic.  Eyes: Eyes are clear, pupils are reactive.   HENT: Oropharynx is clear and moist. No evidence of cranial trauma.  Cardiovascular: Regular rate and rhythm, normal S1 and S2, and no murmur noted. Good peripheral pulses in wrists bilaterally. No lower extremity edema.  Respiratory: Clear to auscultation bilaterally.   GI: Soft, mildly tender RUQ, normal bowel sounds. Right CVA tenderness.  Genitourinary: Deferred  Musculoskeletal: Normal muscle bulk and tone.   Skin: Warm and dry, no rashes.   Neurologic: Neck supple. Cranial nerves are grossly intact.  is symmetric.     Data   Data reviewed today:   Color Urine (no units)   Date Value   04/11/2020 Yellow     Appearance Urine (no units)   Date Value   04/11/2020 Cloudy     Glucose Urine (mg/dL)   Date Value   04/11/2020 Negative     Bilirubin Urine (no units)   Date Value   04/11/2020 Negative     Ketones Urine (mg/dL)   Date Value   04/11/2020 Negative     Specific Gravity Urine (no units)   Date Value   04/11/2020 1.013     pH Urine (pH)   Date Value   04/11/2020 6.0     Protein Albumin Urine (mg/dL)   Date Value   04/11/2020 30 (A)     Urobilinogen Urine (EU/dL)   Date Value "   10/29/2018 0.2     Nitrite Urine (no units)   Date Value   04/11/2020 Positive (A)     Leukocyte Esterase Urine (no units)   Date Value   04/11/2020 Large (A)       Recent Labs   Lab 04/11/20 2001 04/11/20 1955   WBC  --  10.7   HGB  --  15.7   MCV  --  89   PLT  --  116*   NA  --  139   POTASSIUM  --  4.4   CHLORIDE  --  109   CO2  --  25   BUN  --  24   CR  --  1.81*   ANIONGAP  --  5   MARIA  --  9.3   GLC  --  97   ALBUMIN  --  3.6   PROTTOTAL  --  7.7   BILITOTAL  --  0.6   ALKPHOS  --  172*   ALT  --  31   AST  --  31   LIPASE 243  --        Recent Results (from the past 24 hour(s))   US Abdomen Limited    Narrative    ULTRASOUND ABDOMEN LIMITED  4/11/2020 9:26 PM     HISTORY: Right upper quadrant pain, temperature 100.2, single kidney.    COMPARISON: None.    FINDINGS: Liver is mildly fatty infiltrated as evidenced by a diffuse  increase in echogenicity without focal lesions. Gallbladder is normal  without stones or sludge. Extrahepatic bile duct is minimally generous  at 6 mm of uncertain clinical significance. Pancreas is normal where  visualized. Right kidney is surgically absent.      Impression    IMPRESSION:    1. No cholelithiasis or cholecystitis.  2. Minimal extrahepatic biliary dilatation at 6 mm, clinical  correlation for biliary obstruction may be useful in judging this  finding's clinical significance.    ANDREW CLARKE MD   Abd/pelvis CT - no contrast - Stone Protocol    Narrative    EXAM: CT ABDOMEN PELVIS W/O CONTRAST  LOCATION: [facility]  DATE/TIME: 4/11/2020 10:20 PM    INDICATION: Flank pain, recurrent stone disease suspected - left. Right upper quadrant pain.  COMPARISON: 05/09/2019  TECHNIQUE: CT scan of the abdomen and pelvis was performed without IV contrast. Multiplanar reformats were obtained. Dose reduction techniques were used.  CONTRAST: None.    FINDINGS:   LOWER CHEST: Basilar atelectasis.    HEPATOBILIARY: Stable. No biliary dilatation.    PANCREAS:  Normal.    SPLEEN: Normal.    ADRENAL GLANDS: Normal.    KIDNEYS/BLADDER: Absent right kidney. Small left renal cysts. Mild dilatation the left renal collecting system similar to the previous study. Left renal calculi measuring up to 9 mm. No visible left ureteral calculus.    BOWEL: Inflammatory change in the right lower quadrant. Wall thickening of the distal stomach and/or duodenal bulb suspected. Normal caliber bowel. Diverticulosis.    LYMPH NODES: Normal.    VASCULATURE: Atherosclerotic vascular calcification.    PELVIC ORGANS: Normal.    MUSCULOSKELETAL: Mild degenerative change osseous structures.      Impression    IMPRESSION:   1.  Inflammatory stranding in the right upper quadrant. Wall thickening of the distal stomach and/or duodenal bulb is not excluded. Correlate for gastritis/peptic ulcer disease.  2.  Left renal calculi. Mild dilatation left renal collecting system similar to prior. No visible ureteral calculi.  3.  Left renal cysts.  4.  Diverticulosis.

## 2020-04-12 NOTE — PROGRESS NOTES
"[facility] ADMISSION NOTE    Patient admitted to room 2215 at approximately 0100 via cart from emergency room. Patient was accompanied by transport tech.     Verbal SBAR report received from JULIO CÉSAR Cisneros prior to patient arrival.     Patient ambulated to bed with stand-by assist. Patient alert and oriented X 3. Pain is controlled with current analgesics.  Medication(s) being used: acetaminophen and narcotic analgesics including oxycodone (Oxycontin, Oxyir). 0-10 Pain Scale: 7. Admission vital signs: Blood pressure 123/74, pulse 78, temperature 98.8  F (37.1  C), temperature source Oral, resp. rate 18, height 1.702 m (5' 7\"), weight 87.1 kg (192 lb), last menstrual period 07/02/2013, SpO2 97 %, not currently breastfeeding. Patient was oriented to plan of care, call light, bed controls, tv, telephone, bathroom and visiting hours.     Risk Assessment    The following safety risks were identified during admission: none. Yellow risk band applied: NO.     Skin Initial Assessment    This writer admitted this patient and completed a full skin assessment and Kory score in the Adult PCS flowsheet. Appropriate interventions initiated as needed.     Patient declined skin check.    Kory Risk Assessment  Sensory Perception: 4-->no impairment  Moisture: 4-->rarely moist  Activity: 3-->walks occasionally  Mobility: 4-->no limitation  Nutrition: 3-->adequate  Friction and Shear: 3-->no apparent problem  Kory Score: 21  Bed Support Surface: Atmos Air mattress    Education    Patient has a [place] to Observation order: No  Observation education completed and documented: N/A      Italia Ly RN    "

## 2020-04-12 NOTE — CONSULTS
"Surgical Consultation/History and Physical  [facility]    Latoya is seen in consultation for abdominal pain, at the request of Dr. Asencio    Chief Complaint:  Abdominal pain    History of Present Illness: Latoya Riddle is a 49 year old female presents with abdominal pain.  Patient states she has had RUQ abdominal pain for past 3 days.  Pain worse with eating, better when fasting.  She has never had this type of pain prior.  She does admit to previous EGD in distant past with a \"narrowing\" noted.  She denies dilation.  She states she does not have nausea/vomiting, regurgitation or early satiety.  She denies melena.  She does consume NSAIDs on infrequent basis despite having a single kidney.  History of nephrectomy for complicated nephrolithiasis.  Bowel movements have been normal.  She states her pain has improved since admission with protonix and carafate.    Patient Active Problem List   Diagnosis     Calculus of kidney     Congenital medullary sponge kidney     Renal osteodystrophy     Tobacco abuse disorder     Dyspnea and respiratory abnormality     Tobacco abuse     Hyperlipemia     Lower urinary tract infectious disease     Health Care Home     Anxiety     Calculus of ureter     CATHY (obstructive sleep apnea)     Acute kidney injury (H)     Acute right flank pain     Gross hematuria     Hydronephrosis, right     Pyelonephritis     RUQ pain       Past Medical History:   Diagnosis Date     Hyperlipidemia      Kidney stone        Past Surgical History:   Procedure Laterality Date     COMBINED CYSTOSCOPY, INSERT STENT URETER(S) Left 11/10/2017    Procedure: COMBINED CYSTOSCOPY, INSERT STENT URETER(S);  Cystoscopy, Left Ureteral Stent Placement;  Surgeon: Yg Rodriguez MD;  Location: [facility] OR     EXCISE TOENAIL(S) Left 2016    Procedure: EXCISE TOENAIL(S);  Surgeon: Ady Mejia DPM;  Location: [facility] OR     GENITOURINARY SURGERY       SURGICAL HISTORY OF -            " "SURGICAL HISTORY OF -   1994-present    Lithothypsy X 49     SURGICAL HISTORY OF -       Percutaneous nephrostomy X2     SURGICAL HISTORY OF -   2/14/2011    Cystoscopy with bilateral ureteral pyeloscopy, stone basketing and stent placement       Family History   Problem Relation Age of Onset     Respiratory Father         CATHY     C.A.D. Father      Heart Failure Father      Coronary Artery Disease Father      Hypertension Father      Depression Father      Anxiety Disorder Father      Diabetes Father         type 2     Lipids Mother         high cholesterol     Allergies Mother      Arthritis Mother         RA     Hyperlipidemia Mother      Lipids Sister         high cholesterol     Allergies Sister      Asthma Sister      Hypertension Sister      Allergies Sister      Respiratory Sister         cathy     Genitourinary Problems Maternal Grandmother         passed from complications from renal failure     Arthritis Maternal Grandfather         rheumatoid     Diabetes Paternal Grandmother         adult onset     Cardiovascular Paternal Grandfather         AAA     Hypertension Sister      Hyperlipidemia Sister      Anxiety Disorder Sister      Asthma Sister        Social History     Tobacco Use     Smoking status: Current Every Day Smoker     Packs/day: 1.00     Years: 20.00     Pack years: 20.00     Types: Cigarettes     Smokeless tobacco: Never Used     Tobacco comment: 7/25/17 declined , tried quit plan- did not help per patient    Substance Use Topics     Alcohol use: No        History   Drug Use No       No current outpatient medications on file.       Allergies   Allergen Reactions     Gentamycin [Gentamicin Sulfate]      Review of Systems:   10 point ROS otherwise negative    Physical Exam:  /66 (BP Location: Left arm)   Pulse 86   Temp 98.8  F (37.1  C) (Oral)   Resp 16   Ht 1.702 m (5' 7\")   Wt 87.1 kg (192 lb)   LMP 07/02/2013   SpO2 95%   BMI 30.07 kg/m      Constitutional- No acute distress, " well nourished, non-toxic  Eyes: Anicteric, no injection.  PERRL  ENT:  Normocephalic, atraumatic, Nose midline, moist mucus membranes  Neck - supple, no LAD  Respiratory- Clear to auscultation bilaterally, good inspiratory effort  Cardiovascular - Heart RRR, no lift's, thrills, murmurs, rubs, or gallop.  No peripheral edema.  No clubbing.  Abdomen - Soft, minimal ALEXA, RUQ TTP without R/R/G  Neuro - No focal neuro deficits, Alert and oriented x 3  Psych: Appropriate mood and affect  Musculoskeletal: Normal gait, symmetric strength.  FROM upper and lower extremities.  Skin: Warm, Dry    Lab Results   Component Value Date    WBC 8.6 04/12/2020     Lab Results   Component Value Date    RBC 5.20 04/12/2020     Lab Results   Component Value Date    HGB 14.7 04/12/2020     Lab Results   Component Value Date    HCT 46.5 04/12/2020     Lab Results   Component Value Date    MCV 89 04/12/2020     Lab Results   Component Value Date    MCH 28.3 04/12/2020     Lab Results   Component Value Date    MCHC 31.6 04/12/2020     Lab Results   Component Value Date    RDW 14.3 04/12/2020     Lab Results   Component Value Date     04/12/2020     Last Comprehensive Metabolic Panel:  Sodium   Date Value Ref Range Status   04/12/2020 137 133 - 144 mmol/L Final     Potassium   Date Value Ref Range Status   04/12/2020 4.0 3.4 - 5.3 mmol/L Final     Chloride   Date Value Ref Range Status   04/12/2020 109 94 - 109 mmol/L Final     Carbon Dioxide   Date Value Ref Range Status   04/12/2020 23 20 - 32 mmol/L Final     Anion Gap   Date Value Ref Range Status   04/12/2020 5 3 - 14 mmol/L Final     Glucose   Date Value Ref Range Status   04/12/2020 105 (H) 70 - 99 mg/dL Final     Urea Nitrogen   Date Value Ref Range Status   04/12/2020 22 7 - 30 mg/dL Final     Creatinine   Date Value Ref Range Status   04/12/2020 1.55 (H) 0.52 - 1.04 mg/dL Final     GFR Estimate   Date Value Ref Range Status   04/12/2020 39 (L) >60 mL/min/[1.73_m2] Final      Comment:     Non  GFR Calc  Starting 12/18/2018, serum creatinine based estimated GFR (eGFR) will be   calculated using the Chronic Kidney Disease Epidemiology Collaboration   (CKD-EPI) equation.       Calcium   Date Value Ref Range Status   04/12/2020 9.0 8.5 - 10.1 mg/dL Final     Bilirubin Total   Date Value Ref Range Status   04/12/2020 0.5 0.2 - 1.3 mg/dL Final     Alkaline Phosphatase   Date Value Ref Range Status   04/12/2020 161 (H) 40 - 150 U/L Final     ALT   Date Value Ref Range Status   04/12/2020 36 0 - 50 U/L Final     AST   Date Value Ref Range Status   04/12/2020 39 0 - 45 U/L Final       FINDINGS:   LOWER CHEST: Basilar atelectasis.     HEPATOBILIARY: Stable. No biliary dilatation.     PANCREAS: Normal.     SPLEEN: Normal.     ADRENAL GLANDS: Normal.     KIDNEYS/BLADDER: Absent right kidney. Small left renal cysts. Mild dilatation the left renal collecting system similar to the previous study. Left renal calculi measuring up to 9 mm. No visible left ureteral calculus.     BOWEL: Inflammatory change in the right lower quadrant. Wall thickening of the distal stomach and/or duodenal bulb suspected. Normal caliber bowel. Diverticulosis.     LYMPH NODES: Normal.     VASCULATURE: Atherosclerotic vascular calcification.     PELVIC ORGANS: Normal.     MUSCULOSKELETAL: Mild degenerative change osseous structures.                                                                      IMPRESSION:   1.  Inflammatory stranding in the right upper quadrant. Wall thickening of the distal stomach and/or duodenal bulb is not excluded. Correlate for gastritis/peptic ulcer disease.  2.  Left renal calculi. Mild dilatation left renal collecting system similar to prior. No visible ureteral calculi.  3.  Left renal cysts.  4.  Diverticulosis.      Assessment:  1. RUQ abdominal pain    Plan:   Ms. Riddle presents with RUQ abdominal pain and findings of antral/duodenal bulb inflammation.  Given her  constellation of symptoms, it is reasonable to perform EGD to rule out malignancy, assess for H pylori, or PUD.  Patient has eaten this morning a full meal.  I will make her NPO after midnight and plan for EGD tomorrow morning.  I discussed the indications, risks, benefits, alternatives with the patient and she is in agreement and wishes to proceed.  I would recommend continuing protonix and carafate.    Brian Grimes, DO on 4/12/2020 at 10:53 AM

## 2020-04-12 NOTE — ED PROVIDER NOTES
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 Pain is described as an intense, sharp pain that radiates to the right side of the back. Pain is worse with movement and after eating anything. Accompanying right upper quadrant 'bloating'. Patient has a single left kidney due to surgical removal of the right kidney due to frequent/troublesome stones. Followed by urology q6 months due to increased creatinine and decreased GFR. Denies fever however temp in triae is 100.2 F. Denies headache, cough, shortness of breath, chest pain, other abdominal pain, left flank pain, dysuria, hematuria, urinary frequency/urgency, nausea, vomiting and diarrhea. No new medications or dietary changes. Denies alcohol or drug use.     Allergies:  Allergies   Allergen Reactions     Gentamycin [Gentamicin Sulfate]      Problem List:    Patient Active Problem List    Diagnosis Date Noted     CATHY (obstructive sleep apnea) 02/09/2018     Priority: Medium     Mild to moderate CATHY without sleep-associated hypoxemia   - PSG performed 4/1/2008 with weight 180 lbs, AHI 0.3, RDI 41, mary SpO2 93%, PLMI 0, arousal index 57.9.       Gross hematuria 02/15/2017     Priority: Medium     Anxiety 01/11/2016     Priority: Medium     Acute kidney injury (H) 06/25/2015     Priority: Medium     Pyelonephritis 08/16/2013     Priority: Medium     Acute right flank pain 05/25/2013     Priority: Medium     Health Care Home 05/06/2013     Priority: Medium     EMERGENCY CARE PLAN  May 6, 2013: No current Care Coordination follow up planned. Please refer if Care Coordination services are  needed.    Presenting Problem Signs and Symptoms Treatment Plan   Questions or concerns   during clinic hours   I will call my clinic directly:  [facility]  [address] [place] [address]  [phone].   Questions or concerns outside clinic hours   I will call the 24 hour nurse line at   [phone] or [phone].   Need to schedule an appointment   I will call the 24 hour scheduling team at [phone] or my clinic directly at [phone].   Same day treatment     I will call my clinic first, nurse line if after hours, urgent care and express care if needed.   Clinic care coordination services (regular clinic hours)   I will call a clinic care coordinator directly:     Itlaia Keith RN  [phone]    CHELSI Iglesias:    [phone]    Or call my clinic at [phone] and ask to speak with care coordination.   Crisis Services: Behavioral or Mental Health  [facility] 24 Hour Phone Line  [phone]    [facility] 24 Hour Crisis Services  [phone]    [facility] (Behavioral Health Providers) Network [phone]    [facility]   [phone]     Emergency treatment -- Immediately    [facility] 911              Lower urinary tract infectious disease 05/12/2012     Priority: Medium     Overview:   Resistant   Pseudomonas        Hydronephrosis, right 05/12/2012     Priority: Medium     Hyperlipemia 01/06/2011     Priority: Medium     Tobacco abuse 04/19/2010     Priority: Medium     Calculus of ureter 11/19/2009     Priority: Medium     Dyspnea and respiratory abnormality 04/07/2008     Priority: Medium     Sleep study 4/1/08: No CATHY. .5 minutes, sleep latency  normal 11.7 minutes. REM latency 154.5 minutes. Sleep efficiency at 89.4%. The sleep architecture was periodically disrupted with frequent sleep stage changes and arousals. Snoring: moderately loud. RDI 41.0,  AHI of 0.3. Lowest O2 93.0%. PLM index 0.0.  Problem list name updated by automated  process. Provider to review       Tobacco abuse disorder 2006     Priority: Medium     Calculus of kidney 2005     Priority: Medium     Due to RTA and medullary sponge kidney. [id number] ( See's Dr. Tavo Contreras)- calcium oxalate and calcium phosphate. S/p multiple procedures, >50       Congenital medullary sponge kidney 2005     Priority: Medium     Renal osteodystrophy 2005     Priority: Medium     Tubular acidosis        Past Medical History:    Past Medical History:   Diagnosis Date     Hyperlipidemia      Kidney stone      Past Surgical History:    Past Surgical History:   Procedure Laterality Date     COMBINED CYSTOSCOPY, INSERT STENT URETER(S) Left 11/10/2017    Procedure: COMBINED CYSTOSCOPY, INSERT STENT URETER(S);  Cystoscopy, Left Ureteral Stent Placement;  Surgeon: Yg Rodriguez MD;  Location: [facility]     EXCISE TOENAIL(S) Left 2016    Procedure: EXCISE TOENAIL(S);  Surgeon: Ady Mejia DPM;  Location: [facility]     GENITOURINARY SURGERY       SURGICAL HISTORY OF -            SURGICAL HISTORY OF -   -present    Lithothypsy X 49     SURGICAL HISTORY OF -       Percutaneous nephrostomy X2     SURGICAL HISTORY OF -   2011    Cystoscopy with bilateral ureteral pyeloscopy, stone basketing and stent placement     Family History:    Family History   Problem Relation Age of Onset     Respiratory Father         CATHY     C.A.D. Father      Heart Failure Father      Coronary Artery Disease Father      Hypertension Father      Depression Father      Anxiety Disorder Father      Diabetes Father         type 2     Lipids Mother         high cholesterol     Allergies Mother      Arthritis Mother         RA     Hyperlipidemia Mother      Lipids Sister         high cholesterol     Allergies Sister      Asthma Sister      Hypertension Sister      Allergies Sister      Respiratory Sister         cathy     Genitourinary Problems Maternal Grandmother          "passed from complications from renal failure     Arthritis Maternal Grandfather         rheumatoid     Diabetes Paternal Grandmother         adult onset     Cardiovascular Paternal Grandfather         AAA     Hypertension Sister      Hyperlipidemia Sister      Anxiety Disorder Sister      Asthma Sister      Social History:  Marital Status:   [2]  Social History     Tobacco Use     Smoking status: Current Every Day Smoker     Packs/day: 1.00     Years: 20.00     Pack years: 20.00     Types: Cigarettes     Smokeless tobacco: Never Used     Tobacco comment: 7/25/17 declined , tried quit plan- did not help per patient    Substance Use Topics     Alcohol use: No     Drug use: No      Medications:    cyclobenzaprine (FLEXERIL) 10 MG tablet  LORazepam (ATIVAN) 0.5 MG tablet  naproxen sodium (ALEVE) 220 MG tablet  order for DME  oxyCODONE IR (ROXICODONE) 5 MG tablet  ZYRTEC 10 MG OR TABS      Review of Systems   Constitutional: Positive for fever. Negative for chills and fatigue.   Eyes: Negative for discharge and redness.   Respiratory: Negative for cough and shortness of breath.    Cardiovascular: Negative for chest pain.   Gastrointestinal: Positive for abdominal distention and abdominal pain. Negative for blood in stool, constipation, diarrhea, nausea and vomiting.   Genitourinary: Positive for flank pain. Negative for decreased urine volume, difficulty urinating, dysuria, frequency, hematuria, pelvic pain, urgency and vaginal discharge.   Musculoskeletal: Negative for arthralgias, joint swelling and myalgias.   Skin: Negative for rash.   Neurological: Negative for dizziness, weakness, light-headedness, numbness and headaches.     Physical Exam   BP: 122/81  Pulse: (!) 10  Temp: 100.2  F (37.9  C)  Resp: 16  Height: 170.2 cm (5' 7\")  Weight: 87.1 kg (192 lb)  SpO2: 99 %    Physical Exam  Constitutional:       General: She is in acute distress (d/t pain).      Appearance: She is well-developed. She is obese. She " is not ill-appearing or diaphoretic.   HENT:      Right Ear: Tympanic membrane normal.      Left Ear: Tympanic membrane normal.      Nose: Nose normal.      Mouth/Throat:      Pharynx: No posterior oropharyngeal erythema.   Eyes:      Conjunctiva/sclera: Conjunctivae normal.   Neck:      Musculoskeletal: Normal range of motion and neck supple.   Pulmonary:      Effort: Pulmonary effort is normal. No respiratory distress.      Breath sounds: Normal breath sounds and air entry. No decreased air movement. No decreased breath sounds, wheezing or rhonchi.   Abdominal:      General: Bowel sounds are normal. There is no distension.      Palpations: Abdomen is soft.      Tenderness: There is abdominal tenderness in the right upper quadrant. There is right CVA tenderness. There is no left CVA tenderness, guarding or rebound. Negative signs include Willis's sign and McBurney's sign.   Musculoskeletal: Normal range of motion.   Skin:     General: Skin is warm.      Capillary Refill: Capillary refill takes less than 2 seconds.   Neurological:      Mental Status: She is alert and oriented to person, place, and time.       ED Course        Procedures    Results for orders placed or performed during the hospital encounter of 04/11/20 (from the past 24 hour(s))   CBC with platelets differential   Result Value Ref Range    WBC 10.7 4.0 - 11.0 10e9/L    RBC Count 5.53 (H) 3.8 - 5.2 10e12/L    Hemoglobin 15.7 11.7 - 15.7 g/dL    Hematocrit 49.0 (H) 35.0 - 47.0 %    MCV 89 78 - 100 fl    MCH 28.4 26.5 - 33.0 pg    MCHC 32.0 31.5 - 36.5 g/dL    RDW 14.2 10.0 - 15.0 %    Platelet Count 116 (L) 150 - 450 10e9/L    Diff Method Automated Method     % Neutrophils 70.4 %    % Lymphocytes 22.2 %    % Monocytes 6.2 %    % Eosinophils 0.4 %    % Basophils 0.4 %    % Immature Granulocytes 0.4 %    Nucleated RBCs 0 0 /100    Absolute Neutrophil 7.5 1.6 - 8.3 10e9/L    Absolute Lymphocytes 2.4 0.8 - 5.3 10e9/L    Absolute Monocytes 0.7 0.0 - 1.3  10e9/L    Absolute Eosinophils 0.0 0.0 - 0.7 10e9/L    Absolute Basophils 0.0 0.0 - 0.2 10e9/L    Abs Immature Granulocytes 0.0 0 - 0.4 10e9/L    Absolute Nucleated RBC 0.0    Comprehensive metabolic panel   Result Value Ref Range    Sodium 139 133 - 144 mmol/L    Potassium 4.4 3.4 - 5.3 mmol/L    Chloride 109 94 - 109 mmol/L    Carbon Dioxide 25 20 - 32 mmol/L    Anion Gap 5 3 - 14 mmol/L    Glucose 97 70 - 99 mg/dL    Urea Nitrogen 24 7 - 30 mg/dL    Creatinine 1.81 (H) 0.52 - 1.04 mg/dL    GFR Estimate 32 (L) >60 mL/min/[1.73_m2]    GFR Estimate If Black 38 (L) >60 mL/min/[1.73_m2]    Calcium 9.3 8.5 - 10.1 mg/dL    Bilirubin Total 0.6 0.2 - 1.3 mg/dL    Albumin 3.6 3.4 - 5.0 g/dL    Protein Total 7.7 6.8 - 8.8 g/dL    Alkaline Phosphatase 172 (H) 40 - 150 U/L    ALT 31 0 - 50 U/L    AST 31 0 - 45 U/L   UA with Microscopic   Result Value Ref Range    Color Urine Yellow     Appearance Urine Cloudy     Glucose Urine Negative NEG^Negative mg/dL    Bilirubin Urine Negative NEG^Negative    Ketones Urine Negative NEG^Negative mg/dL    Specific Gravity Urine 1.013 1.003 - 1.035    Blood Urine Moderate (A) NEG^Negative    pH Urine 6.0 5.0 - 7.0 pH    Protein Albumin Urine 30 (A) NEG^Negative mg/dL    Urobilinogen mg/dL 2.0 0.0 - 2.0 mg/dL    Nitrite Urine Positive (A) NEG^Negative    Leukocyte Esterase Urine Large (A) NEG^Negative    Source Midstream Urine     WBC Urine >182 (H) 0 - 5 /HPF    RBC Urine 46 (H) 0 - 2 /HPF    WBC Clumps Present (A) NEG^Negative /HPF    Bacteria Urine Few (A) NEG^Negative /HPF    Squamous Epithelial /HPF Urine 2 (H) 0 - 1 /HPF    Mucous Urine Present (A) NEG^Negative /LPF   Lipase   Result Value Ref Range    Lipase 243 73 - 393 U/L   HCG qualitative urine   Result Value Ref Range    HCG Qual Urine Negative NEG^Negative   Urine Culture    Specimen: Midstream Urine   Result Value Ref Range    Specimen Description Midstream Urine     Special Requests Specimen received in preservative      Culture Micro PENDING    US Abdomen Limited    Narrative    ULTRASOUND ABDOMEN LIMITED  4/11/2020 9:26 PM     HISTORY: Right upper quadrant pain, temperature 100.2, single kidney.    COMPARISON: None.    FINDINGS: Liver is mildly fatty infiltrated as evidenced by a diffuse  increase in echogenicity without focal lesions. Gallbladder is normal  without stones or sludge. Extrahepatic bile duct is minimally generous  at 6 mm of uncertain clinical significance. Pancreas is normal where  visualized. Right kidney is surgically absent.      Impression    IMPRESSION:    1. No cholelithiasis or cholecystitis.  2. Minimal extrahepatic biliary dilatation at 6 mm, clinical  correlation for biliary obstruction may be useful in judging this  finding's clinical significance.    ANDREW CLARKE MD   Abd/pelvis CT - no contrast - Stone Protocol    Narrative    EXAM: CT ABDOMEN PELVIS W/O CONTRAST  LOCATION: [facility]  DATE/TIME: 4/11/2020 10:20 PM    INDICATION: Flank pain, recurrent stone disease suspected - left. Right upper quadrant pain.  COMPARISON: 05/09/2019  TECHNIQUE: CT scan of the abdomen and pelvis was performed without IV contrast. Multiplanar reformats were obtained. Dose reduction techniques were used.  CONTRAST: None.    FINDINGS:   LOWER CHEST: Basilar atelectasis.    HEPATOBILIARY: Stable. No biliary dilatation.    PANCREAS: Normal.    SPLEEN: Normal.    ADRENAL GLANDS: Normal.    KIDNEYS/BLADDER: Absent right kidney. Small left renal cysts. Mild dilatation the left renal collecting system similar to the previous study. Left renal calculi measuring up to 9 mm. No visible left ureteral calculus.    BOWEL: Inflammatory change in the right lower quadrant. Wall thickening of the distal stomach and/or duodenal bulb suspected. Normal caliber bowel. Diverticulosis.    LYMPH NODES: Normal.    VASCULATURE: Atherosclerotic vascular calcification.    PELVIC ORGANS: Normal.    MUSCULOSKELETAL: Mild degenerative  change osseous structures.      Impression    IMPRESSION:   1.  Inflammatory stranding in the right upper quadrant. Wall thickening of the distal stomach and/or duodenal bulb is not excluded. Correlate for gastritis/peptic ulcer disease.  2.  Left renal calculi. Mild dilatation left renal collecting system similar to prior. No visible ureteral calculi.  3.  Left renal cysts.  4.  Diverticulosis.       Medications   omeprazole (priLOSEC) CR capsule 20 mg (20 mg Oral Given 4/11/20 2318)   sucralfate (CARAFATE) suspension 1 g (1 g Oral Given 4/11/20 2318)   cefTRIAXone in d5w (ROCEPHIN) intermittent infusion 1 g (has no administration in time range)       Assessments & Plan (with Medical Decision Making)   Latoya Riddle is a 49 year old female who presents to the emergency department for evaluation of right upper quadrant pain for three days. Pain is described as an intense, sharp pain that radiates to the right side of the back. Pain is worse with movement and after eating anything. Accompanying right upper quadrant 'bloating'. Patient has a single left kidney due to surgical removal of the right kidney due to frequent/troublesome stones.     CBC, lipase normal. Urine pregnancy normal. CMP showing worsening kidney function with increased creatinine and decreased GFR compared to November labs. Urinalysis concerning for pyelonephritis, will begin IV cipro. Abdominal US completed with vague result of minimally dilated biliary duct. Declining pain medication. CT abdomen/pelvis showing inflammation in the right upper quadrant, stomach wall thickening suggestive of gastritis/PUD. Patient not currently on a PPI, will try omeprazole and carafate to assess symptomatic improvement. Left renal calculi present, no ureteral calculi.  Plan to admit due to concerning pyelonephritis in a single kidney individual with worsening renal function. Potential for surgical consult for upper GI for gastritis. Discussed patient with Ni  KEL King and Dr. Bonilla who accepts for admission. IV antibiotics changed from Cipro to Rocephin. Patient updated on plan of care and is agreeable to admission. Admitted in stable condition, pain controlled and in no acute distress.     I have reviewed the nursing notes.    I have reviewed the findings, diagnosis, plan and need for follow up with the patient.  New Prescriptions    No medications on file     Final diagnoses:   Pyelonephritis   Right upper quadrant pain   Acute right flank pain     4/11/2020   [facility] EMERGENCY DEPARTMENT     Marsha Lamas, MICHELLE CNP  04/11/20 [id number]       Marsha Lamas APRN CNP  04/23/20 [id number]

## 2020-04-12 NOTE — PLAN OF CARE
"PRN ativan given at 0115. Right upper quadrant pain persists. Patient is aware that pain medications are available but has declined so far. Up with stand-by assist for safety. Uses call light appropriately.  /74   Pulse 78   Temp 98.8  F (37.1  C) (Oral)   Resp 18   Ht 1.702 m (5' 7\")   Wt 87.1 kg (192 lb)   LMP 07/02/2013   SpO2 97%   BMI 30.07 kg/m      "

## 2020-04-12 NOTE — PROGRESS NOTES
"[facility]      Subjective:  Her pain is in the right upper quadrant.  It is somewhat better today.  She has no nausea.  She is unaware of fever or chills-although she did have a low-grade temp last night.  She had a normal bowel movement in the last 24 hours.  She has no history of ulcer disease but did have an EGD in the remote past where she was told \"there was a narrowing between my stomach and my intestine\".    She reports that she does use some naproxen despite having chronic kidney disease and a solitary kidney.  She takes this for migraines.    Review of Systems:  CONSTITUTIONAL: NEGATIVE for fever, chills, change in weight  INTEGUMENTARY/SKIN: NEGATIVE for worrisome rashes, moles or lesions  EYES: NEGATIVE for vision changes or irritation  ENT/MOUTH: NEGATIVE for ear, mouth and throat problems  RESP: NEGATIVE for significant cough or SOB  BREAST: NEGATIVE for masses, tenderness or discharge  CV: NEGATIVE for chest pain, palpitations or peripheral edema  GI: above  : NEGATIVE for frequency, dysuria, or hematuria  MUSCULOSKELETAL: NEGATIVE for significant arthralgias or myalgia  NEURO: NEGATIVE for weakness, dizziness or paresthesias  ENDOCRINE: NEGATIVE for temperature intolerance, skin/hair changes  HEME: NEGATIVE for bleeding problems  PSYCHIATRIC: NEGATIVE for changes in mood or affect    Physical Exam:  Vitals Were Reviewed    Patient Vitals for the past 16 hrs:   BP Temp Temp src Pulse Resp SpO2 Height Weight   04/12/20 0820 113/66 -- -- 86 16 95 % -- --   04/12/20 0100 123/74 98.8  F (37.1  C) Oral 78 18 97 % -- --   04/12/20 0020 133/86 98.2  F (36.8  C) Oral 74 -- 96 % -- --   04/11/20 1920 122/81 100.2  F (37.9  C) Oral (!) 10 16 99 % 1.702 m (5' 7\") 87.1 kg (192 lb)       No intake or output data in the 24 hours ending 04/12/20 0906    GENERAL APPEARANCE: healthy, alert and no distress  RESP: lungs clear to auscultation - no rales, rhonchi or wheezes  CV: regular rate and " rhythm, normal S1 S2, no S3 or S4 and no murmur, click or rub   ABDOMEN: Tender in the right upper quadrant and right midabdomen but there is no rebound guarding or rigidity ,bowel sounds are normal  MS: no clubbing, cyanosis; no edema  SKIN: clear without significant rashes or lesions    Lab:  Recent Labs   Lab Test 04/12/20 0517 04/11/20 1955    139   POTASSIUM 4.0 4.4   CHLORIDE 109 109   CO2 23 25   ANIONGAP 5 5   * 97   BUN 22 24   CR 1.55* 1.81*   MARIA 9.0 9.3     CBC RESULTS:   Recent Labs   Lab Test 04/12/20 0517 04/11/20 1955   WBC 8.6 10.7   RBC 5.20 5.53*   HGB 14.7 15.7   HCT 46.5 49.0*   * 116*       Results for orders placed or performed during the hospital encounter of 04/11/20 (from the past 24 hour(s))   CBC with platelets differential   Result Value Ref Range    WBC 10.7 4.0 - 11.0 10e9/L    RBC Count 5.53 (H) 3.8 - 5.2 10e12/L    Hemoglobin 15.7 11.7 - 15.7 g/dL    Hematocrit 49.0 (H) 35.0 - 47.0 %    MCV 89 78 - 100 fl    MCH 28.4 26.5 - 33.0 pg    MCHC 32.0 31.5 - 36.5 g/dL    RDW 14.2 10.0 - 15.0 %    Platelet Count 116 (L) 150 - 450 10e9/L    Diff Method Automated Method     % Neutrophils 70.4 %    % Lymphocytes 22.2 %    % Monocytes 6.2 %    % Eosinophils 0.4 %    % Basophils 0.4 %    % Immature Granulocytes 0.4 %    Nucleated RBCs 0 0 /100    Absolute Neutrophil 7.5 1.6 - 8.3 10e9/L    Absolute Lymphocytes 2.4 0.8 - 5.3 10e9/L    Absolute Monocytes 0.7 0.0 - 1.3 10e9/L    Absolute Eosinophils 0.0 0.0 - 0.7 10e9/L    Absolute Basophils 0.0 0.0 - 0.2 10e9/L    Abs Immature Granulocytes 0.0 0 - 0.4 10e9/L    Absolute Nucleated RBC 0.0    Comprehensive metabolic panel   Result Value Ref Range    Sodium 139 133 - 144 mmol/L    Potassium 4.4 3.4 - 5.3 mmol/L    Chloride 109 94 - 109 mmol/L    Carbon Dioxide 25 20 - 32 mmol/L    Anion Gap 5 3 - 14 mmol/L    Glucose 97 70 - 99 mg/dL    Urea Nitrogen 24 7 - 30 mg/dL    Creatinine 1.81 (H) 0.52 - 1.04 mg/dL    GFR Estimate 32  (L) >60 mL/min/[1.73_m2]    GFR Estimate If Black 38 (L) >60 mL/min/[1.73_m2]    Calcium 9.3 8.5 - 10.1 mg/dL    Bilirubin Total 0.6 0.2 - 1.3 mg/dL    Albumin 3.6 3.4 - 5.0 g/dL    Protein Total 7.7 6.8 - 8.8 g/dL    Alkaline Phosphatase 172 (H) 40 - 150 U/L    ALT 31 0 - 50 U/L    AST 31 0 - 45 U/L   UA with Microscopic   Result Value Ref Range    Color Urine Yellow     Appearance Urine Cloudy     Glucose Urine Negative NEG^Negative mg/dL    Bilirubin Urine Negative NEG^Negative    Ketones Urine Negative NEG^Negative mg/dL    Specific Gravity Urine 1.013 1.003 - 1.035    Blood Urine Moderate (A) NEG^Negative    pH Urine 6.0 5.0 - 7.0 pH    Protein Albumin Urine 30 (A) NEG^Negative mg/dL    Urobilinogen mg/dL 2.0 0.0 - 2.0 mg/dL    Nitrite Urine Positive (A) NEG^Negative    Leukocyte Esterase Urine Large (A) NEG^Negative    Source Midstream Urine     WBC Urine >182 (H) 0 - 5 /HPF    RBC Urine 46 (H) 0 - 2 /HPF    WBC Clumps Present (A) NEG^Negative /HPF    Bacteria Urine Few (A) NEG^Negative /HPF    Squamous Epithelial /HPF Urine 2 (H) 0 - 1 /HPF    Mucous Urine Present (A) NEG^Negative /LPF   Lipase   Result Value Ref Range    Lipase 243 73 - 393 U/L   HCG qualitative urine   Result Value Ref Range    HCG Qual Urine Negative NEG^Negative   Urine Culture    Specimen: Midstream Urine   Result Value Ref Range    Specimen Description Midstream Urine     Special Requests Specimen received in preservative     Culture Micro PENDING    US Abdomen Limited    Narrative    ULTRASOUND ABDOMEN LIMITED  4/11/2020 9:26 PM     HISTORY: Right upper quadrant pain, temperature 100.2, single kidney.    COMPARISON: None.    FINDINGS: Liver is mildly fatty infiltrated as evidenced by a diffuse  increase in echogenicity without focal lesions. Gallbladder is normal  without stones or sludge. Extrahepatic bile duct is minimally generous  at 6 mm of uncertain clinical significance. Pancreas is normal where  visualized. Right kidney is  surgically absent.      Impression    IMPRESSION:    1. No cholelithiasis or cholecystitis.  2. Minimal extrahepatic biliary dilatation at 6 mm, clinical  correlation for biliary obstruction may be useful in judging this  finding's clinical significance.    ANDREW CLARKE MD   Abd/pelvis CT - no contrast - Stone Protocol    Narrative    EXAM: CT ABDOMEN PELVIS W/O CONTRAST  LOCATION: [facility]  DATE/TIME: 4/11/2020 10:20 PM    INDICATION: Flank pain, recurrent stone disease suspected - left. Right upper quadrant pain.  COMPARISON: 05/09/2019  TECHNIQUE: CT scan of the abdomen and pelvis was performed without IV contrast. Multiplanar reformats were obtained. Dose reduction techniques were used.  CONTRAST: None.    FINDINGS:   LOWER CHEST: Basilar atelectasis.    HEPATOBILIARY: Stable. No biliary dilatation.    PANCREAS: Normal.    SPLEEN: Normal.    ADRENAL GLANDS: Normal.    KIDNEYS/BLADDER: Absent right kidney. Small left renal cysts. Mild dilatation the left renal collecting system similar to the previous study. Left renal calculi measuring up to 9 mm. No visible left ureteral calculus.    BOWEL: Inflammatory change in the right lower quadrant. Wall thickening of the distal stomach and/or duodenal bulb suspected. Normal caliber bowel. Diverticulosis.    LYMPH NODES: Normal.    VASCULATURE: Atherosclerotic vascular calcification.    PELVIC ORGANS: Normal.    MUSCULOSKELETAL: Mild degenerative change osseous structures.      Impression    IMPRESSION:   1.  Inflammatory stranding in the right upper quadrant. Wall thickening of the distal stomach and/or duodenal bulb is not excluded. Correlate for gastritis/peptic ulcer disease.  2.  Left renal calculi. Mild dilatation left renal collecting system similar to prior. No visible ureteral calculi.  3.  Left renal cysts.  4.  Diverticulosis.     CBC with platelets   Result Value Ref Range    WBC 8.6 4.0 - 11.0 10e9/L    RBC Count 5.20 3.8 - 5.2 10e12/L     Hemoglobin 14.7 11.7 - 15.7 g/dL    Hematocrit 46.5 35.0 - 47.0 %    MCV 89 78 - 100 fl    MCH 28.3 26.5 - 33.0 pg    MCHC 31.6 31.5 - 36.5 g/dL    RDW 14.3 10.0 - 15.0 %    Platelet Count 103 (L) 150 - 450 10e9/L   Comprehensive metabolic panel   Result Value Ref Range    Sodium 137 133 - 144 mmol/L    Potassium 4.0 3.4 - 5.3 mmol/L    Chloride 109 94 - 109 mmol/L    Carbon Dioxide 23 20 - 32 mmol/L    Anion Gap 5 3 - 14 mmol/L    Glucose 105 (H) 70 - 99 mg/dL    Urea Nitrogen 22 7 - 30 mg/dL    Creatinine 1.55 (H) 0.52 - 1.04 mg/dL    GFR Estimate 39 (L) >60 mL/min/[1.73_m2]    GFR Estimate If Black 45 (L) >60 mL/min/[1.73_m2]    Calcium 9.0 8.5 - 10.1 mg/dL    Bilirubin Total 0.5 0.2 - 1.3 mg/dL    Albumin 3.1 (L) 3.4 - 5.0 g/dL    Protein Total 7.0 6.8 - 8.8 g/dL    Alkaline Phosphatase 161 (H) 40 - 150 U/L    ALT 36 0 - 50 U/L    AST 39 0 - 45 U/L       Assessment and Plan:    Latoya Riddle is a 49 year old female who presents on 4/11/2020 with right upper quadrant pain for 3 days.     RUQ Abdominal pain-etiology unclear, gastritis and peptic ulcer disease not ruled out, inflammatory stranding right upper quadrant on CT.  Patient reports RUQ abdominal pain for the past three days. She notes the pain is worse with movement, especially while bending forward. In addition her pain is worse with food. She reports the pain wraps around her back on the right side.   CT of abdomen -Inflammatory stranding in the right upper quadrant. Wall thickening of the distal stomach and/or duodenal bulb is not excluded. Correlate for gastritis/peptic ulcer disease.  Abd ultrasound revealed mildly fatty infiltrated liver, gallbladder without stones or sludge, extrahepatic bile duct dilatation at 6 mm  - seems unlikely GB disease as cause, but significant PUD may cause this pain.    Pt was placed on Protonix and Carafate.    I think this is unlikely to be related to UTI since the pain is on the right side and she has had a  previous right nephrectomy.  I requested a surgical consult.  Cornelio saw the patient-he plans an EGD in the a.m.    UTI  Urinalysis reveals positive nitrites, moderate amount of blood, large amount of leukocyte esterase, and few bacteria in urine.     Urine culture is pending.  Creatinine was 1.81-now it is 1.51 with a GFR of 39.  Patient is on Rocephin.  T-max was 100.2 overnight.  History of recurrent UTI.     CKD-stage III-IV  Has one kidney,  Creat is close to baseline at this time .  She had naproxen on med list.  She is advised that she cannot use NSAIDs.  She has been using them for migraine-suggest that she talk to her physician about other agents such as triptans.     Calculus of kidney  Patient has a history of recurrent kidney stones, resulting in surgical removal of her right kidney.  She does follow with urologist every 6 months.   - CT scan of the abdomen and pelvis revealed left renal calculi measuring up to 9 mm.   - Patient does not currently complain of left-sided flank pain or abdominal pain.   - PRN tylenol for pain.    - PRN oxycodone for pain.      Anxiety  Patient takes lorazepam 0.5 mg at home as needed for anxiety.   -Continue lorazepam 0.5 mg.     Diet: Regular adult diet  DVT Prophylaxis: Low Risk/Ambulatory with no VTE prophylaxis indicated  Kay Catheter: not present  Code Status: Full code      plan- discussed with Dr Grimes, n.p.o. at midnight, EGD tomorrow, await urine culture and continue Rocephin, continue IV fluids.  Suspect 2 more nights as an inpatient -convert to inpatient status.  Change omeprazole to IV Protonix and continue Carafate.

## 2020-04-13 ENCOUNTER — ANESTHESIA EVENT (OUTPATIENT)
Dept: SURGERY | Facility: CLINIC | Age: 50
DRG: 392 | End: 2020-04-13
Payer: COMMERCIAL

## 2020-04-13 ENCOUNTER — ANESTHESIA (OUTPATIENT)
Dept: SURGERY | Facility: CLINIC | Age: 50
DRG: 392 | End: 2020-04-13
Payer: COMMERCIAL

## 2020-04-13 VITALS
RESPIRATION RATE: 16 BRPM | TEMPERATURE: 97.8 F | HEIGHT: 67 IN | OXYGEN SATURATION: 95 % | HEART RATE: 72 BPM | DIASTOLIC BLOOD PRESSURE: 71 MMHG | SYSTOLIC BLOOD PRESSURE: 124 MMHG | BODY MASS INDEX: 30.13 KG/M2 | WEIGHT: 192 LBS

## 2020-04-13 LAB — UPPER GI ENDOSCOPY: NORMAL

## 2020-04-13 PROCEDURE — 25000125 ZZHC RX 250: Performed by: NURSE ANESTHETIST, CERTIFIED REGISTERED

## 2020-04-13 PROCEDURE — 88305 TISSUE EXAM BY PATHOLOGIST: CPT | Mod: 26 | Performed by: SURGERY

## 2020-04-13 PROCEDURE — 71000027 ZZH RECOVERY PHASE 2 EACH 15 MINS: Performed by: SURGERY

## 2020-04-13 PROCEDURE — 25000128 H RX IP 250 OP 636: Performed by: NURSE ANESTHETIST, CERTIFIED REGISTERED

## 2020-04-13 PROCEDURE — 99239 HOSP IP/OBS DSCHRG MGMT >30: CPT | Performed by: INTERNAL MEDICINE

## 2020-04-13 PROCEDURE — 25000128 H RX IP 250 OP 636: Performed by: SURGERY

## 2020-04-13 PROCEDURE — 0DB78ZX EXCISION OF STOMACH, PYLORUS, VIA NATURAL OR ARTIFICIAL OPENING ENDOSCOPIC, DIAGNOSTIC: ICD-10-PCS | Performed by: SURGERY

## 2020-04-13 PROCEDURE — 36000052 ZZH SURGERY LEVEL 2 EA 15 ADDTL MIN: Performed by: SURGERY

## 2020-04-13 PROCEDURE — 43239 EGD BIOPSY SINGLE/MULTIPLE: CPT | Performed by: SURGERY

## 2020-04-13 PROCEDURE — C9113 INJ PANTOPRAZOLE SODIUM, VIA: HCPCS | Performed by: SURGERY

## 2020-04-13 PROCEDURE — 0DB98ZX EXCISION OF DUODENUM, VIA NATURAL OR ARTIFICIAL OPENING ENDOSCOPIC, DIAGNOSTIC: ICD-10-PCS | Performed by: SURGERY

## 2020-04-13 PROCEDURE — 25000125 ZZHC RX 250: Performed by: SURGERY

## 2020-04-13 PROCEDURE — 37000008 ZZH ANESTHESIA TECHNICAL FEE, 1ST 30 MIN: Performed by: SURGERY

## 2020-04-13 PROCEDURE — 88342 IMHCHEM/IMCYTCHM 1ST ANTB: CPT | Performed by: SURGERY

## 2020-04-13 PROCEDURE — 88305 TISSUE EXAM BY PATHOLOGIST: CPT | Performed by: SURGERY

## 2020-04-13 PROCEDURE — 36000050 ZZH SURGERY LEVEL 2 1ST 30 MIN: Performed by: SURGERY

## 2020-04-13 PROCEDURE — 88342 IMHCHEM/IMCYTCHM 1ST ANTB: CPT | Mod: 26 | Performed by: SURGERY

## 2020-04-13 PROCEDURE — 40000305 ZZH STATISTIC PRE PROC ASSESS I: Performed by: SURGERY

## 2020-04-13 PROCEDURE — 37000009 ZZH ANESTHESIA TECHNICAL FEE, EACH ADDTL 15 MIN: Performed by: SURGERY

## 2020-04-13 PROCEDURE — 25000132 ZZH RX MED GY IP 250 OP 250 PS 637: Performed by: SURGERY

## 2020-04-13 RX ORDER — SUCRALFATE ORAL 1 G/10ML
1 SUSPENSION ORAL
Qty: 1200 ML | Refills: 0 | Status: SHIPPED | OUTPATIENT
Start: 2020-04-13 | End: 2020-06-24

## 2020-04-13 RX ORDER — PANTOPRAZOLE SODIUM 40 MG/1
40 TABLET, DELAYED RELEASE ORAL DAILY
Qty: 30 TABLET | Refills: 0 | Status: SHIPPED | OUTPATIENT
Start: 2020-04-13 | End: 2020-06-24

## 2020-04-13 RX ORDER — PROPOFOL 10 MG/ML
INJECTION, EMULSION INTRAVENOUS PRN
Status: DISCONTINUED | OUTPATIENT
Start: 2020-04-13 | End: 2020-04-13

## 2020-04-13 RX ORDER — GLYCOPYRROLATE 0.2 MG/ML
INJECTION, SOLUTION INTRAMUSCULAR; INTRAVENOUS PRN
Status: DISCONTINUED | OUTPATIENT
Start: 2020-04-13 | End: 2020-04-13

## 2020-04-13 RX ORDER — LIDOCAINE HYDROCHLORIDE 10 MG/ML
INJECTION, SOLUTION INFILTRATION; PERINEURAL PRN
Status: DISCONTINUED | OUTPATIENT
Start: 2020-04-13 | End: 2020-04-13

## 2020-04-13 RX ORDER — LIDOCAINE 40 MG/G
CREAM TOPICAL
Status: DISCONTINUED | OUTPATIENT
Start: 2020-04-13 | End: 2020-04-13 | Stop reason: HOSPADM

## 2020-04-13 RX ADMIN — PROPOFOL 20 MG: 10 INJECTION, EMULSION INTRAVENOUS at 09:25

## 2020-04-13 RX ADMIN — SUCRALFATE 1 G: 1 SUSPENSION ORAL at 10:30

## 2020-04-13 RX ADMIN — PANTOPRAZOLE SODIUM 40 MG: 40 INJECTION, POWDER, LYOPHILIZED, FOR SOLUTION INTRAVENOUS at 10:31

## 2020-04-13 RX ADMIN — PROPOFOL 100 MG: 10 INJECTION, EMULSION INTRAVENOUS at 09:21

## 2020-04-13 RX ADMIN — PROPOFOL 50 MG: 10 INJECTION, EMULSION INTRAVENOUS at 09:23

## 2020-04-13 RX ADMIN — LIDOCAINE HYDROCHLORIDE 100 MG: 10 INJECTION, SOLUTION INFILTRATION; PERINEURAL at 09:21

## 2020-04-13 RX ADMIN — GLYCOPYRROLATE 0.2 MG: 0.2 INJECTION, SOLUTION INTRAMUSCULAR; INTRAVENOUS at 09:18

## 2020-04-13 RX ADMIN — PROPOFOL 30 MG: 10 INJECTION, EMULSION INTRAVENOUS at 09:27

## 2020-04-13 ASSESSMENT — ACTIVITIES OF DAILY LIVING (ADL)
ADLS_ACUITY_SCORE: 11

## 2020-04-13 ASSESSMENT — LIFESTYLE VARIABLES: TOBACCO_USE: 1

## 2020-04-13 NOTE — PLAN OF CARE
[facility]NAL[facility] DISCHARGE NOTE    Patient discharged to home at 1:45 PM via wheel chair. Accompanied by sister and staff. Discharge instructions reviewed with patient, opportunity offered to ask questions. Prescriptions sent to patients preferred pharmacy. All belongings sent with patient.    Allison Franco RN

## 2020-04-13 NOTE — ANESTHESIA PREPROCEDURE EVALUATION
Anesthesia Pre-Procedure Evaluation    Patient: Latoya Riddle   MRN: [id number] : 1970          Preoperative Diagnosis: * No pre-op diagnosis entered *    Procedure(s):  ESOPHAGOGASTRODUODENOSCOPY (EGD)    Past Medical History:   Diagnosis Date     Hyperlipidemia      Kidney stone      Past Surgical History:   Procedure Laterality Date     COMBINED CYSTOSCOPY, INSERT STENT URETER(S) Left 11/10/2017    Procedure: COMBINED CYSTOSCOPY, INSERT STENT URETER(S);  Cystoscopy, Left Ureteral Stent Placement;  Surgeon: Yg Rodriguez MD;  Location: [facility]     EXCISE TOENAIL(S) Left 2016    Procedure: EXCISE TOENAIL(S);  Surgeon: Ady Mejia DPM;  Location: [facility]     GENITOURINARY SURGERY       SURGICAL HISTORY OF -            SURGICAL HISTORY OF -   -present    Lithothypsy X 49     SURGICAL HISTORY OF -       Percutaneous nephrostomy X2     SURGICAL HISTORY OF -   2011    Cystoscopy with bilateral ureteral pyeloscopy, stone basketing and stent placement       Anesthesia Evaluation     . Pt has had prior anesthetic. Type: General and MAC    No history of anesthetic complications          ROS/MED HX    ENT/Pulmonary:     (+)tobacco use, Current use 1 packs/day  , . .    Neurologic:  - neg neurologic ROS     Cardiovascular:     (+) Dyslipidemia, ----. : . . . :. .       METS/Exercise Tolerance:  >4 METS   Hematologic:  - neg hematologic  ROS       Musculoskeletal:  - neg musculoskeletal ROS       GI/Hepatic: Comment: Abdominal pain        Renal/Genitourinary: Comment: UTI  Renal osteodystrophy  Ureteral calculus   Acute kidney injury  Right hydronephrosis          Endo:  - neg endo ROS       Psychiatric:     (+) psychiatric history anxiety      Infectious Disease:  - neg infectious disease ROS       Malignancy:      - no malignancy   Other:                          Physical Exam  Normal systems: cardiovascular, pulmonary and dental    Airway   Mallampati: II  TM distance: >3  "FB  Neck ROM: full    Dental     Cardiovascular       Pulmonary             Lab Results   Component Value Date    WBC 8.6 04/12/2020    HGB 14.7 04/12/2020    HCT 46.5 04/12/2020     (L) 04/12/2020    CRP <5.0 01/24/2013    SED 7 01/24/2013     04/12/2020    POTASSIUM 4.0 04/12/2020    CHLORIDE 109 04/12/2020    CO2 23 04/12/2020    BUN 22 04/12/2020    CR 1.55 (H) 04/12/2020     (H) 04/12/2020    MARIA 9.0 04/12/2020    ALBUMIN 3.1 (L) 04/12/2020    PROTTOTAL 7.0 04/12/2020    ALT 36 04/12/2020    AST 39 04/12/2020    ALKPHOS 161 (H) 04/12/2020    BILITOTAL 0.5 04/12/2020    LIPASE 243 04/11/2020    INR 1.03 10/16/2014    TSH 2.40 06/27/2018    T4 0.90 03/17/2006    HCG Negative 04/11/2020       Preop Vitals  BP Readings from Last 3 Encounters:   04/13/20 118/61   03/18/20 120/84   01/08/20 104/76    Pulse Readings from Last 3 Encounters:   04/13/20 62   03/18/20 74   01/08/20 64      Resp Readings from Last 3 Encounters:   04/13/20 18   11/29/19 16   11/05/19 16    SpO2 Readings from Last 3 Encounters:   04/13/20 92%   03/18/20 98%   01/08/20 98%      Temp Readings from Last 1 Encounters:   04/13/20 36.7  C (98.1  F) (Oral)    Ht Readings from Last 1 Encounters:   04/11/20 1.702 m (5' 7\")      Wt Readings from Last 1 Encounters:   04/11/20 87.1 kg (192 lb)    Estimated body mass index is 30.07 kg/m  as calculated from the following:    Height as of this encounter: 1.702 m (5' 7\").    Weight as of this encounter: 87.1 kg (192 lb).       Anesthesia Plan      History & Physical Review  History and physical reviewed and following examination; no interval change.    ASA Status:  2 .    NPO Status:  > 8 hours    Plan for MAC Reason for MAC:  Deep or markedly invasive procedure (G8)           Postoperative Care      Consents  Anesthetic plan, risks, benefits and alternatives discussed with:  Patient..                 MICHELLE Lemus CRNA  "

## 2020-04-13 NOTE — ANESTHESIA CARE TRANSFER NOTE
Patient: Latoya Riddle    Procedure(s):  ESOPHAGOGASTRODUODENOSCOPY (EGD), WITH BIOPSY    Diagnosis: * No pre-op diagnosis entered *  Diagnosis Additional Information: No value filed.    Anesthesia Type:   MAC     Note:  Airway :Room Air  Patient transferred to:Phase II  Comments: Patient's VSS. Spontaneous respirations. Patient awake and oriented. IV patent. Report to JULIO CÉSAR Thao.Handoff Report: Identifed the Patient, Identified the Reponsible Provider, Reviewed the pertinent medical history, Discussed the surgical course, Reviewed Intra-OP anesthesia mangement and issues during anesthesia, Set expectations for post-procedure period and Allowed opportunity for questions and acknowledgement of understanding      Vitals: (Last set prior to Anesthesia Care Transfer)    CRNA VITALS  4/13/2020 0916 - 4/13/2020 1016      4/13/2020             Pulse:  91    SpO2:  94 %                Electronically Signed By: MICHELLE Lemus CRNA  April 13, 2020  10:30 AM

## 2020-04-13 NOTE — BRIEF OP NOTE
[facility]    Brief Operative Note    Pre-operative diagnosis:   1. Upper abdominal pain  2. Abnormal CT  Post-operative diagnosis   1. Retained phytobezoar  2. Minimal duodenitis    Procedure: Procedure(s):  ESOPHAGOGASTRODUODENOSCOPY (EGD)  Surgeon: Surgeon(s) and Role:     * Brian Grimes DO - Primary  Anesthesia: General   Estimated blood loss: Minimal  Drains: None  Specimens:   ID Type Source Tests Collected by Time Destination   A : biopsy of duodenum Biopsy Small Intestine, Duodenum SURGICAL PATHOLOGY EXAM Brian Grimes,  4/13/2020  9:27 AM    B : duodenal bulb Biopsy Duodenal bulb SURGICAL PATHOLOGY EXAM Brian Grimes,  4/13/2020  9:28 AM    C : antrum for H pylori Biopsy Stomach, Antrum SURGICAL PATHOLOGY EXAM Brian Grimes,  4/13/2020  9:30 AM      Findings:   None.  Complications: None.
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
## 2020-04-13 NOTE — ANESTHESIA POSTPROCEDURE EVALUATION
Patient: Latoya Riddle    Procedure(s):  ESOPHAGOGASTRODUODENOSCOPY (EGD), WITH BIOPSY    Diagnosis:* No pre-op diagnosis entered *  Diagnosis Additional Information: No value filed.    Anesthesia Type:  MAC    Note:  Anesthesia Post Evaluation    Patient location during evaluation: Bedside and Phase 2  Patient participation: Able to fully participate in evaluation  Level of consciousness: awake and alert  Pain management: adequate  Airway patency: patent  Cardiovascular status: acceptable  Respiratory status: acceptable  Hydration status: acceptable  PONV: none     Anesthetic complications: None          Last vitals:  Vitals:    04/13/20 0820 04/13/20 0949 04/13/20 1022   BP: 127/83  131/77   Pulse: 68 91 83   Resp: 16 16 16   Temp: 36.8  C (98.2  F)  35.9  C (96.6  F)   SpO2: 96%  96%         Electronically Signed By: MICHELLE Lemus CRNA  April 13, 2020  10:30 AM

## 2020-04-13 NOTE — PROGRESS NOTES
[facility] TRANSPORT NOTE  Data:   Reason for Transport:  EGD    Latoya Riddle was transported to [facility] Room. 22 via wheel chair at 0805.  Patient was accompanied by Nursing Assistant. Equipment used for transport: None. Family was aware of reason for transport: no    Action:  Report: given to Brinda ANGELA.    Response:  Patient's condition when transferred off unit was stable..    Debra Kay RN

## 2020-04-13 NOTE — PLAN OF CARE
Patient returned from egd at 1020 via w/c. She reported minimal abdominal discomfort. Patient given sandwich and ate about 50%. Tolerating well.

## 2020-04-15 ENCOUNTER — TELEPHONE (OUTPATIENT)
Dept: FAMILY MEDICINE | Facility: CLINIC | Age: 50
End: 2020-04-15

## 2020-04-15 LAB — COPATH REPORT: NORMAL

## 2020-04-15 NOTE — TELEPHONE ENCOUNTER
Pyelonephritis, Ruq Pain  [facility]    ED/UC/IP follow up phone call:    RN please call to follow up.    Number of ED visits in past 12 months = 2    Amelia RODRIGUEZ

## 2020-04-15 NOTE — TELEPHONE ENCOUNTER
Patient would like clarification of the basilar atelectasis that was seen on abdomen pelvis CT 4-11-20.  She went to ER for RUQ pain, pyelonephritis, flank pain - was admitted due to having single kidney for monitoring.  Discharged 4-13-20.    Any f/u regarding this?    Routing to provider.  Drea MCINTYRE RN

## 2020-04-15 NOTE — TELEPHONE ENCOUNTER
ED / Discharge Outreach Protocol    Patient Contact    Attempt # 1    Was call answered?  No.  Left message on voicemail with information to call me back.    Pt was admitted on 4/11/20 for pyelonephritis and RUQ pain.    Discharged 4/13  Follow up appt pending 4/20  Meds reviewed.  Jadyn Guillory RN

## 2020-04-15 NOTE — TELEPHONE ENCOUNTER
Reason for Call:  Other     Detailed comments: pt had a CT scan done on 4/11/20 and she has concerns over the meaning of basilar atelectasis, as they did not say anything about this during her stay.  Please advise  Phone Number Patient can be reached at: Home number on file [phone] (home)    Best Time: any    Can we leave a detailed message on this number? YES    Call taken on 4/15/2020 at 4:04 PM by Ara Smith

## 2020-04-15 NOTE — TELEPHONE ENCOUNTER
Covering for PCP (out of clinic today):  Just means her lungs were not fully inflated at the time of the scan- she may not have been taking regular sized breaths due to her abdominal pain.  No follow-up needed.  She can try deep breathing exercises to try to inflate the lungs more fully.    Thanks,  Alvarez Gray MD

## 2020-04-16 NOTE — TELEPHONE ENCOUNTER
"ED/Discharge Protocol    \"Hi, my name is Naya Ricketts RN, a registered nurse, and I am calling on behalf of Maribeth Avila's office at [place].  I am calling to follow up and see how things are going for you after your recent visit.\"    \"I see that you were in the (ER/UC/IP) on 4/11/2020 - 4/13/2020.    How are you doing now that you are home?\" No concerns reported     Is patient experiencing symptoms that may require a hospital visit?  No    Discharge Instructions    \"Let's review your discharge instructions.  What is/are the follow-up recommendations?  Pt. Re    \"Were you instructed to make a follow-up appointment?\"  Has appointment been made?   Yes phone visit for 4/20/2020    Medications    \"How many new medications are you on since your hospitalization/ED visit?\"    2 were prescribed, but she is only taking the Carafate and not the Protonix. Asked if she is taking the Carafate 4x/day as ordered and she says she is just taking it before she eats and she feels \"fine.\"  \"How many of your current medicines changed (dose, timing, name, etc.) while you were in the hospital/ED visit?\"   0-1  \"Do you have questions about your medications?\"   No  \"Were you newly diagnosed with heart failure, COPD, diabetes or did you have a heart attack?\"   No  For patients on insulin: \"Did you start on insulin in the hospital or did you have your insulin dose changed?\"   n/a  Medication reconciliation completed? Yes  Post Discharge Medication Reconciliation Status: discharge medications reconciled, continue medications without change.    Was MTM referral placed (*Make sure to put transitions as reason for referral)?   No    Call Summary    \"Do you have any questions or concerns about your condition or care plan at the moment?\"    Yes she was wondering about if she needed kidney labs done. Reviewed the ED note that her CKD was noted as stable and the only follow up recommended was with PCP in one week. Discussed she can talk with " "PCP about when labs are recommended to be rechecked.  Triage nurse advice given: None needed    Patient was in ER 2 in the past year (assess appropriateness of ER visits.)      \"If you have questions or things don't continue to improve, we encourage you contact us through the main clinic number,  [phone].  Even if the clinic is not open, triage nurses are available 24/7 to help you.     We would like you to know that our clinic has extended hours (provide information).  We also have urgent care (provide details on closest location and hours/contact info)\"      \"Thank you for your time and take care!\"          "

## 2020-04-20 ENCOUNTER — VIRTUAL VISIT (OUTPATIENT)
Dept: FAMILY MEDICINE | Facility: CLINIC | Age: 50
End: 2020-04-20
Payer: COMMERCIAL

## 2020-04-20 DIAGNOSIS — R30.0 DYSURIA: Primary | ICD-10-CM

## 2020-04-20 DIAGNOSIS — G43.709 CHRONIC MIGRAINE WITHOUT AURA WITHOUT STATUS MIGRAINOSUS, NOT INTRACTABLE: ICD-10-CM

## 2020-04-20 DIAGNOSIS — R10.11 RUQ ABDOMINAL PAIN: ICD-10-CM

## 2020-04-20 PROCEDURE — 99214 OFFICE O/P EST MOD 30 MIN: CPT | Mod: TEL | Performed by: NURSE PRACTITIONER

## 2020-04-20 RX ORDER — SUMATRIPTAN 50 MG/1
50 TABLET, FILM COATED ORAL
Qty: 18 TABLET | Refills: 1 | Status: SHIPPED | OUTPATIENT
Start: 2020-04-20 | End: 2023-02-02

## 2020-04-20 NOTE — PROGRESS NOTES
"Latoya Riddle is a 49 year old female who is being evaluated via a billable telephone visit.      The patient has been notified of following:     \"This telephone visit will be conducted via a call between you and your physician/provider. We have found that certain health care needs can be provided without the need for a physical exam.  This service lets us provide the care you need with a short phone conversation.  If a prescription is necessary we can send it directly to your pharmacy.  If lab work is needed we can place an order for that and you can then stop by our lab to have the test done at a later time.    Telephone visits are billed at different rates depending on your insurance coverage. During this emergency period, for some insurers they may be billed the same as an in-person visit.  Please reach out to your insurance provider with any questions.    If during the course of the call the physician/provider feels a telephone visit is not appropriate, you will not be charged for this service.\"      Patient has given verbal consent for Telephone visit?  Yes    How would you like to obtain your AVS? Radha    Subjective     Latoya Riddle is a 49 year old female who presents to clinic today for the following health issues:        Hospital Follow-up Visit:    Hospital/Nursing Home/IP Rehab Facility: [facility]  Date of Admission: 4/11/2020  Date of Discharge: 4/13/2020  Reason(s) for Admission: RUQ abdominal pain;              Problems taking medications regularly:  None       Medication changes since discharge:  Feels better- hasn't started taking the protonix or carafate       Problems adhering to non-medication therapy:  None    Can't take aleve anymore-  What else can she take for her migraines?  Tylenol doesn't help    Discuss bladder issue-  They started her on a medicine while she was in the hospital for suspected UTI, but culture was negative.  Patient states she felt better on the " medication (ceftriaxone)-  Symptoms are now  returning-  Frequency and burning (has these symptoms chronically)  Was on oxybutynin in the past with no relief in symptoms      Summary of hospitalization:  [facility] hospital discharge summary reviewed  Diagnostic Tests/Treatments reviewed.  Follow up needed: none  Other Healthcare Providers Involved in Patient s Care:         None  Update since discharge: improved.     Post Discharge Medication Reconciliation: discharge medications reconciled, continue medications without change.  Plan of care communicated with patient     Coding guidelines for this visit:  Type of Medical   Decision Making Face-to-Face Visit       within 7 Days of discharge Face-to-Face Visit        within 14 days of discharge   Moderate Complexity 10989 [id number]   High Complexity [id number]          Patient admitted with RUQ pain. US and CT did not provide any clear cause. Had EGD which showed mild duodenitis. Biopsy was negative for H Pylori. She was started on PPI and carafate. She has since dc'd these medications - didn't feel that she needed them. She says the pain has resolved.     She also c/o dysuria with frequency and urgency. She has h/o recurrent UTIs. She has a standing order for cipro from her urologist that she takes when she has symptoms, however she states that it no longer seems to work. In hospital her UA was positive. She was started on cipro and ceftriaxone. Culture was negative and she was discharged without antibiotics. Patient states that her urinary symptoms resolved when the ceftriaxone was started - states that she hasn't felt that great in months. Symptoms have now returned.    Migraine - patient has a long h/o migraine and cluster headache. Naproxen has always worked. Hasn't tried anything else. However now her kidney function is at a point where she can no longer have NSAIDs. Looking for other alternatives.                Reviewed and updated as needed this visit by  Provider  Tobacco  Allergies  Meds  Problems  Med Hx  Surg Hx  Fam Hx         Review of Systems   ROS COMP: Constitutional, HEENT, cardiovascular, pulmonary, gi and gu systems are negative, except as otherwise noted.       Objective   Reported vitals:  [facility] 07/02/2013    PSYCH: Alert and oriented times 3; coherent speech, normal   rate and volume, able to articulate logical thoughts, able   to abstract reason, no tangential thoughts, no hallucinations   or delusions  RESP: No cough, no audible wheezing, able to talk in full sentences  Remainder of exam unable to be completed due to telephone visits        Assessment/Plan:  1. Dysuria  UA positive.  Will treat with Keflex 250 mg TID for 7 days.  Culture pending.  - **UA reflex to Microscopic FUTURE anytime; Future    2. RUQ abdominal pain  Now resolved    3. Chronic migraine without aura without status migrainosus, not intractable  Naproxen has always worked in the past - now unable to use due to kidney function. Will trial a triptan - follow up if no improvement.  - SUMAtriptan (IMITREX) 50 MG tablet; Take 1 tablet (50 mg) by mouth at onset of headache for migraine May repeat in 2 hours. Max 4 tablets/24 hours.  Dispense: 18 tablet; Refill: 1    Return in about 1 week (around 4/27/2020), or if symptoms worsen or fail to improve.      Phone call duration:  10 minutes      The risks, benefits and treatment options of prescribed medications or other treatments have been discussed with the patient. The patient verbalized their understanding and should call or follow up if no improvement or if they develop further problems.    MICHELLE Sheehan CNP

## 2020-04-20 NOTE — PATIENT INSTRUCTIONS
Thank you for choosing [facility].  You may be receiving an email and/or telephone survey request from [facility] Customer Experience regarding your visit today.  Please take a few minutes to respond to the survey to let us know how we are doing.      If you have questions or concerns, please contact us via basno or you can contact your care team at [phone].    Our Clinic hours are:  Monday 6:40 am  to 7:00 pm  Tuesday -Friday 6:40 am to 5:00 pm    The [place] outpatient lab hours are:  Monday - Friday 6:10 am to 4:45 pm  Saturdays 7:00 am to 11:00 am  Appointments are required, call [phone]    If you have clinical questions after hours or would like to schedule an appointment,  call the clinic at [phone].

## 2020-04-21 ENCOUNTER — TELEPHONE (OUTPATIENT)
Dept: FAMILY MEDICINE | Facility: CLINIC | Age: 50
End: 2020-04-21

## 2020-04-21 DIAGNOSIS — N30.00 ACUTE CYSTITIS WITHOUT HEMATURIA: Primary | ICD-10-CM

## 2020-04-21 DIAGNOSIS — R30.0 DYSURIA: ICD-10-CM

## 2020-04-21 DIAGNOSIS — N30.00 ACUTE CYSTITIS WITHOUT HEMATURIA: ICD-10-CM

## 2020-04-21 PROBLEM — G43.709 CHRONIC MIGRAINE WITHOUT AURA WITHOUT STATUS MIGRAINOSUS, NOT INTRACTABLE: Status: ACTIVE | Noted: 2020-04-21

## 2020-04-21 LAB
ALBUMIN UR-MCNC: ABNORMAL MG/DL
APPEARANCE UR: ABNORMAL
BACTERIA #/AREA URNS HPF: ABNORMAL /HPF
BILIRUB UR QL STRIP: NEGATIVE
COLOR UR AUTO: YELLOW
GLUCOSE UR STRIP-MCNC: NEGATIVE MG/DL
HGB UR QL STRIP: ABNORMAL
KETONES UR STRIP-MCNC: NEGATIVE MG/DL
LEUKOCYTE ESTERASE UR QL STRIP: ABNORMAL
NITRATE UR QL: POSITIVE
NON-SQ EPI CELLS #/AREA URNS LPF: ABNORMAL /LPF
PH UR STRIP: 6.5 PH (ref 5–7)
RBC #/AREA URNS AUTO: ABNORMAL /HPF
SOURCE: ABNORMAL
SP GR UR STRIP: 1.01 (ref 1–1.03)
UROBILINOGEN UR STRIP-ACNC: 0.2 EU/DL (ref 0.2–1)
WBC #/AREA URNS AUTO: >100 /HPF

## 2020-04-21 PROCEDURE — 81001 URINALYSIS AUTO W/SCOPE: CPT | Performed by: NURSE PRACTITIONER

## 2020-04-21 PROCEDURE — 87086 URINE CULTURE/COLONY COUNT: CPT | Performed by: FAMILY MEDICINE

## 2020-04-21 NOTE — TELEPHONE ENCOUNTER
Provider covering for Maribeth Avila,    Lab asking if this UA should be a UC too.   Please advise. Ara VALENTINE RN

## 2020-04-21 NOTE — TELEPHONE ENCOUNTER
Reason for Call: Request for an order or referral:    Order or referral being requested: Patient came in for a urine test, Maribeth Avila is off this afternoon. Does patient need a UC?    Date needed: as soon as possible    Has the patient been seen by the PCP for this problem? ?    Phone number Patient can be reached at:  Home number on file [phone] (home)    Best Time:  Any    Can we leave a detailed message on this number?  YES    Call taken on 4/21/2020 at 1:50 PM by Amelia Klein

## 2020-04-22 LAB
BACTERIA SPEC CULT: NORMAL
Lab: NORMAL
SPECIMEN SOURCE: NORMAL

## 2020-04-30 ENCOUNTER — TRANSFERRED RECORDS (OUTPATIENT)
Dept: HEALTH INFORMATION MANAGEMENT | Facility: CLINIC | Age: 50
End: 2020-04-30

## 2020-06-24 ENCOUNTER — OFFICE VISIT (OUTPATIENT)
Dept: FAMILY MEDICINE | Facility: CLINIC | Age: 50
End: 2020-06-24
Payer: COMMERCIAL

## 2020-06-24 VITALS
TEMPERATURE: 98.3 F | RESPIRATION RATE: 16 BRPM | OXYGEN SATURATION: 97 % | BODY MASS INDEX: 30.35 KG/M2 | SYSTOLIC BLOOD PRESSURE: 112 MMHG | WEIGHT: 193.4 LBS | DIASTOLIC BLOOD PRESSURE: 60 MMHG | HEIGHT: 67 IN | HEART RATE: 83 BPM

## 2020-06-24 DIAGNOSIS — M54.41 CHRONIC BILATERAL LOW BACK PAIN WITH BILATERAL SCIATICA: Primary | ICD-10-CM

## 2020-06-24 DIAGNOSIS — G89.29 CHRONIC BILATERAL LOW BACK PAIN WITH BILATERAL SCIATICA: Primary | ICD-10-CM

## 2020-06-24 DIAGNOSIS — M54.42 CHRONIC BILATERAL LOW BACK PAIN WITH BILATERAL SCIATICA: Primary | ICD-10-CM

## 2020-06-24 PROCEDURE — 99214 OFFICE O/P EST MOD 30 MIN: CPT | Performed by: FAMILY MEDICINE

## 2020-06-24 RX ORDER — GABAPENTIN 100 MG/1
300 CAPSULE ORAL AT BEDTIME
COMMUNITY
Start: 2020-05-31 | End: 2021-10-01

## 2020-06-24 ASSESSMENT — PAIN SCALES - GENERAL: PAINLEVEL: EXTREME PAIN (8)

## 2020-06-24 ASSESSMENT — MIFFLIN-ST. JEOR: SCORE: 1535.92

## 2020-06-24 NOTE — PROGRESS NOTES
Subjective     Latoya Riddle is a 49 year old female who presents to clinic today for the following health issues:    HPI   Problem:   Chief Complaint   Patient presents with     Musculoskeletal Problem     Bilateral hip and low back pain. Pain radiates down the back of legs and around the front of the abdomen. Sx's since 2020 and is getting worse.      Had previous unremarkable lumbar x-ray. No loss of bowel of bladder function. No leg weakness/giving way.      Patient Active Problem List   Diagnosis     Calculus of kidney     Congenital medullary sponge kidney     Renal osteodystrophy     Tobacco abuse disorder     Dyspnea and respiratory abnormality     Tobacco abuse     Hyperlipemia     Lower urinary tract infectious disease     Health Care Home     Anxiety     Calculus of ureter     CATHY (obstructive sleep apnea)     Acute kidney injury (H)     Acute right flank pain     Gross hematuria     Hydronephrosis, right     Pyelonephritis     RUQ pain     Chronic migraine without aura without status migrainosus, not intractable     Past Surgical History:   Procedure Laterality Date     COMBINED CYSTOSCOPY, INSERT STENT URETER(S) Left 11/10/2017    Procedure: COMBINED CYSTOSCOPY, INSERT STENT URETER(S);  Cystoscopy, Left Ureteral Stent Placement;  Surgeon: Yg Rodriguez MD;  Location: [facility]     ESOPHAGOSCOPY, GASTROSCOPY, DUODENOSCOPY (EGD), COMBINED N/A 2020    Procedure: ESOPHAGOGASTRODUODENOSCOPY (EGD), WITH BIOPSY;  Surgeon: Brian Grimes DO;  Location: [facility] OR     EXCISE TOENAIL(S) Left 2016    Procedure: EXCISE TOENAIL(S);  Surgeon: Ady Mejia DPM;  Location: [facility] OR     GENITOURINARY SURGERY       SURGICAL HISTORY OF -            SURGICAL HISTORY OF -   -present    Lithothypsy X 49     SURGICAL HISTORY OF -       Percutaneous nephrostomy X2     SURGICAL HISTORY OF -   2011    Cystoscopy with bilateral ureteral pyeloscopy, stone basketing and stent  placement       Social History     Tobacco Use     Smoking status: Current Every Day Smoker     Packs/day: 1.00     Years: 20.00     Pack years: 20.00     Types: Cigarettes     Smokeless tobacco: Never Used     Tobacco comment: 7/25/17 declined , tried quit plan- did not help per patient    Substance Use Topics     Alcohol use: No     Family History   Problem Relation Age of Onset     Respiratory Father         CATHY     C.A.D. Father      Heart Failure Father      Coronary Artery Disease Father      Hypertension Father      Depression Father      Anxiety Disorder Father      Diabetes Father         type 2     Lipids Mother         high cholesterol     Allergies Mother      Arthritis Mother         RA     Hyperlipidemia Mother      Lipids Sister         high cholesterol     Allergies Sister      Asthma Sister      Hypertension Sister      Allergies Sister      Respiratory Sister         cathy     Genitourinary Problems Maternal Grandmother         passed from complications from renal failure     Arthritis Maternal Grandfather         rheumatoid     Diabetes Paternal Grandmother         adult onset     Cardiovascular Paternal Grandfather         AAA     Hypertension Sister      Hyperlipidemia Sister      Anxiety Disorder Sister      Asthma Sister          Current Outpatient Medications   Medication Sig Dispense Refill     cyclobenzaprine (FLEXERIL) 10 MG tablet Take 1 tablet (10 mg) by mouth 3 times daily as needed for muscle spasms 30 tablet 1     gabapentin (NEURONTIN) 100 MG capsule Take 100 mg by mouth At Bedtime 2 Tablets at bedtime       LORazepam (ATIVAN) 0.5 MG tablet Take 1 tablet (0.5 mg) by mouth every 8 hours as needed for anxiety 30 tablet 1     order for DME Equipment being ordered: TENS 1 Units 0     oxyCODONE IR (ROXICODONE) 5 MG tablet Take 1-2 tablets (5-10 mg) by mouth every 6 hours as needed for moderate to severe pain Do NOT drive or use additional narcotics while using this medication 20 tablet 0  "    SUMAtriptan (IMITREX) 50 MG tablet Take 1 tablet (50 mg) by mouth at onset of headache for migraine May repeat in 2 hours. Max 4 tablets/24 hours. 18 tablet 1     Vitamin D, Cholecalciferol, 10 MCG (400 UNIT) CHEW        ZYRTEC 10 MG OR TABS 1 TABLET DAILY       Allergies   Allergen Reactions     Gentamycin [Gentamicin Sulfate]        Reviewed and updated as needed this visit by Provider         Review of Systems   Constitutional, neuro, ENT, endocrine, pulmonary, cardiac, gastrointestinal, genitourinary, musculoskeletal, integument and psychiatric systems are negative, except as otherwise noted.       Objective    /60   Pulse 83   Temp 98.3  F (36.8  C) (Tympanic)   Resp 16   Ht 1.695 m (5' 6.75\")   Wt 87.7 kg (193 lb 6.4 oz)   LMP 07/02/2013   SpO2 97%   Breastfeeding No   BMI 30.52 kg/m    Body mass index is 30.52 kg/m .  Physical Exam   GENERAL: Pleasant, well appearing female.  MUSCULOSKELETAL:  No midline vertebral tenderness to palpation. Has bilateral paravertebral tenderness and tightness. Straight leg raise is positive for bilateral radicular symptoms. Strength is 5/5 and DTR 2+ and symmetric throughout lower extremities.     Diagnostic Test Results:  Labs reviewed in Epic    PELVIS AND HIP BILATERAL TWO VIEWS March 18, 2020 1:50 PM      HISTORY: Bilateral hip pain.                                                                      IMPRESSION: Unremarkable exam.     PABLITO GOMEZ MD    Assessment & Plan     1. Chronic bilateral low back pain with bilateral sciatica  I think at this point symptoms warrant MRI. Further work-up and management as dictated by results.   - MR Lumbar Spine w/o Contrast; Future     Tobacco Cessation:   reports that she has been smoking cigarettes. She has a 20.00 pack-year smoking history. She has never used smokeless tobacco.  Tobacco Cessation Action Plan: Information offered: Patient not interested at this time      BMI:   Estimated body mass index is 30.52 " "kg/m  as calculated from the following:    Height as of this encounter: 1.695 m (5' 6.75\").    Weight as of this encounter: 87.7 kg (193 lb 6.4 oz).   Weight management plan: Discussed healthy diet and exercise guidelines            No follow-ups on file.    Swathi Soliman MD  [facility]    "

## 2020-06-24 NOTE — PATIENT INSTRUCTIONS
Our Clinic hours are:  Mondays    7:20 am - 7 pm  Tues -  Fri  7:20 am - 5 pm    Clinic Phone: [phone]    The clinic lab opens at 7:30 am Mon - Fri and appointments are required.    [facility]. [phone]  Monday  8 am - 7pm  Tues - Fri 8 am - 5:30 pm

## 2020-06-29 ENCOUNTER — HOSPITAL ENCOUNTER (OUTPATIENT)
Dept: MRI IMAGING | Facility: CLINIC | Age: 50
Discharge: HOME | End: 2020-06-29
Attending: FAMILY MEDICINE | Admitting: FAMILY MEDICINE
Payer: COMMERCIAL

## 2020-06-29 DIAGNOSIS — M54.41 CHRONIC BILATERAL LOW BACK PAIN WITH BILATERAL SCIATICA: ICD-10-CM

## 2020-06-29 DIAGNOSIS — G89.29 CHRONIC BILATERAL LOW BACK PAIN WITH BILATERAL SCIATICA: ICD-10-CM

## 2020-06-29 DIAGNOSIS — M54.42 CHRONIC BILATERAL LOW BACK PAIN WITH BILATERAL SCIATICA: ICD-10-CM

## 2020-06-29 PROCEDURE — 72148 MRI LUMBAR SPINE W/O DYE: CPT

## 2020-07-02 NOTE — RESULT ENCOUNTER NOTE
Please call the patient with the results. Notify that small discuss herniation at L2-3 and some mild degenerative changes. Given location of symptoms consistent with the area of herniation we could try an epidural injection. However, the herniation is quite small so possible it's not contributing to pain at all.  I think probably the majority of her symptoms are muscular in etiology.  We could also try a muscle relaxant and see if that's helpful for symptoms. Let me know what she'd prefer to do.

## 2020-08-26 ENCOUNTER — TRANSFERRED RECORDS (OUTPATIENT)
Dept: HEALTH INFORMATION MANAGEMENT | Facility: CLINIC | Age: 50
End: 2020-08-26

## 2020-09-04 ENCOUNTER — HOSPITAL ENCOUNTER (OUTPATIENT)
Dept: PHYSICAL THERAPY | Facility: CLINIC | Age: 50
Setting detail: THERAPIES SERIES
End: 2020-09-04
Attending: ANESTHESIOLOGY
Payer: COMMERCIAL

## 2020-09-04 PROCEDURE — 97162 PT EVAL MOD COMPLEX 30 MIN: CPT | Mod: GP | Performed by: PHYSICAL THERAPIST
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
## 2020-09-08 DIAGNOSIS — G47.33 OSA (OBSTRUCTIVE SLEEP APNEA): Primary | ICD-10-CM

## 2020-10-14 ENCOUNTER — HOSPITAL ENCOUNTER (OUTPATIENT)
Dept: PHYSICAL THERAPY | Facility: CLINIC | Age: 50
Setting detail: THERAPIES SERIES
End: 2020-10-14
Attending: ANESTHESIOLOGY
Payer: COMMERCIAL

## 2020-10-14 PROCEDURE — 97110 THERAPEUTIC EXERCISES: CPT | Mod: GP | Performed by: PHYSICAL THERAPIST

## 2020-10-20 ENCOUNTER — HOSPITAL ENCOUNTER (OUTPATIENT)
Dept: PHYSICAL THERAPY | Facility: CLINIC | Age: 50
Setting detail: THERAPIES SERIES
End: 2020-10-20
Attending: ANESTHESIOLOGY
Payer: COMMERCIAL

## 2020-10-20 PROCEDURE — 97110 THERAPEUTIC EXERCISES: CPT | Mod: GP | Performed by: PHYSICAL THERAPIST

## 2021-01-15 ENCOUNTER — HEALTH MAINTENANCE LETTER (OUTPATIENT)
Age: 51
End: 2021-01-15

## 2021-01-22 ENCOUNTER — OFFICE VISIT (OUTPATIENT)
Dept: FAMILY MEDICINE | Facility: CLINIC | Age: 51
End: 2021-01-22
Payer: COMMERCIAL

## 2021-01-22 VITALS
SYSTOLIC BLOOD PRESSURE: 114 MMHG | HEART RATE: 78 BPM | WEIGHT: 197 LBS | OXYGEN SATURATION: 98 % | HEIGHT: 67 IN | DIASTOLIC BLOOD PRESSURE: 80 MMHG | BODY MASS INDEX: 30.92 KG/M2 | TEMPERATURE: 98.1 F

## 2021-01-22 DIAGNOSIS — N18.32 STAGE 3B CHRONIC KIDNEY DISEASE (H): ICD-10-CM

## 2021-01-22 DIAGNOSIS — N18.32 CHRONIC KIDNEY DISEASE (CKD) STAGE G3B/A1, MODERATELY DECREASED GLOMERULAR FILTRATION RATE (GFR) BETWEEN 30-44 ML/MIN/1.73 SQUARE METER AND ALBUMINURIA CREATININE RATIO LESS THAN 30 MG/G (H): ICD-10-CM

## 2021-01-22 DIAGNOSIS — M79.661 PAIN OF RIGHT LOWER LEG: Primary | ICD-10-CM

## 2021-01-22 DIAGNOSIS — N18.32 CHRONIC KIDNEY DISEASE (CKD) STAGE G3B/A1, MODERATELY DECREASED GLOMERULAR FILTRATION RATE (GFR) BETWEEN 30-44 ML/MIN/1.73 SQUARE METER AND ALBUMINURIA CREATININE RATIO LESS THAN 30 MG/G (H): Primary | ICD-10-CM

## 2021-01-22 PROBLEM — N18.30 CHRONIC KIDNEY DISEASE, STAGE 3 (H): Status: ACTIVE | Noted: 2021-01-22

## 2021-01-22 LAB
ANION GAP SERPL CALCULATED.3IONS-SCNC: 5 MMOL/L (ref 3–14)
BUN SERPL-MCNC: 19 MG/DL (ref 7–30)
CALCIUM SERPL-MCNC: 9.4 MG/DL (ref 8.5–10.1)
CHLORIDE SERPL-SCNC: 108 MMOL/L (ref 94–109)
CO2 SERPL-SCNC: 27 MMOL/L (ref 20–32)
CREAT SERPL-MCNC: 1.52 MG/DL (ref 0.52–1.04)
GFR SERPL CREATININE-BSD FRML MDRD: 40 ML/MIN/{1.73_M2}
GLUCOSE SERPL-MCNC: 103 MG/DL (ref 70–99)
POTASSIUM SERPL-SCNC: 3.6 MMOL/L (ref 3.4–5.3)
SODIUM SERPL-SCNC: 140 MMOL/L (ref 133–144)

## 2021-01-22 PROCEDURE — 80048 BASIC METABOLIC PNL TOTAL CA: CPT | Performed by: NURSE PRACTITIONER

## 2021-01-22 PROCEDURE — 36415 COLL VENOUS BLD VENIPUNCTURE: CPT | Performed by: NURSE PRACTITIONER

## 2021-01-22 PROCEDURE — 99213 OFFICE O/P EST LOW 20 MIN: CPT | Performed by: NURSE PRACTITIONER

## 2021-01-22 ASSESSMENT — MIFFLIN-ST. JEOR: SCORE: 1543.28

## 2021-01-22 NOTE — PROGRESS NOTES
Assessment & Plan     Pain of right lower leg  Exam and history are most concerning for trochanteric bursitis.  Referral to [facility] for further eval and considering of injection of trochanteric bursa.  - Orthopedic & Spine  Referral; Future    Stage 3b chronic kidney disease  Due for BMP - ordered                    Return in about 4 weeks (around 2/19/2021).    The risks, benefits and treatment options of prescribed medications or other treatments have been discussed with the patient. The patient verbalized their understanding and should call or follow up if no improvement or if they develop further problems.    MICHELLE Sheehan CNP  [facility] is a 50 year old who presents to clinic today for the following health issues     HPI       Concern - right leg  Onset: 5 months  Description: patient states she wakes at night with her right leg feeling like its on fire, lateral thigh from hip to knee  Burning pain  Numbness all the way down to her foot  Almost feels like it bruised-  Tender to the touch  Will happen with sitting, sometimes walking  Intensity: severe when she wakes up with it  Progression of Symptoms:  same  Accompanying Signs & Symptoms: leg will go numb during the day  But symptoms are worse at night  Previous history of similar problem: yes-    Precipitating factors:        Worsened by: unknown  Alleviating factors:        Improved by: nothing  Therapies tried and outcome: steroid injection into back, CBD oil/lotion with no relief    Was seen by a pain specialist.  Had a lumbar MRI:  IMPRESSION:  1. Degenerative changes of the lumbar spine as detailed above.  2. Small posterior disc herniation at the L2-L3 level. No other  significant posterior disc bulges or herniations.  3. No spinal canal or neural foraminal stenosis of the lumbar spine.   LIZZIE GOODSON MD   She had 2 injections of her right SI joint by the pain specialist - neither did  "anything.  She also had PT which didn't help        Chronic Kidney Disease Follow-up      Do you take any over the counter pain medicine?: No      Review of Systems   Constitutional, HEENT, cardiovascular, pulmonary, gi and gu systems are negative, except as otherwise noted.      Objective    /80 (BP Location: Right arm)   Pulse 78   Temp 98.1  F (36.7  C) (Tympanic)   Ht 1.689 m (5' 6.5\")   Wt 89.4 kg (197 lb)   LMP 07/02/2013   SpO2 98%   BMI 31.32 kg/m    Body mass index is 31.32 kg/m .  Physical Exam   GENERAL: healthy, alert and no distress  MS: Hips - tender to palpation over the right trochanteric bursa.  Comprehensive back pain exam:  No tenderness, Range of motion not limited by pain, Lower extremity strength functional and equal on both sides, Lower extremity reflexes within normal limits bilaterally, Lower extremity sensation normal and equal on both sides and Straight leg raise negative bilaterally                "

## 2021-01-24 ENCOUNTER — HEALTH MAINTENANCE LETTER (OUTPATIENT)
Age: 51
End: 2021-01-24

## 2021-01-25 NOTE — PROGRESS NOTES
"   OUTPATIENT PHYSICAL THERAPY DISCHARGE SUMMARY   Dr. Jay Jay Fletcher 9/4/20 to 10/20/20 1000   Signing Clinician's Name / Credentials   Signing clinician's name / credentials Kathy Howe, PT [id number]   Session Number   Session Number 3 [employer]   Ortho Goal 1   Goal Identifier 1.  STG   Goal Description Pt will be able to walk X 5 min w/ pain no > 3/10.  10/14/20 1-2 min tolerance   Target Date 10/04/20   Ortho Goal 2   Goal Identifier 2. LTG   Goal Description Pt will be able to walk 15 min w/ pain no > 3/10   Target Date 11/03/20   Ortho Goal 3   Goal Identifier 3.LTG   Goal Description Pt will be able to  bend with ADL's w/ pain no > 3/10   Target Date 11/03/20   Ortho Goal 4   Goal Identifier 4.  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   Pt states the clam bothered therefore limited reps    Pt was unable to try the pool as she did not have a car.   Pt states neck cont to have significant pain.  Pt notes she stiffens up w/ walking ? if she is anticipating the pain.  Pt did not try walking w/ tens unit   Objective Measures    Neg ERS/ FRS L3-5   Therapeutic Procedure/exercise   Treatment Detail Wall sags.    Piriformis seated stretch 30\" B.  Seated TA set.  Dugout HS stretch 30\" B (reemphasized gentle stretch).  LTRS.   Hip abd in SL x 15 B.   Standing hip ext w/ YTB x 10 B   Sit to stand X 10 w/ UE assist (pt  noted work, no pain).  6\" lateral step w/ UE support X 10 B    Plan   Home program ex as above   Plan  Pt failed to show for appointment 10/27/20 and cancelled 11/2 and 11/3/20.  Pt did not reschedule and will be discharged from physical therapy.   Comments   Comments Pt continued to work towards all goals.      "

## 2021-02-01 ENCOUNTER — TELEPHONE (OUTPATIENT)
Dept: FAMILY MEDICINE | Facility: CLINIC | Age: 51
End: 2021-02-01

## 2021-02-01 NOTE — TELEPHONE ENCOUNTER
Reason for Call:  Other     Detailed comments: Patient told to ask Care Team for patient notes and any xrays and MRIs. Fax records to [phone]. Dr. Soliman    Phone Number Patient can be reached at: Home number on file [phone] (home)    Best Time: Any    Can we leave a detailed message on this number? YES    Call taken on 2/1/2021 at 11:36 AM by Amelia Klein
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
## 2021-02-04 ENCOUNTER — TRANSFERRED RECORDS (OUTPATIENT)
Dept: HEALTH INFORMATION MANAGEMENT | Facility: CLINIC | Age: 51
End: 2021-02-04

## 2021-02-10 ENCOUNTER — OFFICE VISIT (OUTPATIENT)
Dept: FAMILY MEDICINE | Facility: CLINIC | Age: 51
End: 2021-02-10
Payer: COMMERCIAL

## 2021-02-10 VITALS
WEIGHT: 196 LBS | HEIGHT: 67 IN | RESPIRATION RATE: 16 BRPM | DIASTOLIC BLOOD PRESSURE: 80 MMHG | TEMPERATURE: 97.8 F | SYSTOLIC BLOOD PRESSURE: 118 MMHG | BODY MASS INDEX: 30.76 KG/M2 | OXYGEN SATURATION: 99 % | HEART RATE: 58 BPM

## 2021-02-10 DIAGNOSIS — T14.8XXA BRUISING: Primary | ICD-10-CM
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
## 2021-02-10 PROCEDURE — 99213 OFFICE O/P EST LOW 20 MIN: CPT | Performed by: NURSE PRACTITIONER

## 2021-02-10 ASSESSMENT — MIFFLIN-ST. JEOR: SCORE: 1538.74

## 2021-02-10 NOTE — PATIENT INSTRUCTIONS
You are due for a screening Mammogram.  Please contact the Diagnostics Registration Department at: [phone] to schedule this appointment.  Your clinic record indicates that you are due for:   Pap and physical exam        Thank you for choosing [facility].  You may be receiving an email and/or telephone survey request from [facility] Customer Experience regarding your visit today.  Please take a few minutes to respond to the survey to let us know how we are doing.      If you have questions or concerns, please contact us via Sw[employer] or you can contact your care team at [phone].    Our Clinic hours are:  Monday 6:40 am  to 7:00 pm  Tuesday -Friday 6:40 am to 5:00 pm    The [place] outpatient lab hours are:  Monday - Friday 6:10 am to 4:45 pm  Saturdays 7:00 am to 11:00 am  Appointments are required, call [phone]    If you have clinical questions after hours or would like to schedule an appointment,  call the clinic at [phone].
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
## 2021-03-05 ENCOUNTER — TRANSFERRED RECORDS (OUTPATIENT)
Dept: HEALTH INFORMATION MANAGEMENT | Facility: CLINIC | Age: 51
End: 2021-03-05

## 2021-03-08 ENCOUNTER — TELEPHONE (OUTPATIENT)
Dept: FAMILY MEDICINE | Facility: CLINIC | Age: 51
End: 2021-03-08

## 2021-03-08 DIAGNOSIS — F40.240 CLAUSTROPHOBIA: Primary | ICD-10-CM

## 2021-03-08 RX ORDER — DIAZEPAM 10 MG
TABLET ORAL
Qty: 1 TABLET | Refills: 0 | Status: SHIPPED | OUTPATIENT
Start: 2021-03-08 | End: 2021-05-10

## 2021-03-08 NOTE — TELEPHONE ENCOUNTER
Call placed. Patient states in past she has taken 1 mg Ativan prior to MRI and it was not helpful. She is asking what other options she has for tolerating her MRI scheduled 3/11.

## 2021-03-08 NOTE — TELEPHONE ENCOUNTER
Reason for Call:  Other prescription    Detailed comments: Pt has MRI Thursday and is wondering if she can take a higher dose of her Ativan, or if there is something else she can take? Please advise.    Phone Number Patient can be reached at: Home number on file [phone] (home)    Best Time: any    Can we leave a detailed message on this number? YES    Call taken on 3/8/2021 at 4:46 PM by Angelica Ruby

## 2021-03-11 ENCOUNTER — HOSPITAL ENCOUNTER (OUTPATIENT)
Dept: MRI IMAGING | Facility: CLINIC | Age: 51
Discharge: HOME | End: 2021-03-11
Attending: PHYSICIAN ASSISTANT | Admitting: PHYSICIAN ASSISTANT
Payer: COMMERCIAL

## 2021-03-11 DIAGNOSIS — M54.2 NECK PAIN: ICD-10-CM

## 2021-03-11 PROCEDURE — 72141 MRI NECK SPINE W/O DYE: CPT

## 2021-03-17 ENCOUNTER — TRANSFERRED RECORDS (OUTPATIENT)
Dept: HEALTH INFORMATION MANAGEMENT | Facility: CLINIC | Age: 51
End: 2021-03-17

## 2021-03-18 ENCOUNTER — TRANSCRIBE ORDERS (OUTPATIENT)
Dept: OTHER | Age: 51
End: 2021-03-18

## 2021-03-18 DIAGNOSIS — M54.2 NECK PAIN: Primary | ICD-10-CM

## 2021-03-19 ENCOUNTER — TELEPHONE (OUTPATIENT)
Dept: PALLIATIVE MEDICINE | Facility: CLINIC | Age: 51
End: 2021-03-19

## 2021-03-19 DIAGNOSIS — Z20.822 ENCOUNTER FOR LABORATORY TESTING FOR COVID-19 VIRUS: Primary | ICD-10-CM
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
## 2021-03-21 ENCOUNTER — HEALTH MAINTENANCE LETTER (OUTPATIENT)
Age: 51
End: 2021-03-21

## 2021-03-22 ENCOUNTER — TRANSFERRED RECORDS (OUTPATIENT)
Dept: HEALTH INFORMATION MANAGEMENT | Facility: CLINIC | Age: 51
End: 2021-03-22

## 2021-03-22 DIAGNOSIS — Z20.822 ENCOUNTER FOR LABORATORY TESTING FOR COVID-19 VIRUS: ICD-10-CM

## 2021-03-22 LAB
SARS-COV-2 RNA RESP QL NAA+PROBE: NORMAL
SPECIMEN SOURCE: NORMAL

## 2021-03-22 PROCEDURE — U0005 INFEC AGEN DETEC AMPLI PROBE: HCPCS | Performed by: STUDENT IN AN ORGANIZED HEALTH CARE EDUCATION/TRAINING PROGRAM

## 2021-03-22 PROCEDURE — U0003 INFECTIOUS AGENT DETECTION BY NUCLEIC ACID (DNA OR RNA); SEVERE ACUTE RESPIRATORY SYNDROME CORONAVIRUS 2 (SARS-COV-2) (CORONAVIRUS DISEASE [COVID-19]), AMPLIFIED PROBE TECHNIQUE, MAKING USE OF HIGH THROUGHPUT TECHNOLOGIES AS DESCRIBED BY CMS-2020-01-R: HCPCS | Performed by: STUDENT IN AN ORGANIZED HEALTH CARE EDUCATION/TRAINING PROGRAM

## 2021-03-22 NOTE — TELEPHONE ENCOUNTER
COVID order in.    Called pt and gave her the # to call to schedule. She was advised to call back if she has any issues getting scheduled.    Vanessa RN-BSN  [facility]

## 2021-03-23 LAB
LABORATORY COMMENT REPORT: NORMAL
SARS-COV-2 RNA RESP QL NAA+PROBE: NEGATIVE
SPECIMEN SOURCE: NORMAL

## 2021-03-24 NOTE — PROGRESS NOTES
[facility] - Procedure Note    Date of Visit: 3/25/2021     Procedure performed: C7-T1 interlaminar epidural steroid injection with fluoroscopic guidance  Diagnosis: Cervical radiculitis/radiculopathy; Cervical spondylosis  : Marisela Stovall MD  Anesthesia: none    Indications: Latoya Riddle is a 50 year old female who is seen at the request of Dr. Carmen with [facility] for cervical epidural steroid injection. The patient describes right sided neck and scapular pain. The patient has been exhibiting symptoms consistent with cervical intraspinal inflammation and radiculopathy. Symptoms have been persistent, disabling, and intermittently severe. The patient reports minimal improvement with conservative treatment, including medications, chiropractic, physical therapy exercises and trigger point injections.    Cervical MRI was done on 3/11/2021  that showed    1. New large disc extrusion at the C5-C6 level which compresses the  spinal cord causing severe spinal canal narrowing as well as severe  right and moderate-to-severe left neural foraminal narrowing.  2. Probable spinal cord edema at the level of C5-C6 in the region of  compression.  3. Marked degenerative disc changes at C6-C7 that is similar to prior  MR 8/22/2016. This results in moderate spinal canal narrowing as well  as severe bilateral neural foraminal narrowing.  4. Mild degenerative disc changes at C3-C4 and C4-C5 without spinal  canal or neural foraminal narrowing.      Allergies:      Allergies   Allergen Reactions     Gentamycin [Gentamicin Sulfate]         Vitals:  LMP 07/02/2013     Review of Systems: The patient denies recent fever, chills, illness, use of antibiotics or anticoagulants. All other 10-point review of systems negative.     Procedure: The procedure and risks were explained, and informed written consent was obtained from the patient. Risks include but are not limited to: infection, bleeding, increased pain,  and damage to soft tissue, nerve, muscle, and vasculature structures. After getting informed consent, patient was brought into the procedure suite and was placed in a prone position on the procedure table. A Pause for the Cause was performed. Patient was prepped and draped in sterile fashion.     The C7-T1 interspace was identified with use of fluoroscopy in AP view. A 25-gauge, 1.5 inch needle was used to anesthetize the skin and subcutaneous tissue entry site with a total of 2 ml of 1% lidocaine. Under fluoroscopic visualization, a 22-gauge, 3.5 inch Tuohy epidural needle was slowly advanced towards the epidural space a few millimeters right of midline. The latter part of the needle advancement was guided with fluoroscopy in the contralateral oblique view. The epidural space was identified using loss of resistance technique. After negative aspiration for heme and cerebrospinal fluid, a total of 1 mL of Omnipaque was injected to confirm needle placement. 9 mL of contrast was wasted. Epidurogram confirmed spread within the posterior epidural space. 2 ml of 10mg/ml of dexamethasone and 1 ml of preservative free normal saline was injected. The needle was removed.  Images were saved to PACS.    The patient tolerated the procedure well, and there was no evidence of procedural complications. No new sensory or motor deficits were noted following the procedure. The patient was stable and able to ambulate on discharge home. Post-procedure instructions were provided.     Pre-procedure pain score: 7/10 in the neck, 7/10 in the arm  Post-procedure pain score: 2/10 in the neck, 2/10 in the arm    Assessment/Plan: Latoya Riddle is a 50 year old female s/p cervical interlaminar epidural steroid injection today for cervical spondylosis and radiculitis/radiculopathy.     1. Following today's procedure, the patient was advised to contact the [facility] for any of the following:   Fever, chills, or night  sweats   New onset of pain, numbness, or weakness   Any questions/concerns regarding the procedure  If unable to contact the Pain Center, the patient was instructed to go to a local Emergency Room for any complications.   3. Follow-up with referring provider in 2 weeks for post-procedure evaluation.    Marisela Stovall MD  [facility]

## 2021-03-25 ENCOUNTER — HOSPITAL ENCOUNTER (OUTPATIENT)
Dept: PALLIATIVE MEDICINE | Facility: CLINIC | Age: 51
Discharge: HOME | End: 2021-03-25
Attending: STUDENT IN AN ORGANIZED HEALTH CARE EDUCATION/TRAINING PROGRAM | Admitting: STUDENT IN AN ORGANIZED HEALTH CARE EDUCATION/TRAINING PROGRAM
Payer: COMMERCIAL

## 2021-03-25 VITALS
TEMPERATURE: 97.2 F | SYSTOLIC BLOOD PRESSURE: 147 MMHG | DIASTOLIC BLOOD PRESSURE: 87 MMHG | RESPIRATION RATE: 18 BRPM | HEART RATE: 71 BPM

## 2021-03-25 DIAGNOSIS — M54.2 NECK PAIN: ICD-10-CM

## 2021-03-25 DIAGNOSIS — M54.12 CERVICAL RADICULOPATHY: ICD-10-CM

## 2021-03-25 PROCEDURE — 62321 NJX INTERLAMINAR CRV/THRC: CPT | Performed by: STUDENT IN AN ORGANIZED HEALTH CARE EDUCATION/TRAINING PROGRAM

## 2021-03-25 PROCEDURE — 255N000002 HC RX 255 OP 636: Performed by: STUDENT IN AN ORGANIZED HEALTH CARE EDUCATION/TRAINING PROGRAM

## 2021-03-25 PROCEDURE — 250N000011 HC RX IP 250 OP 636: Performed by: STUDENT IN AN ORGANIZED HEALTH CARE EDUCATION/TRAINING PROGRAM

## 2021-03-25 RX ORDER — DEXAMETHASONE SODIUM PHOSPHATE 10 MG/ML
10 INJECTION, SOLUTION INTRAMUSCULAR; INTRAVENOUS ONCE
Status: COMPLETED | OUTPATIENT
Start: 2021-03-25 | End: 2021-03-25

## 2021-03-25 RX ORDER — BUPIVACAINE HYDROCHLORIDE 2.5 MG/ML
10 INJECTION, SOLUTION EPIDURAL; INFILTRATION; INTRACAUDAL ONCE
Status: COMPLETED | OUTPATIENT
Start: 2021-03-25 | End: 2021-03-25

## 2021-03-25 RX ORDER — LIDOCAINE HYDROCHLORIDE 10 MG/ML
10 INJECTION, SOLUTION EPIDURAL; INFILTRATION; INTRACAUDAL; PERINEURAL ONCE
Status: DISCONTINUED | OUTPATIENT
Start: 2021-03-25 | End: 2021-03-25

## 2021-03-25 RX ADMIN — DEXAMETHASONE SODIUM PHOSPHATE 10 MG: 10 INJECTION, SOLUTION INTRAMUSCULAR; INTRAVENOUS at 14:26

## 2021-03-25 RX ADMIN — BUPIVACAINE HYDROCHLORIDE 10 ML: 2.5 INJECTION, SOLUTION EPIDURAL; INFILTRATION; INTRACAUDAL; PERINEURAL at 14:26

## 2021-03-25 RX ADMIN — IOHEXOL 3 ML: 180 INJECTION INTRAVENOUS at 14:26

## 2021-03-25 NOTE — IP AVS SNAPSHOT
[facility]  [address] [address]  Phone: [phone]  Fax: [phone]                                    After Visit Summary   3/25/2021    Laotya Riddle    MRN: [id number]           After Visit Summary Signature Page    I have received my discharge instructions, and my questions have been answered. I have discussed any challenges I see with this plan with the nurse or doctor.    ..........................................................................................................................................  Patient/Patient Representative Signature      ..........................................................................................................................................  Patient Representative Print Name and Relationship to Patient    ..................................................               ................................................  Date                                   Time    ..........................................................................................................................................  Reviewed by Signature/Title    ...................................................              ..............................................  Date                                               Time          22EPIC Rev 08/18
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
## 2021-03-25 NOTE — DISCHARGE INSTRUCTIONS
[facility]   Procedure Discharge Instructions    Today you saw:   Dr. Marisela Stovall    You had an: Cervical/thoracic Epidural steroid injection       Medications used:  Lidocaine   Bupivacaine   Dexamethasone Omnipaque  Normal saline            Be cautious when walking. Numbness and/or weakness in the upper extremities may occur for up to 6-8 hours after the procedure due to effect of the local anesthetic    Do not drive for 6 hours. The effect of the local anesthetic could slow your reflexes.     You may resume your regular activities after 24 hours    Avoid strenuous activity for the first 24 hours    You may shower, however avoid swimming, tub baths or hot tubs for 24 hours following your procedure    You may have a mild to moderate increase in pain for several days following the injection.    It may take up to 14 days for the steroid medication to start working although you may feel the effect as early as a few days after the procedure.       You may use ice packs for 10-15 minutes, 3 to 4 times a day at the injection site for comfort    Do not use heat to painful areas for 6 to 8 hours. This will give the local anesthetic time to wear off and prevent you from accidentally burning your skin.     Unless you have been directed to avoid the use of anti-inflammatory medications (NSAIDS), you may use medications such as ibuprofen, Aleve or Tylenol for pain control if needed.     Possible side effects of steroids that you may experience include flushing, elevated blood pressure, increased appetite, mild headaches and restlessness.  All of these symptoms will get better with time.    If you experience any of the following, call the Pain Clinic during work hours (Mon-Friday 8-4:30 pm) at [phone] or the Provider Line after hours at [phone]:  -Fever over 100 degree F  -Swelling, bleeding, redness, drainage, warmth at the injection site  -Progressive weakness or numbness in your  arms  -Unusual headache that is not relieved by Tylenol or other pain reliever  -Unusual new onset of pain that is not improving

## 2021-03-25 NOTE — PROGRESS NOTES
Pre-procedure Intake    Have you been fasting? No     If yes, for how long?    Are you taking a prescribed blood thinner such as coumadin, Plavix, Xarelto?    No    If yes, when did you take your last dose?     Do you take aspirin?  No    If cervical procedure, have you held aspirin for 6 days?   Yes    Do you have any allergies to contrast dye, iodine, steroid and/or numbing medications?  NO    Are you currently taking antibiotics or have an active infection?  NO    Have you had a fever/elevated temperature within the past week? NO    Are you currently taking oral steroids? NO    Do you have a ? Yes       Are you pregnant or breastfeeding?  NO    Are the vital signs normal?  Yes

## 2021-04-21 ENCOUNTER — TRANSFERRED RECORDS (OUTPATIENT)
Dept: HEALTH INFORMATION MANAGEMENT | Facility: CLINIC | Age: 51
End: 2021-04-21

## 2021-05-07 NOTE — PROGRESS NOTES
[facility]  [address] [address]  Phone: [phone]  Primary Provider: Vanessa Gonsalves  Pre-op Performing Provider: VANESSA GONSALVES      PREOPERATIVE EVALUATION:  Today's date: 5/10/2021    Latoya Riddle is a 50 year old female who presents for a preoperative evaluation.    Surgical Information:  Surgery/Procedure:  C5-6   C6-7  Anterior cervical discectomy and fusion  Surgery Location: [facility]  Surgeon: ALYSSA Aj  Surgery Date: 5/28/2021  Time of Surgery: tbd  Where patient plans to recover: At home with family  Fax number for surgical facility: [phone]    Type of Anesthesia Anticipated: to be determined    Assessment & Plan     The proposed surgical procedure is considered INTERMEDIATE risk.    Pre-op evaluation  - Basic metabolic panel  (Ca, Cl, CO2, Creat, Gluc, K, Na, BUN)  - CBC with platelets    Chronic neck pain  - Basic metabolic panel  (Ca, Cl, CO2, Creat, Gluc, K, Na, BUN)  - CBC with platelets    Stage 3b chronic kidney disease  - Basic metabolic panel  (Ca, Cl, CO2, Creat, Gluc, K, Na, BUN)  - CBC with platelets    Tobacco abuse disorder  - Basic metabolic panel  (Ca, Cl, CO2, Creat, Gluc, K, Na, BUN)  - CBC with platelets    CATHY (obstructive sleep apnea)  - Basic metabolic panel  (Ca, Cl, CO2, Creat, Gluc, K, Na, BUN)  - CBC with platelets         Risks and Recommendations:  The patient has the following additional risks and recommendations for perioperative complications:  Obstructive Sleep Apnea:   Advised to bring CPAP to surgery center  Social and Substance:    - Active nicotine user, advised smoking cessation    Medication Instructions:  Patient is to take all scheduled medications on the day of surgery    RECOMMENDATION:  APPROVAL GIVEN to proceed with proposed procedure, without further diagnostic evaluation.                    Subjective     HPI related to upcoming procedure:   Chronic neck pain  refractory to conservative therapies.      Preop Questions 5/10/2021   1. Have you ever had a heart attack or stroke? No   2. Have you ever had surgery on your heart or blood vessels, such as a stent placement, a coronary artery bypass, or surgery on an artery in your head, neck, heart, or legs? No   3. Do you have chest pain with activity? No   4. Do you have a history of  heart failure? No   5. Do you currently have a cold, bronchitis or symptoms of other infection? No   6. Do you have a cough, shortness of breath, or wheezing? YES - smoking   7. Do you or anyone in your family have previous history of blood clots? YES - father   8. Do you or does anyone in your family have a serious bleeding problem such as prolonged bleeding following surgeries or cuts? No   9. Have you ever had problems with anemia or been told to take iron pills? No   10. Have you had any abnormal blood loss such as black, tarry or bloody stools, or abnormal vaginal bleeding? No   11. Have you ever had a blood transfusion? No   12. Are you willing to have a blood transfusion if it is medically needed before, during, or after your surgery? Yes   13. Have you or any of your relatives ever had problems with anesthesia? No   14. Do you have sleep apnea, excessive snoring or daytime drowsiness? YES - CATHY   14a. Do you have a CPAP machine? Yes   15. Do you have any artifical heart valves or other implanted medical devices like a pacemaker, defibrillator, or continuous glucose monitor? No   16. Do you have artificial joints? No   17. Are you allergic to latex? No   18. Is there any chance that you may be pregnant? No     Health Care Directive:  Patient does not have a Health Care Directive or Living Will: Discussed advance care planning with patient; however, patient declined at this time.      Status of Chronic Conditions:  RENAL INSUFFICIENCY - Patient has a longstanding history of moderate-severe chronic renal insufficiency. Last Cr 1.52.      SLEEP PROBLEM - Patient has a longstanding history of CATHY.. Using CPAP    Review of Systems  CONSTITUTIONAL: NEGATIVE for fever, chills, change in weight  INTEGUMENTARY/SKIN: NEGATIVE for worrisome rashes, moles or lesions  EYES: NEGATIVE for vision changes or irritation  ENT/MOUTH: NEGATIVE for ear, mouth and throat problems  RESP: NEGATIVE for significant cough or SOB  BREAST: NEGATIVE for masses, tenderness or discharge  CV: NEGATIVE for chest pain, palpitations or peripheral edema  GI: NEGATIVE for nausea, abdominal pain, heartburn, or change in bowel habits  : NEGATIVE for frequency, dysuria, or hematuria  MUSCULOSKELETAL: NEGATIVE for significant arthralgias or myalgia  NEURO: NEGATIVE for weakness, dizziness or paresthesias  ENDOCRINE: NEGATIVE for temperature intolerance, skin/hair changes  HEME: NEGATIVE for bleeding problems  PSYCHIATRIC: NEGATIVE for changes in mood or affect    Patient Active Problem List    Diagnosis Date Noted     Chronic kidney disease, stage 3 01/22/2021     Priority: Medium     Chronic migraine without aura without status migrainosus, not intractable 04/21/2020     Priority: Medium     RUQ pain 04/12/2020     Priority: Medium     CATHY (obstructive sleep apnea) 02/09/2018     Priority: Medium     Mild to moderate CATHY without sleep-associated hypoxemia   - PSG performed 4/1/2008 with weight 180 lbs, AHI 0.3, RDI 41, mary SpO2 93%, PLMI 0, arousal index 57.9.       Gross hematuria 02/15/2017     Priority: Medium     Anxiety 01/11/2016     Priority: Medium     Acute kidney injury (H) 06/25/2015     Priority: Medium     Pyelonephritis 08/16/2013     Priority: Medium     Acute right flank pain 05/25/2013     Priority: Medium     Health Care Home 05/06/2013     Priority: Medium     EMERGENCY CARE PLAN  May 6, 2013: No current Care Coordination follow up planned. Please refer if Care Coordination services are needed.    Presenting Problem Signs and Symptoms Treatment Plan   Questions  or concerns   during clinic hours   I will call my clinic directly:  [facility]  [address] [place] [address]  [phone].   Questions or concerns outside clinic hours   I will call the 24 hour nurse line at   [phone] or [phone].   Need to schedule an appointment   I will call the 24 hour scheduling team at [phone] or my clinic directly at [phone].   Same day treatment     I will call my clinic first, nurse line if after hours, urgent care and express care if needed.   Clinic care coordination services (regular clinic hours)   I will call a clinic care coordinator directly:     Italia Keith RN  [phone]    CHELSI Iglesias:    [phone]    Or call my clinic at [phone] and ask to speak with care coordination.   Crisis Services: Behavioral or Mental Health  [facility] 24 Hour Phone Line  [phone]    [facility] 24 Hour Crisis Services  [phone]    [facility] (Behavioral Health Providers) Network [phone]    [facility]   [phone]     Emergency treatment -- Immediately    [facility]All 911              Lower urinary tract infectious disease 05/12/2012     Priority: Medium     Overview:   Resistant   Pseudomonas        Hydronephrosis, right 05/12/2012     Priority: Medium     Hyperlipemia 01/06/2011     Priority: Medium     Tobacco abuse 04/19/2010     Priority: Medium     Calculus of ureter 11/19/2009     Priority: Medium     Dyspnea and respiratory abnormality 04/07/2008     Priority: Medium     Sleep study 4/1/08: No CATHY. .5 minutes, sleep latency  normal 11.7 minutes. REM latency 154.5 minutes. Sleep efficiency at 89.4%. The sleep architecture was periodically disrupted with frequent sleep stage changes and arousals. Snoring: moderately loud. RDI 41.0,  AHI of 0.3. Lowest O2 93.0%. PLM index 0.0.  Problem list name updated by automated process. Provider to review       Tobacco abuse disorder 05/25/2006     Priority:  Medium     Calculus of kidney 2005     Priority: Medium     Due to RTA and medullary sponge kidney. [id number] ( See's Dr. Tavo Contreras)- calcium oxalate and calcium phosphate. S/p multiple procedures, >50       Congenital medullary sponge kidney 2005     Priority: Medium     Renal osteodystrophy 2005     Priority: Medium     Tubular acidosis        Past Medical History:   Diagnosis Date     Hyperlipidemia      Kidney stone      Past Surgical History:   Procedure Laterality Date     COMBINED CYSTOSCOPY, INSERT STENT URETER(S) Left 11/10/2017    Procedure: COMBINED CYSTOSCOPY, INSERT STENT URETER(S);  Cystoscopy, Left Ureteral Stent Placement;  Surgeon: Yg Rodriguez MD;  Location: [facility] OR     ESOPHAGOSCOPY, GASTROSCOPY, DUODENOSCOPY (EGD), COMBINED N/A 2020    Procedure: ESOPHAGOGASTRODUODENOSCOPY (EGD), WITH BIOPSY;  Surgeon: Brian Grimes DO;  Location: [facility] OR     EXCISE TOENAIL(S) Left 2016    Procedure: EXCISE TOENAIL(S);  Surgeon: Ady Mejia DPM;  Location: [facility] OR     GENITOURINARY SURGERY       SURGICAL HISTORY OF -            SURGICAL HISTORY OF -   -present    Lithothypsy X 49     SURGICAL HISTORY OF -       Percutaneous nephrostomy X2     SURGICAL HISTORY OF -   2011    Cystoscopy with bilateral ureteral pyeloscopy, stone basketing and stent placement     Current Outpatient Medications   Medication Sig Dispense Refill     cyclobenzaprine (FLEXERIL) 10 MG tablet Take 1 tablet (10 mg) by mouth 3 times daily as needed for muscle spasms 30 tablet 1     gabapentin (NEURONTIN) 100 MG capsule Take 300 mg by mouth At Bedtime Tablets at bedtime       LORazepam (ATIVAN) 0.5 MG tablet Take 1 tablet (0.5 mg) by mouth every 8 hours as needed for anxiety 30 tablet 1     SUMAtriptan (IMITREX) 50 MG tablet Take 1 tablet (50 mg) by mouth at onset of headache for migraine May repeat in 2 hours. Max 4 tablets/24 hours. 18 tablet 1     ZYRTEC 10 MG  "OR TABS 1 TABLET DAILY         Allergies   Allergen Reactions     Gentamycin [Gentamicin Sulfate]      Skin Adhesives [Mecrylate]         Social History     Tobacco Use     Smoking status: Current Every Day Smoker     Packs/day: 0.50     Years: 20.00     Pack years: 10.00     Types: Cigarettes     Smokeless tobacco: Never Used     Tobacco comment: trying to quit; cutting back on her own   Substance Use Topics     Alcohol use: No       History   Drug Use No         Objective     /68 (BP Location: Right arm, Cuff Size: Adult Large)   Pulse 95   Temp 98.4  F (36.9  C) (Tympanic)   Ht 1.689 m (5' 6.5\")   Wt 90.7 kg (200 lb)   LMP 07/02/2013   SpO2 99%   BMI 31.80 kg/m      Physical Exam    GENERAL APPEARANCE: healthy, alert and no distress     EYES: EOMI, PERRL     HENT: ear canals and TM's normal and nose and mouth without ulcers or lesions     NECK: no adenopathy, no asymmetry, masses, or scars and thyroid normal to palpation     RESP: lungs clear to auscultation - no rales, rhonchi or wheezes     CV: regular rates and rhythm, normal S1 S2, no S3 or S4 and no murmur, click or rub     ABDOMEN:  soft, nontender, no HSM or masses and bowel sounds normal     MS: extremities normal- no gross deformities noted, no evidence of inflammation in joints, FROM in all extremities.     SKIN: no suspicious lesions or rashes     NEURO: Normal strength and tone, sensory exam grossly normal, mentation intact and speech normal     PSYCH: mentation appears normal. and affect normal/bright     LYMPHATICS: No cervical adenopathy    Recent Labs   Lab Test 01/22/21  1515 04/12/20  0517 04/11/20  1955   HGB  --  14.7 15.7   PLT  --  103* 116*    137 139   POTASSIUM 3.6 4.0 4.4   CR 1.52* 1.55* 1.81*        Diagnostics:  Recent Results (from the past 24 hour(s))   Basic metabolic panel  (Ca, Cl, CO2, Creat, Gluc, K, Na, BUN)    Collection Time: 05/10/21  2:06 PM   Result Value Ref Range    Sodium 141 133 - 144 mmol/L    " Potassium 3.6 3.4 - 5.3 mmol/L    Chloride 110 (H) 94 - 109 mmol/L    Carbon Dioxide 24 20 - 32 mmol/L    Anion Gap 7 3 - 14 mmol/L    Glucose 117 (H) 70 - 99 mg/dL    Urea Nitrogen 25 7 - 30 mg/dL    Creatinine 1.49 (H) 0.52 - 1.04 mg/dL    GFR Estimate 41 (L) >60 mL/min/[1.73_m2]    GFR Estimate If Black 47 (L) >60 mL/min/[1.73_m2]    Calcium 9.1 8.5 - 10.1 mg/dL   CBC with platelets    Collection Time: 05/10/21  2:06 PM   Result Value Ref Range    WBC 9.8 4.0 - 11.0 10e9/L    RBC Count 5.54 (H) 3.8 - 5.2 10e12/L    Hemoglobin 16.1 (H) 11.7 - 15.7 g/dL    Hematocrit 50.6 (H) 35.0 - 47.0 %    MCV 91 78 - 100 fl    MCH 29.1 26.5 - 33.0 pg    MCHC 31.8 31.5 - 36.5 g/dL    RDW 14.9 10.0 - 15.0 %    Platelet Count 125 (L) 150 - 450 10e9/L      No EKG required, no history of coronary heart disease, significant arrhythmia, peripheral arterial disease or other structural heart disease.    Revised Cardiac Risk Index (RCRI):  The patient has the following serious cardiovascular risks for perioperative complications:   - No serious cardiac risks = 0 points     RCRI Interpretation: 0 points: Class I (very low risk - 0.4% complication rate)           Signed Electronically by: MICHELLE Sheehan CNP  Copy of this evaluation report is provided to requesting physician.

## 2021-05-10 ENCOUNTER — OFFICE VISIT (OUTPATIENT)
Dept: FAMILY MEDICINE | Facility: CLINIC | Age: 51
End: 2021-05-10
Payer: COMMERCIAL

## 2021-05-10 ENCOUNTER — TELEPHONE (OUTPATIENT)
Dept: FAMILY MEDICINE | Facility: CLINIC | Age: 51
End: 2021-05-10

## 2021-05-10 VITALS
WEIGHT: 200 LBS | OXYGEN SATURATION: 99 % | SYSTOLIC BLOOD PRESSURE: 120 MMHG | BODY MASS INDEX: 31.39 KG/M2 | DIASTOLIC BLOOD PRESSURE: 68 MMHG | HEIGHT: 67 IN | HEART RATE: 95 BPM | TEMPERATURE: 98.4 F

## 2021-05-10 DIAGNOSIS — M54.2 CHRONIC NECK PAIN: ICD-10-CM

## 2021-05-10 DIAGNOSIS — G89.29 CHRONIC NECK PAIN: ICD-10-CM

## 2021-05-10 DIAGNOSIS — Z72.0 TOBACCO ABUSE DISORDER: ICD-10-CM

## 2021-05-10 DIAGNOSIS — N18.32 STAGE 3B CHRONIC KIDNEY DISEASE (H): ICD-10-CM

## 2021-05-10 DIAGNOSIS — G47.33 OSA (OBSTRUCTIVE SLEEP APNEA): ICD-10-CM

## 2021-05-10 DIAGNOSIS — Z01.818 PRE-OP EVALUATION: Primary | ICD-10-CM

## 2021-05-10 LAB
ANION GAP SERPL CALCULATED.3IONS-SCNC: 7 MMOL/L (ref 3–14)
BUN SERPL-MCNC: 25 MG/DL (ref 7–30)
CALCIUM SERPL-MCNC: 9.1 MG/DL (ref 8.5–10.1)
CHLORIDE SERPL-SCNC: 110 MMOL/L (ref 94–109)
CO2 SERPL-SCNC: 24 MMOL/L (ref 20–32)
CREAT SERPL-MCNC: 1.49 MG/DL (ref 0.52–1.04)
ERYTHROCYTE [DISTWIDTH] IN BLOOD BY AUTOMATED COUNT: 14.9 % (ref 10–15)
GFR SERPL CREATININE-BSD FRML MDRD: 41 ML/MIN/{1.73_M2}
GLUCOSE SERPL-MCNC: 117 MG/DL (ref 70–99)
HCT VFR BLD AUTO: 50.6 % (ref 35–47)
HGB BLD-MCNC: 16.1 G/DL (ref 11.7–15.7)
MCH RBC QN AUTO: 29.1 PG (ref 26.5–33)
MCHC RBC AUTO-ENTMCNC: 31.8 G/DL (ref 31.5–36.5)
MCV RBC AUTO: 91 FL (ref 78–100)
PLATELET # BLD AUTO: 125 10E9/L (ref 150–450)
POTASSIUM SERPL-SCNC: 3.6 MMOL/L (ref 3.4–5.3)
RBC # BLD AUTO: 5.54 10E12/L (ref 3.8–5.2)
SODIUM SERPL-SCNC: 141 MMOL/L (ref 133–144)
WBC # BLD AUTO: 9.8 10E9/L (ref 4–11)

## 2021-05-10 PROCEDURE — 36415 COLL VENOUS BLD VENIPUNCTURE: CPT | Performed by: NURSE PRACTITIONER

## 2021-05-10 PROCEDURE — 85027 COMPLETE CBC AUTOMATED: CPT | Performed by: NURSE PRACTITIONER

## 2021-05-10 PROCEDURE — 99214 OFFICE O/P EST MOD 30 MIN: CPT | Performed by: NURSE PRACTITIONER

## 2021-05-10 PROCEDURE — 80048 BASIC METABOLIC PNL TOTAL CA: CPT | Performed by: NURSE PRACTITIONER

## 2021-05-10 ASSESSMENT — MIFFLIN-ST. JEOR: SCORE: 1556.88

## 2021-05-10 NOTE — TELEPHONE ENCOUNTER
Maribeth,    Patient had a Pre op today and calls back with the needed labs.  Pended a few for your consideration. Ara VALENTINE RN

## 2021-05-10 NOTE — TELEPHONE ENCOUNTER
Can she bring the paperwork in?  Some of these requests are not guideline recommended and not standard  Maribeth Avila, CNP

## 2021-05-10 NOTE — TELEPHONE ENCOUNTER
Reason for call:    Symptom or request:     Patient called stating that she had a preop done today, she forgot the paperwork on what surgeon wanted for preop.      UA, pregnancy, PT and PTT, ESR CBC profile 7,12,18  pft w/abg and W/o abg chest xray and EKG.     Surgery scheduled for 5/28/2021.    Best Time:  any    Can we leave a detailed message on this number?  YES     Zuleyma FERRERA  Station

## 2021-05-10 NOTE — PATIENT INSTRUCTIONS
You are due for a screening Mammogram.  Please contact the Diagnostics Registration Department at: [phone] to schedule this appointment.  Your clinic record indicates that you are due for:   Pap and physical exam

## 2021-05-12 NOTE — TELEPHONE ENCOUNTER
[facility] - preop form received, printed preop from chart, and routed form to Maribeth Avila for review.

## 2021-05-26 ENCOUNTER — RECORDS - HEALTHEAST (OUTPATIENT)
Dept: ADMINISTRATIVE | Facility: CLINIC | Age: 51
End: 2021-05-26

## 2021-05-28 ENCOUNTER — RECORDS - HEALTHEAST (OUTPATIENT)
Dept: ADMINISTRATIVE | Facility: CLINIC | Age: 51
End: 2021-05-28
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
## 2021-06-01 ENCOUNTER — RECORDS - HEALTHEAST (OUTPATIENT)
Dept: ADMINISTRATIVE | Facility: CLINIC | Age: 51
End: 2021-06-01

## 2021-06-16 ENCOUNTER — TRANSFERRED RECORDS (OUTPATIENT)
Dept: HEALTH INFORMATION MANAGEMENT | Facility: CLINIC | Age: 51
End: 2021-06-16

## 2021-08-25 ENCOUNTER — TRANSFERRED RECORDS (OUTPATIENT)
Dept: HEALTH INFORMATION MANAGEMENT | Facility: CLINIC | Age: 51
End: 2021-08-25

## 2021-09-05 ENCOUNTER — HEALTH MAINTENANCE LETTER (OUTPATIENT)
Age: 51
End: 2021-09-05

## 2021-09-20 ENCOUNTER — TELEPHONE (OUTPATIENT)
Dept: FAMILY MEDICINE | Facility: CLINIC | Age: 51
End: 2021-09-20

## 2021-09-20 DIAGNOSIS — F41.9 ANXIETY: ICD-10-CM

## 2021-09-20 NOTE — TELEPHONE ENCOUNTER
Patient requesting refill to [facility], wasn't sure if she needed appointment or not.    Thank you,    Olya Wadsworth, MICHAEL Mcqueen

## 2021-09-22 NOTE — TELEPHONE ENCOUNTER
Left message for patient to return call to clinic to clarify med needed for refill.    Mindy Sandhu RN

## 2021-09-22 NOTE — TELEPHONE ENCOUNTER
Patient called back. She needs refill for Lorazepam as listed under this encounter.    Emily Still on 9/22/2021 at 12:32 PM

## 2021-09-23 RX ORDER — LORAZEPAM 0.5 MG/1
0.5 TABLET ORAL EVERY 8 HOURS PRN
Qty: 30 TABLET | Refills: 1 | Status: SHIPPED | OUTPATIENT
Start: 2021-09-23 | End: 2022-04-01

## 2021-09-23 NOTE — TELEPHONE ENCOUNTER
Routing refill request to provider for review/approval because:  Drug not on the [facility] refill protocol       Last seen in clinic on 5/10/21    Thank you    Hillary MATOS RN

## 2021-09-30 ASSESSMENT — ANXIETY QUESTIONNAIRES
GAD7 TOTAL SCORE: 11
2. NOT BEING ABLE TO STOP OR CONTROL WORRYING: MORE THAN HALF THE DAYS
1. FEELING NERVOUS, ANXIOUS, OR ON EDGE: NEARLY EVERY DAY
4. TROUBLE RELAXING: SEVERAL DAYS
7. FEELING AFRAID AS IF SOMETHING AWFUL MIGHT HAPPEN: NOT AT ALL
GAD7 TOTAL SCORE: 11
GAD7 TOTAL SCORE: 11
8. IF YOU CHECKED OFF ANY PROBLEMS, HOW DIFFICULT HAVE THESE MADE IT FOR YOU TO DO YOUR WORK, TAKE CARE OF THINGS AT HOME, OR GET ALONG WITH OTHER PEOPLE?: SOMEWHAT DIFFICULT
6. BECOMING EASILY ANNOYED OR IRRITABLE: NEARLY EVERY DAY
3. WORRYING TOO MUCH ABOUT DIFFERENT THINGS: MORE THAN HALF THE DAYS
5. BEING SO RESTLESS THAT IT IS HARD TO SIT STILL: NOT AT ALL
7. FEELING AFRAID AS IF SOMETHING AWFUL MIGHT HAPPEN: NOT AT ALL

## 2021-09-30 ASSESSMENT — PATIENT HEALTH QUESTIONNAIRE - PHQ9
10. IF YOU CHECKED OFF ANY PROBLEMS, HOW DIFFICULT HAVE THESE PROBLEMS MADE IT FOR YOU TO DO YOUR WORK, TAKE CARE OF THINGS AT HOME, OR GET ALONG WITH OTHER PEOPLE: SOMEWHAT DIFFICULT
SUM OF ALL RESPONSES TO PHQ QUESTIONS 1-9: 13
SUM OF ALL RESPONSES TO PHQ QUESTIONS 1-9: 13

## 2021-10-01 ENCOUNTER — OFFICE VISIT (OUTPATIENT)
Dept: FAMILY MEDICINE | Facility: CLINIC | Age: 51
End: 2021-10-01
Payer: COMMERCIAL

## 2021-10-01 VITALS
DIASTOLIC BLOOD PRESSURE: 72 MMHG | TEMPERATURE: 97.8 F | WEIGHT: 185 LBS | BODY MASS INDEX: 29.03 KG/M2 | HEART RATE: 58 BPM | OXYGEN SATURATION: 98 % | HEIGHT: 67 IN | SYSTOLIC BLOOD PRESSURE: 110 MMHG

## 2021-10-01 DIAGNOSIS — F33.1 MODERATE EPISODE OF RECURRENT MAJOR DEPRESSIVE DISORDER (H): Primary | ICD-10-CM

## 2021-10-01 DIAGNOSIS — Z12.31 VISIT FOR SCREENING MAMMOGRAM: ICD-10-CM

## 2021-10-01 DIAGNOSIS — F41.9 ANXIETY: ICD-10-CM

## 2021-10-01 DIAGNOSIS — R53.83 FATIGUE, UNSPECIFIED TYPE: ICD-10-CM

## 2021-10-01 LAB
ERYTHROCYTE [DISTWIDTH] IN BLOOD BY AUTOMATED COUNT: 15.7 % (ref 10–15)
HCT VFR BLD AUTO: 50.8 % (ref 35–47)
HGB BLD-MCNC: 16.3 G/DL (ref 11.7–15.7)
MCH RBC QN AUTO: 28.9 PG (ref 26.5–33)
MCHC RBC AUTO-ENTMCNC: 32.1 G/DL (ref 31.5–36.5)
MCV RBC AUTO: 90 FL (ref 78–100)
PLATELET # BLD AUTO: 122 10E3/UL (ref 150–450)
RBC # BLD AUTO: 5.64 10E6/UL (ref 3.8–5.2)
TSH SERPL DL<=0.005 MIU/L-ACNC: 1.84 MU/L (ref 0.4–4)
WBC # BLD AUTO: 8.2 10E3/UL (ref 4–11)

## 2021-10-01 PROCEDURE — 84443 ASSAY THYROID STIM HORMONE: CPT | Performed by: NURSE PRACTITIONER

## 2021-10-01 PROCEDURE — 85027 COMPLETE CBC AUTOMATED: CPT | Performed by: NURSE PRACTITIONER

## 2021-10-01 PROCEDURE — 99214 OFFICE O/P EST MOD 30 MIN: CPT | Performed by: NURSE PRACTITIONER

## 2021-10-01 PROCEDURE — 36415 COLL VENOUS BLD VENIPUNCTURE: CPT | Performed by: NURSE PRACTITIONER

## 2021-10-01 RX ORDER — ESCITALOPRAM OXALATE 10 MG/1
10 TABLET ORAL DAILY
Qty: 30 TABLET | Refills: 0 | Status: SHIPPED | OUTPATIENT
Start: 2021-10-01 | End: 2021-10-29

## 2021-10-01 ASSESSMENT — ANXIETY QUESTIONNAIRES: GAD7 TOTAL SCORE: 11

## 2021-10-01 ASSESSMENT — MIFFLIN-ST. JEOR: SCORE: 1488.84

## 2021-10-01 ASSESSMENT — PATIENT HEALTH QUESTIONNAIRE - PHQ9: SUM OF ALL RESPONSES TO PHQ QUESTIONS 1-9: 13

## 2021-10-01 NOTE — PROGRESS NOTES
Assessment & Plan     Moderate episode of recurrent major depressive disorder (H)  - escitalopram (LEXAPRO) 10 MG tablet; Take 1 tablet (10 mg) by mouth daily    Anxiety  - escitalopram (LEXAPRO) 10 MG tablet; Take 1 tablet (10 mg) by mouth daily    Anxiety and depression are poorly controlled.  Recommend starting Lexapro 10 mg daily.  Follow-up in 1 month to reassess; sooner if needed.      Fatigue, unspecified type  Lab check today:  - TSH with free T4 reflex; Future  - CBC with platelets; Future  - CBC with platelets  - TSH with free T4 reflex    Visit for screening mammogram  - MA Screen Bilateral w/Rocael; Future      The risks, benefits and treatment options of prescribed medications or other treatments have been discussed with the patient. The patient verbalized their understanding and should call or follow up if no improvement or if they develop further problems.    MICHELLE Sheehan CNP  [facility]ill[facility] InstituteCANDIE Klein is a 50 year old who presents for the following health issues     HPI     Abnormal Mood Symptoms  Onset/Duration: since April or May  Description: patient is feeling more depressed   Thinks it has to do with her weight- has been trying to lose weight without success  Also had a lot of stress in her life with family situations.  Depression (if yes, do PHQ-9): no  Anxiety (if yes, do ALFREDITO-7): YES  Accompanying Signs & Symptoms:  Still participating in activities that you used to enjoy: YES- not like she was in the past   Fatigue: YES  Irritability: YES  Difficulty concentrating: sometimes  Changes in appetite: no; doesn't have much of an appetite  Problems with sleep: no  Heart racing/beating fast: no  Abnormally elevated, expansive, or irritable mood: no  Persistently increased activity or energy: no  Thoughts of hurting yourself or others: no  History:  Recent stress or major life event: YES  Prior depression or anxiety: patient has had depression all her  "life  Has used meds years ago - sertraline caused homicidal thoughts.  Family history of depression or anxiety: YES  Alcohol/drug use: no  Difficulty sleeping: no  Precipitating or alleviating factors: concerns regarding her weight  Would like to have hormone levels checked to see if she is in menopause  Therapies tried and outcome:  Marriage counseling -  was using meth    PHQ 7/25/2017 9/30/2021   PHQ-9 Total Score 7 13   Q9: Thoughts of better off dead/self-harm past 2 weeks Not at all Not at all     ALFREDITO-7 SCORE 4/5/2019 4/9/2020 9/30/2021   Total Score - - -   Total Score - - 11 (moderate anxiety)   Total Score 8 12 11           Wt Readings from Last 4 Encounters:   10/01/21 83.9 kg (185 lb)   05/10/21 90.7 kg (200 lb)   02/10/21 88.9 kg (196 lb)   01/22/21 89.4 kg (197 lb)       Review of Systems   Constitutional, HEENT, cardiovascular, pulmonary, gi and gu systems are negative, except as otherwise noted.          Objective    /72 (BP Location: Right arm)   Pulse 58   Temp 97.8  F (36.6  C) (Tympanic)   Ht 1.689 m (5' 6.5\")   Wt 83.9 kg (185 lb)   LMP 07/02/2013   SpO2 98%   BMI 29.41 kg/m    Body mass index is 29.41 kg/m .  Physical Exam   GENERAL: healthy, alert and no distress  PSYCH: mentation appears normal and tearful                "

## 2021-10-08 ENCOUNTER — HOSPITAL ENCOUNTER (OUTPATIENT)
Dept: MAMMOGRAPHY | Facility: CLINIC | Age: 51
Discharge: HOME | End: 2021-10-08
Attending: NURSE PRACTITIONER | Admitting: NURSE PRACTITIONER
Payer: COMMERCIAL

## 2021-10-08 DIAGNOSIS — Z12.31 VISIT FOR SCREENING MAMMOGRAM: ICD-10-CM

## 2021-10-08 PROCEDURE — 77063 BREAST TOMOSYNTHESIS BI: CPT

## 2021-10-15 ENCOUNTER — HOSPITAL ENCOUNTER (OUTPATIENT)
Dept: ULTRASOUND IMAGING | Facility: CLINIC | Age: 51
Discharge: HOME | End: 2021-10-15
Attending: NURSE PRACTITIONER | Admitting: NURSE PRACTITIONER
Payer: COMMERCIAL

## 2021-10-15 DIAGNOSIS — R92.8 ABNORMAL MAMMOGRAM: ICD-10-CM

## 2021-10-15 PROCEDURE — 76642 ULTRASOUND BREAST LIMITED: CPT | Mod: LT

## 2021-10-28 ENCOUNTER — TELEPHONE (OUTPATIENT)
Dept: FAMILY MEDICINE | Facility: CLINIC | Age: 51
End: 2021-10-28

## 2021-10-28 DIAGNOSIS — F41.9 ANXIETY: ICD-10-CM

## 2021-10-28 DIAGNOSIS — F33.1 MODERATE EPISODE OF RECURRENT MAJOR DEPRESSIVE DISORDER (H): ICD-10-CM

## 2021-10-28 NOTE — TELEPHONE ENCOUNTER
Pt will be out of med before appointment. Requesting refill of lexapro to [facility].      Thank you,    Olya Wadsworth, MICHAEL Mcqueen

## 2021-10-29 RX ORDER — ESCITALOPRAM OXALATE 10 MG/1
10 TABLET ORAL DAILY
Qty: 30 TABLET | Refills: 0 | Status: SHIPPED | OUTPATIENT
Start: 2021-10-29 | End: 2021-11-04

## 2021-10-29 NOTE — TELEPHONE ENCOUNTER
Maribeth    Pt requesting refill of lexapro. She will be out before her appt with you on 11/4.  Please advise.    Mindy Sandhu RN

## 2021-11-03 ASSESSMENT — ANXIETY QUESTIONNAIRES
3. WORRYING TOO MUCH ABOUT DIFFERENT THINGS: MORE THAN HALF THE DAYS
6. BECOMING EASILY ANNOYED OR IRRITABLE: MORE THAN HALF THE DAYS
GAD7 TOTAL SCORE: 7
1. FEELING NERVOUS, ANXIOUS, OR ON EDGE: SEVERAL DAYS
8. IF YOU CHECKED OFF ANY PROBLEMS, HOW DIFFICULT HAVE THESE MADE IT FOR YOU TO DO YOUR WORK, TAKE CARE OF THINGS AT HOME, OR GET ALONG WITH OTHER PEOPLE?: SOMEWHAT DIFFICULT
4. TROUBLE RELAXING: NOT AT ALL
5. BEING SO RESTLESS THAT IT IS HARD TO SIT STILL: NOT AT ALL
GAD7 TOTAL SCORE: 7
7. FEELING AFRAID AS IF SOMETHING AWFUL MIGHT HAPPEN: SEVERAL DAYS
7. FEELING AFRAID AS IF SOMETHING AWFUL MIGHT HAPPEN: SEVERAL DAYS
GAD7 TOTAL SCORE: 7
2. NOT BEING ABLE TO STOP OR CONTROL WORRYING: SEVERAL DAYS

## 2021-11-03 ASSESSMENT — PATIENT HEALTH QUESTIONNAIRE - PHQ9
SUM OF ALL RESPONSES TO PHQ QUESTIONS 1-9: 6
10. IF YOU CHECKED OFF ANY PROBLEMS, HOW DIFFICULT HAVE THESE PROBLEMS MADE IT FOR YOU TO DO YOUR WORK, TAKE CARE OF THINGS AT HOME, OR GET ALONG WITH OTHER PEOPLE: SOMEWHAT DIFFICULT
SUM OF ALL RESPONSES TO PHQ QUESTIONS 1-9: 6

## 2021-11-04 ENCOUNTER — VIRTUAL VISIT (OUTPATIENT)
Dept: FAMILY MEDICINE | Facility: CLINIC | Age: 51
End: 2021-11-04
Payer: COMMERCIAL

## 2021-11-04 DIAGNOSIS — F41.9 ANXIETY: ICD-10-CM

## 2021-11-04 DIAGNOSIS — F33.1 MODERATE EPISODE OF RECURRENT MAJOR DEPRESSIVE DISORDER (H): ICD-10-CM

## 2021-11-04 PROCEDURE — 99214 OFFICE O/P EST MOD 30 MIN: CPT | Mod: TEL | Performed by: NURSE PRACTITIONER

## 2021-11-04 RX ORDER — ESCITALOPRAM OXALATE 10 MG/1
10 TABLET ORAL DAILY
Qty: 90 TABLET | Refills: 3 | Status: SHIPPED | OUTPATIENT
Start: 2021-11-04 | End: 2023-01-25

## 2021-11-04 ASSESSMENT — PATIENT HEALTH QUESTIONNAIRE - PHQ9: SUM OF ALL RESPONSES TO PHQ QUESTIONS 1-9: 6

## 2021-11-04 ASSESSMENT — ANXIETY QUESTIONNAIRES: GAD7 TOTAL SCORE: 7

## 2021-11-04 NOTE — PROGRESS NOTES
Latoya is a 50 year old who is being evaluated via a billable telephone visit.      What phone number would you like to be contacted at? [phone]  How would you like to obtain your AVS? MyChart    Assessment & Plan     Moderate episode of recurrent major depressive disorder (H)  Well controlled.  - escitalopram (LEXAPRO) 10 MG tablet; Take 1 tablet (10 mg) by mouth daily    Anxiety  Well controlled.  - escitalopram (LEXAPRO) 10 MG tablet; Take 1 tablet (10 mg) by mouth daily      The risks, benefits and treatment options of prescribed medications or other treatments have been discussed with the patient. The patient verbalized their understanding and should call or follow up if no improvement or if they develop further problems.    MICHELLE Sheehan [facility]            Subjective   Latoya is a 50 year old who presents for the following health issues     HPI     Depression and Anxiety Follow-Up    How are you doing with your depression since your last visit? Improved     How are you doing with your anxiety since your last visit?  Improved  slightly    Are you having other symptoms that might be associated with depression or anxiety? No    Have you had a significant life event? No     Do you have any concerns with your use of alcohol or other drugs? No     Not having racing thoughts any more.    Less irritable.     No side effects from the Lexapro.    She is happy with current dose.    Social History     Tobacco Use     Smoking status: Current Every Day Smoker     Packs/day: 0.50     Years: 20.00     Pack years: 10.00     Types: Cigarettes     Smokeless tobacco: Never Used     Tobacco comment: trying to quit; cutting back on her own   Substance Use Topics     Alcohol use: No     Drug use: No     PHQ 7/25/2017 9/30/2021 11/3/2021   PHQ-9 Total Score 7 13 6   Q9: Thoughts of better off dead/self-harm past 2 weeks Not at all Not at all Not at all     ALFREDITO-7 SCORE 4/9/2020 9/30/2021  11/3/2021   Total Score - - -   Total Score - 11 (moderate anxiety) 7 (mild anxiety)   Total Score 12 11 7         Suicide Assessment Five-step Evaluation and Treatment (SAFE-T)      How many servings of fruits and vegetables do you eat daily?  2-3    On average, how many sweetened beverages do you drink each day (Examples: soda, juice, sweet tea, etc.  Do NOT count diet or artificially sweetened beverages)?   0    How many days per week do you exercise enough to make your heart beat faster? When she can, has hip and shoulder pain    How many minutes a day do you exercise enough to make your heart beat faster?     How many days per week do you miss taking your medication? 0        Review of Systems   Constitutional, HEENT, cardiovascular, pulmonary, gi and gu systems are negative, except as otherwise noted.      Objective           Vitals:  No vitals were obtained today due to virtual visit.    Physical Exam     PSYCH: Alert and oriented times 3; coherent speech, normal   rate and volume, able to articulate logical thoughts, able   to abstract reason, no tangential thoughts, no hallucinations   or delusions    RESP: No cough, no audible wheezing, able to talk in full sentences  Remainder of exam unable to be completed due to telephone visits                Phone call duration: 5 minutes

## 2021-11-24 ENCOUNTER — TRANSFERRED RECORDS (OUTPATIENT)
Dept: HEALTH INFORMATION MANAGEMENT | Facility: CLINIC | Age: 51
End: 2021-11-24
Payer: COMMERCIAL

## 2022-02-16 ENCOUNTER — TRANSFERRED RECORDS (OUTPATIENT)
Dept: HEALTH INFORMATION MANAGEMENT | Facility: CLINIC | Age: 52
End: 2022-02-16
Payer: COMMERCIAL

## 2022-02-19 ENCOUNTER — HEALTH MAINTENANCE LETTER (OUTPATIENT)
Age: 52
End: 2022-02-19

## 2022-03-01 ENCOUNTER — TRANSFERRED RECORDS (OUTPATIENT)
Dept: HEALTH INFORMATION MANAGEMENT | Facility: CLINIC | Age: 52
End: 2022-03-01
Payer: COMMERCIAL

## 2022-03-30 ENCOUNTER — TELEPHONE (OUTPATIENT)
Dept: FAMILY MEDICINE | Facility: CLINIC | Age: 52
End: 2022-03-30
Payer: COMMERCIAL

## 2022-03-30 DIAGNOSIS — G47.33 OSA (OBSTRUCTIVE SLEEP APNEA): Primary | ICD-10-CM

## 2022-03-31 DIAGNOSIS — F41.9 ANXIETY: ICD-10-CM

## 2022-03-31 NOTE — TELEPHONE ENCOUNTER
Routing refill request to provider for review/approval because:  Drug not on the [facility] refill protocol     Pending Prescriptions:                       Disp   Refills    LORazepam (ATIVAN) 0.5 MG tablet [Pharmacy*30 tab*0        Sig: TAKE 1 TABLET BY MOUTH EVERY 8 HOURS AS NEEDED FOR           ANXIETY    Sully Leong RN on 3/31/2022 at 2:32 PM

## 2022-04-01 RX ORDER — LORAZEPAM 0.5 MG/1
TABLET ORAL
Qty: 30 TABLET | Refills: 0 | Status: SHIPPED | OUTPATIENT
Start: 2022-04-01 | End: 2023-02-02

## 2022-04-01 NOTE — TELEPHONE ENCOUNTER
Patient notified with acknowledgement.  I advised I didn't hear back from medical supply, so if there's anything needing to be changed, etc. please let us know.     Lali Linares RN  [facility]

## 2022-04-01 NOTE — TELEPHONE ENCOUNTER
Forwarding request for CPAP supplies to PCP.  DME order pended for consideration.     RN sees previous old orders for CPAP machine dated 2/16/2018, placed by sleep management.  Reached out to patient to see what supplies specifically that she's needing and if it would be better to see if sleep management can order until she's able to get in with them in July.  Patient states she already spoke with sleep management about this and got the impression they wouldn't be ordering supplies until patient seen.  Patient states she's just needing the mask/tubing, etc.    RN reached out to [facility] to see what previous orders were.  Left  for return call to [phone].  Will forward to PCP in the meantime, as perhaps there's just a standard order for these supplies, or a previous order that RN is unaware of.     Lali Linares RN  [facility]

## 2022-04-14 ENCOUNTER — TRANSFERRED RECORDS (OUTPATIENT)
Dept: HEALTH INFORMATION MANAGEMENT | Facility: CLINIC | Age: 52
End: 2022-04-14
Payer: COMMERCIAL

## 2022-07-11 ASSESSMENT — PATIENT HEALTH QUESTIONNAIRE - PHQ9
SUM OF ALL RESPONSES TO PHQ QUESTIONS 1-9: 6
SUM OF ALL RESPONSES TO PHQ QUESTIONS 1-9: 6

## 2022-07-12 ENCOUNTER — OFFICE VISIT (OUTPATIENT)
Dept: FAMILY MEDICINE | Facility: CLINIC | Age: 52
End: 2022-07-12
Payer: COMMERCIAL

## 2022-07-12 VITALS
TEMPERATURE: 97.6 F | HEART RATE: 79 BPM | SYSTOLIC BLOOD PRESSURE: 116 MMHG | WEIGHT: 193 LBS | DIASTOLIC BLOOD PRESSURE: 68 MMHG | HEIGHT: 67 IN | OXYGEN SATURATION: 95 % | BODY MASS INDEX: 30.29 KG/M2 | RESPIRATION RATE: 18 BRPM

## 2022-07-12 DIAGNOSIS — K02.9 DENTAL CARIES: ICD-10-CM

## 2022-07-12 DIAGNOSIS — Z00.00 HEALTHCARE MAINTENANCE: ICD-10-CM

## 2022-07-12 DIAGNOSIS — E78.2 MIXED HYPERLIPIDEMIA: ICD-10-CM

## 2022-07-12 DIAGNOSIS — F17.200 NICOTINE DEPENDENCE, UNCOMPLICATED, UNSPECIFIED NICOTINE PRODUCT TYPE: ICD-10-CM

## 2022-07-12 DIAGNOSIS — Z01.818 PREOP GENERAL PHYSICAL EXAM: Primary | ICD-10-CM

## 2022-07-12 DIAGNOSIS — Z11.4 SCREENING FOR HIV WITHOUT PRESENCE OF RISK FACTORS: ICD-10-CM

## 2022-07-12 DIAGNOSIS — F33.1 MODERATE EPISODE OF RECURRENT MAJOR DEPRESSIVE DISORDER (H): ICD-10-CM

## 2022-07-12 DIAGNOSIS — Z11.59 ENCOUNTER FOR HEPATITIS C SCREENING TEST FOR LOW RISK PATIENT: ICD-10-CM

## 2022-07-12 DIAGNOSIS — N18.32 STAGE 3B CHRONIC KIDNEY DISEASE (H): ICD-10-CM

## 2022-07-12 PROCEDURE — 99406 BEHAV CHNG SMOKING 3-10 MIN: CPT | Performed by: FAMILY MEDICINE

## 2022-07-12 PROCEDURE — 99214 OFFICE O/P EST MOD 30 MIN: CPT | Performed by: FAMILY MEDICINE

## 2022-07-12 ASSESSMENT — PAIN SCALES - GENERAL: PAINLEVEL: NO PAIN (0)

## 2022-07-12 NOTE — PATIENT INSTRUCTIONS
Preparing for Your Surgery  Getting started  A nurse will call you to review your health history and instructions. They will give you an arrival time based on your scheduled surgery time. Please be ready to share:    Your doctor's clinic name and phone number    Your medical, surgical and anesthesia history    A list of allergies and sensitivities    A list of medicines, including herbal treatments and over-the-counter drugs    Whether the patient has a legal guardian (ask how to send us the papers in advance)  Please tell us if you're pregnant--or if there's any chance you might be pregnant. Some surgeries may injure a fetus (unborn baby), so they require a pregnancy test. Surgeries that are safe for a fetus don't always need a test, and you can choose whether to have one.   If you have a child who's having surgery, please ask for a copy of Preparing for Your Child's Surgery.    Preparing for surgery  1. Within 30 days of surgery: Have a pre-op exam (sometimes called an H&P, or History and Physical). This can be done at a clinic or pre-operative center.  ? If you're having a , you may not need this exam. Talk to your care team.  2. At your pre-op exam, talk to your care team about all medicines you take. If you need to stop any medicines before surgery, ask when to start taking them again.  ? We do this for your safety. Many medicines can make you bleed too much during surgery. Some change how well surgery (anesthesia) drugs work.  3. Call your insurance company to let them know you're having surgery. (If you don't have insurance, call [phone].)  4. Call your clinic if there's any change in your health. This includes signs of a cold or flu (sore throat, runny nose, cough, rash, fever). It also includes a scrape or scratch near the surgery site.  5. If you have questions on the day of surgery, call your hospital or surgery center.  COVID testing  You may need to be tested for COVID-19 before having  surgery. If so, we will give you instructions.  Eating and drinking guidelines  For your safety: Unless your surgeon tells you otherwise, follow the guidelines below.    Eat and drink as usual until 8 hours before surgery. After that, no food or milk.    Drink clear liquids until 2 hours before surgery. These are liquids you can see through, like water, Gatorade and Propel Water. You may also have black coffee and tea (no cream or milk).    Nothing by mouth within 2 hours of surgery. This includes gum, candy and breath mints.    If you drink alcohol: Stop drinking it the night before surgery.    If your care team tells you to take medicine on the morning of surgery, it's okay to take it with a sip of water.  Preventing infection  1. Shower or bathe the night before and morning of your surgery. Follow the instructions your clinic gave you. (If no instructions, use regular soap.)  2. Don't shave or clip hair near your surgery site. We'll remove the hair if needed.  3. Don't smoke or vape the morning of surgery. You may chew nicotine gum up to 2 hours before surgery. A nicotine patch is okay.  ? Note: Some surgeries require you to completely quit smoking and nicotine. Check with your surgeon.  4. Your care team will make every effort to keep you safe from infection. We will:  ? Clean our hands often with soap and water (or an alcohol-based hand rub).  ? Clean the skin at your surgery site with a special soap that kills germs.  ? Give you a special gown to keep you warm. (Cold raises the risk of infection.)  ? Wear special hair covers, masks, gowns and gloves during surgery.  ? Give antibiotic medicine, if prescribed. Not all surgeries need antibiotics.  What to bring on the day of surgery  1. Photo ID and insurance card  2. Copy of your health care directive, if you have one  3. Glasses and hearing aides (bring cases)  ? You can't wear contacts during surgery  4. Inhaler and eye drops, if you use them (tell us about  these when you arrive)  5. CPAP machine or breathing device, if you use them  6. A few personal items, if spending the night  7. If you have . . .  ? A pacemaker, ICD (cardiac defibrillator) or other implant: Bring the ID card.  ? An implanted stimulator: Bring the remote control.  ? A legal guardian: Bring a copy of the certified (court-stamped) guardianship papers.  Please remove any jewelry, including body piercings. Leave jewelry and other valuables at home.  If you're going home the day of surgery    You must have a responsible adult drive you home. They should stay with you overnight as well.    If you don't have someone to stay with you, and you aren't safe to go home alone, we may keep you overnight. Insurance often won't pay for this.  After surgery  If it's hard to control your pain or you need more pain medicine, please call your surgeon's office.  Questions?   If you have any questions for your care team, list them here: _________________________________________________________________________________________________________________________________________________________________________ ____________________________________ ____________________________________ ____________________________________  For informational purposes only. Not to replace the advice of your health care provider. Copyright   2003, 2019 [facility]. All rights reserved. Clinically reviewed by Rima Monique MD. [employer] 736000 - REV 07/21.    How to Take Your Medication Before Surgery  - HOLD (do not take) any meds on the morning of surgery  - Continue avoiding NSAIDs which you already do    Nicotine Inhaler    Dosing:    Weeks 1-12 Use 6-16 cartridges per day. Maximum of 16 cartridges per day.   Weeks 12-24 Gradually reduce the number of cartridges per day.     How to use the nicotine Inhaler:    Remove the mouthpiece from the plastic wrap and push the top and bottom pieces together. Turn them to line up the markings and  "pull the top and bottom apart.     Insert one cartridge into the inhaler. Push hard until it pops into place.    Line up the markings again and push the top and bottom back together.    Turn the pieces so the markings do not line up and the inhaler is \"locked\".    Inhale deeply into back of throat or puff in short breaths.    You will soon learn to be able to tell when no nicotine is left in the cartridge. Once you feel you are no longer getting any nicotine, change the cartridge.    Take off the top of the mouthpiece and throw away the cartridge.    Especially at the beginning, use the inhaler whenever you would normally smoke a cigarette.    Some tips:    Do not smoke while you are using the nicotine inhaler.    Each cartridge will last for 20 minutes of puffing.    Puff on the inhaler until your craving resolves. If used for less than 20 minutes, leave the cartridge in the inhaler and save for your next use.     Try different schedules to see what works best for you--try puffing for a full 20 minutes, or try puffing for 5 minutes at a time. The cartridge will last for FOUR 5 minute sessions.    Keep inhaler and cartridges out of reach of children.    Store the inhaler at room temperature.    Clean the mouthpiece regularly with soap and water.     As your body becomes less dependent on nicotine, you will need to use less cartridges.    Follow up with your health care professional about any questions you have and to get help with tapering you dose of the nicotine inhaler.    Side Effects:  Some people experience mild irritation of the mouth and throat in the first few days. These should resolve with time. If you experience any other troublesome or unusual side effects, call your health care professional.  HOW TO QUIT SMOKING  Smoking is one of the hardest habits to break. About half of all those who have ever smoked have been able to quit, and most of those (about 70%) who still smoke want to quit. Here are some of " the best ways to stop smoking.     KEEP TRYING:  It takes most smokers about 8 tries before they are finally able to fully quit. So, the more often you try and fail, the better your chance of quitting the next time! So, don't give up!    GO COLD TURKEY:  Most ex-smokers quit cold turkey. Trying to cut back gradually doesn't seem to work as well, perhaps because it continues the smoking habit. Also, it is possible to fool yourself by inhaling more while smoking fewer cigarettes. This results in the same amount of nicotine in your body!    GET SUPPORT:  Support programs can make an important difference, especially for the heavy smoker. These groups offer lectures, methods to change your behavior and peer support. Call the free national Quitline for more information. 800-QUIT-NOW (476-882-8030). Low-cost or free programs are offered by many hospitals, local chapters of the American Lung Association (934-905-8384) and the American Cancer Society (131-275-1580). Support at home is important too. Non-smokers can help by offering praise and encouragement. If the smoker fails to quit, encourage them to try again!    OVER-THE-COUNTER MEDICINES:  For those who can't quit on their own, Nicotine Replacement Therapy (NRT) may make quitting much easier. Certain aids such as the nicotine patch, gum and lozenge are available without a prescription. However, it is best to use these under the guidance of your doctor. The skin patch provides a steady supply of nicotine to the body. Nicotine gum and lozenge gives temporary bursts of low levels of nicotine. Both methods take the edge off the craving for cigarettes. WARNING: If you feel symptoms of nicotine overdose, such as nausea, vomiting, dizziness, weakness, or fast heartbeat, stop using these and see your doctor.    PRESCRIPTION MEDICINES:  After evaluating your smoking patterns and prior attempts at quitting, your doctor may offer a prescription medicine such as bupropion (Zyban,  Wellbutrin), varenicline (Chantix, Champix), a niocotine inhaler or nasal spray. Each has its unique advantage and side effects which your doctor can review with you.    HEALTH BENEFITS OF QUITTING:  The benefits of quitting start right away and keep improving the longer you go without smokin minutes: blood pressure and pulse return to normal  8 hours: oxygen levels return to normal  2 days: ability to smell and taste begins to improve as damaged nerves start to regrow  2-3 weeks: circulation and lung function improves  1-9 months: decreased cough, congestion and shortness of breath; less tired  1 year: risk of heart attack decreases by half  5 years: risk of lung cancer decreases by half; risk of stroke becomes the same as a non-smoker  For information about how to quit smoking, visit the following links:  [employer] [place] ,   Clearing the Air, Quit Smoking Today   - an online booklet. http://www.smokefree.gov/pubs/clearing_the_air.pdf  Smokefree.gov http://smokefree.gov/  QuitNet http://www.quitnet.com/    7802-2633 Donta Carey, [address] [place] [address], [place] [address]. All rights reserved. This information is not intended as a substitute for professional medical care. Always follow your healthcare professional's instructions.

## 2022-07-12 NOTE — PROGRESS NOTES
[facility]  [address]  [facility] [address]  Phone: [phone]  Primary Provider: Maribeth Avila  Pre-op Performing Provider: LEAH HILLS    PREOPERATIVE EVALUATION:  Today's date: 7/12/2022    Latoya Riddle is a 51 year old female who presents for a preoperative evaluation.    Surgical Information:  Surgery/Procedure: removal of 6 teeth  Surgery Location: [facility]   Surgeon:   Surgery Date: 7-15-22  Time of Surgery: 8:00am  Where patient plans to recover: At home with family  Fax number for surgical facility: [phone]    Type of Anesthesia Anticipated: General    Assessment & Plan     The proposed surgical procedure is considered INTERMEDIATE risk.    Preop general physical exam  Dental caries  Risks and Recommendations:  The patient has the following additional risks and recommendations for perioperative complications:   - No identified additional risk factors other than previously addressed  - Please note patient has CKD and NSAIDs should be avoided. She does not need to have the labs below completed before her procedure but I have ordered them in anticipation of her follow-up with her primary provider.    Medication recommendations:  - HOLD (do not take) any meds on the morning of surgery  - Continue avoiding NSAIDs which you already do    RECOMMENDATION:  APPROVAL GIVEN to proceed with proposed procedure, without further diagnostic evaluation.      Additional issues addressed today:  She will come back for fasting lab appointment and then see her PCP    Moderate episode of recurrent major depressive disorder (H)  Continues lexapro, working for her    Stage 3b chronic kidney disease (H)  - CBC with platelets  - Comprehensive metabolic panel (BMP + Alb, Alk Phos, ALT, AST, Total. Bili, TP)    Healthcare maintenance  - reviewed health maintenance - MANy care gaps, pt notified and reports she will see her PCP     Mixed hyperlipidemia  -  Lipid panel reflex to direct LDL Fasting    Screening for HIV without presence of risk factors  - HIV Antigen Antibody Combo    Encounter for hepatitis C screening test for low risk patient  - Hepatitis C Screen Reflex to HCV RNA Quant and Genotype    Nicotine dependence, uncomplicated, unspecified nicotine product type  - [place] Quit Partner Referral  - SMOKING CESSATION COUNSELING 3-10 MIN, 5 minutes spent discussing smoking cessation  - nicotine (NICOTROL) 10 MG inhaler  Dispense: 168 each; Refill: 2    Return if symptoms worsen or fail to improve, for Routine preventive PLUS follow up chronic conditions when you're able.    Italia Pino MD  Family Medicine  [facility]          Subjective     HPI related to upcoming procedure: Dental caries needing removal of teeth under general anesthesia    Preop Questions 7/11/2022   1. Have you ever had a heart attack or stroke? No   2. Have you ever had surgery on your heart or blood vessels, such as a stent placement, a coronary artery bypass, or surgery on an artery in your head, neck, heart, or legs? No   3. Do you have chest pain with activity? No   4. Do you have a history of  heart failure? No   5. Do you currently have a cold, bronchitis or symptoms of other infection? No   6. Do you have a cough, shortness of breath, or wheezing? YES - cough for year    7. Do you or anyone in your family have previous history of blood clots? YES - father    8. Do you or does anyone in your family have a serious bleeding problem such as prolonged bleeding following surgeries or cuts? No   9. Have you ever had problems with anemia or been told to take iron pills? No   10. Have you had any abnormal blood loss such as black, tarry or bloody stools, or abnormal vaginal bleeding? No   11. Have you ever had a blood transfusion? No   12. Are you willing to have a blood transfusion if it is medically needed before, during, or after your surgery? Yes   13. Have you  or any of your relatives ever had problems with anesthesia? No   14. Do you have sleep apnea, excessive snoring or daytime drowsiness? YES - patient has c-pap   14a. Do you have a CPAP machine? Yes   15. Do you have any artifical heart valves or other implanted medical devices like a pacemaker, defibrillator, or continuous glucose monitor? No   16. Do you have artificial joints? No   17. Are you allergic to latex? No   18. Is there any chance that you may be pregnant? No     Health Care Directive:  Patient does not have a Health Care Directive or Living Will: Discussed advance care planning with patient; however, patient declined at this time.    Preoperative Review of :   reviewed - no record of controlled substances prescribed.  Has just had a few related to her repeated Urologic procedures    Review of Systems  Constitutional, neuro, ENT, endocrine, pulmonary, cardiac, gastrointestinal, genitourinary, musculoskeletal, integument and psychiatric systems are negative, except as otherwise noted.    Patient Active Problem List    Diagnosis Date Noted     Moderate episode of recurrent major depressive disorder (H) 07/12/2022     Priority: Medium     Chronic kidney disease, stage 3 (H) 01/22/2021     Priority: Medium     Chronic migraine without aura without status migrainosus, not intractable 04/21/2020     Priority: Medium     RUQ pain 04/12/2020     Priority: Medium     CATHY (obstructive sleep apnea) 02/09/2018     Priority: Medium     Mild to moderate CATHY without sleep-associated hypoxemia   - PSG performed 4/1/2008 with weight 180 lbs, AHI 0.3, RDI 41, mary SpO2 93%, PLMI 0, arousal index 57.9.       Gross hematuria 02/15/2017     Priority: Medium     Anxiety 01/11/2016     Priority: Medium     Acute kidney injury (H) 06/25/2015     Priority: Medium     Pyelonephritis 08/16/2013     Priority: Medium     Acute right flank pain 05/25/2013     Priority: Medium     Health Care Home 05/06/2013     Priority:  Medium     EMERGENCY CARE PLAN  May 6, 2013: No current Care Coordination follow up planned. Please refer if Care Coordination services are needed.    Presenting Problem Signs and Symptoms Treatment Plan   Questions or concerns   during clinic hours   I will call my clinic directly:  [facility]  [address] [place] [address]  [phone].   Questions or concerns outside clinic hours   I will call the 24 hour nurse line at   [phone] or [address] [place] [address].   Need to schedule an appointment   I will call the 24 hour scheduling team at [phone] or my clinic directly at [phone].   Same day treatment     I will call my clinic first, nurse line if after hours, urgent care and express care if needed.   Clinic care coordination services (regular clinic hours)   I will call a clinic care coordinator directly:     Italia Keith RN  [phone]    Rosette Valle, SW:    [phone]    Or call my clinic at [phone] and ask to speak with care coordination.   Crisis Services: Behavioral or Mental Health  [facility] 24 Hour Phone Line  [phone]    [facility] 24 Hour Crisis Services  [phone]    [facility] (Behavioral Health Providers) Network [phone]    [facility]   [phone]     Emergency treatment -- Immediately    CAll 911              Lower urinary tract infectious disease 05/12/2012     Priority: Medium     Overview:   Resistant   Pseudomonas        Hydronephrosis, right 05/12/2012     Priority: Medium     Hyperlipemia 01/06/2011     Priority: Medium     Tobacco abuse 04/19/2010     Priority: Medium     Calculus of ureter 11/19/2009     Priority: Medium     Dyspnea and respiratory abnormality 04/07/2008     Priority: Medium     Sleep study 4/1/08: No CATHY. .5 minutes, sleep latency  normal 11.7 minutes. REM latency 154.5 minutes. Sleep efficiency at 89.4%. The sleep architecture was periodically disrupted with frequent sleep stage changes  and arousals. Snoring: moderately loud. RDI 41.0,  AHI of 0.3. Lowest O2 93.0%. PLM index 0.0.  Problem list name updated by automated process. Provider to review       Tobacco abuse disorder 2006     Priority: Medium     Calculus of kidney 2005     Priority: Medium     Due to RTA and medullary sponge kidney. [id number] ( See's Dr. Tavo Contreras)- calcium oxalate and calcium phosphate. S/p multiple procedures, >50       Congenital medullary sponge kidney 2005     Priority: Medium     Renal osteodystrophy 2005     Priority: Medium     Tubular acidosis        Past Medical History:   Diagnosis Date     Depressive disorder      Hyperlipidemia      Kidney stone      Past Surgical History:   Procedure Laterality Date     COMBINED CYSTOSCOPY, INSERT STENT URETER(S) Left 11/10/2017    Procedure: COMBINED CYSTOSCOPY, INSERT STENT URETER(S);  Cystoscopy, Left Ureteral Stent Placement;  Surgeon: Yg Rodriguez MD;  Location: [facility]     ESOPHAGOSCOPY, GASTROSCOPY, DUODENOSCOPY (EGD), COMBINED N/A 2020    Procedure: ESOPHAGOGASTRODUODENOSCOPY (EGD), WITH BIOPSY;  Surgeon: Brian Grimes DO;  Location: [facility] OR     EXCISE TOENAIL(S) Left 2016    Procedure: EXCISE TOENAIL(S);  Surgeon: Ady Mejia DPM;  Location: [facility] OR     GENITOURINARY SURGERY       IR MISCELLANEOUS PROCEDURE  2002     IR MISCELLANEOUS PROCEDURE  2002     IR MISCELLANEOUS PROCEDURE  2002     IR MISCELLANEOUS PROCEDURE  2002     SURGICAL HISTORY OF -            SURGICAL HISTORY OF -   -present    Lithothypsy X 49     SURGICAL HISTORY OF -       Percutaneous nephrostomy X2     SURGICAL HISTORY OF -   2011    Cystoscopy with bilateral ureteral pyeloscopy, stone basketing and stent placement     Current Outpatient Medications   Medication Sig Dispense Refill     escitalopram (LEXAPRO) 10 MG tablet Take 1 tablet (10 mg) by mouth daily 90 tablet 3     LORazepam (ATIVAN)  "0.5 MG tablet TAKE 1 TABLET BY MOUTH EVERY 8 HOURS AS NEEDED FOR ANXIETY 30 tablet 0     SUMAtriptan (IMITREX) 50 MG tablet Take 1 tablet (50 mg) by mouth at onset of headache for migraine May repeat in 2 hours. Max 4 tablets/24 hours. 18 tablet 1     ZYRTEC 10 MG OR TABS 1 TABLET DAILY         Allergies   Allergen Reactions     Gentamycin [Gentamicin Sulfate]      Skin Adhesives [Mecrylate]         Social History     Tobacco Use     Smoking status: Current Every Day Smoker     Packs/day: 0.50     Years: 20.00     Pack years: 10.00     Types: Cigarettes     Smokeless tobacco: Never Used     Tobacco comment: trying to quit; cutting back on her own   Substance Use Topics     Alcohol use: No     History   Drug Use No         Objective     /68   Pulse 79   Temp 97.6  F (36.4  C)   Resp 18   Ht 1.695 m (5' 6.75\")   Wt 87.5 kg (193 lb)   LMP 07/02/2013   SpO2 95%   BMI 30.45 kg/m      Physical Exam    GENERAL APPEARANCE: healthy, alert and no distress     EYES: EOMI, PERRL     HENT: ear canals and TM's normal and nose and mouth without ulcers or lesions. Mucous membranes generally dry and poor dentition/missing many teeth     NECK: no adenopathy, no asymmetry, masses, or scars and thyroid normal to palpation     RESP: lungs clear to auscultation - no rales, rhonchi or wheezes     CV: regular rates and rhythm, normal S1 S2, no S3 or S4 and no murmur, click or rub     ABDOMEN:  soft, nontender, no HSM or masses and bowel sounds normal     MS: extremities normal- no gross deformities noted, no evidence of inflammation in joints, FROM in all extremities.     SKIN: no suspicious lesions or rashes     NEURO: Normal strength and tone, sensory exam grossly normal, mentation intact and speech normal     PSYCH: mentation appears normal. and affect normal/bright     LYMPHATICS: No cervical adenopathy    Recent Labs   Lab Test 10/01/21  1433 05/10/21  1406 01/22/21  1515   HGB 16.3* 16.1*  --    * 125*  --    NA  " --  141 140   POTASSIUM  --  3.6 3.6   CR  --  1.49* 1.52*      Diagnostics:  Labs pending at this time.  Results will be reviewed when available.   No EKG required, no history of coronary heart disease, significant arrhythmia, peripheral arterial disease or other structural heart disease.    Revised Cardiac Risk Index (RCRI):  The patient has the following serious cardiovascular risks for perioperative complications:   - No serious cardiac risks = 0 points     RCRI Interpretation: 0 points: Class I (very low risk - 0.4% complication rate)           Signed Electronically by: Italia Pino MD  Copy of this evaluation report is provided to requesting physician.    Answers for HPI/ROS submitted by the patient on 7/11/2022  PHQ9 TOTAL SCORE: 6

## 2022-08-17 ENCOUNTER — TRANSFERRED RECORDS (OUTPATIENT)
Dept: FAMILY MEDICINE | Facility: CLINIC | Age: 52
End: 2022-08-17

## 2022-08-26 ENCOUNTER — TELEPHONE (OUTPATIENT)
Dept: FAMILY MEDICINE | Facility: CLINIC | Age: 52
End: 2022-08-26

## 2022-08-26 NOTE — LETTER
August 26, 2022      Latoya Francismagda  [address]  [place] [address]      Your healthcare team cares about your health. To provide you with the best care,   we have reviewed your chart and based on our findings, we see that you are due to:     - CERVICAL CANCER SCREENING: Schedule a Cervical Cancer Screening, with Pap and wellness exam.   - COLON CANCER SCREENING:  Call or mychart the clinic to schedule your colonoscopy or schedule/ your FIT Test, or Cologuard test    If you have already completed these items, please contact the clinic via phone or   Mychart so your care team can review and update your records. Thank you for   choosing [facility] for your healthcare needs. For any questions,   concerns, or to schedule an appointment please contact the clinic.       Healthy Regards,      Your [facility] Care Team

## 2022-08-26 NOTE — TELEPHONE ENCOUNTER
Patient Quality Outreach    Patient is due for the following:   Colon Cancer Screening  Cervical Cancer Screening - PAP Needed  Physical Preventive Adult Physical    Next Steps:   Schedule a Adult Preventative    Type of outreach:    Sent letter.      Questions for provider review:    None     Carline Solitario, CMA

## 2022-10-05 ENCOUNTER — OFFICE VISIT (OUTPATIENT)
Dept: FAMILY MEDICINE | Facility: CLINIC | Age: 52
End: 2022-10-05
Payer: COMMERCIAL

## 2022-10-05 VITALS
BODY MASS INDEX: 29.98 KG/M2 | WEIGHT: 190 LBS | DIASTOLIC BLOOD PRESSURE: 62 MMHG | HEART RATE: 77 BPM | OXYGEN SATURATION: 98 % | RESPIRATION RATE: 18 BRPM | SYSTOLIC BLOOD PRESSURE: 96 MMHG | TEMPERATURE: 98.2 F

## 2022-10-05 DIAGNOSIS — L98.9 SKIN LESION: Primary | ICD-10-CM

## 2022-10-05 PROCEDURE — 99213 OFFICE O/P EST LOW 20 MIN: CPT | Performed by: NURSE PRACTITIONER

## 2022-10-05 ASSESSMENT — PATIENT HEALTH QUESTIONNAIRE - PHQ9
10. IF YOU CHECKED OFF ANY PROBLEMS, HOW DIFFICULT HAVE THESE PROBLEMS MADE IT FOR YOU TO DO YOUR WORK, TAKE CARE OF THINGS AT HOME, OR GET ALONG WITH OTHER PEOPLE: NOT DIFFICULT AT ALL
SUM OF ALL RESPONSES TO PHQ QUESTIONS 1-9: 5
SUM OF ALL RESPONSES TO PHQ QUESTIONS 1-9: 5

## 2022-10-05 ASSESSMENT — PAIN SCALES - GENERAL: PAINLEVEL: NO PAIN (0)

## 2022-10-05 NOTE — PROGRESS NOTES
"I, Ann Campbell, was present with the NP student who participated in the service and in the documentationof the note.  I have verified the history and personally performed the physical exam and medical decision making.  I agree with the assessment and plan of care as documented in the note.       Ann Campbell, CNP      Assessment & Plan     Skin lesion  Referral to Derm sent to evaluate and assess lesions. Patient verbalizes understanding.   - Adult Dermatology Referral; Future     Nicotine/Tobacco Cessation:  She reports that she has been smoking cigarettes. She has a 10.00 pack-year smoking history. She has never used smokeless tobacco.  Nicotine/Tobacco Cessation Plan:         BMI:   Estimated body mass index is 29.98 kg/m  as calculated from the following:    Height as of 7/12/22: 1.695 m (5' 6.75\").    Weight as of this encounter: 86.2 kg (190 lb).       Patient Instructions   I placed a referral to a dermatologist.  You can also try [facility] in [place].      Return in about 1 month (around 11/5/2022) for worsening or continued symptoms.      Chente Klein is a 51 year old, presenting for the following health issues:  Mole (Has a few moles that are bothering her and seem to be painful to the touch. The spot on her head is bothering her the most since it catches on things. The most painful spot is on her outer right breast. )      History of Present Illness       Reason for visit:  Painful moles  Symptom onset:  More than a month  Symptom intensity:  Mild  Symptom progression:  Staying the same  Had these symptoms before:  No    She eats 2-3 servings of fruits and vegetables daily.She consumes 0 sweetened beverage(s) daily.She exercises with enough effort to increase her heart rate 9 or less minutes per day.  She exercises with enough effort to increase her heart rate 3 or less days per week.   She is taking medications regularly.    Today's PHQ-9         PHQ-9 Total Score: " 5    PHQ-9 Q9 Thoughts of better off dead/self-harm past 2 weeks :   Not at all    How difficult have these problems made it for you to do your work, take care of things at home, or get along with other people: Not difficult at all     HPI reviewed above. Additional information, patient has some chronic moles that have not changed shape, color, size. Patient does report two new moles one one right upper breast and one left flank that have been present for approximately two months. Patient reports that if something catches or rubs on the mole that is when there will be discomfort, otherwise no pain is noted. Patient does report she has had two mole removed in the past by Derm. Patient denies any other associated symptoms.   Review of Systems   Constitutional, HEENT, cardiovascular, pulmonary, gi and gu systems are negative, except as otherwise noted.      Objective    BP 96/62   Pulse 77   Temp 98.2  F (36.8  C) (Tympanic)   Resp 18   Wt 86.2 kg (190 lb)   LMP 07/02/2013   SpO2 98%   BMI 29.98 kg/m    Body mass index is 29.98 kg/m .  Physical Exam  Constitutional:       Appearance: Normal appearance.   HENT:      Mouth/Throat:      Mouth: Mucous membranes are moist.      Pharynx: Oropharynx is clear.   Cardiovascular:      Rate and Rhythm: Normal rate and regular rhythm.      Pulses: Normal pulses.      Heart sounds: Normal heart sounds.   Pulmonary:      Effort: Pulmonary effort is normal.      Breath sounds: Normal breath sounds.   Skin:     General: Skin is warm and dry.      Capillary Refill: Capillary refill takes less than 2 seconds.          Neurological:      General: No focal deficit present.      Mental Status: She is alert and oriented to person, place, and time.   Psychiatric:         Mood and Affect: Mood normal.         Behavior: Behavior normal.      Arabella OLVERA-S

## 2022-10-22 ENCOUNTER — HEALTH MAINTENANCE LETTER (OUTPATIENT)
Age: 52
End: 2022-10-22

## 2022-11-07 ENCOUNTER — TRANSFERRED RECORDS (OUTPATIENT)
Dept: FAMILY MEDICINE | Facility: CLINIC | Age: 52
End: 2022-11-07

## 2022-11-07 LAB — HEMOCCULT STL QL IA: NEGATIVE

## 2022-12-05 ENCOUNTER — TELEPHONE (OUTPATIENT)
Dept: FAMILY MEDICINE | Facility: CLINIC | Age: 52
End: 2022-12-05

## 2022-12-05 NOTE — LETTER
December 5, 2022      Latoya Riddle  [address]  [place] [address]            Dear Latoya,       Your team at [facility] cares about your health. We have reviewed your chart and based on our findings; we are making the following recommendations to better manage your health.     You are in particular need of attention regarding the following:     Call or MyChart message your clinic to schedule a colonoscopy, schedule/ a FIT Test, or order a Cologuard test. If you are unsure what type of test you need, please call your clinic and speak to clinic staff.   Colon cancer is now the second leading cause of cancer-related deaths in the [facility] for both men and women and there are over 130,000 new cases and 50,000 deaths per year from colon cancer. Colonoscopies can prevent 90-95% of these deaths. Problem lesions can be removed before they ever become cancer. This test is not only looking for cancer, but also getting rid of precancerous lesions.   If you are under/uninsured, we recommend you contact the [employer] Program.[employer] is a free colorectal cancer screening program that provides colonoscopies for eligible under/uninsured [place] men and women. If you are interested in receiving a free colonoscopy, please call [employer] at t [phone] (mention code [employer]) to see if you're eligible. Please have them send us the results.   PREVENTATIVE VISIT: Physical/pappe    If you have already completed these items, please contact the clinic via phone or   Innolighthart so your care team can review and update your records. Thank you for   choosing [facility] for your healthcare needs. For any questions,   concerns, or to schedule an appointment please contact our clinic.    Sincerely,    MICHELLE Sheehan CNP

## 2022-12-05 NOTE — TELEPHONE ENCOUNTER
Patient Quality Outreach    Patient is due for the following:   Colon Cancer Screening  Cervical Cancer Screening - PAP Needed    Next Steps:   Schedule a office visit for pappe/pe    Type of outreach:    Sent letter.      Questions for provider review:    None     Dereje Celestin, CMA

## 2022-12-10 ENCOUNTER — HEALTH MAINTENANCE LETTER (OUTPATIENT)
Age: 52
End: 2022-12-10

## 2023-01-24 ENCOUNTER — MYC MEDICAL ADVICE (OUTPATIENT)
Dept: FAMILY MEDICINE | Facility: CLINIC | Age: 53
End: 2023-01-24
Payer: COMMERCIAL

## 2023-01-24 ENCOUNTER — MYC REFILL (OUTPATIENT)
Dept: FAMILY MEDICINE | Facility: CLINIC | Age: 53
End: 2023-01-24
Payer: COMMERCIAL

## 2023-01-24 DIAGNOSIS — F41.9 ANXIETY: ICD-10-CM

## 2023-01-24 DIAGNOSIS — F33.1 MODERATE EPISODE OF RECURRENT MAJOR DEPRESSIVE DISORDER (H): ICD-10-CM

## 2023-01-25 ENCOUNTER — HOSPITAL ENCOUNTER (OUTPATIENT)
Dept: MAMMOGRAPHY | Facility: CLINIC | Age: 53
Discharge: HOME | End: 2023-01-25
Attending: NURSE PRACTITIONER | Admitting: NURSE PRACTITIONER
Payer: COMMERCIAL

## 2023-01-25 DIAGNOSIS — Z12.31 VISIT FOR SCREENING MAMMOGRAM: ICD-10-CM

## 2023-01-25 PROCEDURE — 77067 SCR MAMMO BI INCL CAD: CPT

## 2023-01-25 RX ORDER — ESCITALOPRAM OXALATE 10 MG/1
10 TABLET ORAL DAILY
Qty: 7 TABLET | Refills: 0 | Status: SHIPPED | OUTPATIENT
Start: 2023-01-25 | End: 2023-02-02

## 2023-01-26 RX ORDER — LORAZEPAM 0.5 MG/1
0.5 TABLET ORAL EVERY 8 HOURS PRN
Qty: 30 TABLET | Refills: 0 | OUTPATIENT
Start: 2023-01-26

## 2023-01-26 NOTE — TELEPHONE ENCOUNTER
Pending Prescriptions:                       Disp   Refills    LORazepam (ATIVAN) 0.5 MG tablet          30 tab*0            Sig: Take 1 tablet (0.5 mg) by mouth every 8 hours as           needed for anxiety    Routing refill request to provider for review/approval because:  Drug not on the G refill protocol       Andres Styles RN

## 2023-02-01 ASSESSMENT — ANXIETY QUESTIONNAIRES
3. WORRYING TOO MUCH ABOUT DIFFERENT THINGS: SEVERAL DAYS
2. NOT BEING ABLE TO STOP OR CONTROL WORRYING: SEVERAL DAYS
GAD7 TOTAL SCORE: 8
1. FEELING NERVOUS, ANXIOUS, OR ON EDGE: MORE THAN HALF THE DAYS
4. TROUBLE RELAXING: SEVERAL DAYS
6. BECOMING EASILY ANNOYED OR IRRITABLE: NEARLY EVERY DAY
7. FEELING AFRAID AS IF SOMETHING AWFUL MIGHT HAPPEN: NOT AT ALL
8. IF YOU CHECKED OFF ANY PROBLEMS, HOW DIFFICULT HAVE THESE MADE IT FOR YOU TO DO YOUR WORK, TAKE CARE OF THINGS AT HOME, OR GET ALONG WITH OTHER PEOPLE?: SOMEWHAT DIFFICULT
IF YOU CHECKED OFF ANY PROBLEMS ON THIS QUESTIONNAIRE, HOW DIFFICULT HAVE THESE PROBLEMS MADE IT FOR YOU TO DO YOUR WORK, TAKE CARE OF THINGS AT HOME, OR GET ALONG WITH OTHER PEOPLE: SOMEWHAT DIFFICULT
7. FEELING AFRAID AS IF SOMETHING AWFUL MIGHT HAPPEN: NOT AT ALL
GAD7 TOTAL SCORE: 8
5. BEING SO RESTLESS THAT IT IS HARD TO SIT STILL: NOT AT ALL
GAD7 TOTAL SCORE: 8

## 2023-02-01 ASSESSMENT — PATIENT HEALTH QUESTIONNAIRE - PHQ9
SUM OF ALL RESPONSES TO PHQ QUESTIONS 1-9: 6
10. IF YOU CHECKED OFF ANY PROBLEMS, HOW DIFFICULT HAVE THESE PROBLEMS MADE IT FOR YOU TO DO YOUR WORK, TAKE CARE OF THINGS AT HOME, OR GET ALONG WITH OTHER PEOPLE: NOT DIFFICULT AT ALL
SUM OF ALL RESPONSES TO PHQ QUESTIONS 1-9: 6

## 2023-02-02 ENCOUNTER — OFFICE VISIT (OUTPATIENT)
Dept: FAMILY MEDICINE | Facility: CLINIC | Age: 53
End: 2023-02-02
Payer: COMMERCIAL

## 2023-02-02 VITALS
TEMPERATURE: 97.3 F | BODY MASS INDEX: 29.03 KG/M2 | WEIGHT: 185 LBS | HEIGHT: 67 IN | RESPIRATION RATE: 16 BRPM | SYSTOLIC BLOOD PRESSURE: 112 MMHG | DIASTOLIC BLOOD PRESSURE: 68 MMHG | HEART RATE: 68 BPM | OXYGEN SATURATION: 99 %

## 2023-02-02 DIAGNOSIS — F33.1 MODERATE EPISODE OF RECURRENT MAJOR DEPRESSIVE DISORDER (H): ICD-10-CM

## 2023-02-02 DIAGNOSIS — N18.32 STAGE 3B CHRONIC KIDNEY DISEASE (H): ICD-10-CM

## 2023-02-02 DIAGNOSIS — F41.9 ANXIETY: ICD-10-CM

## 2023-02-02 DIAGNOSIS — G43.709 CHRONIC MIGRAINE WITHOUT AURA WITHOUT STATUS MIGRAINOSUS, NOT INTRACTABLE: ICD-10-CM

## 2023-02-02 LAB
ANION GAP SERPL CALCULATED.3IONS-SCNC: 11 MMOL/L (ref 7–15)
BUN SERPL-MCNC: 19.8 MG/DL (ref 6–20)
CALCIUM SERPL-MCNC: 10.1 MG/DL (ref 8.6–10)
CHLORIDE SERPL-SCNC: 103 MMOL/L (ref 98–107)
CREAT SERPL-MCNC: 1.58 MG/DL (ref 0.51–0.95)
CREAT UR-MCNC: 73.5 MG/DL
DEPRECATED HCO3 PLAS-SCNC: 27 MMOL/L (ref 22–29)
GFR SERPL CREATININE-BSD FRML MDRD: 39 ML/MIN/1.73M2
GLUCOSE SERPL-MCNC: 97 MG/DL (ref 70–99)
HGB BLD-MCNC: 16 G/DL (ref 11.7–15.7)
MICROALBUMIN UR-MCNC: 95.5 MG/L
MICROALBUMIN/CREAT UR: 129.93 MG/G CR (ref 0–25)
POTASSIUM SERPL-SCNC: 4.3 MMOL/L (ref 3.4–5.3)
SODIUM SERPL-SCNC: 141 MMOL/L (ref 136–145)

## 2023-02-02 PROCEDURE — 80048 BASIC METABOLIC PNL TOTAL CA: CPT | Performed by: NURSE PRACTITIONER

## 2023-02-02 PROCEDURE — 82043 UR ALBUMIN QUANTITATIVE: CPT | Performed by: NURSE PRACTITIONER

## 2023-02-02 PROCEDURE — 36415 COLL VENOUS BLD VENIPUNCTURE: CPT | Performed by: NURSE PRACTITIONER

## 2023-02-02 PROCEDURE — 82570 ASSAY OF URINE CREATININE: CPT | Performed by: NURSE PRACTITIONER

## 2023-02-02 PROCEDURE — 99214 OFFICE O/P EST MOD 30 MIN: CPT | Performed by: NURSE PRACTITIONER

## 2023-02-02 PROCEDURE — 85018 HEMOGLOBIN: CPT | Performed by: NURSE PRACTITIONER

## 2023-02-02 RX ORDER — ESCITALOPRAM OXALATE 10 MG/1
10 TABLET ORAL DAILY
Qty: 90 TABLET | Refills: 3 | Status: SHIPPED | OUTPATIENT
Start: 2023-02-02 | End: 2024-02-08

## 2023-02-02 RX ORDER — LORAZEPAM 0.5 MG/1
0.5 TABLET ORAL EVERY 8 HOURS PRN
Qty: 30 TABLET | Refills: 0 | Status: SHIPPED | OUTPATIENT
Start: 2023-02-02 | End: 2024-02-21

## 2023-02-02 RX ORDER — SUMATRIPTAN 50 MG/1
50 TABLET, FILM COATED ORAL
Qty: 18 TABLET | Refills: 1 | Status: SHIPPED | OUTPATIENT
Start: 2023-02-02 | End: 2024-05-14

## 2023-02-02 ASSESSMENT — PATIENT HEALTH QUESTIONNAIRE - PHQ9
SUM OF ALL RESPONSES TO PHQ QUESTIONS 1-9: 6
10. IF YOU CHECKED OFF ANY PROBLEMS, HOW DIFFICULT HAVE THESE PROBLEMS MADE IT FOR YOU TO DO YOUR WORK, TAKE CARE OF THINGS AT HOME, OR GET ALONG WITH OTHER PEOPLE: NOT DIFFICULT AT ALL

## 2023-02-02 ASSESSMENT — PAIN SCALES - GENERAL: PAINLEVEL: MODERATE PAIN (5)

## 2023-02-02 ASSESSMENT — ANXIETY QUESTIONNAIRES: GAD7 TOTAL SCORE: 8

## 2023-02-02 NOTE — PROGRESS NOTES
HPI: Patient presents to the clinic for a refill of mental health medications. The patient has a history of chronic kidney disease stage 3 due to a congenital medullary sponge kidney. Patient reports she is taking her Lexapro as prescribed and only uses the lorazepam as needed. She states she is satisfied with her medications and denies side effects. She desires a refill of her medications. She had to stop going to therapy due to insurance coverage issues, but is interested in finding a new counselor to talk to. The patient follows with a urologist for her chronic kidney disease. Since her last appointment, she has not had any changes in the frequency of her migraines. She gets approximately one migraine per month and uses the sumatriptan if she does not need to drive anywhere or watch her granddaughter because it makes her sleepy.    PMH:  Chronic migraine without aura  CATHY  Hyperlipidemia  CKD stage 3  Tobacco Use  Depression  Anxiety    Surgical History:  Urethral stents    Recent illness: None    Social History:  Tobacco - smokes cigarettes but reports she has been cutting back    Medications:  Escitalopram 10 mg daily  Lorazepam 0.5 mg as needed  Sumatriptan 50 mg as needed for migraines  Zyrtec 10 mg daily    Allergies:   Gentamycin  Skin adhesives    ROS  GEN: denies fever, chill or weight loss  PSYCHIATRIC/BEHAVIORAL: positive for anxiety    Objective  VS: Temp 97.3F, Pulse 68, /68, Resp 16, SpO2 99% BMI 34.93  GEN: alert & oriented, in no acute distress  PSYCH: appropriate mood and affect    PHQ 7/11/2022 10/5/2022 2/1/2023   PHQ-9 Total Score 6 5 6   Q9: Thoughts of better off dead/self-harm past 2 weeks Not at all Not at all Not at all       ALFREDITO-7 SCORE 9/30/2021 11/3/2021 2/1/2023   Total Score - - -   Total Score 11 (moderate anxiety) 7 (mild anxiety) 8 (mild anxiety)   Total Score 11 7 8     Assessment  1. Moderate Depressive Disorder  Controlled  PHQ-9 today is 6    2. Anxiety  Controlled  ALFREDITO-7  is 8    3. Migraines  Well-controlled    4. Chronic kidney disease  Controlled    Plan  1. Moderate Depressive Disorder   Continue escitalopram 10 mg daily  Referral to Adult Mental Health Counseling    2. Anxiety  Continue escitalopram 10 mg daily  Refill lorazepam 0.5 mg as needed  Referral to Adult Mental Health Counseling    3. Migraines  Continue sumatriptan as needed    4. Chronic kidney disease  Continue to follow with urology  BMP & Hemoglobin  Urine Albumin    Apoorvaruthie Rain on 2/2/2023 at 2:47 PM  Nurse Practitioner Student

## 2023-02-02 NOTE — PROGRESS NOTES
Assessment & Plan     Moderate episode of recurrent major depressive disorder (H)  Well controlled.  - escitalopram (LEXAPRO) 10 MG tablet; Take 1 tablet (10 mg) by mouth daily  - Adult [facility]  Referral; Future    Anxiety  Well controlled.  - escitalopram (LEXAPRO) 10 MG tablet; Take 1 tablet (10 mg) by mouth daily  - LORazepam (ATIVAN) 0.5 MG tablet; Take 1 tablet (0.5 mg) by mouth every 8 hours as needed for anxiety  - [facility]  Referral; Future    Stage 3b chronic kidney disease (H)  Follow with a urologist   Due for labs:  - Albumin Random Urine Quantitative with Creat Ratio; Future  - Hemoglobin; Future  - Basic metabolic panel  (Ca, Cl, CO2, Creat, Gluc, K, Na, BUN); Future    Chronic migraine without aura without status migrainosus, not intractable  Stable.  - SUMAtriptan (IMITREX) 50 MG tablet; Take 1 tablet (50 mg) by mouth at onset of headache for migraine May repeat in 2 hours. Max 4 tablets/24 hours.    The risks, benefits and treatment options of prescribed medications or other treatments have been discussed with the patient. The patient verbalized their understanding and should call or follow up if no improvement or if they develop further problems.    MICHELLE [facility]            Chente Klein is a 52 year old, presenting for the following health issues:  Anxiety, Depression, and Health Maintenance (Declined all immunizations today;  also reminded to schedule pap smear.)      History of Present Illness       Mental Health Follow-up:  Patient presents to follow-up on Depression & Anxiety.Patient's depression since last visit has been:  Better  The patient is not having other symptoms associated with depression.  Patient's anxiety since last visit has been:  No change  The patient is not having other symptoms associated with anxiety.  Any significant life events: relationship concerns  Patient is feeling anxious or  "having panic attacks.  Patient has no concerns about alcohol or drug use.    She eats 2-3 servings of fruits and vegetables daily.She consumes 1 sweetened beverage(s) daily.She exercises with enough effort to increase her heart rate 10 to 19 minutes per day.  She exercises with enough effort to increase her heart rate 3 or less days per week.   She is taking medications regularly.    Today's PHQ-9         PHQ-9 Total Score: 6    PHQ-9 Q9 Thoughts of better off dead/self-harm past 2 weeks :   Not at all    How difficult have these problems made it for you to do your work, take care of things at home, or get along with other people: Not difficult at all  Today's ALFREDITO-7 Score: 8       Migraine -  Is there an alternative to imitrex? - side effect    Since your last clinic visit, how have your headaches changed?  No change    How often are you getting headaches or migraines? One monthly     Are you able to do normal daily activities when you have a migraine?  Depends - migraines, no;  Regular headache yes    Are you taking rescue/relief medications? (Select all that apply) No'  Can't take ibuprofen and imitrex \"knocks her out\"    How helpful is your rescue/relief medication?      Are you taking any medications to prevent migraines? (Select all that apply)  No    In the past 4 weeks, how often have you gone to urgent care or the emergency room because of your headaches?  0        Chronic Kidney Disease Follow-up      Do you take any over the counter pain medicine?: No      Review of Systems   Constitutional, HEENT, cardiovascular, pulmonary, gi and gu systems are negative, except as otherwise noted.      Objective    /68 (BP Location: Right arm, Cuff Size: Adult Regular)   Pulse 68   Temp 97.3  F (36.3  C) (Tympanic)   Resp 16   Ht 1.695 m (5' 6.75\")   Wt 83.9 kg (185 lb)   LMP 07/02/2013   SpO2 99%   BMI 29.19 kg/m    Body mass index is 29.19 kg/m .  Physical Exam   GENERAL: healthy, alert and no " distress  NECK: no adenopathy, no asymmetry, masses, or scars and thyroid normal to palpation  RESP: lungs clear to auscultation - no rales, rhonchi or wheezes  CV: regular rate and rhythm, normal S1 S2, no S3 or S4, no murmur, click or rub, no peripheral edema and peripheral pulses strong

## 2023-02-20 ENCOUNTER — ALLIED HEALTH/NURSE VISIT (OUTPATIENT)
Dept: FAMILY MEDICINE | Facility: CLINIC | Age: 53
End: 2023-02-20
Payer: COMMERCIAL

## 2023-02-20 ENCOUNTER — NURSE TRIAGE (OUTPATIENT)
Dept: NURSING | Facility: CLINIC | Age: 53
End: 2023-02-20

## 2023-02-20 ENCOUNTER — HOSPITAL ENCOUNTER (EMERGENCY)
Facility: CLINIC | Age: 53
Discharge: HOME | End: 2023-02-21
Attending: EMERGENCY MEDICINE | Admitting: EMERGENCY MEDICINE
Payer: COMMERCIAL

## 2023-02-20 ENCOUNTER — HOSPITAL ENCOUNTER (EMERGENCY)
Facility: CLINIC | Age: 53
Discharge: LEFT BEFORE DISCHARGE | End: 2023-02-20
Attending: PHYSICIAN ASSISTANT | Admitting: PHYSICIAN ASSISTANT
Payer: COMMERCIAL

## 2023-02-20 ENCOUNTER — APPOINTMENT (OUTPATIENT)
Dept: CT IMAGING | Facility: CLINIC | Age: 53
End: 2023-02-20
Attending: EMERGENCY MEDICINE
Payer: COMMERCIAL

## 2023-02-20 VITALS
DIASTOLIC BLOOD PRESSURE: 70 MMHG | SYSTOLIC BLOOD PRESSURE: 136 MMHG | OXYGEN SATURATION: 100 % | TEMPERATURE: 97.6 F | HEART RATE: 73 BPM | RESPIRATION RATE: 18 BRPM

## 2023-02-20 DIAGNOSIS — Z90.5 SOLITARY KIDNEY, ACQUIRED: ICD-10-CM

## 2023-02-20 DIAGNOSIS — R31.9 HEMATURIA: Primary | ICD-10-CM

## 2023-02-20 DIAGNOSIS — R31.0 GROSS HEMATURIA: ICD-10-CM

## 2023-02-20 DIAGNOSIS — R93.89 ABNORMAL CT SCAN: ICD-10-CM

## 2023-02-20 LAB
ALBUMIN UR-MCNC: 100 MG/DL
APPEARANCE UR: ABNORMAL
BASOPHILS # BLD AUTO: 0 10E3/UL (ref 0–0.2)
BASOPHILS NFR BLD AUTO: 0 %
BILIRUB UR QL STRIP: NEGATIVE
COLOR UR AUTO: ABNORMAL
EOSINOPHIL # BLD AUTO: 0.1 10E3/UL (ref 0–0.7)
EOSINOPHIL NFR BLD AUTO: 1 %
ERYTHROCYTE [DISTWIDTH] IN BLOOD BY AUTOMATED COUNT: 14.6 % (ref 10–15)
GLUCOSE UR STRIP-MCNC: NEGATIVE MG/DL
HCT VFR BLD AUTO: 48.5 % (ref 35–47)
HGB BLD-MCNC: 15.8 G/DL (ref 11.7–15.7)
HGB UR QL STRIP: ABNORMAL
IMM GRANULOCYTES # BLD: 0 10E3/UL
IMM GRANULOCYTES NFR BLD: 0 %
KETONES UR STRIP-MCNC: NEGATIVE MG/DL
LEUKOCYTE ESTERASE UR QL STRIP: ABNORMAL
LYMPHOCYTES # BLD AUTO: 3 10E3/UL (ref 0.8–5.3)
LYMPHOCYTES NFR BLD AUTO: 31 %
MCH RBC QN AUTO: 29.2 PG (ref 26.5–33)
MCHC RBC AUTO-ENTMCNC: 32.6 G/DL (ref 31.5–36.5)
MCV RBC AUTO: 90 FL (ref 78–100)
MONOCYTES # BLD AUTO: 0.5 10E3/UL (ref 0–1.3)
MONOCYTES NFR BLD AUTO: 5 %
NEUTROPHILS # BLD AUTO: 6 10E3/UL (ref 1.6–8.3)
NEUTROPHILS NFR BLD AUTO: 63 %
NITRATE UR QL: POSITIVE
NRBC # BLD AUTO: 0 10E3/UL
NRBC BLD AUTO-RTO: 0 /100
PH UR STRIP: 7 [PH] (ref 5–7)
PLATELET # BLD AUTO: 136 10E3/UL (ref 150–450)
RBC # BLD AUTO: 5.42 10E6/UL (ref 3.8–5.2)
RBC URINE: >182 /HPF
SP GR UR STRIP: 1.01 (ref 1–1.03)
UROBILINOGEN UR STRIP-MCNC: NORMAL MG/DL
WBC # BLD AUTO: 9.6 10E3/UL (ref 4–11)
WBC URINE: >182 /HPF

## 2023-02-20 PROCEDURE — 80053 COMPREHEN METABOLIC PANEL: CPT | Performed by: EMERGENCY MEDICINE

## 2023-02-20 PROCEDURE — 99284 EMERGENCY DEPT VISIT MOD MDM: CPT | Mod: 25 | Performed by: EMERGENCY MEDICINE

## 2023-02-20 PROCEDURE — 36415 COLL VENOUS BLD VENIPUNCTURE: CPT | Performed by: EMERGENCY MEDICINE

## 2023-02-20 PROCEDURE — 99284 EMERGENCY DEPT VISIT MOD MDM: CPT | Performed by: EMERGENCY MEDICINE

## 2023-02-20 PROCEDURE — 99207 PR NO CHARGE NURSE ONLY: CPT

## 2023-02-20 PROCEDURE — 85025 COMPLETE CBC W/AUTO DIFF WBC: CPT | Performed by: EMERGENCY MEDICINE

## 2023-02-20 PROCEDURE — 999N000104 HC STATISTIC NO CHARGE: Performed by: PHYSICIAN ASSISTANT

## 2023-02-20 PROCEDURE — 81001 URINALYSIS AUTO W/SCOPE: CPT | Performed by: PHYSICIAN ASSISTANT

## 2023-02-20 PROCEDURE — 74176 CT ABD & PELVIS W/O CONTRAST: CPT

## 2023-02-20 RX ORDER — OXYCODONE HYDROCHLORIDE 5 MG/1
TABLET ORAL
COMMUNITY
Start: 2022-07-15 | End: 2023-03-24

## 2023-02-20 RX ORDER — IOPAMIDOL 755 MG/ML
80 INJECTION, SOLUTION INTRAVASCULAR ONCE
Status: DISCONTINUED | OUTPATIENT
Start: 2023-02-20 | End: 2023-02-20

## 2023-02-20 RX ORDER — HYDROCODONE BITARTRATE AND ACETAMINOPHEN 5; 325 MG/1; MG/1
TABLET ORAL
COMMUNITY
Start: 2022-11-15 | End: 2023-03-24

## 2023-02-20 ASSESSMENT — ENCOUNTER SYMPTOMS
ALLERGIC/IMMUNOLOGIC NEGATIVE: 1
MUSCULOSKELETAL NEGATIVE: 1
ENDOCRINE NEGATIVE: 1
HEMATOLOGIC/LYMPHATIC NEGATIVE: 1
CARDIOVASCULAR NEGATIVE: 1
HEMATURIA: 1
EYES NEGATIVE: 1
GASTROINTESTINAL NEGATIVE: 1
RESPIRATORY NEGATIVE: 1
PSYCHIATRIC NEGATIVE: 1
CONSTITUTIONAL NEGATIVE: 1
NEUROLOGICAL NEGATIVE: 1

## 2023-02-20 ASSESSMENT — ACTIVITIES OF DAILY LIVING (ADL): ADLS_ACUITY_SCORE: 35

## 2023-02-20 NOTE — PROGRESS NOTES
PCP advises ER, as patient needs evaluation and possibly further testing. Patient could try [facility] to see if they wait there is less, as patient has been seen there in the past.   Patient was notified with understanding and will either present back to [place] [facility] or check the wait at other facilities.     Lali Linares RN  [facility]

## 2023-02-20 NOTE — ED PROVIDER NOTES
/70   Pulse 73   Temp 97.6  F (36.4  C) (Tympanic)   Resp 18   LMP 07/02/2013   SpO2 100%     Patient is a 52-year-old female presenting with complaints of gross hematuria since yesterday.  Patient denies associated dysuria, urinary urgency or frequency, or incomplete bladder emptying.  She states she is not having symptoms of urinary tract infection.  She states she is starting to develop some left lower quadrant abdominal pain and left low back pain.  Patient is concerned because her symptoms are similar to when she experienced a ureteral hemorrhage and subsequently had to have her right kidney removed about 6 years ago.  She states she has a history of recurrent kidney stones.  Given patient's history and current symptoms of gross hematuria, left lower quadrant abdominal/flank pain and without associated urinary tract infection symptoms, I recommended she be evaluated in the emergency department.  We discussed that she will likely require further testing and/or treatment beyond the capabilities of the current urgent care setting including labs and potential imaging.  Patient expresses understanding of this and agreement with the plan.  Patient was therefore transferred to the waiting room in stable condition.   Urinalysis was pending at the time of transfer.     Ashley Joyner PA-C  02/20/23 2012

## 2023-02-20 NOTE — PROGRESS NOTES
Patient presents distraught regarding current symptoms of hematuria and ER situation and requesting order for CT.   She reports she started having hematuria yesterday, which worries her, as she had this symptom the last time she lost her R kidney 6 years ago.    She presented to [place] urgent care today, who sent her to ER.  They started the check-in process and triage, but told patient it would be a 6 hour wait.   Patient then presented to  clinic, in tears.  It appears she saw PCP just recently, but didn't have the symptoms at that time, which just started yesterday.  Patient is having a large amount of blood in urine with blood clots the size of pencil eraser.  She endorses burning with urination, but states she's had this since other kidney removed years ago.  She denies abdominal pain and no fever.   She is followed by urology, but apparently they're only in Tuesdays and Thursdays. She is concerned and is requesting order for CT.   She did have a UA at urgent care, which was abnormal.  They didn't tell her if they were sending for culture, just sent patient to ER.   Will huddle with PCP for advice.     Lali Linares RN  [facility]

## 2023-02-21 VITALS
TEMPERATURE: 98.2 F | RESPIRATION RATE: 16 BRPM | OXYGEN SATURATION: 96 % | HEART RATE: 65 BPM | HEIGHT: 66 IN | WEIGHT: 182 LBS | DIASTOLIC BLOOD PRESSURE: 74 MMHG | SYSTOLIC BLOOD PRESSURE: 113 MMHG | BODY MASS INDEX: 29.25 KG/M2

## 2023-02-21 LAB
ALBUMIN SERPL BCG-MCNC: 4.2 G/DL (ref 3.5–5.2)
ALP SERPL-CCNC: 136 U/L (ref 35–104)
ALT SERPL W P-5'-P-CCNC: 16 U/L (ref 10–35)
ANION GAP SERPL CALCULATED.3IONS-SCNC: 12 MMOL/L (ref 7–15)
AST SERPL W P-5'-P-CCNC: 21 U/L (ref 10–35)
BILIRUB SERPL-MCNC: 0.3 MG/DL
BUN SERPL-MCNC: 18.9 MG/DL (ref 6–20)
CALCIUM SERPL-MCNC: 9.6 MG/DL (ref 8.6–10)
CHLORIDE SERPL-SCNC: 105 MMOL/L (ref 98–107)
CREAT SERPL-MCNC: 1.46 MG/DL (ref 0.51–0.95)
DEPRECATED HCO3 PLAS-SCNC: 22 MMOL/L (ref 22–29)
GFR SERPL CREATININE-BSD FRML MDRD: 43 ML/MIN/1.73M2
GLUCOSE SERPL-MCNC: 97 MG/DL (ref 70–99)
HOLD SPECIMEN: NORMAL
HOLD SPECIMEN: NORMAL
POTASSIUM SERPL-SCNC: 4 MMOL/L (ref 3.4–5.3)
PROT SERPL-MCNC: 7.5 G/DL (ref 6.4–8.3)
SODIUM SERPL-SCNC: 139 MMOL/L (ref 136–145)

## 2023-02-21 PROCEDURE — 250N000013 HC RX MED GY IP 250 OP 250 PS 637: Performed by: EMERGENCY MEDICINE

## 2023-02-21 RX ORDER — CIPROFLOXACIN 250 MG/1
250 TABLET, FILM COATED ORAL 2 TIMES DAILY
Qty: 14 TABLET | Refills: 0 | Status: SHIPPED | OUTPATIENT
Start: 2023-02-21 | End: 2023-02-28

## 2023-02-21 RX ORDER — CIPROFLOXACIN 500 MG/1
500 TABLET, FILM COATED ORAL ONCE
Status: COMPLETED | OUTPATIENT
Start: 2023-02-21 | End: 2023-02-21

## 2023-02-21 RX ADMIN — CIPROFLOXACIN HYDROCHLORIDE 500 MG: 500 TABLET, FILM COATED ORAL at 01:53

## 2023-02-21 ASSESSMENT — ACTIVITIES OF DAILY LIVING (ADL): ADLS_ACUITY_SCORE: 35

## 2023-02-21 NOTE — TELEPHONE ENCOUNTER
"Seen at [facility] for hematuria since last night. t. Urine \"pure blood\" w/ clots. Only one kidney. Had these sx when she lost her kidney.   [facility]. ED told her wait is 6 hrs so she went home. Asks if she can see clinic tomorrow. Advised ED now. Advised do NOT under any circumstances wait on this; could lose remaining kidney. Go to ED now. Pt voiced understanding and agreement.       Reason for Disposition    Passing pure blood or large blood clots (i.e., size > a dime) (Exception: jaclyn or small strands)    Additional Information    Negative: Shock suspected (e.g., cold/pale/clammy skin, too weak to stand, low BP, rapid pulse)    Negative: Sounds like a life-threatening emergency to the triager    Negative: Urinary catheter, questions about    Negative: Recent back or abdominal injury    Negative: Recent genital injury    Negative: [1] Unable to urinate (or only a few drops) > 4 hours AND [2] bladder feels very full (e.g., palpable bladder or strong urge to urinate)    Protocols used: URINE - BLOOD IN-A-AH      "

## 2023-02-21 NOTE — ED PROVIDER NOTES
History     Chief Complaint   Patient presents with     Hematuria     HPI  Latoya Riddle is a 52 year old female who presents for evaluation with report of hematuria.    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  She also has a history of obstructive sleep apnea tobacco use disorder.  She has stage III chronic kidney disease migraine headaches and depression.    Patient's prescribed medications were reviewed.    On examination patient reports she was in his usual state of health until yesterday when she developed gross hematuria.  Patient reports that in the past when she has had gross hematuria this was related to kidney stone disease and that she underwent a nephrectomy 6 years prior.  She has had no urinary frequency or urgency and that she has been able to void and empty her bladder without abdominal distention or suprapubic pressure.  Patient is followed by Dr. Contreras with [facility] and reports that she did not want to lose her kidney which was why she came in for further evaluation.  Patient also reports her urinalysis is always abnormal.  She reports no back pain or flank pain no fever or chills.  Patient reports that her urine was tea colored today and wanted to come in for evaluation.    Allergies:  Allergies   Allergen Reactions     Gentamycin [Gentamicin Sulfate]      Skin Adhesives [Mecrylate]        Problem List:    Patient Active Problem List    Diagnosis Date Noted     Moderate episode of recurrent major depressive disorder (H) 07/12/2022     Priority: Medium     Chronic kidney disease, stage 3 (H) 01/22/2021     Priority: Medium     Chronic migraine without aura without status migrainosus, not intractable 04/21/2020     Priority: Medium     RUQ pain 04/12/2020     Priority: Medium     CATHY (obstructive sleep apnea) 02/09/2018     Priority: Medium     Mild to moderate CATHY without sleep-associated hypoxemia   - PSG  performed 4/1/2008 with weight 180 lbs, AHI 0.3, RDI 41, mary SpO2 93%, PLMI 0, arousal index 57.9.       Gross hematuria 02/15/2017     Priority: Medium     Anxiety 01/11/2016     Priority: Medium     Acute kidney injury (H) 06/25/2015     Priority: Medium     Pyelonephritis 08/16/2013     Priority: Medium     Acute right flank pain 05/25/2013     Priority: Medium     Health Care Home 05/06/2013     Priority: Medium     EMERGENCY CARE PLAN  May 6, 2013: No current Care Coordination follow up planned. Please refer if Care Coordination services are needed.    Presenting Problem Signs and Symptoms Treatment Plan   Questions or concerns   during clinic hours   I will call my clinic directly:  [facility]  [address] [place] [address]  [phone].   Questions or concerns outside clinic hours   I will call the 24 hour nurse line at   [phone] or [facility].   Need to schedule an appointment   I will call the 24 hour scheduling team at [phone] or my clinic directly at [phone].   Same day treatment     I will call my clinic first, nurse line if after hours, urgent care and express care if needed.   Clinic care coordination services (regular clinic hours)   I will call a clinic care coordinator directly:     Italia Keith RN  [phone]    Rosette Valle SW:    [phone]    Or call my clinic at [phone] and ask to speak with care coordination.   Crisis Services: Behavioral or Mental Health  [facility] 24 Hour Phone Line  [phone]    [facility] 24 Hour Crisis Services  [phone]    [facility] (Behavioral Health Providers) Network [phone]    [facility]   [phone]     Emergency treatment -- Immediately    [facility] 911              Lower urinary tract infectious disease 05/12/2012     Priority: Medium     Overview:   Resistant   Pseudomonas        Hydronephrosis, right 05/12/2012     Priority: Medium     Hyperlipemia 01/06/2011      Priority: Medium     Tobacco abuse 04/19/2010     Priority: Medium     Calculus of ureter 11/19/2009     Priority: Medium     Dyspnea and respiratory abnormality 04/07/2008     Priority: Medium     Sleep study 4/1/08: No CATHY. .5 minutes, sleep latency  normal 11.7 minutes. REM latency 154.5 minutes. Sleep efficiency at 89.4%. The sleep architecture was periodically disrupted with frequent sleep stage changes and arousals. Snoring: moderately loud. RDI 41.0,  AHI of 0.3. Lowest O2 93.0%. PLM index 0.0.  Problem list name updated by automated process. Provider to review       Tobacco abuse disorder 05/25/2006     Priority: Medium     Calculus of kidney 06/20/2005     Priority: Medium     Due to RTA and medullary sponge kidney. [id number] ( See's Dr. Tavo Contreras)- calcium oxalate and calcium phosphate. S/p multiple procedures, >50       Congenital medullary sponge kidney 06/20/2005     Priority: Medium     Renal osteodystrophy 06/20/2005     Priority: Medium     Tubular acidosis          Past Medical History:    Past Medical History:   Diagnosis Date     Depressive disorder      Hyperlipidemia      Kidney stone        Past Surgical History:    Past Surgical History:   Procedure Laterality Date     COMBINED CYSTOSCOPY, INSERT STENT URETER(S) Left 11/10/2017    Procedure: COMBINED CYSTOSCOPY, INSERT STENT URETER(S);  Cystoscopy, Left Ureteral Stent Placement;  Surgeon: Yg Rodriguez MD;  Location: [facility]     ESOPHAGOSCOPY, GASTROSCOPY, DUODENOSCOPY (EGD), COMBINED N/A 04/13/2020    Procedure: ESOPHAGOGASTRODUODENOSCOPY (EGD), WITH BIOPSY;  Surgeon: Brian Grimes DO;  Location: [facility] OR     EXCISE TOENAIL(S) Left 09/20/2016    Procedure: EXCISE TOENAIL(S);  Surgeon: Ady Mejia DPM;  Location: [facility] OR     GENITOURINARY SURGERY       IR MISCELLANEOUS PROCEDURE  08/28/2002     IR MISCELLANEOUS PROCEDURE  08/28/2002     IR MISCELLANEOUS PROCEDURE  08/28/2002     IR MISCELLANEOUS PROCEDURE   2002     SURGICAL HISTORY OF -            SURGICAL HISTORY OF -   -present    Lithothypsy X 49     SURGICAL HISTORY OF -       Percutaneous nephrostomy X2     SURGICAL HISTORY OF -   2011    Cystoscopy with bilateral ureteral pyeloscopy, stone basketing and stent placement       Family History:    Family History   Problem Relation Age of Onset     Respiratory Father         CATHY     C.A.D. Father      Heart Failure Father      Coronary Artery Disease Father      Hypertension Father      Depression Father      Anxiety Disorder Father      Diabetes Father         type 2     Lipids Mother         high cholesterol     Allergies Mother      Arthritis Mother         RA     Hyperlipidemia Mother      Lipids Sister         high cholesterol     Allergies Sister      Asthma Sister      Hypertension Sister      Allergies Sister      Respiratory Sister         cathy     Genitourinary Problems Maternal Grandmother         passed from complications from renal failure     Arthritis Maternal Grandfather         rheumatoid     Diabetes Paternal Grandmother         adult onset     Cardiovascular Paternal Grandfather         AAA     Hypertension Sister      Hyperlipidemia Sister      Anxiety Disorder Sister      Asthma Sister        Social History:  Marital Status:   [2]  Social History     Tobacco Use     Smoking status: Every Day     Packs/day: 0.50     Years: 20.00     Pack years: 10.00     Types: Cigarettes     Smokeless tobacco: Never     Tobacco comments:     trying to quit; cutting back on her own   Vaping Use     Vaping Use: Never used   Substance Use Topics     Alcohol use: No     Drug use: No        Medications:    ciprofloxacin (CIPRO) 250 MG tablet  escitalopram (LEXAPRO) 10 MG tablet  HYDROcodone-acetaminophen (NORCO) 5-325 MG tablet  LORazepam (ATIVAN) 0.5 MG tablet  oxyCODONE (ROXICODONE) 5 MG tablet  SUMAtriptan (IMITREX) 50 MG tablet  ZYRTEC 10 MG OR TABS          Review of Systems  "  Constitutional: Negative.    HENT: Negative.    Eyes: Negative.    Respiratory: Negative.    Cardiovascular: Negative.    Gastrointestinal: Negative.    Endocrine: Negative.    Genitourinary: Positive for hematuria.   Musculoskeletal: Negative.    Skin: Negative.    Allergic/Immunologic: Negative.    Neurological: Negative.    Hematological: Negative.    Psychiatric/Behavioral: Negative.    All other systems reviewed and are negative.      Physical Exam   BP: 118/76  Pulse: 95  Temp: 98.2  F (36.8  C)  Resp: 16  Height: 167.6 cm (5' 6\")  Weight: 82.6 kg (182 lb)  SpO2: 95 %      Physical Exam  Constitutional:       General: She is not in acute distress.     Appearance: Normal appearance. She is not ill-appearing, toxic-appearing or diaphoretic.   HENT:      Head: Normocephalic and atraumatic.      Nose: Nose normal.   Eyes:      Extraocular Movements: Extraocular movements intact.      Pupils: Pupils are equal, round, and reactive to light.   Cardiovascular:      Pulses: Normal pulses.   Pulmonary:      Effort: No respiratory distress.      Breath sounds: No stridor. No wheezing or rhonchi.   Chest:      Chest wall: No tenderness.   Musculoskeletal:         General: No swelling, tenderness, deformity or signs of injury.      Cervical back: Normal range of motion and neck supple.      Right lower leg: No edema.      Left lower leg: No edema.   Skin:     Capillary Refill: Capillary refill takes less than 2 seconds.      Coloration: Skin is not jaundiced or pale.      Findings: No bruising, erythema, lesion or rash.   Neurological:      General: No focal deficit present.      Mental Status: She is alert and oriented to person, place, and time.      Cranial Nerves: No cranial nerve deficit.      Sensory: No sensory deficit.      Motor: No weakness.      Coordination: Coordination normal.      Gait: Gait normal.      Deep Tendon Reflexes: Reflexes normal.   Psychiatric:         Mood and Affect: Mood normal.         " Behavior: Behavior normal.         Thought Content: Thought content normal.         Judgment: Judgment normal.         ED Course                 Procedures              Critical Care time:  none             ED medications:   Medications   ciprofloxacin (CIPRO) tablet 500 mg (500 mg Oral Given 2/21/23 0153)       ED vitals:  Vitals:    02/21/23 0010 02/21/23 0025 02/21/23 0055 02/21/23 0131   BP: 128/74 123/73 120/74 113/74   Pulse: 61   65   Resp:       Temp:       TempSrc:       SpO2: 94% 94% 96% 96%   Weight:       Height:         ED labs and imaging:  Results for orders placed or performed during the hospital encounter of 02/20/23 (from the past 24 hour(s))   Comprehensive metabolic panel   Result Value Ref Range    Sodium 139 136 - 145 mmol/L    Potassium 4.0 3.4 - 5.3 mmol/L    Chloride 105 98 - 107 mmol/L    Carbon Dioxide (CO2) 22 22 - 29 mmol/L    Anion Gap 12 7 - 15 mmol/L    Urea Nitrogen 18.9 6.0 - 20.0 mg/dL    Creatinine 1.46 (H) 0.51 - 0.95 mg/dL    Calcium 9.6 8.6 - 10.0 mg/dL    Glucose 97 70 - 99 mg/dL    Alkaline Phosphatase 136 (H) 35 - 104 U/L    AST 21 10 - 35 U/L    ALT 16 10 - 35 U/L    Protein Total 7.5 6.4 - 8.3 g/dL    Albumin 4.2 3.5 - 5.2 g/dL    Bilirubin Total 0.3 <=1.2 mg/dL    GFR Estimate 43 (L) >60 mL/min/1.73m2   CBC with platelets, differential    Narrative    The following orders were created for panel order CBC with platelets, differential.  Procedure                               Abnormality         Status                     ---------                               -----------         ------                     CBC with platelets and d...[[id number]]  Abnormal            Final result                 Please view results for these tests on the individual orders.   CBC with platelets and differential   Result Value Ref Range    WBC Count 9.6 4.0 - 11.0 10e3/uL    RBC Count 5.42 (H) 3.80 - 5.20 10e6/uL    Hemoglobin 15.8 (H) 11.7 - 15.7 g/dL    Hematocrit 48.5 (H) 35.0 - 47.0 %     MCV 90 78 - 100 fL    MCH 29.2 26.5 - 33.0 pg    MCHC 32.6 31.5 - 36.5 g/dL    RDW 14.6 10.0 - 15.0 %    Platelet Count 136 (L) 150 - 450 10e3/uL    % Neutrophils 63 %    % Lymphocytes 31 %    % Monocytes 5 %    % Eosinophils 1 %    % Basophils 0 %    % Immature Granulocytes 0 %    NRBCs per 100 WBC 0 <1 /100    Absolute Neutrophils 6.0 1.6 - 8.3 10e3/uL    Absolute Lymphocytes 3.0 0.8 - 5.3 10e3/uL    Absolute Monocytes 0.5 0.0 - 1.3 10e3/uL    Absolute Eosinophils 0.1 0.0 - 0.7 10e3/uL    Absolute Basophils 0.0 0.0 - 0.2 10e3/uL    Absolute Immature Granulocytes 0.0 <=0.4 10e3/uL    Absolute NRBCs 0.0 10e3/uL   Etters Draw    Narrative    The following orders were created for panel order Etters Draw.  Procedure                               Abnormality         Status                     ---------                               -----------         ------                     Extra Blue Top Tube[[id number]]                              Final result               Extra Red Top Tube[[id number]]                               Final result                 Please view results for these tests on the individual orders.   Extra Blue Top Tube   Result Value Ref Range    Hold Specimen JIC    Extra Red Top Tube   Result Value Ref Range    Hold Specimen JIC    Abd/pelvis CT - no contrast - Stone Protocol    Narrative    EXAM: CT ABDOMEN PELVIS W/O CONTRAST  LOCATION: [facility]  DATE/TIME: 2/21/2023 12:07 AM    INDICATION: Gross hematuria.  History of solitary kidney.  History of urolithiasis.  Evaluate for obstructing stone versus acute  process  COMPARISON: None.  TECHNIQUE: CT scan of the abdomen and pelvis was performed without IV contrast. Multiplanar reformats were obtained. Dose reduction techniques were used.  CONTRAST: None.    FINDINGS:   LOWER CHEST: Normal.    HEPATOBILIARY: Normal.    PANCREAS: Normal.    SPLEEN: Normal.    ADRENAL GLANDS: Normal.    KIDNEYS/BLADDER: Solitary left kidney  with a few scattered cortical cysts requiring no follow-up. There are multiple confluent stones in the left lower pole measuring up to 1.8 cm in length on axial image 86. There is mild left hydronephrosis and there   is urothelial thickening in the left renal pelvis, though there is no hydroureter or obstructing ureteral calculus. There are a few tiny bubbles of gas in the left collecting system such as seen in the upper pole on coronal image 54. The bladder is   nearly empty. No bladder stone.    BOWEL: No mechanical bowel obstruction or free air. Normal appendix. Scattered colonic diverticulosis.    LYMPH NODES: There is a single mildly enlarged left retroperitoneal node measuring 1.3 x 1.0 cm on axial image 73.    VASCULATURE: Aortoiliac atherosclerotic change without aneurysm.    PELVIC ORGANS: Normal.    MUSCULOSKELETAL: Normal.      Impression    IMPRESSION:   1.  Mild left hydronephrosis with urothelial thickening in the renal pelvis which is presumably infectious or inflammatory in nature and may result in mild obstructive uropathy at the ureteropelvic junction. Normal caliber left ureter, without evidence   of ureteral or bladder stone. Tiny bubbles of gas in the left renal collecting system could represent the sequela of infection. There are multiple large stones in the left lower pole calyx.    2.  A single mildly enlarged retroperitoneal lymph node is likely reactive to the above.    3.  Colonic diverticulosis.         Assessments & Plan (with Medical Decision Making)   Assessment Summary and Clinical Impression; 52-year-old female presented with hematuria.  Patient has a history of congenital medullary sponge kidney and history of ureterolithiasis.  She initially presented for care in urgent care and left without being seen.  She reported that she panicked due to the long wait time and went to [facility] and was told there was a similar wait time and returned for further care.  She reported sudden gross  hematuria since yesterday with no urinary frequency, urgency, no suprapubic pain and no sense of incomplete bladder emptying.  Urinanlysis from evaluation in urgent care prior to patient leaving without being seen was reviewed which is consistent with prior urinalysis.  CT imaging was obtained to evaluate for obstructing ureteral stone versus acute  process that would contribute to gross hematuria.  CT revealed mild hydronephrosis urothelial thickening in the renal pelvis presumed to be infectious or inflammatory with mild obstructive uropathy at the UPJ.  There is no obstructing stone in the urinary bladder with tiny bubbles noted in the left renal collecting system.  Had a normal white count and was afebrile and reported no lower urinary tract symptoms.  After discussion with urology at [place] did advise empiric treatment pending patient's ability to reach in clinic with her treating urology team she was placed on ciprofloxacin renally dosed and discharged home and advised to reach out to her treating urology team within the next 24 hours.      ED course and plan:  Reviewed medical record.  Reviewed initial work-up in urgent care prior to patient leaving without being seen.  She was noted to have microscopic hematuria, positive nitrites and large leukocyte esterase.  Greater than 182 red cells and white cells.  This is similar pattern with prior urinalysis.  CT imaging was obtained given gross hematuria.  Creatinine is at baseline today.  Normal hemoglobin and normal white count.  CT stone protocol revealed mild left hydronephrosis or urothelial thickening in the renal pelvis presumably infectious or inflammatory causing mild obstructive uropathy at the ureteropelvic junction.  Left ureter was noted to be normal in caliber without obstructing ureteral or bladder stone.  There is tiny bubbles in the left renal collecting system that radiology felt could be sequela of infection with multiple large stones in  the left lower pole of the calyx mildly enlarged retroperitoneal lymph nodes were noted to be reactive.  See details outlined in the radiology report.    With urinalysis and CT interpretation I reviewed patient's CT findings with urology at the [place]. Spoke.  Dr. Talbert- Urology at [facility] at 1.24am.  We discussed CT interpretation patient's clinical exam and history and presentation.  Dr. Talbert advised discharging with empiric antibiotics and have the patient follow-up with her treating urologist for further follow-up care.  I reviewed CT findings with the patient today and again reexamined her and she reported no flank pain or back pain.  She also did not have any abdominal pain or suprapubic pressure any lower urinary tract symptoms.  We discussed that the exact cause of her gross hematuria is not clear, however given her history of kidney stones and CT interpretation she was placed on ciprofloxacin renally dosed to 250 mg twice daily x7 pending follow-up care with her treating urologist- Dr JULIÁN Contreras- with [facility].  We reviewed concerning symptoms and reasons to return to the department to be evaluated.  Patient and her friend was present during her entire ED course expressed comfort, understanding and agreement with plan of care.      Disclaimer: This note consists of symbols derived from keyboarding, dictation and/or voice recognition software. As a result, there may be errors in the script that have gone undetected. Please consider this when interpreting information found in this chart.  I have reviewed the nursing notes.    I have reviewed the findings, diagnosis, plan and need for follow up with the patient.           Medical Decision Making  The patient's presentation was of moderate complexity (an acute illness with systemic symptoms).    The patient's evaluation involved:  ordering and/or review of 3+ test(s) in this encounter (Diagnostic labs and imaging)    The patient's management necessitated  moderate risk (prescription drug management including medications given in the ED).        New Prescriptions    CIPROFLOXACIN (CIPRO) 250 MG TABLET    Take 1 tablet (250 mg) by mouth 2 times daily for 7 days       Final diagnoses:   Gross hematuria   Abnormal CT scan - Mild left hydronephrosis with urothelial thickening in the renal pelvis which is presumably infectious or inflammatory in nature and may result in mild obstructive uropathy at the ureteropelvic junction.Normal caliber left ureter, without evidence of ureteral or bladder stone. Tiny bubbles of gas in the left renal collecting system represent the sequela of infection. There are multiple large stones in the left lower pole calyx   Solitary kidney, acquired       2/20/2023   [facility] EMERGENCY DEPT     Jason Byrd MD  02/21/23 015

## 2023-02-21 NOTE — PROGRESS NOTES
Telephone Consult - patient not seen or examined in person    Called by Dr. Mckenzie at [facility] ED. Patient presents with gross hematuria, history of solitary kidney due to medullary sponge, many prior stone surgeries. No flank pain or subjective fevers, WBC 9 is afebrile with normal vitals in the ED. Cr is at baseline at 1.46. CT shows stone collection In the [facility] but no acute obstructing stone, no hydro, bladder is decompressed without clot.     Discussed without leukocytosis or fever pyelonephritis is less likely, though this could be early pyelo. Hematuria could be due to the present stones or early pyelo. Could treat empirically. Does not require urgent intervention as there is no obstructing stone, she is not febrile and Cr is at baseline. Recommend she follow up with her usual urologist at [facility] for further discussion. And in the meantime can increase hydration. Can return to ED if she develops high fevers, flank pain or signs of an obstructing stone.

## 2023-02-21 NOTE — DISCHARGE INSTRUCTIONS
1) Your evaluation today did not reveal the exact cause of your blood in the urine in the last 24 hours.  Imaging did show some abnormal findings in the left renal pelvis and ureteropelvic junction.  After discussion with the urologist on-call at the [facility] they have advised treating you pending follow-up with your treating urologist.    2) you are placed on ciprofloxacin to take 250 mg twice a day over the next 7 days based on your renal function until further directions provided by your treating urologist.    3) You appears stable for discharge to home at this time however if you develop a fever, persistent blood in urine or new concerns you should return to be re-evaluated.

## 2023-02-21 NOTE — ED TRIAGE NOTES
Pt presents with hematuria since yesterday. Reports only having 1 kidney. Provided urine sample earlier while in urgent care, but LWBS. Denies pain. Reports ongoing burning, urgency, frequency. Urine sample from today has resulted.      Triage Assessment     Row Name 02/20/23 2132       Triage Assessment (Adult)    Airway WDL WDL       Respiratory WDL    Respiratory WDL WDL       Skin Circulation/Temperature WDL    Skin Circulation/Temperature WDL WDL       Cardiac WDL    Cardiac WDL WDL       Peripheral/Neurovascular WDL    Peripheral Neurovascular WDL WDL       Cognitive/Neuro/Behavioral WDL    Cognitive/Neuro/Behavioral WDL WDL

## 2023-03-06 ENCOUNTER — TRANSFERRED RECORDS (OUTPATIENT)
Dept: HEALTH INFORMATION MANAGEMENT | Facility: CLINIC | Age: 53
End: 2023-03-06
Payer: COMMERCIAL

## 2023-03-24 ENCOUNTER — OFFICE VISIT (OUTPATIENT)
Dept: FAMILY MEDICINE | Facility: CLINIC | Age: 53
End: 2023-03-24
Payer: COMMERCIAL

## 2023-03-24 VITALS
OXYGEN SATURATION: 95 % | RESPIRATION RATE: 16 BRPM | DIASTOLIC BLOOD PRESSURE: 68 MMHG | HEART RATE: 72 BPM | HEIGHT: 66 IN | SYSTOLIC BLOOD PRESSURE: 110 MMHG | BODY MASS INDEX: 30.05 KG/M2 | WEIGHT: 187 LBS | TEMPERATURE: 97.6 F

## 2023-03-24 DIAGNOSIS — Z01.818 PRE-OP EVALUATION: Primary | ICD-10-CM

## 2023-03-24 DIAGNOSIS — N20.0 CALCULUS OF KIDNEY: ICD-10-CM

## 2023-03-24 PROCEDURE — 99214 OFFICE O/P EST MOD 30 MIN: CPT | Performed by: NURSE PRACTITIONER

## 2023-03-24 ASSESSMENT — PATIENT HEALTH QUESTIONNAIRE - PHQ9
SUM OF ALL RESPONSES TO PHQ QUESTIONS 1-9: 5
10. IF YOU CHECKED OFF ANY PROBLEMS, HOW DIFFICULT HAVE THESE PROBLEMS MADE IT FOR YOU TO DO YOUR WORK, TAKE CARE OF THINGS AT HOME, OR GET ALONG WITH OTHER PEOPLE: NOT DIFFICULT AT ALL
SUM OF ALL RESPONSES TO PHQ QUESTIONS 1-9: 5

## 2023-03-24 ASSESSMENT — PAIN SCALES - GENERAL: PAINLEVEL: NO PAIN (0)

## 2023-03-24 NOTE — PROGRESS NOTES
[facility]  [address] [facility] [facility] [address]  Phone: [phone]  Primary Provider: Maribeth Gonsalves  Pre-op Performing Provider: MARIBETH GONSALVES      PREOPERATIVE EVALUATION:  Today's date: 3/24/2023    Latoya Riddle is a 52 year old female who presents for a preoperative evaluation.  No flowsheet data found.  Surgical Information:  Surgery/Procedure: CYSTOSCOPY, LEFT URETEROSCOPY, HOLMIUM LASER LITHOTRIPSY, STONE BASKETING AND LEFT URETERAL STENT PLACEMENT  Surgery Location: [facility]  Surgeon: Ben  Surgery Date: 4/7/23  Time of Surgery: 0950  Where patient plans to recover: At home with family  Fax number for surgical facility: Note does not need to be faxed, will be available electronically in Epic.      Assessment & Plan     The proposed surgical procedure is considered INTERMEDIATE risk.    Pre-op evaluation  Calculus of kidney      Risks and Recommendations:  The patient has the following additional risks and recommendations for perioperative complications:  Obstructive Sleep Apnea:   Bring CPAP to surgery center  Social and Substance:    - Active nicotine user, advised smoking cessation    Medication Instructions:  Patient is to take all scheduled medications on the day of surgery    RECOMMENDATION:  APPROVAL GIVEN to proceed with proposed procedure, without further diagnostic evaluation.                  Subjective     HPI related to upcoming procedure:   Requires surgery for kidney stones.      Preop Questions 3/24/2023   1. Have you ever had a heart attack or stroke? No   2. Have you ever had surgery on your heart or blood vessels, such as a stent placement, a coronary artery bypass, or surgery on an artery in your head, neck, heart, or legs? No   3. Do you have chest pain with activity? No   4. Do you have a history of  heart failure? No   5. Do you currently have a cold, bronchitis or symptoms of other infection? No   6. Do you have a cough, shortness  of breath, or wheezing? YES - chronic related to smoking   7. Do you or anyone in your family have previous history of blood clots? YES - Dad, in heart   8. Do you or does anyone in your family have a serious bleeding problem such as prolonged bleeding following surgeries or cuts? No   9. Have you ever had problems with anemia or been told to take iron pills? No   10. Have you had any abnormal blood loss such as black, tarry or bloody stools, or abnormal vaginal bleeding? No   11. Have you ever had a blood transfusion? No   12. Are you willing to have a blood transfusion if it is medically needed before, during, or after your surgery? Yes   13. Have you or any of your relatives ever had problems with anesthesia? No   14. Do you have sleep apnea, excessive snoring or daytime drowsiness? YES - CATHY   14a. Do you have a CPAP machine? Yes   15. Do you have any artifical heart valves or other implanted medical devices like a pacemaker, defibrillator, or continuous glucose monitor? No   16. Do you have artificial joints? No   17. Are you allergic to latex? No   18. Is there any chance that you may be pregnant? No     Health Care Directive:  Patient does not have a Health Care Directive or Living Will: Discussed advance care planning with patient; however, patient declined at this time.    Preoperative Review of :   reviewed - controlled substances reflected in medication list.      Status of Chronic Conditions:  See problem list for active medical problems.  Problems all longstanding and stable, except as noted/documented.  See ROS for pertinent symptoms related to these conditions.      Review of Systems  CONSTITUTIONAL: NEGATIVE for fever, chills, change in weight  INTEGUMENTARY/SKIN: NEGATIVE for worrisome rashes, moles or lesions  EYES: NEGATIVE for vision changes or irritation  ENT/MOUTH: NEGATIVE for ear, mouth and throat problems  RESP: NEGATIVE for significant cough or SOB  CV: NEGATIVE for chest pain,  palpitations or peripheral edema  GI: NEGATIVE for nausea, abdominal pain, heartburn, or change in bowel habits  : NEGATIVE for frequency, dysuria, or hematuria  MUSCULOSKELETAL: NEGATIVE for significant arthralgias or myalgia  NEURO: NEGATIVE for weakness, dizziness or paresthesias  ENDOCRINE: NEGATIVE for temperature intolerance, skin/hair changes  HEME: NEGATIVE for bleeding problems  PSYCHIATRIC: NEGATIVE for changes in mood or affect    Patient Active Problem List    Diagnosis Date Noted     Moderate episode of recurrent major depressive disorder (H) 07/12/2022     Priority: Medium     Chronic kidney disease, stage 3 (H) 01/22/2021     Priority: Medium     Chronic migraine without aura without status migrainosus, not intractable 04/21/2020     Priority: Medium     CATHY (obstructive sleep apnea) 02/09/2018     Priority: Medium     Mild to moderate CATHY without sleep-associated hypoxemia   - PSG performed 4/1/2008 with weight 180 lbs, AHI 0.3, RDI 41, mary SpO2 93%, PLMI 0, arousal index 57.9.       Anxiety 01/11/2016     Priority: Medium     Acute kidney injury (H) 06/25/2015     Priority: Medium     Pyelonephritis 08/16/2013     Priority: Medium     Health Care Home 05/06/2013     Priority: Medium     EMERGENCY CARE PLAN  May 6, 2013: No current Care Coordination follow up planned. Please refer if Care Coordination services are needed.    Presenting Problem Signs and Symptoms Treatment Plan   Questions or concerns   during clinic hours   I will call my clinic directly:  [address] [id number] [phone].   Questions or concerns outside clinic hours   I will call the 24 hour nurse line at   [phone] or [address] [place] [address].   Need to schedule an appointment   I will call the 24 hour scheduling team at [phone] or my clinic directly at [phone].   Same day treatment     I will call my clinic first, nurse line if after hours, urgent care and express care if  needed.   Clinic care coordination services (regular clinic hours)   I will call a clinic care coordinator directly:     Italia Keith RN  [phone]    CHELSI Iglesias:    [phone]    Or call my clinic at [phone] and ask to speak with care coordination.   Crisis Services: Behavioral or Mental Health  [facility] 24 Hour Phone Line  [phone]    [facility] 24 Hour Crisis Services  [phone]    [facility] ([facility] Prov[facility] [phone]    State [facility]   [phone]     Emergency treatment -- Immediately    [facility] 911              Lower urinary tract infectious disease 05/12/2012     Priority: Medium     Overview:   Resistant   Pseudomonas        Hydronephrosis, right 05/12/2012     Priority: Medium     Hyperlipemia 01/06/2011     Priority: Medium     Tobacco abuse 04/19/2010     Priority: Medium     Dyspnea and respiratory abnormality 04/07/2008     Priority: Medium     Sleep study 4/1/08: No CATHY. .5 minutes, sleep latency  normal 11.7 minutes. REM latency 154.5 minutes. Sleep efficiency at 89.4%. The sleep architecture was periodically disrupted with frequent sleep stage changes and arousals. Snoring: moderately loud. RDI 41.0,  AHI of 0.3. Lowest O2 93.0%. PLM index 0.0.  Problem list name updated by automated process. Provider to review       Calculus of kidney 06/20/2005     Priority: Medium     Due to RTA and medullary sponge kidney. [id number] ( See's Dr. Tavo Contreras)- calcium oxalate and calcium phosphate. S/p multiple procedures, >50       Congenital medullary sponge kidney 06/20/2005     Priority: Medium     Renal osteodystrophy 06/20/2005     Priority: Medium     Tubular acidosis        Past Medical History:   Diagnosis Date     Depressive disorder      Hyperlipidemia      Kidney stone      Past Surgical History:   Procedure Laterality Date     COMBINED CYSTOSCOPY, INSERT STENT URETER(S) Left 11/10/2017    Procedure: COMBINED  CYSTOSCOPY, INSERT STENT URETER(S);  Cystoscopy, Left Ureteral Stent Placement;  Surgeon: Yg Rodriguez MD;  Location: [facility] OR     ESOPHAGOSCOPY, GASTROSCOPY, DUODENOSCOPY (EGD), COMBINED N/A 2020    Procedure: ESOPHAGOGASTRODUODENOSCOPY (EGD), WITH BIOPSY;  Surgeon: Brian Grimes DO;  Location: [facility] OR     EXCISE TOENAIL(S) Left 2016    Procedure: EXCISE TOENAIL(S);  Surgeon: Ady Mejia DPM;  Location: [facility] OR     GENITOURINARY SURGERY       HEAD & NECK SURGERY  May 29th, 2021     IR MISCELLANEOUS PROCEDURE  2002     IR MISCELLANEOUS PROCEDURE  2002     IR MISCELLANEOUS PROCEDURE  2002     IR MISCELLANEOUS PROCEDURE  2002     SURGICAL HISTORY OF -            SURGICAL HISTORY OF -   -present    Lithothypsy X 49     SURGICAL HISTORY OF -       Percutaneous nephrostomy X2     SURGICAL HISTORY OF -   2011    Cystoscopy with bilateral ureteral pyeloscopy, stone basketing and stent placement     Current Outpatient Medications   Medication Sig Dispense Refill     escitalopram (LEXAPRO) 10 MG tablet Take 1 tablet (10 mg) by mouth daily 90 tablet 3     LORazepam (ATIVAN) 0.5 MG tablet Take 1 tablet (0.5 mg) by mouth every 8 hours as needed for anxiety 30 tablet 0     SUMAtriptan (IMITREX) 50 MG tablet Take 1 tablet (50 mg) by mouth at onset of headache for migraine May repeat in 2 hours. Max 4 tablets/24 hours. 18 tablet 1     ZYRTEC 10 MG OR TABS 1 TABLET DAILY         Allergies   Allergen Reactions     Gentamycin [Gentamicin Sulfate]      Skin Adhesives [Mecrylate]         Social History     Tobacco Use     Smoking status: Every Day     Packs/day: 0.50     Years: 20.00     Pack years: 10.00     Types: Cigarettes     Smokeless tobacco: Never     Tobacco comments:     trying to quit; cutting back on her own   Substance Use Topics     Alcohol use: No       History   Drug Use No         Objective     /68 (BP Location: Right arm, Cuff Size:  "Adult Large)   Pulse 72   Temp 97.6  F (36.4  C) (Tympanic)   Resp 16   Ht 1.676 m (5' 6\")   Wt 84.8 kg (187 lb)   LMP 07/02/2013   SpO2 95%   BMI 30.18 kg/m      Physical Exam    GENERAL APPEARANCE: healthy, alert and no distress     EYES: EOMI, PERRL     HENT: ear canals and TM's normal and nose and mouth without ulcers or lesions     NECK: no adenopathy, no asymmetry, masses, or scars and thyroid normal to palpation     RESP: lungs clear to auscultation - no rales, rhonchi or wheezes     CV: regular rates and rhythm, normal S1 S2, no S3 or S4 and no murmur, click or rub     MS: extremities normal- no gross deformities noted, no evidence of inflammation in joints, FROM in all extremities.     NEURO: Normal strength and tone, sensory exam grossly normal, mentation intact and speech normal     PSYCH: mentation appears normal. and affect normal/bright     LYMPHATICS: No cervical adenopathy    Recent Labs   Lab Test 02/20/23  2322 02/02/23  1330 10/01/21  1433   HGB 15.8* 16.0* 16.3*   *  --  122*    141  --    POTASSIUM 4.0 4.3  --    CR 1.46* 1.58*  --           No EKG required, no history of coronary heart disease, significant arrhythmia, peripheral arterial disease or other structural heart disease.    Revised Cardiac Risk Index (RCRI):  The patient has the following serious cardiovascular risks for perioperative complications:   - No serious cardiac risks = 0 points     RCRI Interpretation: 0 points: Class I (very low risk - 0.4% complication rate)           Signed Electronically by: MICHELLE Sheehan CNP  Copy of this evaluation report is provided to requesting physician.      "

## 2023-04-03 ENCOUNTER — OFFICE VISIT (OUTPATIENT)
Dept: FAMILY MEDICINE | Facility: CLINIC | Age: 53
End: 2023-04-03
Payer: COMMERCIAL

## 2023-04-03 VITALS
OXYGEN SATURATION: 98 % | RESPIRATION RATE: 20 BRPM | WEIGHT: 188 LBS | SYSTOLIC BLOOD PRESSURE: 104 MMHG | BODY MASS INDEX: 30.22 KG/M2 | HEIGHT: 66 IN | DIASTOLIC BLOOD PRESSURE: 80 MMHG | HEART RATE: 64 BPM | TEMPERATURE: 97.8 F

## 2023-04-03 DIAGNOSIS — Z00.00 ENCOUNTER FOR MEDICARE ANNUAL WELLNESS EXAM: Primary | ICD-10-CM

## 2023-04-03 DIAGNOSIS — E78.5 HYPERLIPIDEMIA LDL GOAL <130: Primary | ICD-10-CM

## 2023-04-03 DIAGNOSIS — Z12.4 CERVICAL CANCER SCREENING: ICD-10-CM

## 2023-04-03 LAB
CHOLEST SERPL-MCNC: 344 MG/DL
FASTING STATUS PATIENT QL REPORTED: YES
GLUCOSE SERPL-MCNC: 90 MG/DL (ref 70–99)
HDLC SERPL-MCNC: 47 MG/DL
LDLC SERPL CALC-MCNC: 231 MG/DL
NONHDLC SERPL-MCNC: 297 MG/DL
TRIGL SERPL-MCNC: 329 MG/DL

## 2023-04-03 PROCEDURE — 82947 ASSAY GLUCOSE BLOOD QUANT: CPT | Performed by: NURSE PRACTITIONER

## 2023-04-03 PROCEDURE — 36415 COLL VENOUS BLD VENIPUNCTURE: CPT | Performed by: NURSE PRACTITIONER

## 2023-04-03 PROCEDURE — G0145 SCR C/V CYTO,THINLAYER,RESCR: HCPCS | Performed by: NURSE PRACTITIONER

## 2023-04-03 PROCEDURE — G0438 PPPS, INITIAL VISIT: HCPCS | Performed by: NURSE PRACTITIONER

## 2023-04-03 PROCEDURE — 87624 HPV HI-RISK TYP POOLED RSLT: CPT | Performed by: NURSE PRACTITIONER

## 2023-04-03 PROCEDURE — 80061 LIPID PANEL: CPT | Performed by: NURSE PRACTITIONER

## 2023-04-03 RX ORDER — ROSUVASTATIN CALCIUM 20 MG/1
20 TABLET, COATED ORAL DAILY
Qty: 90 TABLET | Refills: 3 | Status: SHIPPED | OUTPATIENT
Start: 2023-04-03 | End: 2024-05-14

## 2023-04-03 ASSESSMENT — PAIN SCALES - GENERAL: PAINLEVEL: MILD PAIN (3)

## 2023-04-03 ASSESSMENT — PATIENT HEALTH QUESTIONNAIRE - PHQ9
SUM OF ALL RESPONSES TO PHQ QUESTIONS 1-9: 5
SUM OF ALL RESPONSES TO PHQ QUESTIONS 1-9: 5
10. IF YOU CHECKED OFF ANY PROBLEMS, HOW DIFFICULT HAVE THESE PROBLEMS MADE IT FOR YOU TO DO YOUR WORK, TAKE CARE OF THINGS AT HOME, OR GET ALONG WITH OTHER PEOPLE: NOT DIFFICULT AT ALL

## 2023-04-03 ASSESSMENT — ENCOUNTER SYMPTOMS
CHILLS: 0
MYALGIAS: 0
ARTHRALGIAS: 0
EYE PAIN: 0
HEMATURIA: 1
HEADACHES: 0
ABDOMINAL PAIN: 0
DYSURIA: 0
PARESTHESIAS: 0
HEMATOCHEZIA: 0
HEARTBURN: 0
FEVER: 0
DIARRHEA: 0
SORE THROAT: 0
JOINT SWELLING: 0
DIZZINESS: 0
NAUSEA: 0
SHORTNESS OF BREATH: 0
BREAST MASS: 0
CONSTIPATION: 1
COUGH: 1
PALPITATIONS: 0
WEAKNESS: 0
NERVOUS/ANXIOUS: 0
FREQUENCY: 1

## 2023-04-03 ASSESSMENT — ACTIVITIES OF DAILY LIVING (ADL): CURRENT_FUNCTION: NO ASSISTANCE NEEDED

## 2023-04-03 NOTE — PROGRESS NOTES
The patient was provided with written information regarding signs of hearing loss.  Information on urinary incontinence and treatment options given to patient.  The patient's PHQ-9 score is consistent with mild depression. She was provided with information regarding depression and was advised to schedule a follow up appointment in 12 weeks to further address this issue.

## 2023-04-03 NOTE — PATIENT INSTRUCTIONS
Patient Education   Personalized Prevention Plan  You are due for the preventive services outlined below.  Your care team is available to assist you in scheduling these services.  If you have already completed any of these items, please share that information with your care team to update in your medical record.  Health Maintenance Due   Topic Date Due     URINE DRUG SCREEN  Never done     Discuss Advance Care Planning  Never done     Depression Action Plan  Never done     Hepatitis B Vaccine (1 of 3 - 3-dose series) Never done     Pneumococcal Vaccine (1 - PCV) Never done     HIV Screening  Never done     Hepatitis C Screening  Never done     Cholesterol Lab  10/28/2017     Diptheria Tetanus Pertussis (DTAP/TDAP/TD) Vaccine (2 - Td or Tdap) 03/28/2018     PAP Smear  05/26/2019     Zoster (Shingles) Vaccine (1 of 2) Never done     LUNG CANCER SCREENING  Never done       Signs of Hearing Loss  Hearing loss is a problem shared by many people. In fact, it's one of the most common health problems, particularly as people age. Most people aged 65 and older have some hearing loss. By age 80, almost everyone does. Hearing loss often occurs slowly over the years. So, you may not realize your hearing has gotten worse.   When sudden hearing loss occurs, it's important to contact your healthcare provider right away. Your provider will do a medical exam and a hearing exam as soon as possible. This is to help find the cause and type of your sudden hearing loss. Based on your diagnosis, your healthcare provider will discuss possible treatments.      Hearing much better with one ear can be a sign of hearing loss.     Have your hearing checked  Call your healthcare provider if you:     Have to strain to hear normal conversation    Have to watch other people s faces very carefully to follow what they re saying    Need to ask people to repeat what they ve said    Often misunderstand what people are saying    Turn the volume of the  television or radio up so high that others complain    Feel that people are mumbling when they re talking to you    Find that the effort to hear leaves you feeling tired and irritated    Notice, when using the phone, that you hear better with one ear than the other  [employer] last reviewed this educational content on 6/1/2022 2000-2022 [employer]. All rights reserved. This information is not intended as a substitute for professional medical care. Always follow your healthcare professional's instructions.          Urinary Incontinence, Female (Adult)   Urinary incontinence means loss of bladder control. This problem affects many women, especially as they get older. If you have incontinence, you may be embarrassed to ask for help. But know that this problem can be treated.   Types of Incontinence  There are different types of incontinence. Two of the main types are described here. You can have more than one type.     Stress incontinence. With this type, urine leaks when pressure (stress) is put on the bladder. This may happen when you cough, sneeze, or laugh. Stress incontinence most often occurs because the pelvic floor muscles that support the bladder and urethra are weak. This can happen after pregnancy and vaginal childbirth or a hysterectomy. It can also be due to excess body weight or hormone changes.    Urge incontinence (also called overactive bladder). With this type, a sudden urge to urinate is felt often. This may happen even though there may not be much urine in the bladder. The need to urinate often during the night is common. Urge incontinence most often occurs because of bladder spasms. This may be due to bladder irritation or infection. Damage to bladder nerves or pelvic muscles, constipation, and certain medicines can also lead to urge incontinence.  Treatment depends on the cause. Further evaluation is needed to find the type you have. This will likely include an exam and certain  tests. Based on the results, you and your healthcare provider can then plan treatment. Until a diagnosis is made, the home care tips below can help ease symptoms.   Home care    Do pelvic floor muscle exercises, if they are prescribed. The pelvic floor muscles help support the bladder and urethra. Many women find that their symptoms improve when doing special exercises that strengthen these muscles. To do the exercises, contract the muscles you would use to stop your stream of urine. But do this when you re not urinating. Hold for 10 seconds, then relax. Repeat 10 to 20 times in a row, at least 3 times a day. Your healthcare provider may give you other instructions for how to do the exercises and how often.    Keep a bladder diary. This helps track how often and how much you urinate over a set period of time. Bring this diary with you to your next visit with the provider. The information can help your provider learn more about your bladder problem.    Lose weight, if advised to by your provider. Extra weight puts pressure on the bladder. Your provider can help you create a weight-loss plan that s right for you. This may include exercising more and making certain diet changes.    Don't have foods and drinks that may irritate the bladder. These can include alcohol and caffeinated drinks.    Quit smoking. Smoking and other tobacco use can lead to a long-term (chronic) cough that strains the pelvic floor muscles. Smoking may also damage the bladder and urethra. Talk with your provider about treatments or methods you can use to quit smoking.    If drinking large amounts of fluid makes you have symptoms, you may be advised to limit your fluid intake. You may also be advised to drink most of your fluids during the day and to limit fluids at night.    If you re worried about urine leakage or accidents, you may wear absorbent pads to catch urine. Change the pads often. This helps reduce discomfort. It may also reduce the  "risk of skin or bladder infections.    Follow-up care  Follow up with your healthcare provider, or as directed. It may take some to find the right treatment for your problem. But healthy lifestyle changes can be made right away. These include such things as exercising on a regular basis, eating a healthy diet, losing weight (if needed), and quitting smoking. Your treatment plan may include special therapies or medicines. Certain procedures or surgery may also be options. Talk about any questions you have with your provider.   When to seek medical advice  Call the healthcare provider right away if any of these occur:    Fever of 100.4 F (38 C) or higher, or as directed by your provider    Bladder pain or fullness    Belly swelling    Nausea or vomiting    Back pain    Weakness, dizziness, or fainting  [employer] last reviewed this educational content on 1/1/2020 2000-2022 [employer]. All rights reserved. This information is not intended as a substitute for professional medical care. Always follow your healthcare professional's instructions.          Depression and Suicide in Older Adults  Nearly 2 million older adults in the [place] have some type of depression. Some of them even take their own lives. Yet depression among older adults is often ignored. Learning about the warning signs of depression may help spare a loved one needless pain. You may also save a life.   What is depression?  Depression is a common and serious illness. It affects the way you think and feel. It is not a normal part of aging. It is not a sign of weakness, a character flaw, or something you can \"snap out of.\" Most people with depression need treatment to get better. The most common symptom is a feeling of deep sadness. People who are depressed also may seem tired and listless. And nothing seems to give them pleasure. It s normal to grieve or be sad sometimes. But sadness lessens or passes with time. Depression rarely goes away " or improves on its own. Other symptoms of depression are:     Sleeping more or less than normal    Eating more or less than normal    Having headaches, stomachaches, or other pains that don t go away    Feeling nervous,  empty,  or worthless    Crying a lot    Thinking or talking about suicide or death    Loss of interest in activities previously enjoyed    Social isolation    Feeling confused or forgetful  What causes it?  The causes of depression aren t fully known. But it is thought to result from a complex blend of these factors:     Biochemistry. Certain chemicals in the brain play a role.    Genes. Depression does run in families.    Life stress. Life stresses can also trigger depression in some people. Older adults often face many stressors. These may include isolation, the death of friends or a spouse, health problems, and financial concerns.    Chronic health conditions. This includes diabetes, heart disease, or cancer. These can cause symptoms of depression. Medicine side effects can cause changes in thoughts and behaviors.  Giving support    Depressed people often may not want to ask for help. When they do, they may be ignored. Or they may get the wrong treatment. You can help by showing parents and older friends love and support. If they seem depressed, don t lecture the person or ignore the symptoms. Don't discount the symptoms as a  normal  part of aging. They are not. Get involved, listen, and show interest and support.   Help them understand that depression is a treatable illness. Tell them you can help them find the right treatment. Offer to go to their healthcare provider's appointment with them for support when the symptoms are discussed. With their approval, contact a local mental health center, social service agency, or hospital about services.   Helping at healthcare visits  You can be an advocate for them at healthcare appointments. Many older adults have chronic illnesses. Many of these can  cause symptoms of depression. Medicine side effects can change thoughts and behaviors.   You can help make sure that the healthcare provider looks at all of these factors. They should refer your family member or friend to a mental healthcare provider when needed. In some cases, untreated depression can lead to a misdiagnosis. A person may be diagnosed with a brain disorder such as dementia. If the healthcare provider does not take the issue of depression seriously, help your family member or friend find another provider.   Asking about self-harm thoughts  If you think an older person you care about could be suicidal, ask,  Have you thought about suicide?  Most people will tell you the truth. If they say yes, they may already have a plan for how and when they will attempt it. Find out as much as you can. The more detailed the plan, and the easier it is to carry out, the more danger the person is in right now. Tell the person you are there for them and you do not want them to get harmed. Don't wait to get help for the person. Call the person's healthcare provider, local hospital, or emergency services.   Call 988 in a crisis   Never leave the person alone. A person who is actively suicidal needs crisis care right away. They need constant supervision. Never leave the person out of sight. Call 988 Tell the crisis counselor you need help for a person who is thinking about suicide. The counselor will help you get the right level of crisis help. You may be advised to take the person to the nearest emergency room.   The National Suicide Prevention Lifeline is available at 986, or 006-400-MSVE (563-707-8742), or www.suicidepreventionlifeline.org. When you call or text 988, you will be connected to trained counselors who are part of the National Suicide Prevention Lifeline network. An online chat option is also available. Lifeline is free and available 24/7.   To learn more    National Suicide Prevention Lifeline at  www.suicidepreventionlifeline.org  or 555-242-DSRV (304-265-3464)    National [place] on Mental Illness at www.[facility].org  or 972-952-ETFP (591-734-2853)    Mental Health Susy at www.[facility].org  or 025-750-6407    National [place] of Mental Health at www.[facility].nih.gov  or 202-261-7611    [employer] last reviewed this educational content on 7/1/2022 2000-2022 [employer]. All rights reserved. This information is not intended as a substitute for professional medical care. Always follow your healthcare professional's instructions.

## 2023-04-03 NOTE — PROGRESS NOTES
"SUBJECTIVE:   Latoya is a 52 year old who presents for Preventive Visit.      4/3/2023    10:06 AM   Additional Questions   Roomed by Dereje HAMILTON CMA         4/3/2023    10:06 AM   Patient Reported Additional Medications   Patient reports taking the following new medications Levofloxacin 500mg , 1 tabklet daily     Patient has been advised of split billing requirements and indicates understanding: Yes  Are you in the first 12 months of your Medicare coverage?  No  Chief Complaint   Patient presents with     Wellness Visit     Health Maintenance     Declined all vaccines      Medication Reconciliation     Patient is taking Levofloxacin 500mg daily      Cough     Patient states every time she eats she coughs. Does not matter what she eats. Cough is a dry cough .        Healthy Habits:     In general, how would you rate your overall health?  Good    Frequency of exercise:  2-3 days/week    Duration of exercise:  15-30 minutes    Do you usually eat at least 4 servings of fruit and vegetables a day, include whole grains    & fiber and avoid regularly eating high fat or \"junk\" foods?  Yes    Taking medications regularly:  Yes    Medication side effects:  Not applicable    Ability to successfully perform activities of daily living:  No assistance needed    Home Safety:  No safety concerns identified    Hearing Impairment:  Need to ask people to speak up or repeat themselves and difficulty understanding soft or whispered speech    In the past 6 months, have you been bothered by leaking of urine? Yes    In general, how would you rate your overall mental or emotional health?  Good      PHQ-2 Total Score: 2    Additional concerns today:  No      Have you ever done Advance Care Planning? (For example, a Health Directive, POLST, or a discussion with a medical provider or your loved ones about your wishes): No, advance care planning information given to patient to review.  Advanced care planning was discussed at today's visit.     "   Fall risk  Fallen 2 or more times in the past year?: No  Any fall with injury in the past year?: No    Cognitive Screening   1) Repeat 3 items (Leader, Season, Table)    2) Clock draw: NORMAL  3) 3 item recall: Recalls 2 objects   Results: NORMAL clock, 1-2 items recalled: COGNITIVE IMPAIRMENT LESS LIKELY    Mini-CogTM Copyright JULIÁN Burkett. Licensed by the author for use in [facility]; reprinted with permission (shantelle@[facility]). All rights reserved.      Do you have sleep apnea, excessive snoring or daytime drowsiness?: yes, sleep apnea, has CPAP    Reviewed and updated as needed this visit by clinical staff   Tobacco   Meds              Reviewed and updated as needed this visit by Provider                 Social History     Tobacco Use     Smoking status: Every Day     Packs/day: 0.50     Years: 20.00     Pack years: 10.00     Types: Cigarettes     Smokeless tobacco: Never     Tobacco comments:     trying to quit; cutting back on her own   Vaping Use     Vaping status: Never Used   Substance Use Topics     Alcohol use: No             4/3/2023    10:01 AM   Alcohol Use   Prescreen: >3 drinks/day or >7 drinks/week? Not Applicable     Do you have a current opioid prescription? No  Do you use any other controlled substances or medications that are not prescribed by a provider? None          Concern -   Cough Patient states every time she eats she coughs. Does not matter what she eats. Cough is a dry cough .      Onset: x2 years     Description:   Dry cough after eating .     Intensity: moderate    Progression of Symptoms:  same    Accompanying Signs & Symptoms:  None     Previous history of similar problem:   None     Precipitating factors:   Worsened by: none     Alleviating factors:  Improved by: none     Therapies Tried and outcome: none     Current providers sharing in care for this patient include:   Patient Care Team:  Maribeth Avila APRN CNP as PCP - General (Nurse Practitioner)  Tavo Contreras  MD RAKAN as MD (Urology)  Irvin Quezada MD as MD (Internal Medicine)  Maribeth Avila APRN CNP as Assigned PCP  Ann Campbell APRN CNP as Nurse Practitioner (Nurse Practitioner - Family)  Mami Garcia PA-C as Physician Assistant (Dermatology)    The following health maintenance items are reviewed in Epic and correct as of today:  Health Maintenance   Topic Date Due     URINE DRUG SCREEN  Never done     ADVANCE CARE PLANNING  Never done     DEPRESSION ACTION PLAN  Never done     HEPATITIS B IMMUNIZATION (1 of 3 - 3-dose series) Never done     Pneumococcal Vaccine: Pediatrics (0 to 5 Years) and At-Risk Patients (6 to 64 Years) (1 - PCV) Never done     HIV SCREENING  Never done     HEPATITIS C SCREENING  Never done     MEDICARE ANNUAL WELLNESS VISIT  05/26/2017     LIPID  10/28/2017     DTAP/TDAP/TD IMMUNIZATION (2 - Td or Tdap) 03/28/2018     PAP  05/26/2019     ZOSTER IMMUNIZATION (1 of 2) Never done     LUNG CANCER SCREENING  Never done     INFLUENZA VACCINE (1) 06/30/2023 (Originally 9/1/2022)     COVID-19 Vaccine (1) 03/24/2024 (Originally 5/11/1971)     PHQ-9  10/03/2023     NICOTINE/TOBACCO CESSATION COUNSELING Q 1 YR  10/05/2023     COLORECTAL CANCER SCREENING  11/07/2023     MAMMO SCREENING  01/25/2024     MICROALBUMIN  02/02/2024     ANNUAL REVIEW OF HM ORDERS  02/02/2024     BMP  02/20/2024     HEMOGLOBIN  02/20/2024     URINALYSIS  Completed     IPV IMMUNIZATION  Aged Out     MENINGITIS IMMUNIZATION  Aged Out               7/11/2022     8:03 PM   Breast CA Risk Assessment (FHS-7)   Do you have a family history of breast, colon, or ovarian cancer? No / Unknown       Mammogram Screening: Recommended annual mammography  Pertinent mammograms are reviewed under the imaging tab.    Review of Systems   Constitutional: Negative for chills and fever.   HENT: Negative for congestion, ear pain, hearing loss and sore throat.    Eyes: Negative for pain and visual disturbance.   Respiratory: Positive  "for cough. Negative for shortness of breath.    Cardiovascular: Negative for chest pain, palpitations and peripheral edema.   Gastrointestinal: Positive for constipation. Negative for abdominal pain, diarrhea, heartburn, hematochezia and nausea.   Breasts:  Negative for tenderness, breast mass and discharge.   Genitourinary: Positive for frequency, hematuria and urgency. Negative for dysuria, genital sores, pelvic pain, vaginal bleeding and vaginal discharge.   Musculoskeletal: Negative for arthralgias, joint swelling and myalgias.   Skin: Negative for rash.   Neurological: Negative for dizziness, weakness, headaches and paresthesias.   Psychiatric/Behavioral: Negative for mood changes. The patient is not nervous/anxious.          OBJECTIVE:   /80 (BP Location: Right arm, Patient Position: Chair, Cuff Size: Adult Regular)   Pulse 64   Temp 97.8  F (36.6  C) (Tympanic)   Resp 20   Ht 1.683 m (5' 6.25\")   Wt 85.3 kg (188 lb)   LMP 07/02/2013   SpO2 98%   BMI 30.12 kg/m   Estimated body mass index is 30.12 kg/m  as calculated from the following:    Height as of this encounter: 1.683 m (5' 6.25\").    Weight as of this encounter: 85.3 kg (188 lb).  Physical Exam  GENERAL APPEARANCE: healthy, alert and no distress  EYES: Eyes grossly normal to inspection, PERRL and conjunctivae and sclerae normal  HENT: ear canals and TM's normal, nose and mouth without ulcers or lesions, oropharynx clear and oral mucous membranes moist  NECK: no adenopathy, no asymmetry, masses, or scars and thyroid normal to palpation  RESP: lungs clear to auscultation - no rales, rhonchi or wheezes  BREAST: normal without masses, tenderness or nipple discharge and no palpable axillary masses or adenopathy  CV: regular rate and rhythm, normal S1 S2, no S3 or S4, no murmur, click or rub, no peripheral edema and peripheral pulses strong  ABDOMEN: soft, nontender, no hepatosplenomegaly, no masses and bowel sounds normal   (female): normal " "female external genitalia, normal urethral meatus, vaginal mucosal atrophy noted, normal cervix, adnexae, and uterus without masses or abnormal discharge  MS: no musculoskeletal defects are noted and gait is age appropriate without ataxia  SKIN: no suspicious lesions or rashes  NEURO: Normal strength and tone, sensory exam grossly normal, mentation intact and speech normal  PSYCH: mentation appears normal and affect normal/bright        ASSESSMENT / PLAN:       ICD-10-CM    1. Encounter for Medicare annual wellness exam  Z00.00 PRIMARY CARE FOLLOW-UP SCHEDULING     Lipid panel reflex to direct LDL Fasting     Glucose      2. Cervical cancer screening  Z12.4 Pap Screen with HPV - recommended age 30 - 65 years          Patient has been advised of split billing requirements and indicates understanding: Yes      COUNSELING:  Reviewed preventive health counseling, as reflected in patient instructions      BMI:   Estimated body mass index is 30.12 kg/m  as calculated from the following:    Height as of this encounter: 1.683 m (5' 6.25\").    Weight as of this encounter: 85.3 kg (188 lb).   Weight management plan: Discussed healthy diet and exercise guidelines      She reports that she has been smoking cigarettes. She has a 10.00 pack-year smoking history. She has never used smokeless tobacco.  Nicotine/Tobacco Cessation Plan:   Information offered: Patient not interested at this time      Appropriate preventive services were discussed with this patient, including applicable screening as appropriate for cardiovascular disease, diabetes, osteopenia/osteoporosis, and glaucoma.  As appropriate for age/gender, discussed screening for colorectal cancer, prostate cancer, breast cancer, and cervical cancer. Checklist reviewing preventive services available has been given to the patient.    Reviewed patients plan of care and provided an AVS. The Basic Care Plan (routine screening as documented in Health Maintenance) for Latoya meets " the Care Plan requirement. This Care Plan has been established and reviewed with the Patient.      The risks, benefits and treatment options of prescribed medications or other treatments have been discussed with the patient. The patient verbalized their understanding and should call or follow up if no improvement or if they develop further problems.    MICHELLE Sheehan [facility]    Identified Health Risks:    I have reviewed Opioid Use Disorder and Substance Use Disorder risk factors and made any needed referrals.

## 2023-04-05 LAB
BKR LAB AP GYN ADEQUACY: NORMAL
BKR LAB AP GYN INTERPRETATION: NORMAL
BKR LAB AP HPV REFLEX: NORMAL
BKR LAB AP PREVIOUS ABNORMAL: NORMAL
PATH REPORT.COMMENTS IMP SPEC: NORMAL
PATH REPORT.COMMENTS IMP SPEC: NORMAL
PATH REPORT.RELEVANT HX SPEC: NORMAL

## 2023-04-07 LAB
HUMAN PAPILLOMA VIRUS 16 DNA: NEGATIVE
HUMAN PAPILLOMA VIRUS 18 DNA: NEGATIVE
HUMAN PAPILLOMA VIRUS FINAL DIAGNOSIS: NORMAL
HUMAN PAPILLOMA VIRUS OTHER HR: NEGATIVE

## 2023-04-17 ENCOUNTER — TRANSFERRED RECORDS (OUTPATIENT)
Dept: HEALTH INFORMATION MANAGEMENT | Facility: CLINIC | Age: 53
End: 2023-04-17
Payer: COMMERCIAL

## 2023-05-30 ENCOUNTER — TRANSFERRED RECORDS (OUTPATIENT)
Dept: HEALTH INFORMATION MANAGEMENT | Facility: CLINIC | Age: 53
End: 2023-05-30
Payer: COMMERCIAL

## 2023-08-09 ENCOUNTER — HOSPITAL ENCOUNTER (OUTPATIENT)
Dept: ULTRASOUND IMAGING | Facility: CLINIC | Age: 53
Discharge: HOME | End: 2023-08-09
Attending: INTERNAL MEDICINE | Admitting: INTERNAL MEDICINE
Payer: COMMERCIAL

## 2023-08-09 DIAGNOSIS — E78.2 MIXED HYPERLIPIDEMIA: ICD-10-CM

## 2023-08-09 DIAGNOSIS — N18.32 STAGE 3B CHRONIC KIDNEY DISEASE (H): ICD-10-CM

## 2023-08-09 DIAGNOSIS — Q61.5 MEDULLARY SPONGE KIDNEY: ICD-10-CM

## 2023-08-09 DIAGNOSIS — N20.0 NEPHROLITHIASIS: ICD-10-CM

## 2023-08-09 PROCEDURE — 76775 US EXAM ABDO BACK WALL LIM: CPT

## 2023-08-15 ENCOUNTER — LAB (OUTPATIENT)
Dept: LAB | Facility: CLINIC | Age: 53
End: 2023-08-15
Payer: COMMERCIAL

## 2023-08-15 ENCOUNTER — HOSPITAL ENCOUNTER (OUTPATIENT)
Dept: CT IMAGING | Facility: CLINIC | Age: 53
Discharge: HOME | End: 2023-08-15
Attending: STUDENT IN AN ORGANIZED HEALTH CARE EDUCATION/TRAINING PROGRAM | Admitting: STUDENT IN AN ORGANIZED HEALTH CARE EDUCATION/TRAINING PROGRAM
Payer: COMMERCIAL

## 2023-08-15 ENCOUNTER — VIRTUAL VISIT (OUTPATIENT)
Dept: UROLOGY | Facility: CLINIC | Age: 53
End: 2023-08-15
Payer: COMMERCIAL

## 2023-08-15 ENCOUNTER — TELEPHONE (OUTPATIENT)
Dept: UROLOGY | Facility: CLINIC | Age: 53
End: 2023-08-15

## 2023-08-15 DIAGNOSIS — N20.0 KIDNEY STONE: ICD-10-CM

## 2023-08-15 DIAGNOSIS — N18.32 CHRONIC KIDNEY DISEASE (CKD) STAGE G3B/A1, MODERATELY DECREASED GLOMERULAR FILTRATION RATE (GFR) BETWEEN 30-44 ML/MIN/1.73 SQUARE METER AND ALBUMINURIA CREATININE RATIO LESS THAN 30 MG/G (H): ICD-10-CM

## 2023-08-15 DIAGNOSIS — Z11.4 SCREENING FOR HIV (HUMAN IMMUNODEFICIENCY VIRUS): Primary | ICD-10-CM

## 2023-08-15 DIAGNOSIS — N13.30 HYDRONEPHROSIS OF LEFT KIDNEY: Primary | ICD-10-CM

## 2023-08-15 DIAGNOSIS — N18.32 CHRONIC KIDNEY DISEASE (CKD) STAGE G3B/A1, MODERATELY DECREASED GLOMERULAR FILTRATION RATE (GFR) BETWEEN 30-44 ML/MIN/1.73 SQUARE METER AND ALBUMINURIA CREATININE RATIO LESS THAN 30 MG/G (H): Primary | ICD-10-CM

## 2023-08-15 DIAGNOSIS — N13.30 HYDRONEPHROSIS OF LEFT KIDNEY: ICD-10-CM

## 2023-08-15 DIAGNOSIS — Z11.59 NEED FOR HEPATITIS C SCREENING TEST: ICD-10-CM

## 2023-08-15 LAB
ALBUMIN UR-MCNC: 30 MG/DL
ANION GAP SERPL CALCULATED.3IONS-SCNC: 12 MMOL/L (ref 7–15)
APPEARANCE UR: ABNORMAL
BACTERIA #/AREA URNS HPF: ABNORMAL /HPF
BILIRUB UR QL STRIP: NEGATIVE
BUN SERPL-MCNC: 28.9 MG/DL (ref 6–20)
CALCIUM SERPL-MCNC: 9.9 MG/DL (ref 8.6–10)
CHLORIDE SERPL-SCNC: 106 MMOL/L (ref 98–107)
COLOR UR AUTO: YELLOW
CREAT SERPL-MCNC: 1.97 MG/DL (ref 0.51–0.95)
DEPRECATED HCO3 PLAS-SCNC: 22 MMOL/L (ref 22–29)
GFR SERPL CREATININE-BSD FRML MDRD: 30 ML/MIN/1.73M2
GLUCOSE SERPL-MCNC: 104 MG/DL (ref 70–99)
GLUCOSE UR STRIP-MCNC: NEGATIVE MG/DL
HGB UR QL STRIP: ABNORMAL
KETONES UR STRIP-MCNC: NEGATIVE MG/DL
LEUKOCYTE ESTERASE UR QL STRIP: ABNORMAL
MUCOUS THREADS #/AREA URNS LPF: PRESENT /LPF
NITRATE UR QL: POSITIVE
PH UR STRIP: 6 [PH] (ref 5–7)
POTASSIUM SERPL-SCNC: 4.1 MMOL/L (ref 3.4–5.3)
RBC #/AREA URNS AUTO: ABNORMAL /HPF
SODIUM SERPL-SCNC: 140 MMOL/L (ref 136–145)
SP GR UR STRIP: 1.01 (ref 1–1.03)
SQUAMOUS #/AREA URNS AUTO: ABNORMAL /LPF
UROBILINOGEN UR STRIP-ACNC: 0.2 E.U./DL
WBC #/AREA URNS AUTO: ABNORMAL /HPF
WBC CLUMPS #/AREA URNS HPF: PRESENT /HPF

## 2023-08-15 PROCEDURE — 81001 URINALYSIS AUTO W/SCOPE: CPT

## 2023-08-15 PROCEDURE — 87086 URINE CULTURE/COLONY COUNT: CPT

## 2023-08-15 PROCEDURE — 99203 OFFICE O/P NEW LOW 30 MIN: CPT | Mod: VID | Performed by: STUDENT IN AN ORGANIZED HEALTH CARE EDUCATION/TRAINING PROGRAM

## 2023-08-15 PROCEDURE — 80048 BASIC METABOLIC PNL TOTAL CA: CPT

## 2023-08-15 PROCEDURE — 74176 CT ABD & PELVIS W/O CONTRAST: CPT

## 2023-08-15 PROCEDURE — 36415 COLL VENOUS BLD VENIPUNCTURE: CPT

## 2023-08-15 NOTE — PROGRESS NOTES
Chief Complaint:   Left hydronephrosis         History of Present Illness:   Latoya Riddle is a 52 year old female with a history of CKD stage 3, congenital medullary sponge kidney, CATHY, and HLD who presents for evaluation of left hydronephrosis.     The patient has a very complex medical history. Information is obtained through chart review from [facility]. She has a history of kidney stones, requiring many stone clearance procedures.     In February 2017, she developed gross hematuria and reported RLQ pain. Cystoscopy noted significant bleeding from the R ureteral orifice. 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 She underwent a right nephroureterectomy. Pathology ultimately demonstrated pyelonephritis.     Most recently, she developed gross hematuria and LLQ pain. CT demonstrated a 1.8 cm left lower pole kidney stone. She had a normal cystoscopy on 3/6/2023. On April 7, 2023 she underwent left ureteroscopy for stone extraction.     She recently started following with nephrology. Her last BMP on 5/02/2023 noted a creatinine of 2.34 and GFR of 25 mL/min, worsened from creatinine of 1.46 and GFR of 43 mL/min on 2/20/2023. Renal US on 8/09/2023 demonstrated moderate right hydronephrosis. Of note, mild left hydronephrosis was noted on CT from 2/20/2023 and 4/11/2020.    Today, she denies gross hematuria, fevers, chills, and left flank pain. She has chronic dysuria that has been present since her right nephroureterectomy. This is no worse than usual today. She is still producing urine.          Past Medical History:     Past Medical History:   Diagnosis Date    Depressive disorder     Hyperlipidemia     Kidney stone             Past Surgical History:     Past Surgical History:   Procedure Laterality Date    COMBINED CYSTOSCOPY, INSERT STENT URETER(S) Left 11/10/2017    Procedure: COMBINED  CYSTOSCOPY, INSERT STENT URETER(S);  Cystoscopy, Left Ureteral Stent Placement;  Surgeon: Yg Rodriguze MD;  [facility]: [facility]    ESOPHAGOSCOPY, GASTROSCOPY, DUODENOSCOPY (EGD), COMBINED N/A 2020    Procedure: ESOPHAGOGASTRODUODENOSCOPY (EGD), WITH BIOPSY;  Surgeon: Brian Grimes DO;  Location: [facility]    EXCISE TOENAIL(S) Left 2016    Procedure: EXCISE TOENAIL(S);  Surgeon: Ady Mejia DPM;  Location: [facility]    GENITOURINARY SURGERY      HEAD & NECK SURGERY  May 29th, 2021    IR MISCELLANEOUS PROCEDURE  2002    IR MISCELLANEOUS PROCEDURE  2002    IR MISCELLANEOUS PROCEDURE  2002    IR MISCELLANEOUS PROCEDURE  2002    SURGICAL HISTORY OF -           SURGICAL HISTORY OF -   -present    Lithothypsy X 49    SURGICAL HISTORY OF -       Percutaneous nephrostomy X2    SURGICAL HISTORY OF -   2011    Cystoscopy with bilateral ureteral pyeloscopy, stone basketing and stent placement            Medications     Current Outpatient Medications   Medication    escitalopram (LEXAPRO) 10 MG tablet    LORazepam (ATIVAN) 0.5 MG tablet    rosuvastatin (CRESTOR) 20 MG tablet    SUMAtriptan (IMITREX) 50 MG tablet    ZYRTEC 10 MG OR TABS     No current facility-administered medications for this visit.            Allergies:   Gentamycin [gentamicin sulfate] and Skin adhesives [cyanoacrylate]         Review of Systems:  From intake questionnaire   Negative 14 system review except as noted on HPI, nurse's note.         Physical Exam:   Patient is a 52 year old female evaluated via video visit.       Labs and Pathology:    I personally reviewed all applicable laboratory data and went over findings with patient  Significant for:    CBC RESULTS:  Recent Labs   Lab Test 23  2322 23  1330 10/01/21  1433 05/10/21  1406 20  0517   WBC 9.6  --  8.2 9.8 8.6   HGB 15.8* 16.0* 16.3* 16.1* 14.7   *  --  122* 125* 103*        BMP RESULTS:  Recent  Labs   Lab Test 04/03/23  1049 02/20/23  2322 02/02/23  1330 05/10/21  1406 01/22/21  1515 04/12/20  0517 04/11/20 1955   NA  --  139 141 141 140 137 139   POTASSIUM  --  4.0 4.3 3.6 3.6 4.0 4.4   CHLORIDE  --  105 103 110* 108 109 109   CO2  --  22 27 24 27 23 25   ANIONGAP  --  12 11 7 5 5 5   GLC 90 97 97 117* 103* 105* 97   BUN  --  18.9 19.8 25 19 22 24   CR  --  1.46* 1.58* 1.49* 1.52* 1.55* 1.81*   GFRESTIMATED  --  43* 39* 41* 40* 39* 32*   GFRESTBLACK  --   --   --  47* 46* 45* 38*   MARIA  --  9.6 10.1* 9.1 9.4 9.0 9.3       UA RESULTS:   Recent Labs   Lab Test 02/20/23  1412 04/21/20  1108 04/11/20 1955   SG 1.015 1.015 1.013   URINEPH 7.0 6.5 6.0   NITRITE Positive* Positive* Positive*   RBCU >182* 10-25* 46*   WBCU >182* >100* >182*           Imaging:    I personally reviewed all applicable imaging and went over findings with patient.  Significant for:    Results for orders placed or performed during the hospital encounter of 08/09/23   US Kidney Left    Narrative    EXAM: US KIDNEY LEFT  LOCATION: [facility]  DATE: 8/9/2023    INDICATION: Chronic lower urinary tract infection; solitary left kidney.  COMPARISON: 04/11/2020.  TECHNIQUE: Routine Left Renal and Bladder Ultrasound.    FINDINGS:    LEFT KIDNEY: 13.6 cm. Normal cortical thickness and echogenicity. Moderate hydronephrosis. There are 2 simple appearing cysts within the left kidney.     BLADDER: Normal; left ureteral jet is visualized.      Impression    IMPRESSION:    Moderate left hydronephrosis.            Assessment and Plan:     Assessment: 52 year old female seen in evaluation for left hydronephrosis.   The patient has a very complex medical history. Information is obtained through chart review from [facility]. She has a history of kidney stones, requiring many stone clearance procedures.     In February 2017, she developed gross hematuria and reported RLQ pain. Cystoscopy noted significant bleeding from the R  ureteral orifice. 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 She underwent a right nephroureterectomy. Pathology ultimately demonstrated pyelonephritis.     Most recently, she developed gross hematuria and LLQ pain. CT demonstrated a 1.8 cm left lower pole kidney stone. She had a normal cystoscopy on 3/6/2023. On April 7, 2023 she underwent left ureteroscopy for stone extraction.     She recently started following with nephrology. Her last BMP on 5/02/2023 noted a creatinine of 2.34 and GFR of 25 mL/min, worsened from creatinine of 1.46 and GFR of 43 mL/min on 2/20/2023. Renal US on 8/09/2023 demonstrated moderate right hydronephrosis. Of note, mild left hydronephrosis was noted on CT from 2/20/2023 and 4/11/2020.    Today, she denies gross hematuria, fevers, chills, and left flank pain. She has chronic dysuria that has been present since her right nephroureterectomy. This is no worse than usual today. She is still producing urine.     The patient's case was discussed with Dr. Self who recommended prompt evaluation with a CT abdomen pelvis without contrast to evaluate for a left ureteral stone as well as a UA. If there is no ureteral stone present, he recommended NM renogram to evaluate for left ureteral stricture, potentially secondary to recent left ureteroscopy in April 2023. The patient is in agreement.     Plan:  CT abdomen pelvis without contrast to evaluate for left ureteral stone. Patient is scheduled for this afternoon.   UA, urine culture, and BMP (nephrology ordered). Patient also has lab appointment this afternoon.   If patient has a left ureteral stone, will refer to stone surgeon for management. If there is no ureteral stone, will order NM renogram.     SHWETA WAGNER PA-C  Department of Urology

## 2023-08-15 NOTE — PROGRESS NOTES
Latoya Riddle is a 52 year old year old who is being evaluated via a billable video visit.      How would you like to obtain your AVS? "[employer]hart  If the video visit is dropped, the invitation should be resent by: Text to cell phone: [phone]  Will anyone else be joining your video visit? No    Video-Visit Details:    Originating Location (pt. Location): Home    Distant Location (provider location):  On-site  Platform used for Video Visit: Lust have it!

## 2023-08-15 NOTE — TELEPHONE ENCOUNTER
[facility] Call Center    Phone Message    May a detailed message be left on voicemail: yes     Reason for Call: Same day appt scheduled 8/15 at 2:00 pm    Action Taken: Message routed to:  Other: Uro    Travel Screening: Not Applicable

## 2023-08-16 ENCOUNTER — HOSPITAL ENCOUNTER (OUTPATIENT)
Facility: HOSPITAL | Age: 53
Setting detail: OBSERVATION
Discharge: HOME | End: 2023-08-18
Attending: EMERGENCY MEDICINE | Admitting: STUDENT IN AN ORGANIZED HEALTH CARE EDUCATION/TRAINING PROGRAM
Payer: COMMERCIAL

## 2023-08-16 ENCOUNTER — ANESTHESIA (OUTPATIENT)
Dept: SURGERY | Facility: HOSPITAL | Age: 53
End: 2023-08-16
Payer: COMMERCIAL

## 2023-08-16 ENCOUNTER — ANESTHESIA EVENT (OUTPATIENT)
Dept: SURGERY | Facility: HOSPITAL | Age: 53
End: 2023-08-16
Payer: COMMERCIAL

## 2023-08-16 ENCOUNTER — HOSPITAL ENCOUNTER (INPATIENT)
Facility: HOSPITAL | Age: 53
Setting detail: SURGERY ADMIT
End: 2023-08-16
Attending: STUDENT IN AN ORGANIZED HEALTH CARE EDUCATION/TRAINING PROGRAM | Admitting: STUDENT IN AN ORGANIZED HEALTH CARE EDUCATION/TRAINING PROGRAM
Payer: COMMERCIAL

## 2023-08-16 ENCOUNTER — APPOINTMENT (OUTPATIENT)
Dept: RADIOLOGY | Facility: HOSPITAL | Age: 53
End: 2023-08-16
Attending: STUDENT IN AN ORGANIZED HEALTH CARE EDUCATION/TRAINING PROGRAM
Payer: COMMERCIAL

## 2023-08-16 ENCOUNTER — TELEPHONE (OUTPATIENT)
Dept: SURGERY | Facility: CLINIC | Age: 53
End: 2023-08-16
Payer: COMMERCIAL

## 2023-08-16 ENCOUNTER — TELEPHONE (OUTPATIENT)
Dept: UROLOGY | Facility: CLINIC | Age: 53
End: 2023-08-16
Payer: COMMERCIAL

## 2023-08-16 DIAGNOSIS — N13.2 HYDRONEPHROSIS WITH URINARY OBSTRUCTION DUE TO URETERAL CALCULUS: Primary | ICD-10-CM

## 2023-08-16 DIAGNOSIS — R31.9 URINARY TRACT INFECTION WITH HEMATURIA, SITE UNSPECIFIED: ICD-10-CM

## 2023-08-16 DIAGNOSIS — N20.0 NEPHROLITHIASIS: Primary | ICD-10-CM

## 2023-08-16 DIAGNOSIS — N20.1 LEFT URETERAL STONE: ICD-10-CM

## 2023-08-16 DIAGNOSIS — N39.0 URINARY TRACT INFECTION WITH HEMATURIA, SITE UNSPECIFIED: ICD-10-CM

## 2023-08-16 LAB
ANION GAP SERPL CALCULATED.3IONS-SCNC: 10 MMOL/L (ref 7–15)
BASOPHILS # BLD AUTO: 0.1 10E3/UL (ref 0–0.2)
BASOPHILS NFR BLD AUTO: 1 %
BUN SERPL-MCNC: 27.8 MG/DL (ref 6–20)
CALCIUM SERPL-MCNC: 9.8 MG/DL (ref 8.6–10)
CHLORIDE SERPL-SCNC: 107 MMOL/L (ref 98–107)
CREAT SERPL-MCNC: 1.9 MG/DL (ref 0.51–0.95)
CRP SERPL-MCNC: 24.6 MG/L
DEPRECATED HCO3 PLAS-SCNC: 23 MMOL/L (ref 22–29)
EOSINOPHIL # BLD AUTO: 0.1 10E3/UL (ref 0–0.7)
EOSINOPHIL NFR BLD AUTO: 1 %
ERYTHROCYTE [DISTWIDTH] IN BLOOD BY AUTOMATED COUNT: 14.8 % (ref 10–15)
GFR SERPL CREATININE-BSD FRML MDRD: 31 ML/MIN/1.73M2
GLUCOSE SERPL-MCNC: 98 MG/DL (ref 70–99)
HCT VFR BLD AUTO: 50.9 % (ref 35–47)
HGB BLD-MCNC: 16.1 G/DL (ref 11.7–15.7)
HOLD SPECIMEN: NORMAL
IMM GRANULOCYTES # BLD: 0.1 10E3/UL
IMM GRANULOCYTES NFR BLD: 1 %
LYMPHOCYTES # BLD AUTO: 2.2 10E3/UL (ref 0.8–5.3)
LYMPHOCYTES NFR BLD AUTO: 24 %
MCH RBC QN AUTO: 28.7 PG (ref 26.5–33)
MCHC RBC AUTO-ENTMCNC: 31.6 G/DL (ref 31.5–36.5)
MCV RBC AUTO: 91 FL (ref 78–100)
MONOCYTES # BLD AUTO: 0.4 10E3/UL (ref 0–1.3)
MONOCYTES NFR BLD AUTO: 4 %
NEUTROPHILS # BLD AUTO: 6.4 10E3/UL (ref 1.6–8.3)
NEUTROPHILS NFR BLD AUTO: 69 %
NRBC # BLD AUTO: 0 10E3/UL
NRBC BLD AUTO-RTO: 0 /100
PLATELET # BLD AUTO: 149 10E3/UL (ref 150–450)
POTASSIUM SERPL-SCNC: 4.4 MMOL/L (ref 3.4–5.3)
RBC # BLD AUTO: 5.61 10E6/UL (ref 3.8–5.2)
SODIUM SERPL-SCNC: 140 MMOL/L (ref 136–145)
WBC # BLD AUTO: 9.2 10E3/UL (ref 4–11)

## 2023-08-16 PROCEDURE — 99285 EMERGENCY DEPT VISIT HI MDM: CPT | Mod: 25

## 2023-08-16 PROCEDURE — 52332 CYSTOSCOPY AND TREATMENT: CPT | Mod: LT | Performed by: STUDENT IN AN ORGANIZED HEALTH CARE EDUCATION/TRAINING PROGRAM

## 2023-08-16 PROCEDURE — 250N000013 HC RX MED GY IP 250 OP 250 PS 637: Performed by: STUDENT IN AN ORGANIZED HEALTH CARE EDUCATION/TRAINING PROGRAM

## 2023-08-16 PROCEDURE — 36415 COLL VENOUS BLD VENIPUNCTURE: CPT | Performed by: EMERGENCY MEDICINE

## 2023-08-16 PROCEDURE — 250N000011 HC RX IP 250 OP 636: Mod: JZ | Performed by: NURSE ANESTHETIST, CERTIFIED REGISTERED

## 2023-08-16 PROCEDURE — 250N000009 HC RX 250: Performed by: STUDENT IN AN ORGANIZED HEALTH CARE EDUCATION/TRAINING PROGRAM

## 2023-08-16 PROCEDURE — 272N000001 HC OR GENERAL SUPPLY STERILE: Performed by: STUDENT IN AN ORGANIZED HEALTH CARE EDUCATION/TRAINING PROGRAM

## 2023-08-16 PROCEDURE — 80048 BASIC METABOLIC PNL TOTAL CA: CPT | Performed by: EMERGENCY MEDICINE

## 2023-08-16 PROCEDURE — 99204 OFFICE O/P NEW MOD 45 MIN: CPT | Mod: 25 | Performed by: STUDENT IN AN ORGANIZED HEALTH CARE EDUCATION/TRAINING PROGRAM

## 2023-08-16 PROCEDURE — 258N000003 HC RX IP 258 OP 636: Performed by: EMERGENCY MEDICINE

## 2023-08-16 PROCEDURE — 258N000003 HC RX IP 258 OP 636: Performed by: STUDENT IN AN ORGANIZED HEALTH CARE EDUCATION/TRAINING PROGRAM

## 2023-08-16 PROCEDURE — 99223 1ST HOSP IP/OBS HIGH 75: CPT | Performed by: INTERNAL MEDICINE

## 2023-08-16 PROCEDURE — 96375 TX/PRO/DX INJ NEW DRUG ADDON: CPT

## 2023-08-16 PROCEDURE — 250N000011 HC RX IP 250 OP 636: Performed by: NURSE ANESTHETIST, CERTIFIED REGISTERED

## 2023-08-16 PROCEDURE — 250N000011 HC RX IP 250 OP 636: Performed by: EMERGENCY MEDICINE

## 2023-08-16 PROCEDURE — C1769 GUIDE WIRE: HCPCS | Performed by: STUDENT IN AN ORGANIZED HEALTH CARE EDUCATION/TRAINING PROGRAM

## 2023-08-16 PROCEDURE — 74420 UROGRAPHY RTRGR +-KUB: CPT | Mod: 26 | Performed by: STUDENT IN AN ORGANIZED HEALTH CARE EDUCATION/TRAINING PROGRAM

## 2023-08-16 PROCEDURE — 999N000141 HC STATISTIC PRE-PROCEDURE NURSING ASSESSMENT: Performed by: STUDENT IN AN ORGANIZED HEALTH CARE EDUCATION/TRAINING PROGRAM

## 2023-08-16 PROCEDURE — 86140 C-REACTIVE PROTEIN: CPT | Performed by: EMERGENCY MEDICINE

## 2023-08-16 PROCEDURE — 250N000009 HC RX 250: Performed by: NURSE ANESTHETIST, CERTIFIED REGISTERED

## 2023-08-16 PROCEDURE — 255N000002 HC RX 255 OP 636: Performed by: STUDENT IN AN ORGANIZED HEALTH CARE EDUCATION/TRAINING PROGRAM

## 2023-08-16 PROCEDURE — 258N000003 HC RX IP 258 OP 636: Performed by: NURSE ANESTHETIST, CERTIFIED REGISTERED

## 2023-08-16 PROCEDURE — G0378 HOSPITAL OBSERVATION PER HR: HCPCS

## 2023-08-16 PROCEDURE — 710N000012 HC RECOVERY PHASE 2, PER MINUTE: Performed by: STUDENT IN AN ORGANIZED HEALTH CARE EDUCATION/TRAINING PROGRAM

## 2023-08-16 PROCEDURE — 999N000180 XR SURGERY CARM FLUORO LESS THAN 5 MIN

## 2023-08-16 PROCEDURE — 85025 COMPLETE CBC W/AUTO DIFF WBC: CPT | Performed by: EMERGENCY MEDICINE

## 2023-08-16 PROCEDURE — 360N000082 HC SURGERY LEVEL 2 W/ FLUORO, PER MIN: Performed by: STUDENT IN AN ORGANIZED HEALTH CARE EDUCATION/TRAINING PROGRAM

## 2023-08-16 PROCEDURE — 96374 THER/PROPH/DIAG INJ IV PUSH: CPT

## 2023-08-16 PROCEDURE — 370N000017 HC ANESTHESIA TECHNICAL FEE, PER MIN: Performed by: STUDENT IN AN ORGANIZED HEALTH CARE EDUCATION/TRAINING PROGRAM

## 2023-08-16 PROCEDURE — 258N000003 HC RX IP 258 OP 636: Performed by: ANESTHESIOLOGY

## 2023-08-16 PROCEDURE — C2617 STENT, NON-COR, TEM W/O DEL: HCPCS | Performed by: STUDENT IN AN ORGANIZED HEALTH CARE EDUCATION/TRAINING PROGRAM

## 2023-08-16 DEVICE — URETERAL STENT
Type: IMPLANTABLE DEVICE | Site: URETER | Status: NON-FUNCTIONAL
Brand: PERCUFLEX™ PLUS
Removed: 2023-08-23

## 2023-08-16 RX ORDER — PROPOFOL 10 MG/ML
INJECTION, EMULSION INTRAVENOUS PRN
Status: DISCONTINUED | OUTPATIENT
Start: 2023-08-16 | End: 2023-08-16

## 2023-08-16 RX ORDER — LIDOCAINE 40 MG/G
CREAM TOPICAL
Status: DISCONTINUED | OUTPATIENT
Start: 2023-08-16 | End: 2023-08-18 | Stop reason: HOSPADM

## 2023-08-16 RX ORDER — OXYCODONE HYDROCHLORIDE 10 MG/1
10 TABLET ORAL EVERY 4 HOURS PRN
Status: DISCONTINUED | OUTPATIENT
Start: 2023-08-16 | End: 2023-08-18 | Stop reason: HOSPADM

## 2023-08-16 RX ORDER — LIDOCAINE HYDROCHLORIDE 10 MG/ML
INJECTION, SOLUTION INFILTRATION; PERINEURAL PRN
Status: DISCONTINUED | OUTPATIENT
Start: 2023-08-16 | End: 2023-08-16

## 2023-08-16 RX ORDER — ESCITALOPRAM OXALATE 10 MG/1
10 TABLET ORAL DAILY
Status: DISCONTINUED | OUTPATIENT
Start: 2023-08-16 | End: 2023-08-18 | Stop reason: HOSPADM

## 2023-08-16 RX ORDER — CEFTRIAXONE 1 G/1
1 INJECTION, POWDER, FOR SOLUTION INTRAMUSCULAR; INTRAVENOUS ONCE
Status: COMPLETED | OUTPATIENT
Start: 2023-08-16 | End: 2023-08-16

## 2023-08-16 RX ORDER — LIDOCAINE 40 MG/G
CREAM TOPICAL
Status: DISCONTINUED | OUTPATIENT
Start: 2023-08-16 | End: 2023-08-16 | Stop reason: HOSPADM

## 2023-08-16 RX ORDER — CALCIUM CARBONATE 500 MG/1
500 TABLET, CHEWABLE ORAL 4 TIMES DAILY PRN
Status: DISCONTINUED | OUTPATIENT
Start: 2023-08-16 | End: 2023-08-18 | Stop reason: HOSPADM

## 2023-08-16 RX ORDER — CETIRIZINE HYDROCHLORIDE 10 MG/1
10 TABLET ORAL EVERY EVENING
COMMUNITY

## 2023-08-16 RX ORDER — NALOXONE HYDROCHLORIDE 0.4 MG/ML
0.2 INJECTION, SOLUTION INTRAMUSCULAR; INTRAVENOUS; SUBCUTANEOUS
Status: DISCONTINUED | OUTPATIENT
Start: 2023-08-16 | End: 2023-08-18 | Stop reason: HOSPADM

## 2023-08-16 RX ORDER — SODIUM CHLORIDE, SODIUM LACTATE, POTASSIUM CHLORIDE, CALCIUM CHLORIDE 600; 310; 30; 20 MG/100ML; MG/100ML; MG/100ML; MG/100ML
INJECTION, SOLUTION INTRAVENOUS CONTINUOUS
Status: DISCONTINUED | OUTPATIENT
Start: 2023-08-16 | End: 2023-08-17

## 2023-08-16 RX ORDER — PROPOFOL 10 MG/ML
INJECTION, EMULSION INTRAVENOUS CONTINUOUS PRN
Status: DISCONTINUED | OUTPATIENT
Start: 2023-08-16 | End: 2023-08-16

## 2023-08-16 RX ORDER — ACETAMINOPHEN 325 MG/1
650 TABLET ORAL EVERY 6 HOURS PRN
Status: DISCONTINUED | OUTPATIENT
Start: 2023-08-16 | End: 2023-08-18 | Stop reason: HOSPADM

## 2023-08-16 RX ORDER — ROSUVASTATIN CALCIUM 10 MG/1
20 TABLET, COATED ORAL DAILY
Status: DISCONTINUED | OUTPATIENT
Start: 2023-08-16 | End: 2023-08-18 | Stop reason: HOSPADM

## 2023-08-16 RX ORDER — CEFTRIAXONE 1 G/1
1 INJECTION, POWDER, FOR SOLUTION INTRAMUSCULAR; INTRAVENOUS EVERY 24 HOURS
Status: DISCONTINUED | OUTPATIENT
Start: 2023-08-17 | End: 2023-08-18 | Stop reason: HOSPADM

## 2023-08-16 RX ORDER — ONDANSETRON 2 MG/ML
4 INJECTION INTRAMUSCULAR; INTRAVENOUS EVERY 30 MIN PRN
Status: DISCONTINUED | OUTPATIENT
Start: 2023-08-16 | End: 2023-08-16 | Stop reason: HOSPADM

## 2023-08-16 RX ORDER — ACETAMINOPHEN 325 MG/1
650 TABLET ORAL EVERY 4 HOURS PRN
Qty: 100 TABLET | Refills: 0 | Status: SHIPPED | OUTPATIENT
Start: 2023-08-16

## 2023-08-16 RX ORDER — HYDROMORPHONE HCL IN WATER/PF 6 MG/30 ML
0.4 PATIENT CONTROLLED ANALGESIA SYRINGE INTRAVENOUS
Status: DISCONTINUED | OUTPATIENT
Start: 2023-08-16 | End: 2023-08-18 | Stop reason: HOSPADM

## 2023-08-16 RX ORDER — PROCHLORPERAZINE MALEATE 10 MG
10 TABLET ORAL EVERY 6 HOURS PRN
Status: DISCONTINUED | OUTPATIENT
Start: 2023-08-16 | End: 2023-08-18 | Stop reason: HOSPADM

## 2023-08-16 RX ORDER — NALOXONE HYDROCHLORIDE 0.4 MG/ML
0.4 INJECTION, SOLUTION INTRAMUSCULAR; INTRAVENOUS; SUBCUTANEOUS
Status: DISCONTINUED | OUTPATIENT
Start: 2023-08-16 | End: 2023-08-18 | Stop reason: HOSPADM

## 2023-08-16 RX ORDER — TAMSULOSIN HYDROCHLORIDE 0.4 MG/1
0.4 CAPSULE ORAL DAILY
Status: DISCONTINUED | OUTPATIENT
Start: 2023-08-16 | End: 2023-08-18 | Stop reason: HOSPADM

## 2023-08-16 RX ORDER — HYDROMORPHONE HCL IN WATER/PF 6 MG/30 ML
0.2 PATIENT CONTROLLED ANALGESIA SYRINGE INTRAVENOUS
Status: DISCONTINUED | OUTPATIENT
Start: 2023-08-16 | End: 2023-08-18 | Stop reason: HOSPADM

## 2023-08-16 RX ORDER — ONDANSETRON 2 MG/ML
4 INJECTION INTRAMUSCULAR; INTRAVENOUS EVERY 6 HOURS PRN
Status: DISCONTINUED | OUTPATIENT
Start: 2023-08-16 | End: 2023-08-18 | Stop reason: HOSPADM

## 2023-08-16 RX ORDER — SODIUM CHLORIDE, SODIUM LACTATE, POTASSIUM CHLORIDE, CALCIUM CHLORIDE 600; 310; 30; 20 MG/100ML; MG/100ML; MG/100ML; MG/100ML
INJECTION, SOLUTION INTRAVENOUS CONTINUOUS
Status: DISCONTINUED | OUTPATIENT
Start: 2023-08-16 | End: 2023-08-16 | Stop reason: HOSPADM

## 2023-08-16 RX ORDER — OXYBUTYNIN CHLORIDE 5 MG/1
5 TABLET ORAL 3 TIMES DAILY
Status: DISCONTINUED | OUTPATIENT
Start: 2023-08-16 | End: 2023-08-18 | Stop reason: HOSPADM

## 2023-08-16 RX ORDER — ONDANSETRON 4 MG/1
4-8 TABLET, ORALLY DISINTEGRATING ORAL EVERY 8 HOURS PRN
Qty: 10 TABLET | Refills: 0 | Status: SHIPPED | OUTPATIENT
Start: 2023-08-16 | End: 2024-01-30

## 2023-08-16 RX ORDER — OXYCODONE HYDROCHLORIDE 5 MG/1
5 TABLET ORAL
Status: DISCONTINUED | OUTPATIENT
Start: 2023-08-16 | End: 2023-08-16 | Stop reason: HOSPADM

## 2023-08-16 RX ORDER — ONDANSETRON 4 MG/1
4 TABLET, ORALLY DISINTEGRATING ORAL EVERY 30 MIN PRN
Status: DISCONTINUED | OUTPATIENT
Start: 2023-08-16 | End: 2023-08-16 | Stop reason: HOSPADM

## 2023-08-16 RX ORDER — ONDANSETRON 4 MG/1
4 TABLET, ORALLY DISINTEGRATING ORAL EVERY 6 HOURS PRN
Status: DISCONTINUED | OUTPATIENT
Start: 2023-08-16 | End: 2023-08-18 | Stop reason: HOSPADM

## 2023-08-16 RX ORDER — SODIUM CHLORIDE 9 MG/ML
INJECTION, SOLUTION INTRAVENOUS CONTINUOUS PRN
Status: DISCONTINUED | OUTPATIENT
Start: 2023-08-16 | End: 2023-08-16

## 2023-08-16 RX ORDER — HYDROMORPHONE HYDROCHLORIDE 1 MG/ML
0.5 INJECTION, SOLUTION INTRAMUSCULAR; INTRAVENOUS; SUBCUTANEOUS ONCE
Status: COMPLETED | OUTPATIENT
Start: 2023-08-16 | End: 2023-08-16

## 2023-08-16 RX ORDER — FENTANYL CITRATE 50 UG/ML
INJECTION, SOLUTION INTRAMUSCULAR; INTRAVENOUS PRN
Status: DISCONTINUED | OUTPATIENT
Start: 2023-08-16 | End: 2023-08-16

## 2023-08-16 RX ORDER — ONDANSETRON 2 MG/ML
INJECTION INTRAMUSCULAR; INTRAVENOUS PRN
Status: DISCONTINUED | OUTPATIENT
Start: 2023-08-16 | End: 2023-08-16

## 2023-08-16 RX ORDER — OXYCODONE HYDROCHLORIDE 5 MG/1
5 TABLET ORAL EVERY 4 HOURS PRN
Status: DISCONTINUED | OUTPATIENT
Start: 2023-08-16 | End: 2023-08-18 | Stop reason: HOSPADM

## 2023-08-16 RX ADMIN — FENTANYL CITRATE 50 MCG: 50 INJECTION, SOLUTION INTRAMUSCULAR; INTRAVENOUS at 12:36

## 2023-08-16 RX ADMIN — OXYBUTYNIN CHLORIDE 5 MG: 5 TABLET ORAL at 21:05

## 2023-08-16 RX ADMIN — CEFTRIAXONE SODIUM 1 G: 1 INJECTION, POWDER, FOR SOLUTION INTRAMUSCULAR; INTRAVENOUS at 11:48

## 2023-08-16 RX ADMIN — OXYCODONE HYDROCHLORIDE 5 MG: 5 TABLET ORAL at 23:35

## 2023-08-16 RX ADMIN — MIDAZOLAM 2 MG: 1 INJECTION INTRAMUSCULAR; INTRAVENOUS at 12:23

## 2023-08-16 RX ADMIN — PROPOFOL 120 MCG/KG/MIN: 10 INJECTION, EMULSION INTRAVENOUS at 12:32

## 2023-08-16 RX ADMIN — LIDOCAINE HYDROCHLORIDE 30 MG: 10 INJECTION, SOLUTION INFILTRATION; PERINEURAL at 12:35

## 2023-08-16 RX ADMIN — ROSUVASTATIN CALCIUM 20 MG: 10 TABLET, FILM COATED ORAL at 21:05

## 2023-08-16 RX ADMIN — ONDANSETRON 4 MG: 2 INJECTION INTRAMUSCULAR; INTRAVENOUS at 12:46

## 2023-08-16 RX ADMIN — ESCITALOPRAM OXALATE 10 MG: 10 TABLET ORAL at 21:05

## 2023-08-16 RX ADMIN — FENTANYL CITRATE 25 MCG: 50 INJECTION, SOLUTION INTRAMUSCULAR; INTRAVENOUS at 12:48

## 2023-08-16 RX ADMIN — TAMSULOSIN HYDROCHLORIDE 0.4 MG: 0.4 CAPSULE ORAL at 17:41

## 2023-08-16 RX ADMIN — HYDROMORPHONE HYDROCHLORIDE 0.5 MG: 1 INJECTION, SOLUTION INTRAMUSCULAR; INTRAVENOUS; SUBCUTANEOUS at 10:58

## 2023-08-16 RX ADMIN — FENTANYL CITRATE 25 MCG: 50 INJECTION, SOLUTION INTRAMUSCULAR; INTRAVENOUS at 12:54

## 2023-08-16 RX ADMIN — SODIUM CHLORIDE 500 ML: 9 INJECTION, SOLUTION INTRAVENOUS at 10:57

## 2023-08-16 RX ADMIN — SODIUM CHLORIDE, POTASSIUM CHLORIDE, SODIUM LACTATE AND CALCIUM CHLORIDE: 600; 310; 30; 20 INJECTION, SOLUTION INTRAVENOUS at 15:38

## 2023-08-16 RX ADMIN — OXYBUTYNIN CHLORIDE 5 MG: 5 TABLET ORAL at 15:38

## 2023-08-16 RX ADMIN — SODIUM CHLORIDE: 9 INJECTION, SOLUTION INTRAVENOUS at 12:23

## 2023-08-16 RX ADMIN — PROPOFOL 30 MG: 10 INJECTION, EMULSION INTRAVENOUS at 12:33

## 2023-08-16 ASSESSMENT — ACTIVITIES OF DAILY LIVING (ADL)
ADLS_ACUITY_SCORE: 35
ADLS_ACUITY_SCORE: 20
ADLS_ACUITY_SCORE: 18
ADLS_ACUITY_SCORE: 20

## 2023-08-16 NOTE — ED PROVIDER NOTES
Emergency Department Encounter     Evaluation Date & Time:   8/16/2023 10:02 AM    CHIEF COMPLAINT:  Abnormal Labs and Flank Pain      Triage Note:Pt to ED via private vehicle with her sister.    Pt reports that she recent changes to a new nephrologist. She states that during a phone call yesterday they requested the pt go into clinic for blood work and a CT scan yesterday. She states today she received a call from urology to go to the ER. Pt reports abnormal blood work yesterday and the presence of a stone stuck in the left ureter on CT.     Hx of Chronic Kidney stones, removal of right kidney               ED COURSE & MEDICAL DECISION MAKING:     ED Course as of 08/16/23 1130   Wed Aug 16, 2023   1006 Paged [facility]   1011 Met with patient for initial interview and exam. The patient's sister is at bedside.   1030 Paged hospitalist   1031 Discussed patient with Dr. Newton with [facility].  Plan for OR at 1300, agrees with IV rocephin. Pt will need hospitalization with medical team.   1104 Discussed patient with Dr. Addison, hospitalist, who accepts patient for admission.       Pt with extensive kidney stone history with solitary left kidney secondary to complications with right kidney related to obstructing stones, here after urology called to have her come in for stenting procedure. Pt reports she had some LLQ pain last week, seemed to resolve, but followed up with urologist yesterday and ultimately had labs and CT performed of abd/pelvis. CT showing obstructing left distal ureteral stone and UA concerning for possible infection, so urology advised coming in for intervention.  I will speak with urologist, order labs, IV. Pt is having some pain now that she knows she has stone/infection potentially.  She's otherwise afebrile and denies infectious symptoms/concerns.  Last oral intake was around 2200 last night.    Medical Decision Making    History:  Supplemental history from: Documented in chart, if applicable  External  Record(s) reviewed: Outpatient Record: Virtual Visit Urology 08/15/2023    Work Up:  Chart documentation includes differential considered and any EKGs or imaging independently interpreted by provider, where specified.  In additional to work up documented, I considered the following work up: Documented in chart, if applicable.    External consultation:  Discussion of management with another provider: Urology    Complicating factors:  Care impacted by chronic illness: Chronic Kidney Disease  Care affected by social determinants of health: N/A    Disposition considerations: Admit.      At the conclusion of the encounter I discussed the results of all the tests and the disposition. The questions were answered. The patient or family acknowledged understanding and was agreeable with the care plan.      MEDICATIONS GIVEN IN THE EMERGENCY DEPARTMENT:  Medications   cefTRIAXone (ROCEPHIN) 1 g vial to attach to  mL bag for ADULTS or NS 50 mL bag for PEDS (has no administration in time range)   Patient RECEIVING antibiotic to treat a different condition and it provides ADEQUATE COVERAGE for this surgical procedure. (has no administration in time range)   lidocaine 1 % 0.1-1 mL (has no administration in time range)   lidocaine (LMX4) cream (has no administration in time range)   sodium chloride (PF) 0.9% PF flush 3 mL (has no administration in time range)   sodium chloride (PF) 0.9% PF flush 3 mL (has no administration in time range)   lactated ringers infusion (has no administration in time range)   0.9% sodium chloride BOLUS (500 mLs Intravenous $New Bag 8/16/23 1057)   HYDROmorphone (PF) (DILAUDID) injection 0.5 mg (0.5 mg Intravenous $Given 8/16/23 1058)       NEW PRESCRIPTIONS STARTED AT TODAY'S ED VISIT:  New Prescriptions    No medications on file       HPI   The history is provided by the patient and a relative. No  was used.        Latoya Riddle is a 52 year old female with a pertinent  history of kidney stone, congenital medullary sponge kidney, hyperlipemia, chronic kidney disease, stage 3, and pyelonephritis who presents to this ED by walk in for evaluation of abnormal labs and flank pain.    Per chart review, the patient was seen for a Urology Virtual Visit yesterday. In February 2017, she developed gross hematuria and reported RLQ pain. Cystoscopy noted significant bleeding from the R ureteral orifice. 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 She underwent a right nephroureterectomy. Pathology ultimately demonstrated pyelonephritis. Most recently, she developed gross hematuria and LLQ pain. CT demonstrated a 1.8 cm left lower pole kidney stone. She had a normal cystoscopy on 3/6/2023. On April 7, 2023 she underwent left ureteroscopy for stone extraction. Her last BMP on 5/02/2023 noted a creatinine of 2.34 and GFR of 25 mL/min, worsened from creatinine of 1.46 and GFR of 43 mL/min on 2/20/2023. Renal US on 8/09/2023 demonstrated moderate right hydronephrosis.     The patient was called today at 8:11 AM to review 4 mm distal left ureteral stone found on CTAP. Patient was recommended to go to the ED for antibiotics and urology consult.    The patient reports left-sided flank pain starting last week, but then subsiding. She noticed the pain again later in the week and endorses current pain located in her upper left flank. It is a dull pain and radiates to her back, though the patient states that this radiation may be due to her needing to urinate. The patient endorses hematuria at baseline,.     She denies nausea, vomiting, fever, and other urinary symptoms. She is not taking a blood thinner. The patient last ate at about 10 PM last night (~12 hours ago). She endorses extensive history with abdominal surgery and kidney stones and reports that she lost her right kidney due to a  stone.    REVIEW OF SYSTEMS:  See HPI      Medical History     Past Medical History:   Diagnosis Date    Anxiety and depression 2016    Chronic kidney disease, stage 3 (H) 2021    Congenital medullary sponge kidney 2005    Hyperlipidemia     Kidney stone     CATHY (obstructive sleep apnea) 2018       Past Surgical History:   Procedure Laterality Date     SECTION      COMBINED CYSTOSCOPY, INSERT STENT URETER(S) Left 11/10/2017    Procedure: COMBINED CYSTOSCOPY, INSERT STENT URETER(S);  Cystoscopy, Left Ureteral Stent Placement;  Surgeon: Yg Rodriguez MD;  [facility]: [facility]    ESOPHAGOSCOPY, GASTROSCOPY, DUODENOSCOPY (EGD), COMBINED N/A 2020    Procedure: ESOPHAGOGASTRODUODENOSCOPY (EGD), WITH BIOPSY;  Surgeon: Brian Grimes DO;  Location: [facility]    EXCISE TOENAIL(S) Left 2016    Procedure: EXCISE TOENAIL(S);  Surgeon: Ady Mejia DPM;  Location: [facility]    HEAD & NECK SURGERY  May 29th, 2021    IR MISCELLANEOUS PROCEDURE  2002    IR MISCELLANEOUS PROCEDURE  2002    IR MISCELLANEOUS PROCEDURE  2002    IR MISCELLANEOUS PROCEDURE  2002    NEPHRECTOMY Right 2017    SURGICAL HISTORY OF -           SURGICAL HISTORY OF -   -present    Lithothypsy X 49    SURGICAL HISTORY OF -       Percutaneous nephrostomy X2    SURGICAL HISTORY OF -   2011    Cystoscopy with bilateral ureteral pyeloscopy, stone basketing and stent placement       Family History   Problem Relation Age of Onset    Respiratory Father         CATHY    C.A.D. Father     Heart Failure Father     Coronary Artery Disease Father     Hypertension Father     Depression Father     Anxiety Disorder Father     Diabetes Father         type 2    Cerebrovascular Disease Father     Lipids Mother         high cholesterol    Allergies Mother     Arthritis Mother         RA    Hyperlipidemia Mother     Lipids Sister         high cholesterol    Allergies Sister      "Asthma Sister     Hypertension Sister     Allergies Sister     Respiratory Sister         moshe    Genitourinary Problems Maternal Grandmother         passed from complications from renal failure    Arthritis Maternal Grandfather         rheumatoid    Diabetes Paternal Grandmother         adult onset    Cardiovascular Paternal Grandfather         AAA    Hypertension Sister     Hyperlipidemia Sister     Anxiety Disorder Sister     Asthma Sister     Depression Sister        Social History     Tobacco Use    Smoking status: Every Day     Packs/day: 0.50     Years: 30.00     Pack years: 15.00     Types: Cigarettes    Smokeless tobacco: Never    Tobacco comments:     trying to quit; cutting back on her own   Vaping Use    Vaping Use: Never used   Substance Use Topics    Alcohol use: No    Drug use: No       cetirizine (ZYRTEC) 10 MG tablet  escitalopram (LEXAPRO) 10 MG tablet  LORazepam (ATIVAN) 0.5 MG tablet  rosuvastatin (CRESTOR) 20 MG tablet  SUMAtriptan (IMITREX) 50 MG tablet        Physical Exam     Vitals:  BP (!) 141/71 (BP Location: Right arm)   Pulse 67   Temp 98.2  F (36.8  C) (Oral)   Resp 18   Ht 1.676 m (5' 6\")   Wt 86.8 kg (191 lb 6.4 oz)   LMP 07/02/2013   SpO2 96%   BMI 30.89 kg/m      PHYSICAL EXAM:   Physical Exam  Vitals and nursing note reviewed.   Constitutional:       General: She is not in acute distress.     Appearance: Normal appearance.   HENT:      Head: Normocephalic and atraumatic.      Nose: Nose normal.      Mouth/Throat:      Mouth: Mucous membranes are moist.   Eyes:      Pupils: Pupils are equal, round, and reactive to light.   Cardiovascular:      Rate and Rhythm: Normal rate and regular rhythm.      Pulses: Normal pulses.           Radial pulses are 2+ on the right side and 2+ on the left side.        Dorsalis pedis pulses are 2+ on the right side and 2+ on the left side.   Pulmonary:      Effort: Pulmonary effort is normal. No respiratory distress.      Breath sounds: Normal " breath sounds.   Abdominal:      Palpations: Abdomen is soft.      Tenderness: There is no abdominal tenderness. There is left CVA tenderness (mild).   Musculoskeletal:      Cervical back: Full passive range of motion without pain and neck supple.      Comments: No calf tenderness or swelling b/l   Skin:     General: Skin is warm.      Findings: No rash.   Neurological:      General: No focal deficit present.      Mental Status: She is alert. Mental status is at baseline.      Comments: Fluent speech, no acute lateralizing deficits   Psychiatric:         Mood and Affect: Mood normal.         Behavior: Behavior normal.         Results     LAB:  All pertinent labs reviewed and interpreted  Labs Ordered and Resulted from Time of ED Arrival to Time of ED Departure   BASIC METABOLIC PANEL - Abnormal       Result Value    Sodium 140      Potassium 4.4      Chloride 107      Carbon Dioxide (CO2) 23      Anion Gap 10      Urea Nitrogen 27.8 (*)     Creatinine 1.90 (*)     Calcium 9.8      Glucose 98      GFR Estimate 31 (*)    CRP INFLAMMATION - Abnormal    CRP Inflammation 24.60 (*)    CBC WITH PLATELETS AND DIFFERENTIAL - Abnormal    WBC Count 9.2      RBC Count 5.61 (*)     Hemoglobin 16.1 (*)     Hematocrit 50.9 (*)     MCV 91      MCH 28.7      MCHC 31.6      RDW 14.8      Platelet Count 149 (*)     % Neutrophils 69      % Lymphocytes 24      % Monocytes 4      % Eosinophils 1      % Basophils 1      % Immature Granulocytes 1      NRBCs per 100 WBC 0      Absolute Neutrophils 6.4      Absolute Lymphocytes 2.2      Absolute Monocytes 0.4      Absolute Eosinophils 0.1      Absolute Basophils 0.1      Absolute Immature Granulocytes 0.1      Absolute NRBCs 0.0     GLUCOSE MONITOR NURSING POCT       RADIOLOGY:  No orders to display                ECG:  none    PROCEDURES:  Procedures:  none      FINAL IMPRESSION:    ICD-10-CM    1. Left ureteral stone  N20.1       2. Urinary tract infection with hematuria, site unspecified   N39.0     R31.9           0 minutes of critical care time      I, Esteban Mederos, am serving as a scribe to document services personally performed by Dr. Lc Jay, based on my observations and the provider's statements to me. I, Lc Jay, DO attest that Esteban Mederos is acting in a scribe capacity, has observed my performance of the services and has documented them in accordance with my direction.      Lc Jay DO  Emergency Medicine  [facility] EMERGENCY DEPARTMENT  8/16/2023  10:03 AM          Lc Jay MD  08/16/23 1130

## 2023-08-16 NOTE — CONSULTS
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  She has prior history of right nephrectomy and multiple procedures for kidney stones over the years.  She was noted to have worsening creatinine by her nephrologist and was seen by our PA at [facility] and identified to have mild left hydronephrosis with distal left ureteral stones.  Her UA was positive for nitrites and her creatinine was at 1.9 which is higher than her baseline.  Does not endorse any fever chills or rigors      Past Medical History:  Past Medical History:   Diagnosis Date    Anxiety and depression 2016    Chronic kidney disease, stage 3 (H) 2021    Congenital medullary sponge kidney 2005    Hyperlipidemia     Kidney stone     CATHY (obstructive sleep apnea) 2018    Mild to moderate CATHY without sleep-associated hypoxemia   - PSG performed 2008 with weight 180 lbs, AHI 0.3, RDI 41, mary SpO2 93%, PLMI 0, arousal index 57.9.       Past Surgical History:  Past Surgical History:   Procedure Laterality Date     SECTION      COMBINED CYSTOSCOPY, INSERT STENT URETER(S) Left 11/10/2017    Procedure: COMBINED CYSTOSCOPY, INSERT STENT URETER(S);  Cystoscopy, Left Ureteral Stent Placement;  Surgeon: gY Rodriguez MD;  [facility]: [facility]    ESOPHAGOSCOPY, GASTROSCOPY, DUODENOSCOPY (EGD), COMBINED N/A 2020    Procedure: ESOPHAGOGASTRODUODENOSCOPY (EGD), WITH BIOPSY;  Surgeon: Brian Grimes DO;  Location: [facility]    EXCISE TOENAIL(S) Left 2016    Procedure: EXCISE TOENAIL(S);  Surgeon:  Ady Mejia, EDEN;  Location: [facility]    HEAD & NECK SURGERY  May 29th, 2021    IR MISCELLANEOUS PROCEDURE  2002    IR MISCELLANEOUS PROCEDURE  2002    IR MISCELLANEOUS PROCEDURE  2002    IR MISCELLANEOUS PROCEDURE  2002    NEPHRECTOMY Right 2017    SURGICAL HISTORY OF -           SURGICAL HISTORY OF -   -present    Lithothypsy X 49    SURGICAL HISTORY OF -       Percutaneous nephrostomy X2    SURGICAL HISTORY OF -   2011    Cystoscopy with bilateral ureteral pyeloscopy, stone basketing and stent placement       Allergies:     Allergies   Allergen Reactions    Gentamycin [Gentamicin Sulfate]     Nsaids Other (See Comments)     Avoids due presence of 1 kidney and that kidney has decreased function   GFR 44    Skin Adhesives [Cyanoacrylate]        Medications:  No current facility-administered medications on file prior to encounter.  cetirizine (ZYRTEC) 10 MG tablet, Take 10 mg by mouth every evening  escitalopram (LEXAPRO) 10 MG tablet, Take 1 tablet (10 mg) by mouth daily (Patient taking differently: Take 10 mg by mouth every evening)  LORazepam (ATIVAN) 0.5 MG tablet, Take 1 tablet (0.5 mg) by mouth every 8 hours as needed for anxiety  rosuvastatin (CRESTOR) 20 MG tablet, Take 1 tablet (20 mg) by mouth daily (Patient taking differently: Take 20 mg by mouth every evening)  SUMAtriptan (IMITREX) 50 MG tablet, Take 1 tablet (50 mg) by mouth at onset of headache for migraine May repeat in 2 hours. Max 4 tablets/24 hours.        Social History:  Social History     Socioeconomic History    Marital status:      Spouse name: Not on file    Number of children: 1    Years of education: Not on file    Highest education level: Not on file   Occupational History     Employer: UNEMPLOYED     Employer: DISABLED     Comment: due to kidney disease   Tobacco Use    Smoking status: Every Day     Packs/day: 1.00     Years: 30.00     Pack years: 30.00     Types: Cigarettes     "Smokeless tobacco: Never    Tobacco comments:     trying to quit; cutting back on her own   Vaping Use    Vaping Use: Never used   Substance and Sexual Activity    Alcohol use: No    Drug use: No    Sexual activity: Yes     Partners: Male     Birth control/protection: None   Other Topics Concern    Parent/sibling w/ CABG, MI or angioplasty before 65F 55M? Yes   Social History Narrative    Not on file     Social Determinants of Health     Financial Resource Strain: Not on file   Food Insecurity: Not on file   Transportation Needs: Not on file   Physical Activity: Not on file   Stress: Not on file   Social Connections: Not on file   Intimate Partner Violence: Not on file   Housing Stability: Not on file       Family History:  Family History   Problem Relation Age of Onset    Respiratory Father         CATHY    C.A.D. Father     Heart Failure Father     Coronary Artery Disease Father     Hypertension Father     Depression Father     Anxiety Disorder Father     Diabetes Father         type 2    Cerebrovascular Disease Father     Lipids Mother         high cholesterol    Allergies Mother     Arthritis Mother         RA    Hyperlipidemia Mother     Lipids Sister         high cholesterol    Allergies Sister     Asthma Sister     Hypertension Sister     Allergies Sister     Respiratory Sister         cathy    Genitourinary Problems Maternal Grandmother         passed from complications from renal failure    Arthritis Maternal Grandfather         rheumatoid    Diabetes Paternal Grandmother         adult onset    Cardiovascular Paternal Grandfather         AAA    Hypertension Sister     Hyperlipidemia Sister     Anxiety Disorder Sister     Asthma Sister     Depression Sister        ROS:  The remainder of the complete ROS was negative unless noted in the HPI.    Exam:  BP (!) 141/71 (BP Location: Right arm)   Pulse 67   Temp 98.2  F (36.8  C) (Oral)   Resp 18   Ht 1.676 m (5' 6\")   Wt 86.8 kg (191 lb 6.4 oz)   LMP 07/02/2013 "   SpO2 96%   BMI 30.89 kg/m    General: Alert, interactive, & in NAD  Resp: CTAB, no crackles or wheezes  Cardiac: Regular rate; extremities warm;   Abdomen: Soft, nontender, nondistended. .  : Normal   Extremities: No LE edema or obvious joint abnormalities  Skin: Warm and dry, no jaundice or rash    Labs:  Results for orders placed or performed during the hospital encounter of 08/16/23 (from the past 24 hour(s))   CBC with platelets differential    Narrative    The following orders were created for panel order CBC with platelets differential.  Procedure                               Abnormality         Status                     ---------                               -----------         ------                     CBC with platelets and d...[[id number]]  Abnormal            Final result                 Please view results for these tests on the individual orders.   Basic metabolic panel   Result Value Ref Range    Sodium 140 136 - 145 mmol/L    Potassium 4.4 3.4 - 5.3 mmol/L    Chloride 107 98 - 107 mmol/L    Carbon Dioxide (CO2) 23 22 - 29 mmol/L    Anion Gap 10 7 - 15 mmol/L    Urea Nitrogen 27.8 (H) 6.0 - 20.0 mg/dL    Creatinine 1.90 (H) 0.51 - 0.95 mg/dL    Calcium 9.8 8.6 - 10.0 mg/dL    Glucose 98 70 - 99 mg/dL    GFR Estimate 31 (L) >60 mL/min/1.73m2   CRP inflammation   Result Value Ref Range    CRP Inflammation 24.60 (H) <5.00 mg/L   [place] Draw    Narrative    The following orders were created for panel order [place] Draw.  Procedure                               Abnormality         Status                     ---------                               -----------         ------                     Extra Blue Top Tube[[id number]]                              In process                 Extra Red Top Tube[[id number]]                               In process                 Extra Green Top (Lithium...[[id number]]                      In process                 Extra Purple Top Tube[[id number]]                             In process                   Please view results for these tests on the individual orders.   CBC with platelets and differential   Result Value Ref Range    WBC Count 9.2 4.0 - 11.0 10e3/uL    RBC Count 5.61 (H) 3.80 - 5.20 10e6/uL    Hemoglobin 16.1 (H) 11.7 - 15.7 g/dL    Hematocrit 50.9 (H) 35.0 - 47.0 %    MCV 91 78 - 100 fL    MCH 28.7 26.5 - 33.0 pg    MCHC 31.6 31.5 - 36.5 g/dL    RDW 14.8 10.0 - 15.0 %    Platelet Count 149 (L) 150 - 450 10e3/uL    % Neutrophils 69 %    % Lymphocytes 24 %    % Monocytes 4 %    % Eosinophils 1 %    % Basophils 1 %    % Immature Granulocytes 1 %    NRBCs per 100 WBC 0 <1 /100    Absolute Neutrophils 6.4 1.6 - 8.3 10e3/uL    Absolute Lymphocytes 2.2 0.8 - 5.3 10e3/uL    Absolute Monocytes 0.4 0.0 - 1.3 10e3/uL    Absolute Eosinophils 0.1 0.0 - 0.7 10e3/uL    Absolute Basophils 0.1 0.0 - 0.2 10e3/uL    Absolute Immature Granulocytes 0.1 <=0.4 10e3/uL    Absolute NRBCs 0.0 10e3/uL       Imaging: IDNEYS/BLADDER: 4 mm obstructing stone distal left ureter on image 166 of series 5 resulting in severe hydronephrosis, progressed from the previous exam. Numerous stones in the lower pole the largest measuring 7 mm. Several left renal cysts. Absent   right kidney. No bladder stones.        Assessment and Plan: Latoya Riddle is a(n) 52 year old female with history of recurrent urinary calculi, middle response kidney with  RTA 3 presenting with obstructing left distal ureteral stone with multiple kidney stones and moderate to severe hydronephrosis with elevated creatinine and evidence of urinary tract infection.  Latoya and her sister are available today for discussion and we talked about the need for ureteral stent placement as a drainage procedure for the obstructed kidney  We discussed about how stents function and she is well aware of the stent related issues she has had them in the past as well.  She will need admission overnight for observation considering her renal  function, concerns for urinary tract infection.  Hopefully we can have her discharged tomorrow once stable and will have to schedule her for a follow-up procedure to take care of the ureteral as well as the kidney stones at a subsequent date.  Both Mary and his sister expressed understanding and were agreeable with the planned procedure.    An informed consent was signed.      Gustavo Newton MD  [facility]

## 2023-08-16 NOTE — H&P
"[facility]    History and Physical - Hospitalist Service       Date of Admission:  8/16/2023    Assessment & Plan      51 yo F with h/o CATHY on CPAP, CKD3, anxiety, depression, HLD, right nephrectomy, and recurrent kidney stones who presents with left ureteral kidney stone with hydronephrosis and UTI.  Urology is planning to take to OR for cystoscopy and likely stent placement.  Antibiotics started in ED.    Principal Problem:    Left Hydronephrosis with urinary obstruction due to ureteral calculus - to OR today    Active Problems:    Congenital medullary sponge kidney    Tobacco abuse - encourage to quit    Hyperlipemia- continue crestor as at home    Anxiety and depression - continue lexapro as at home    CATHY (obstructive sleep apnea) - CPAP at night    Chronic kidney disease, stage 3 - follow    Urinary tract infection - rocephin as per urology    Left ureteral stone - to OR today         Diet: NPO per Anesthesia Guidelines for Procedure/Surgery Except for: Meds    DVT Prophylaxis: Low Risk/Ambulatory with no VTE prophylaxis indicated  Kay Catheter: Not present  Lines: None     Cardiac Monitoring: None  Code Status:  full    Clinically Significant Risk Factors Present on Admission                       # Obesity: Estimated body mass index is 30.89 kg/m  as calculated from the following:    Height as of this encounter: 1.676 m (5' 6\").    Weight as of this encounter: 86.8 kg (191 lb 6.4 oz).              Disposition Plan      Expected Discharge Date: 08/17/2023                  Amador Addison MD  Hospitalist Service  [facility]  Securely message with [employer] (more info)  Text page via [employer] Paging/Directory     ______________________________________________________________________    Chief Complaint   Left flank pain    History is obtained from the patient and electronic health record    History of Present Illness   51 yo F with h/o CATHY on CPAP, CKD3, anxiety, " depression, HLD, right nephrectomy, and recurrent kidney stones who presents with left flank pain.  Patient has history of multiple kidney stones and multiple surgeries and stents and kidney stone removals in the past.  Urinary obstruction on the right because what look like a mass and she had a nephrectomy for this in 2017 turned out to be just due to obstruction with infection.  She was doing okay in her usual state of health up until about 1 week ago when his she started developing some mild to moderate left flank pain.  It lasted a couple days and then seemed to subside though never went away completely.  Her nephrologist ordered a CT and some lab work which was done yesterday and she got a call that she needed to get into see urology.  Discussed this with urology and she was recommended to come into the emergency department this morning and the plan is to go to the OR today as she has hydronephrosis on the CT due to a 4 mm obstructing stone in the distal left ureter.  She denies any fevers or chills or nausea vomiting or diarrhea.  No gross hematuria.  She has chronic urinary frequency and mild dysuria but that has not changed recently.  No cold or flu symptoms recently.  No chest pain or shortness of breath.  She has a chronic cough that has not changed due to her smoking.      Past Medical History    Past Medical History:   Diagnosis Date    Anxiety and depression 2016    Chronic kidney disease, stage 3 (H) 2021    Congenital medullary sponge kidney 2005    Hyperlipidemia     Kidney stone     CATHY (obstructive sleep apnea) 2018    Mild to moderate CATHY without sleep-associated hypoxemia   - PSG performed 2008 with weight 180 lbs, AHI 0.3, RDI 41, mary SpO2 93%, PLMI 0, arousal index 57.9.       Past Surgical History   Past Surgical History:   Procedure Laterality Date     SECTION      COMBINED CYSTOSCOPY, INSERT STENT URETER(S) Left 11/10/2017    Procedure: COMBINED  CYSTOSCOPY, INSERT STENT URETER(S);  Cystoscopy, Left Ureteral Stent Placement;  Surgeon: Yg Rodriguez MD;  [facility]: [facility]    ESOPHAGOSCOPY, GASTROSCOPY, DUODENOSCOPY (EGD), COMBINED N/A 2020    Procedure: ESOPHAGOGASTRODUODENOSCOPY (EGD), WITH BIOPSY;  Surgeon: Brian Grimes DO;  Location: [facility] OR    EXCISE TOENAIL(S) Left 2016    Procedure: EXCISE TOENAIL(S);  Surgeon: Ady Mejia DPM;  Location: [facility]    HEAD & NECK SURGERY  May 29th, 2021    IR MISCELLANEOUS PROCEDURE  2002    IR MISCELLANEOUS PROCEDURE  2002    IR MISCELLANEOUS PROCEDURE  2002    IR MISCELLANEOUS PROCEDURE  2002    NEPHRECTOMY Right 2017    SURGICAL HISTORY OF -           SURGICAL HISTORY OF -   -present    Lithothypsy X 49    SURGICAL HISTORY OF -       Percutaneous nephrostomy X2    SURGICAL HISTORY OF -   2011    Cystoscopy with bilateral ureteral pyeloscopy, stone basketing and stent placement       Prior to Admission Medications   Prior to Admission Medications   Prescriptions Last Dose Informant Patient Reported? Taking?   LORazepam (ATIVAN) 0.5 MG tablet More than a month at prn  No Yes   Sig: Take 1 tablet (0.5 mg) by mouth every 8 hours as needed for anxiety   SUMAtriptan (IMITREX) 50 MG tablet More than a month at prn  No Yes   Sig: Take 1 tablet (50 mg) by mouth at onset of headache for migraine May repeat in 2 hours. Max 4 tablets/24 hours.   cetirizine (ZYRTEC) 10 MG tablet 8/15/2023 at PM  Yes Yes   Sig: Take 10 mg by mouth every evening   escitalopram (LEXAPRO) 10 MG tablet 8/15/2023 at PM  No Yes   Sig: Take 1 tablet (10 mg) by mouth daily   Patient taking differently: Take 10 mg by mouth every evening   rosuvastatin (CRESTOR) 20 MG tablet 8/15/2023 at PM  No Yes   Sig: Take 1 tablet (20 mg) by mouth daily   Patient taking differently: Take 20 mg by mouth every evening      Facility-Administered Medications: None        Review of Systems    The  10 point Review of Systems is negative other than noted in the HPI    Social History   I have reviewed this patient's social history and updated it with pertinent information if needed.  Social History     Tobacco Use    Smoking status: Every Day     Packs/day: 0.50     Years: 30.00     Pack years: 15.00     Types: Cigarettes    Smokeless tobacco: Never    Tobacco comments:     trying to quit; cutting back on her own   Vaping Use    Vaping Use: Never used   Substance Use Topics    Alcohol use: No    Drug use: No         Family History   I have reviewed this patient's family history and updated it with pertinent information if needed.  Family History   Problem Relation Age of Onset    Respiratory Father         CATHY    C.A.D. Father     Heart Failure Father     Coronary Artery Disease Father     Hypertension Father     Depression Father     Anxiety Disorder Father     Diabetes Father         type 2    Cerebrovascular Disease Father     Lipids Mother         high cholesterol    Allergies Mother     Arthritis Mother         RA    Hyperlipidemia Mother     Lipids Sister         high cholesterol    Allergies Sister     Asthma Sister     Hypertension Sister     Allergies Sister     Respiratory Sister         cathy    Genitourinary Problems Maternal Grandmother         passed from complications from renal failure    Arthritis Maternal Grandfather         rheumatoid    Diabetes Paternal Grandmother         adult onset    Cardiovascular Paternal Grandfather         AAA    Hypertension Sister     Hyperlipidemia Sister     Anxiety Disorder Sister     Asthma Sister     Depression Sister         Physical Exam   Vital Signs: Temp: 98.2  F (36.8  C) Temp src: Oral BP: (!) 141/71 Pulse: 67   Resp: 18 SpO2: 96 % O2 Device: None (Room air)    Weight: 191 lbs 6.4 oz    General Appearance:  Middle-aged female no acute distress   Head:    Normocephalic, without obvious abnormality, atraumatic   Eyes:    PERRL, conjunctiva/corneas clear,  EOM's intact,both eyes    Ears:    Normal external ear canals no drainage or erythema bilat.   Nose:   Nares normal by gross inspection,  mucosa normal, no drainage or sinus tenderness   Throat:   Lips, mucosa, and tongue normal; teeth and gums normal   Neck:   Supple, symmetrical, trachea midline, no adenopathy;        thyroid:  No enlargement/tenderness/nodules   Back:     Symmetric, no curvature, ROM normal, no CVA tenderness   Lungs:   Clear to auscultation   Chest wall:    No tenderness or deformity   Heart:    Regular rate and rhythm, S1 and S2 normal, I/VI systolic murmur, no rubs, no JVD, no edema   Abdomen:     Soft, non-tender, bowel sounds active all four quadrants,     no masses, no hepatosplenomegaly   Musculoskeletal:   Extremities are warm and non-tender, atraumatic, no joint swelling or tenderness, mild flank tenderness on the left   Pulses:   2+ and symmetric all extremities   Skin:   Skin color, texture, turgor normal, no rashes or lesions on exposed areas, please see nursing assessment for full skin assessment   Neurologic:      Psychiatric: Alert, oriented, cranial nerves II through XII intact, motor strength 5 out of 5 upper and lower extremities symmetric, sensory grossly intact to light touch    Affect is calm and normal         Medical Decision Making       75 MINUTES SPENT BY ME on the date of service doing chart review, history, exam, documentation & further activities per the note.      Data     I have personally reviewed the following data over the past 24 hrs:    9.2  \   16.1 (H)   / 149 (L)     140 107 27.8 (H) /  98   4.4 23 1.90 (H) \     Procal: N/A CRP: 24.60 (H) Lactic Acid: N/A         Imaging results reviewed over the past 24 hrs:   Recent Results (from the past 24 hour(s))   CT Abdomen Pelvis w/o Contrast [[id number]]    Narrative    EXAM: CT ABDOMEN PELVIS W/O CONTRAST  LOCATION: [facility]  DATE: 8/15/2023    INDICATION: Left hydronephrosis, solitary  left kidney, hx of stones  COMPARISON: CT abdomen and pelvis 02/21/2023  TECHNIQUE: CT scan of the abdomen and pelvis was performed without IV contrast. Multiplanar reformats were obtained. Dose reduction techniques were used.  CONTRAST: None.    FINDINGS:   LOWER CHEST: Normal.    HEPATOBILIARY: Normal.    PANCREAS: Normal.    SPLEEN: Normal.    ADRENAL GLANDS: Normal.    KIDNEYS/BLADDER: 4 mm obstructing stone distal left ureter on image 166 of series 5 resulting in severe hydronephrosis, progressed from the previous exam. Numerous stones in the lower pole the largest measuring 7 mm. Several left renal cysts. Absent   right kidney. No bladder stones.    BOWEL: Diverticula sigmoid colon, without evidence for diverticulitis or bowel obstruction.    LYMPH NODES: A few borderline-enlarged retroperitoneal nodes are unchanged.    VASCULATURE: Atherosclerotic disease abdominal aorta.    PELVIC ORGANS: Normal.    MUSCULOSKELETAL: Normal.      Impression    IMPRESSION:   1.  4 mm obstructing stone distal left ureter resulting in high-grade obstruction/hydronephrosis.  2.  Multiple additional stones left kidney.  3.  Absent right kidney.  4.  Sigmoid diverticulosis.

## 2023-08-16 NOTE — ANESTHESIA CARE TRANSFER NOTE
Patient: Latoya Riddle    Procedure: Procedure(s):  CYSTOSCOPY, WITH RETROGRADE PYELOGRAM AND URETERAL STENT REPLACEMENT       Diagnosis: Nephrolithiasis [N20.0]  Diagnosis Additional Information: No value filed.    Anesthesia Type:   MAC     Note:    Oropharynx: oropharynx clear of all foreign objects  Level of Consciousness: awake  Oxygen Supplementation: face mask  Level of Supplemental Oxygen (L/min / FiO2): 6  Independent Airway: airway patency satisfactory and stable  Dentition: dentition unchanged  Vital Signs Stable: post-procedure vital signs reviewed and stable  Report to RN Given: handoff report given  Patient transferred to: Phase II    Handoff Report: Identifed the Patient, Identified the Reponsible Provider, Reviewed the pertinent medical history, Discussed the surgical course, Reviewed Intra-OP anesthesia mangement and issues during anesthesia, Set expectations for post-procedure period and Allowed opportunity for questions and acknowledgement of understanding      Vitals:  Vitals Value Taken Time   /69 08/16/23 1305   Temp 36.6  C (97.8  F) 08/16/23 1305   Pulse 71 08/16/23 1305   Resp     SpO2 98 % 08/16/23 1305   Vitals shown include unvalidated device data.    Electronically Signed By: Mindy Rossi CRNA, MICHELLE SOLIS  August 16, 2023  1:06 PM

## 2023-08-16 NOTE — OP NOTE
OPERATIVE REPORT    PREOPERATIVE DIAGNOSIS: Left ureteral stone                                                     Acute UTI, single functioning left kidney    POSTOPERATIVE DIAGNOSIS:  Same    PROCEDURES PERFORMED:   1. Cystourethroscopy with left retrograde pyelography  2. Placement of left ureteral stent.  3. Intraoperative interpretation of fluoroscopic imaging.     STAFF SURGEON: Gustavo Newton MD, present for entire case.     ANESTHESIA: MAC    ESTIMATED BLOOD LOSS: 0 mL.     DRAINS: 6 Fr 26 cm Double J Stent      OPERATIVE INDICATIONS:   Latoya Riddle is a 52 year old female who presented with a left obstructing ureteral stone.  She has a single functioning left kidney with worsening creatinine and an acute UTI.  The patient was counseled on the alternatives, risks, and benefits and elected to proceed with the above stated procedure.    DESCRIPTION OF PROCEDURE:    After informed consent was obtained, the patient was taken to the operating room, and moved to the operating table.  After adequate anesthesia was induced, the patient was repositioned in dorsal lithotomy position and prepped and draped in the usual sterile fashion. A timeout was taken to confirm correct patient, procedure and laterality.     A 22-Chinese cystoscope was inserted into a well lubricated urethra. The urethra was unremarkable.  The bladder was free of tumors, stones or diverticuli.  The media was mildly turbid.  Bilateral ureteral orifices were orthotopic.  A Sensor guidewire was advanced into the left renal pelvis with the aid of a 5-Chinese open ended ureteral catheter.  A retrograde pyelogram demonstrated mild   hydronephrosis or filling defects.  The wire was replaced and a 6 Fr 26 cm Double J Stent was advanced over the guidewire under fluoroscopic guidance with a good curl in the renal pelvis and bladder.  The stent passed up easily with no appreciable resistance.  The bladder was then drained.    The patient tolerated the  procedure well.  There were no complications.         PLAN:   - Admit overnight for monitoring with medicine  - Follow-up with [facility] for definitive stone management in next 2-3 weeks    Gustavo Newton MD  [facility] urology

## 2023-08-16 NOTE — ED TRIAGE NOTES
Pt to ED via private vehicle with her sister.    Pt reports that she recent changes to a new nephrologist. She states that during a phone call yesterday they requested the pt go into clinic for blood work and a CT scan yesterday. She states today she received a call from urology to go to the ER. Pt reports abnormal blood work yesterday and the presence of a stone stuck in the left ureter on CT.     Hx of Chronic Kidney stones, removal of right kidney     Triage Assessment       Row Name 08/16/23 0956       Triage Assessment (Adult)    Airway WDL WDL       Respiratory WDL    Respiratory WDL WDL       Skin Circulation/Temperature WDL    Skin Circulation/Temperature WDL WDL       Cardiac WDL    Cardiac WDL WDL       Peripheral/Neurovascular WDL    Peripheral Neurovascular WDL WDL       Cognitive/Neuro/Behavioral WDL    Cognitive/Neuro/Behavioral WDL WDL

## 2023-08-16 NOTE — PHARMACY-ADMISSION MEDICATION HISTORY
Pharmacist Admission Medication History    Admission medication history is complete. The information provided in this note is only as accurate as the sources available at the time of the update.    Medication reconciliation/reorder completed by provider prior to medication history? No    Information Source(s): Patient and CareEverywhere/SureScripts via in-person    Pertinent Information: n/a    Changes made to PTA medication list:  Added: None  Deleted: None  Changed: None    Medication Affordability:  Not including over the counter (OTC) medications, was there a time in the past 3 months when you did not take your medications as prescribed because of cost?: No    Allergies reviewed with patient and updates made in EHR: yes    Medication History Completed By: Maribeth Miles, PharmD 8/16/2023 10:55 AM    Prior to Admission medications    Medication Sig Last Dose Taking? Auth Provider Long Term End Date   cetirizine (ZYRTEC) 10 MG tablet Take 10 mg by mouth every evening 8/15/2023 at PM Yes Unknown, Entered By History     escitalopram (LEXAPRO) 10 MG tablet Take 1 tablet (10 mg) by mouth daily  Patient taking differently: Take 10 mg by mouth every evening 8/15/2023 at PM Yes Maribeth Avila APRN CNP Yes    LORazepam (ATIVAN) 0.5 MG tablet Take 1 tablet (0.5 mg) by mouth every 8 hours as needed for anxiety More than a month at prn Yes Maribeth Avila APRN CNP     rosuvastatin (CRESTOR) 20 MG tablet Take 1 tablet (20 mg) by mouth daily  Patient taking differently: Take 20 mg by mouth every evening 8/15/2023 at PM Yes Maribeth Avila APRN CNP Yes    SUMAtriptan (IMITREX) 50 MG tablet Take 1 tablet (50 mg) by mouth at onset of headache for migraine May repeat in 2 hours. Max 4 tablets/24 hours. More than a month at prn Yes Maribeth Avila APRN CNP

## 2023-08-16 NOTE — TELEPHONE ENCOUNTER
Called patient to review findings of CTAP. There is a 4 mm distal left ureteral stone causing significant obstruction. UA also suspicious for infection. She has a solitary left kidney. Discussed with Dr. Self who recommended the patient go through the ED at [facility] for antibiotics and urology consult, likely for ureteral stent placement.     Patient was advised and will head down to [facility] this morning. She will remain NPO.     Dara Tamayo PA-C  Department of Urology

## 2023-08-16 NOTE — ANESTHESIA PREPROCEDURE EVALUATION
Anesthesia Pre-Procedure Evaluation    Patient: Latoya Riddle   MRN: [id number] : 1970        Procedure : Procedure(s):  CYSTOSCOPY, WITH RETROGRADE PYELOGRAM AND URETERAL STENT REPLACEMENT          Past Medical History:   Diagnosis Date    Anxiety and depression 2016    Chronic kidney disease, stage 3 (H) 2021    Congenital medullary sponge kidney 2005    Hyperlipidemia     Kidney stone     CATHY (obstructive sleep apnea) 2018    Mild to moderate CATHY without sleep-associated hypoxemia   - PSG performed 2008 with weight 180 lbs, AHI 0.3, RDI 41, mary SpO2 93%, PLMI 0, arousal index 57.9.      Past Surgical History:   Procedure Laterality Date     SECTION      COMBINED CYSTOSCOPY, INSERT STENT URETER(S) Left 11/10/2017    Procedure: COMBINED CYSTOSCOPY, INSERT STENT URETER(S);  Cystoscopy, Left Ureteral Stent Placement;  Surgeon: Yg Rodriguez MD;  Location: [facility] OR    ESOPHAGOSCOPY, GASTROSCOPY, DUODENOSCOPY (EGD), COMBINED N/A 2020    Procedure: ESOPHAGOGASTRODUODENOSCOPY (EGD), WITH BIOPSY;  Surgeon: Brian Grimes, DO;  Location: [facility] OR    EXCISE TOENAIL(S) Left 2016    Procedure: EXCISE TOENAIL(S);  Surgeon: Ady Mejai DPM;  Location: [facility] OR    HEAD & NECK SURGERY  May 29th, 2021    IR MISCELLANEOUS PROCEDURE  2002    IR MISCELLANEOUS PROCEDURE  2002    IR MISCELLANEOUS PROCEDURE  2002    IR MISCELLANEOUS PROCEDURE  2002    NEPHRECTOMY Right 2017    SURGICAL HISTORY OF -           SURGICAL HISTORY OF -   -present    Lithothypsy X 49    SURGICAL HISTORY OF -       Percutaneous nephrostomy X2    SURGICAL HISTORY OF -   2011    Cystoscopy with bilateral ureteral pyeloscopy, stone basketing and stent placement      Allergies   Allergen Reactions    Gentamycin [Gentamicin Sulfate]     Nsaids Other (See Comments)     Avoids due presence of 1 kidney and that kidney has decreased function    GFR 44    Skin Adhesives [Cyanoacrylate]       Social History     Tobacco Use    Smoking status: Every Day     Packs/day: 1.00     Years: 30.00     Pack years: 30.00     Types: Cigarettes    Smokeless tobacco: Never    Tobacco comments:     trying to quit; cutting back on her own   Substance Use Topics    Alcohol use: No      Wt Readings from Last 1 Encounters:   08/16/23 86.8 kg (191 lb 6.4 oz)        Anesthesia Evaluation   Pt has had prior anesthetic. Type: General.    History of anesthetic complications  - PONV.      ROS/MED HX  ENT/Pulmonary:     (+) sleep apnea,                                      Neurologic:     (+)      migraines,                          Cardiovascular:  - neg cardiovascular ROS     METS/Exercise Tolerance:     Hematologic:  - neg hematologic  ROS     Musculoskeletal:  - neg musculoskeletal ROS     GI/Hepatic:     (+) GERD,                   Renal/Genitourinary: Comment: Congenital medullary sponge kidney    (+) renal disease (CKD3), type: CRI,     Nephrolithiasis ,       Endo:  - neg endo ROS     Psychiatric/Substance Use:     (+) psychiatric history anxiety and depression       Infectious Disease:  - neg infectious disease ROS     Malignancy:  - neg malignancy ROS     Other:            Physical Exam    Airway        Mallampati: II   TM distance: > 3 FB   Neck ROM: full   Mouth opening: > 3 cm    Respiratory Devices and Support         Dental         B=Bridge, C=Chipped, L=Loose, M=Missing    Cardiovascular   cardiovascular exam normal          Pulmonary   pulmonary exam normal                OUTSIDE LABS:  CBC:   Lab Results   Component Value Date    WBC 9.2 08/16/2023    WBC 9.6 02/20/2023    HGB 16.1 (H) 08/16/2023    HGB 15.8 (H) 02/20/2023    HCT 50.9 (H) 08/16/2023    HCT 48.5 (H) 02/20/2023     (L) 08/16/2023     (L) 02/20/2023     BMP:   Lab Results   Component Value Date     08/16/2023     08/15/2023    POTASSIUM 4.4 08/16/2023    POTASSIUM 4.1  08/15/2023    CHLORIDE 107 08/16/2023    CHLORIDE 106 08/15/2023    CO2 23 08/16/2023    CO2 22 08/15/2023    BUN 27.8 (H) 08/16/2023    BUN 28.9 (H) 08/15/2023    CR 1.90 (H) 08/16/2023    CR 1.97 (H) 08/15/2023    GLC 98 08/16/2023     (H) 08/15/2023     COAGS:   Lab Results   Component Value Date    INR 1.03 10/16/2014     POC:   Lab Results   Component Value Date    HCG Negative 04/11/2020     HEPATIC:   Lab Results   Component Value Date    ALBUMIN 4.2 02/20/2023    PROTTOTAL 7.5 02/20/2023    ALT 16 02/20/2023    AST 21 02/20/2023    ALKPHOS 136 (H) 02/20/2023    BILITOTAL 0.3 02/20/2023     OTHER:   Lab Results   Component Value Date    PH 6.0 01/07/2013    MARIA 9.8 08/16/2023    LIPASE 243 04/11/2020    TSH 1.84 10/01/2021    T4 0.90 03/17/2006    CRP <5.0 01/24/2013    SED 7 01/24/2013       Anesthesia Plan    ASA Status:  3       Anesthesia Type: MAC.     - Reason for MAC: straight local not clinically adequate   Induction: Intravenous.   Maintenance: TIVA.        Consents    Anesthesia Plan(s) and associated risks, benefits, and realistic alternatives discussed. Questions answered and patient/representative(s) expressed understanding.     - Discussed: Risks, Benefits and Alternatives for BOTH SEDATION and the PROCEDURE were discussed     - Discussed with:  Patient      - Extended Intubation/Ventilatory Support Discussed: Yes.      - Patient is DNR/DNI Status: No     Use of blood products discussed: No .     Postoperative Care    Pain management: IV analgesics, Multi-modal analgesia.   PONV prophylaxis: Ondansetron (or other 5HT-3), Background Propofol Infusion     Comments:    Other Comments: Plan for MAC anesthesia. Risks of intraoperative awareness/recall and possible conversion to general anesthesia discussed.            Anjum Roy MD

## 2023-08-16 NOTE — PLAN OF CARE
"PRIMARY DIAGNOSIS: \"GENERIC\" NURSING  OUTPATIENT/OBSERVATION GOALS TO BE MET BEFORE DISCHARGE:  ADLs back to baseline: No    Activity and level of assistance: Ambulating independently.    Pain status: Improved-controlled with oral pain medications.    Return to near baseline physical activity: No     Discharge Planner Nurse   Safe discharge environment identified: No  Barriers to discharge: Yes       Entered by: Johnathon Naylor RN 08/16/2023 4:30 PM     Please review provider order for any additional goals.   Nurse to notify provider when observation goals have been met and patient is ready for discharge.Goal Outcome Evaluation:                        "

## 2023-08-17 PROBLEM — N17.9 AKI (ACUTE KIDNEY INJURY) (H): Status: ACTIVE | Noted: 2023-08-17

## 2023-08-17 LAB
ALBUMIN UR-MCNC: 50 MG/DL
ANION GAP SERPL CALCULATED.3IONS-SCNC: 8 MMOL/L (ref 7–15)
APPEARANCE UR: ABNORMAL
BACTERIA UR CULT: NORMAL
BILIRUB UR QL STRIP: NEGATIVE
BUN SERPL-MCNC: 24.5 MG/DL (ref 6–20)
CALCIUM SERPL-MCNC: 8.7 MG/DL (ref 8.6–10)
CHLORIDE SERPL-SCNC: 109 MMOL/L (ref 98–107)
COLOR UR AUTO: ABNORMAL
CREAT SERPL-MCNC: 1.92 MG/DL (ref 0.51–0.95)
DEPRECATED HCO3 PLAS-SCNC: 23 MMOL/L (ref 22–29)
ERYTHROCYTE [DISTWIDTH] IN BLOOD BY AUTOMATED COUNT: 14.7 % (ref 10–15)
GFR SERPL CREATININE-BSD FRML MDRD: 31 ML/MIN/1.73M2
GLUCOSE BLDC GLUCOMTR-MCNC: 118 MG/DL (ref 70–99)
GLUCOSE SERPL-MCNC: 101 MG/DL (ref 70–99)
GLUCOSE UR STRIP-MCNC: NEGATIVE MG/DL
HCT VFR BLD AUTO: 43.8 % (ref 35–47)
HGB BLD-MCNC: 13.4 G/DL (ref 11.7–15.7)
HGB UR QL STRIP: ABNORMAL
KETONES UR STRIP-MCNC: NEGATIVE MG/DL
LEUKOCYTE ESTERASE UR QL STRIP: ABNORMAL
MCH RBC QN AUTO: 28.6 PG (ref 26.5–33)
MCHC RBC AUTO-ENTMCNC: 30.6 G/DL (ref 31.5–36.5)
MCV RBC AUTO: 93 FL (ref 78–100)
NITRATE UR QL: POSITIVE
PH UR STRIP: 6 [PH] (ref 5–7)
PLATELET # BLD AUTO: 113 10E3/UL (ref 150–450)
POTASSIUM SERPL-SCNC: 4.4 MMOL/L (ref 3.4–5.3)
RBC # BLD AUTO: 4.69 10E6/UL (ref 3.8–5.2)
RBC URINE: 13 /HPF
SODIUM SERPL-SCNC: 140 MMOL/L (ref 136–145)
SP GR UR STRIP: 1.01 (ref 1–1.03)
SQUAMOUS EPITHELIAL: 2 /HPF
UROBILINOGEN UR STRIP-MCNC: <2 MG/DL
WBC # BLD AUTO: 7.7 10E3/UL (ref 4–11)
WBC CLUMPS #/AREA URNS HPF: PRESENT /HPF
WBC URINE: >182 /HPF

## 2023-08-17 PROCEDURE — 94660 CPAP INITIATION&MGMT: CPT

## 2023-08-17 PROCEDURE — 120N000001 HC R&B MED SURG/OB

## 2023-08-17 PROCEDURE — G0378 HOSPITAL OBSERVATION PER HR: HCPCS

## 2023-08-17 PROCEDURE — 96376 TX/PRO/DX INJ SAME DRUG ADON: CPT

## 2023-08-17 PROCEDURE — 85027 COMPLETE CBC AUTOMATED: CPT | Performed by: STUDENT IN AN ORGANIZED HEALTH CARE EDUCATION/TRAINING PROGRAM

## 2023-08-17 PROCEDURE — 36415 COLL VENOUS BLD VENIPUNCTURE: CPT | Performed by: STUDENT IN AN ORGANIZED HEALTH CARE EDUCATION/TRAINING PROGRAM

## 2023-08-17 PROCEDURE — 258N000003 HC RX IP 258 OP 636: Performed by: STUDENT IN AN ORGANIZED HEALTH CARE EDUCATION/TRAINING PROGRAM

## 2023-08-17 PROCEDURE — 250N000011 HC RX IP 250 OP 636: Mod: JZ | Performed by: STUDENT IN AN ORGANIZED HEALTH CARE EDUCATION/TRAINING PROGRAM

## 2023-08-17 PROCEDURE — 250N000013 HC RX MED GY IP 250 OP 250 PS 637: Performed by: STUDENT IN AN ORGANIZED HEALTH CARE EDUCATION/TRAINING PROGRAM

## 2023-08-17 PROCEDURE — 87086 URINE CULTURE/COLONY COUNT: CPT | Performed by: STUDENT IN AN ORGANIZED HEALTH CARE EDUCATION/TRAINING PROGRAM

## 2023-08-17 PROCEDURE — 81001 URINALYSIS AUTO W/SCOPE: CPT | Performed by: STUDENT IN AN ORGANIZED HEALTH CARE EDUCATION/TRAINING PROGRAM

## 2023-08-17 PROCEDURE — 82310 ASSAY OF CALCIUM: CPT | Performed by: STUDENT IN AN ORGANIZED HEALTH CARE EDUCATION/TRAINING PROGRAM

## 2023-08-17 PROCEDURE — 99233 SBSQ HOSP IP/OBS HIGH 50: CPT | Performed by: STUDENT IN AN ORGANIZED HEALTH CARE EDUCATION/TRAINING PROGRAM

## 2023-08-17 PROCEDURE — 96361 HYDRATE IV INFUSION ADD-ON: CPT

## 2023-08-17 PROCEDURE — 82962 GLUCOSE BLOOD TEST: CPT

## 2023-08-17 PROCEDURE — 82374 ASSAY BLOOD CARBON DIOXIDE: CPT | Performed by: STUDENT IN AN ORGANIZED HEALTH CARE EDUCATION/TRAINING PROGRAM

## 2023-08-17 PROCEDURE — 999N000157 HC STATISTIC RCP TIME EA 10 MIN

## 2023-08-17 RX ORDER — SODIUM CHLORIDE 9 MG/ML
INJECTION, SOLUTION INTRAVENOUS CONTINUOUS
Status: DISCONTINUED | OUTPATIENT
Start: 2023-08-17 | End: 2023-08-18 | Stop reason: HOSPADM

## 2023-08-17 RX ADMIN — OXYCODONE HYDROCHLORIDE 5 MG: 5 TABLET ORAL at 15:38

## 2023-08-17 RX ADMIN — SODIUM CHLORIDE: 9 INJECTION, SOLUTION INTRAVENOUS at 21:24

## 2023-08-17 RX ADMIN — OXYBUTYNIN CHLORIDE 5 MG: 5 TABLET ORAL at 08:44

## 2023-08-17 RX ADMIN — SODIUM CHLORIDE: 9 INJECTION, SOLUTION INTRAVENOUS at 08:46

## 2023-08-17 RX ADMIN — ESCITALOPRAM OXALATE 10 MG: 10 TABLET ORAL at 21:21

## 2023-08-17 RX ADMIN — OXYBUTYNIN CHLORIDE 5 MG: 5 TABLET ORAL at 21:21

## 2023-08-17 RX ADMIN — ROSUVASTATIN CALCIUM 20 MG: 10 TABLET, FILM COATED ORAL at 21:21

## 2023-08-17 RX ADMIN — OXYBUTYNIN CHLORIDE 5 MG: 5 TABLET ORAL at 13:41

## 2023-08-17 RX ADMIN — TAMSULOSIN HYDROCHLORIDE 0.4 MG: 0.4 CAPSULE ORAL at 17:43

## 2023-08-17 RX ADMIN — CEFTRIAXONE SODIUM 1 G: 1 INJECTION, POWDER, FOR SOLUTION INTRAMUSCULAR; INTRAVENOUS at 11:04

## 2023-08-17 RX ADMIN — OXYCODONE HYDROCHLORIDE 5 MG: 5 TABLET ORAL at 06:01

## 2023-08-17 ASSESSMENT — ACTIVITIES OF DAILY LIVING (ADL)
ADLS_ACUITY_SCORE: 20

## 2023-08-17 NOTE — PLAN OF CARE
"PRIMARY DIAGNOSIS: \"GENERIC\" NURSING  OUTPATIENT/OBSERVATION GOALS TO BE MET BEFORE DISCHARGE:  ADLs back to baseline: Yes    Activity and level of assistance: Ambulating independently.    Pain status: Pain free.    Return to near baseline physical activity: Yes     Discharge Planner Nurse   Safe discharge environment identified: Yes  Barriers to discharge: Yes       Entered by: Deepika Pierce RN 08/17/2023 2:17 PM     Please review provider order for any additional goals.   Nurse to notify provider when observation goals have been met and patient is ready for discharge.                      "

## 2023-08-17 NOTE — PLAN OF CARE
UA sent. UC pending.  Pt continues on IVF.  No reports of pain.  Pt voiding freely.  Pt anxious to discharge home.

## 2023-08-17 NOTE — PLAN OF CARE
"PRIMARY DIAGNOSIS: \"GENERIC\" NURSING  OUTPATIENT/OBSERVATION GOALS TO BE MET BEFORE DISCHARGE:  ADLs back to baseline: No    Activity and level of assistance: Ambulating independently.    Pain status: Improved-controlled with oral pain medications.    Return to near baseline physical activity: Yes     Discharge Planner Nurse   Safe discharge environment identified: Yes  Barriers to discharge: Yes       Entered by: Johnathon Naylor RN 08/16/2023 11:45 PM     Please review provider order for any additional goals.   Nurse to notify provider when observation goals have been met and patient is ready for discharge.Goal Outcome Evaluation:       Pain consistently 3-4/10 during shift. Denies need for pain medication. Ambulates independently to and from bathroom. Refused to ambulate in hallway when offered.                  "

## 2023-08-17 NOTE — PLAN OF CARE
Goal Outcome Evaluation:  PRIMARY DIAGNOSIS: Left hydronephrosis with urinary obstruction  OUTPATIENT/OBSERVATION GOALS TO BE MET BEFORE DISCHARGE:  ADLs back to baseline: Yes    Activity and level of assistance: Up with standby assistance.    Pain status: Improved-controlled with oral pain medications.    Return to near baseline physical activity: Yes     Discharge Planner Nurse   Safe discharge environment identified: Yes  Barriers to discharge: No       Entered by: Wilberto Cabrera RN 08/17/2023 12:55 AM     Please review provider order for any additional goals.   Nurse to notify provider when observation goals have been met and patient is ready for discharge.    Patient c/o increased L flank pain, PRN oxycodone given, Patient reports pain improved after administration.

## 2023-08-17 NOTE — PROGRESS NOTES
[facility]    Medicine Progress Note - Hospitalist Service    Date of Admission:  8/16/2023    Assessment & Plan   Assessment  53 yo F with h/o CATHY on CPAP, CKD3, anxiety, depression, HLD, right nephrectomy, and recurrent kidney stones who presents with left ureteral kidney stone with hydronephrosis and UTI.  Urology following, on antibiotics.    Problem list and plan:  Left Hydronephrosis with urinary obstruction due to ureteral calculus   Congenital medullary sponge kidney  Patient presented to the hospital with left flank pain and was found to have ureteral kidney stone with hydronephrosis and UTI  CT abd: 4 mm obstructing stone distal left ureter resulting in high-grade obstruction/hydronephrosis. Multiple additional stones left kidney. Absent right kidney.  S/p cystoscopy and stent placement  As per urology follow-up with [facility] for definitive stone management in next 2 to 3 weeks  Hopefully renal function will improve and can discharge patient tomorrow but if renal function fails to improve may need to touch base with urology to see if any further imaging is necessary.    Tobacco abuse   Encouraged to quit on admission  History of hyperlipidemia  Continue crestor as at home    Mood disorder/anxiety and depression   Continue lexapro as at home    CATHY (obstructive sleep apnea)   CPAP at night    LINDEN on chronic kidney disease, stage 3 B  Monitor renal function  Avoid nephrotoxic meds  Renally dose all meds  Follow-up BMP  We will continue to monitor renal function, possibly new baseline but will trend and continue with IV fluid resuscitation for now, will advise patient to follow-up with nephrology as outpatient.  Creatinine currently appears to be stable at 1.9    Polycythemia hemoconcentration secondary to dehydration  Polycythemia most likely in the setting of initial hemoconcentration that responded to fluid resuscitation, hemoglobin trended down from 16-13, no active bleeding  "appreciated, most likely dilutional decrease in hemoglobin  Monitor CBC    Chronic thrombocytopenia of unclear etiology  Currently platelet count at baseline range  Monitor CBC    Urinary tract infection   Follow-up urine culture, initial cultures were growing mixed urogenital facundo but as there is high index of suspicion for UTI repeated urine culture  Continue with IV antibiotics  Elevated inflammatory markers most likely setting of UTI     Diet: Regular Diet Adult    DVT Prophylaxis: Pneumatic Compression Devices  Kay Catheter: Not present  Lines: None     Cardiac Monitoring: None  Code Status: Full Code      Clinically Significant Risk Factors Present on Admission                # Thrombocytopenia: Lowest platelets = 113 in last 2 days, will monitor for bleeding        # Obesity: Estimated body mass index is 31.1 kg/m  as calculated from the following:    Height as of this encounter: 1.676 m (5' 6\").    Weight as of this encounter: 87.4 kg (192 lb 10.9 oz).              Disposition Plan      Expected Discharge Date: 08/18/2023                  Saad J. Gondal, MD  Hospitalist Service  [facility]  Securely message with [employer] (more info)  Text page via [employer] Paging/Directory   ______________________________________________________________________    Interval History   Patient seen and examined at bedside, denied any acute overnight concerns or complaints other than minimal abdominal pain and tenderness on palpation.  Discussed with the plan with the patient at bedside, his renal function though stable but has not improved we will continue to monitor for today.    12 point review of the system was negative except above    Physical Exam   Vital Signs: Temp: 99.5  F (37.5  C) Temp src: Oral BP: 114/59 Pulse: 63   Resp: 18 SpO2: 95 % O2 Device: None (Room air)    Weight: 192 lbs 10.91 oz    GENERAL: Patient was seen and examined at bedside with no acute distress  HENT:  Head is " normocephalic, atraumatic.   EYES:  Eyes have anicteric sclerae without conjunctival injection   LUNGS: Bilateral air entry, clear to auscultation bilaterally  CARDIOVASCULAR:  Regular rate and rhythm with no murmurs, gallops or rubs.  ABDOMEN: Soft, normal bowel sounds, nondistended, minimal tenderness to palpation.  EXT: no edema    SKIN:  No acute rashes.   NEUROLOGIC:  Grossly nonfocal.      Medical Decision Making       50 MINUTES SPENT BY ME on the date of service doing chart review, history, exam, documentation & further activities per the note.      Data     I have personally reviewed the following data over the past 24 hrs:    7.7  \   13.4   / 113 (L)     140 109 (H) 24.5 (H) /  118 (H)   4.4 23 1.92 (H) \       Imaging results reviewed over the past 24 hrs:   No results found for this or any previous visit (from the past 24 hour(s)).

## 2023-08-17 NOTE — PROGRESS NOTES
Care Management Follow Up    Length of Stay (days): 0    Expected Discharge Date: 08/17/2023     Concerns to be Addressed:       Patient plan of care discussed at interdisciplinary rounds: Yes    Anticipated Discharge Disposition:  Home     Anticipated Discharge Services:    Anticipated Discharge DME:      Patient/family educated on Medicare website which has current facility and service quality ratings:    Education Provided on the Discharge Plan:    Patient/Family in Agreement with the Plan:      Referrals Placed by CM/SW:    Private pay costs discussed: Not applicable    Additional Information:  Patient from home with spouse, independent at baseline. Anticipate discharge home, sister to transport.     SEALS discussed and given.     BLOSSOM JarrettW

## 2023-08-17 NOTE — CONSULTS
Patient had an out of system MDRO clearance flag on her chart.   I was not able to find history of MDRO in Care Everywhere or our system.  I removed the flag.   No isolation needed.   Standard Precautions.

## 2023-08-17 NOTE — PLAN OF CARE
PRIMARY DIAGNOSIS: Left Hydronephrosis    OUTPATIENT/OBSERVATION GOALS TO BE MET BEFORE DISCHARGE  1. Orthostatic performed: No    2. Tolerating PO medications: Yes    3. Return to near baseline physical activity: Yes    4. Cleared for discharge by consultants (if involved): No    Discharge Planner Nurse   Safe discharge environment identified: Yes  Barriers to discharge: Yes       Entered by: Deepika Pierce RN 08/17/2023 2:15 PM     Please review provider order for any additional goals.   Nurse to notify provider when observation goals have been met and patient is ready for discharge.Goal Outcome Evaluation:

## 2023-08-17 NOTE — PLAN OF CARE
Goal Outcome Evaluation:  PRIMARY DIAGNOSIS: Left hydronephrosis  OUTPATIENT/OBSERVATION GOALS TO BE MET BEFORE DISCHARGE:  ADLs back to baseline: Yes    Activity and level of assistance: Ambulating independently.    Pain status: Improved-controlled with oral pain medications.    Return to near baseline physical activity: Yes     Discharge Planner Nurse   Safe discharge environment identified: Yes  Barriers to discharge: No       Entered by: Wilberto Cabrera RN 08/17/2023 6:56 AM     Please review provider order for any additional goals.   Nurse to notify provider when observation goals have been met and patient is ready for discharge.

## 2023-08-18 ENCOUNTER — PREP FOR PROCEDURE (OUTPATIENT)
Dept: UROLOGY | Facility: CLINIC | Age: 53
End: 2023-08-18
Payer: COMMERCIAL

## 2023-08-18 VITALS
RESPIRATION RATE: 20 BRPM | HEIGHT: 66 IN | DIASTOLIC BLOOD PRESSURE: 77 MMHG | BODY MASS INDEX: 30.97 KG/M2 | SYSTOLIC BLOOD PRESSURE: 131 MMHG | OXYGEN SATURATION: 96 % | TEMPERATURE: 98.5 F | HEART RATE: 59 BPM | WEIGHT: 192.68 LBS

## 2023-08-18 DIAGNOSIS — N20.2 CALCULUS OF KIDNEY AND URETER: Primary | ICD-10-CM

## 2023-08-18 LAB
ANION GAP SERPL CALCULATED.3IONS-SCNC: 9 MMOL/L (ref 7–15)
BACTERIA UR CULT: NO GROWTH
BUN SERPL-MCNC: 20.4 MG/DL (ref 6–20)
CALCIUM SERPL-MCNC: 8.9 MG/DL (ref 8.6–10)
CHLORIDE SERPL-SCNC: 109 MMOL/L (ref 98–107)
CREAT SERPL-MCNC: 1.79 MG/DL (ref 0.51–0.95)
DEPRECATED HCO3 PLAS-SCNC: 21 MMOL/L (ref 22–29)
ERYTHROCYTE [DISTWIDTH] IN BLOOD BY AUTOMATED COUNT: 14.3 % (ref 10–15)
GFR SERPL CREATININE-BSD FRML MDRD: 34 ML/MIN/1.73M2
GLUCOSE BLDC GLUCOMTR-MCNC: 100 MG/DL (ref 70–99)
GLUCOSE SERPL-MCNC: 109 MG/DL (ref 70–99)
HCT VFR BLD AUTO: 42.8 % (ref 35–47)
HGB BLD-MCNC: 13.4 G/DL (ref 11.7–15.7)
MAGNESIUM SERPL-MCNC: 2 MG/DL (ref 1.7–2.3)
MCH RBC QN AUTO: 28.8 PG (ref 26.5–33)
MCHC RBC AUTO-ENTMCNC: 31.3 G/DL (ref 31.5–36.5)
MCV RBC AUTO: 92 FL (ref 78–100)
PHOSPHATE SERPL-MCNC: 2.7 MG/DL (ref 2.5–4.5)
PLATELET # BLD AUTO: 124 10E3/UL (ref 150–450)
POTASSIUM SERPL-SCNC: 4.5 MMOL/L (ref 3.4–5.3)
RBC # BLD AUTO: 4.66 10E6/UL (ref 3.8–5.2)
SODIUM SERPL-SCNC: 139 MMOL/L (ref 136–145)
WBC # BLD AUTO: 7.9 10E3/UL (ref 4–11)

## 2023-08-18 PROCEDURE — 84100 ASSAY OF PHOSPHORUS: CPT | Performed by: STUDENT IN AN ORGANIZED HEALTH CARE EDUCATION/TRAINING PROGRAM

## 2023-08-18 PROCEDURE — 258N000003 HC RX IP 258 OP 636: Performed by: STUDENT IN AN ORGANIZED HEALTH CARE EDUCATION/TRAINING PROGRAM

## 2023-08-18 PROCEDURE — 36415 COLL VENOUS BLD VENIPUNCTURE: CPT | Performed by: STUDENT IN AN ORGANIZED HEALTH CARE EDUCATION/TRAINING PROGRAM

## 2023-08-18 PROCEDURE — 96361 HYDRATE IV INFUSION ADD-ON: CPT

## 2023-08-18 PROCEDURE — 96376 TX/PRO/DX INJ SAME DRUG ADON: CPT

## 2023-08-18 PROCEDURE — 250N000011 HC RX IP 250 OP 636: Mod: JZ | Performed by: STUDENT IN AN ORGANIZED HEALTH CARE EDUCATION/TRAINING PROGRAM

## 2023-08-18 PROCEDURE — 250N000013 HC RX MED GY IP 250 OP 250 PS 637: Performed by: STUDENT IN AN ORGANIZED HEALTH CARE EDUCATION/TRAINING PROGRAM

## 2023-08-18 PROCEDURE — 83735 ASSAY OF MAGNESIUM: CPT | Performed by: STUDENT IN AN ORGANIZED HEALTH CARE EDUCATION/TRAINING PROGRAM

## 2023-08-18 PROCEDURE — 99239 HOSP IP/OBS DSCHRG MGMT >30: CPT | Performed by: STUDENT IN AN ORGANIZED HEALTH CARE EDUCATION/TRAINING PROGRAM

## 2023-08-18 PROCEDURE — 85027 COMPLETE CBC AUTOMATED: CPT | Performed by: STUDENT IN AN ORGANIZED HEALTH CARE EDUCATION/TRAINING PROGRAM

## 2023-08-18 PROCEDURE — 80048 BASIC METABOLIC PNL TOTAL CA: CPT | Performed by: STUDENT IN AN ORGANIZED HEALTH CARE EDUCATION/TRAINING PROGRAM

## 2023-08-18 PROCEDURE — G0378 HOSPITAL OBSERVATION PER HR: HCPCS

## 2023-08-18 PROCEDURE — 94660 CPAP INITIATION&MGMT: CPT

## 2023-08-18 PROCEDURE — 999N000157 HC STATISTIC RCP TIME EA 10 MIN

## 2023-08-18 RX ORDER — CEFPODOXIME PROXETIL 100 MG/1
100 TABLET, FILM COATED ORAL 2 TIMES DAILY
Qty: 14 TABLET | Refills: 0 | Status: SHIPPED | OUTPATIENT
Start: 2023-08-18 | End: 2023-08-25

## 2023-08-18 RX ORDER — CEFAZOLIN SODIUM/WATER 2 G/20 ML
2 SYRINGE (ML) INTRAVENOUS SEE ADMIN INSTRUCTIONS
Status: CANCELLED | OUTPATIENT
Start: 2023-08-23

## 2023-08-18 RX ORDER — TAMSULOSIN HYDROCHLORIDE 0.4 MG/1
0.4 CAPSULE ORAL DAILY
Qty: 30 CAPSULE | Refills: 0 | Status: SHIPPED | OUTPATIENT
Start: 2023-08-18 | End: 2023-09-17

## 2023-08-18 RX ORDER — CEFAZOLIN SODIUM/WATER 2 G/20 ML
2 SYRINGE (ML) INTRAVENOUS
Status: CANCELLED | OUTPATIENT
Start: 2023-08-23

## 2023-08-18 RX ADMIN — OXYBUTYNIN CHLORIDE 5 MG: 5 TABLET ORAL at 08:00

## 2023-08-18 RX ADMIN — SODIUM CHLORIDE: 9 INJECTION, SOLUTION INTRAVENOUS at 07:56

## 2023-08-18 RX ADMIN — CEFTRIAXONE SODIUM 1 G: 1 INJECTION, POWDER, FOR SOLUTION INTRAMUSCULAR; INTRAVENOUS at 10:42

## 2023-08-18 RX ADMIN — OXYCODONE HYDROCHLORIDE 5 MG: 5 TABLET ORAL at 07:55

## 2023-08-18 ASSESSMENT — ACTIVITIES OF DAILY LIVING (ADL)
ADLS_ACUITY_SCORE: 20

## 2023-08-18 NOTE — PLAN OF CARE
Pt set to discharge home.   Discharge instructions were reviewed with the pt prior to leaving.   No new concerns noted prior to discharge.

## 2023-08-18 NOTE — DISCHARGE INSTRUCTIONS
Patient is advised to follow-up with PCP and urology within 2 weeks s/p discharge, advised to have a follow-up CBC, BMP, magnesium and phosphorus done at the PCP office within 2 weeks s/p discharge, advised to have increased activity and ambulation as tolerated, is advised if she develop any significant worsening of symptoms that initially led to hospitalization if she develop any significant chest pain, shortness of breath, palpitation, weakness, fever or chills associated with fatigue or bleeding from any source if she develop any significant burning on urination, foul-smelling urine, increased frequency or urgency of urination with associated fever to immediately present back to the ER would be discharged on Tylenol for pain, cefpodoxime for UTI, Zofran for nausea and tamsulosin, continue rest of the medication as prescribed and to follow-up with PCP for further medication recommendations, reconciliation and refills.

## 2023-08-18 NOTE — PROGRESS NOTES
Care Management Discharge Note    Discharge Date: 08/19/2023       Discharge Disposition:  Home    Discharge Services:  None    Discharge DME:  None    Discharge Transportation: family or friend will provide    Private pay costs discussed: Not applicable    Does the patient's insurance plan have a 3 day qualifying hospital stay waiver?  No  Education Provided on the Discharge Plan:  Yes  Persons Notified of Discharge Plans: MD, Rn, CM, patient  Patient/Family in Agreement with the Plan:  Yes    Handoff Referral Completed: Yes    Additional Information:  Patient is discharging home with no CM needs. Family to transport.     Kaylie Mchugh, BLOSSOMW

## 2023-08-18 NOTE — PLAN OF CARE
Problem: Surgery Nonspecified  Goal: Effective Bowel Elimination  Outcome: Progressing  Goal: Fluid and Electrolyte Balance  Outcome: Progressing  Goal: Absence of Infection Signs and Symptoms  Outcome: Progressing  Goal: Optimal Pain Control and Function  Outcome: Progressing  Goal: Effective Urinary Elimination  Outcome: Progressing  Goal: Effective Oxygenation and Ventilation  Outcome: Progressing  Intervention: Optimize Oxygenation and Ventilation  Recent Flowsheet Documentation  Taken 8/17/2023 1623 by Leslie Berumen, RN  Head of Bed (HOB) Positioning: HOB at 60-90 degrees     Goal Outcome Evaluation:  Patient up walking the halls 3 times during my shift. Good oral intake and urine output.       Plan of Care Reviewed With: patient    Overall Patient Progress: improvingOverall Patient Progress: improving

## 2023-08-18 NOTE — DISCHARGE SUMMARY
"[facility]  Hospitalist Discharge Summary      Date of Admission:  8/16/2023  Date of Discharge:  8/18/2023  Discharging Provider: Saad J. Gondal, MD  Discharge Service: Hospitalist Service    Discharge Diagnoses   Left hydronephrosis with urinary obstruction due to ureteral calculus, LINDEN, UTI    Clinically Significant Risk Factors     # Obesity: Estimated body mass index is 31.1 kg/m  as calculated from the following:    Height as of this encounter: 1.676 m (5' 6\").    Weight as of this encounter: 87.4 kg (192 lb 10.9 oz).       Follow-ups Needed After Discharge   Follow-up Appointments     Follow-up and recommended labs and tests       Follow up with primary care provider, Mraibeth Avila, within 7 days for   hospital follow- up and to follow up on results.  The following labs/tests   are recommended: CBC, BMP, magnesium and phosphorus.        {Additional follow-up instructions/to-do's for PCP    : Please follow-up and repeat CBC, BMP, magnesium and phosphorus.    Unresulted Labs Ordered in the Past 30 Days of this Admission       Date and Time Order Name Status Description    8/17/2023 11:45 AM Urine Culture In process         These results will be followed up by PCP    Discharge Disposition   Discharged to home  Condition at discharge: Stable    Hospital Course   Assessment  51 yo F with h/o CATHY on CPAP, CKD3, anxiety, depression, HLD, right nephrectomy, and recurrent kidney stones who presents with left ureteral kidney stone with hydronephrosis and UTI.  Urology following, on antibiotics.    Problem list and plan:  Left Hydronephrosis with urinary obstruction due to ureteral calculus   Congenital medullary sponge kidney  Patient presented to the hospital with left flank pain and was found to have ureteral kidney stone with hydronephrosis and UTI  CT abd: 4 mm obstructing stone distal left ureter resulting in high-grade obstruction/hydronephrosis. Multiple additional stones left kidney. " Absent right kidney.  S/p cystoscopy and stent placement  As per urology follow-up with [facility] for definitive stone management in next 2 to 3 weeks  As renal function seems to be improving for discharge patient today and advised to follow-up with urology as outpatient, patient clinically and hemodynamically stable and is approved for discharge, labs vitals and imaging reviewed    Tobacco abuse   Encouraged to quit on admission    History of hyperlipidemia  Continue crestor as at home    Mood disorder/anxiety and depression   Continue lexapro as at home    CATHY (obstructive sleep apnea)   CPAP at night    LINDEN on chronic kidney disease, stage 3 B  Monitored renal function  Avoided nephrotoxic meds  Renally dosed all meds  Renal function improved to 1.79 suspecting would continue to improve and advised the patient to increase p.o. intake, also advised to have repeat kidney function done with PCP as outpatient which the patient was in agreement with.    Polycythemia/hemoconcentration secondary to dehydration  Polycythemia most likely in the setting of initial hemoconcentration that responded to fluid resuscitation, hemoglobin trended down from 16-13, no active bleeding appreciated, most likely dilutional decrease in hemoglobin  CBC stable    Chronic thrombocytopenia of unclear etiology  Currently platelet count at baseline range  Currently stable and improving    Urinary tract infection   Urine culture pending, initial cultures were growing mixed urogenital facundo but as there was high index of suspicion for UTI repeated urine culture  Received IV antibiotics and will be discharged on cefpodoxime 100 p.o. twice daily on discharge  Elevated inflammatory markers most likely setting of UTI and nephrolithiasis    Consultations This Hospital Stay   INFECTION PREVENTION IP CONSULT    Code Status   Full Code    Time Spent on this Encounter   I, Saad J. Gondal, MD, personally saw the patient today and spent greater than 30 minutes  discharging this patient.       Saad J. Gondal, MD  [address] [address]  Phone: [phone]  Fax: [phone]  ______________________________________________________________________    Physical Exam   Vital Signs: Temp: 98.5  F (36.9  C) Temp src: Oral BP: 131/77 Pulse: 59   Resp: 20 SpO2: 96 % O2 Device: None (Room air)    Weight: 192 lbs 10.91 oz    GENERAL: Patient was seen and examined at bedside with no acute distress  HENT:  Head is normocephalic, atraumatic.   EYES:  Eyes have anicteric sclerae without conjunctival injection   LUNGS: Bilateral air entry, clear to auscultation bilaterally  CARDIOVASCULAR:  Regular rate and rhythm with no murmurs, gallops or rubs.  ABDOMEN: Soft, normal bowel sounds, nondistended, no significant tenderness  EXT: no edema    SKIN:  No acute rashes.   NEUROLOGIC:  Grossly nonfocal.       Primary Care Physician   Maribeth Avila    Discharge Orders      When to call - Contact Surgeon Team    You may experience symptoms that require follow-up before your scheduled appointment. Contact your Surgeon Team if you are concerned about pain control, large amount of bleeding, blood clots, constipation, or if you experience signs of infection (fever, growing tenderness at the surgery site, a large amount of drainage, severe pain, foul-smelling drainage, redness or swelling.     When to call - Reach out to Urgent Care    If you are experiencing uncontrolled Nausea and Vomiting, uncontrolled pain, inability to urinate and uncomfortable, and in need of immediate care, and you are NOT able to reach your Surgeon Team, go to an Urgent Care clinic. Do NOT go to the Emergency Room unless you have shortness of breath, chest pain, or other signs of a medical emergency.     When to call - Reasons to Call 911    Call 911 immediately if you experience sudden-onset chest pain, arm weakness/numbness, slurred speech, or shortness of breath      Symptoms - Fever Management    A low grade fever can be expected after surgery. Your Provider many have prescribed an Opioid pain medication that also contains acetaminophen (TYLENOL) that may help with Fever management.  Do NOT take additional acetaminophen (TYLENOL) in combination with an Opioid/acetaminophen (TYLENOL) product. Read the labels on your Over The Counter (OTC) medications with care.     Symptoms - Reduced Urine Output    If it has been greater than 8 hours since you have urinated despite drinking plenty of water, call your Surgeon Team.     No driving or operating machinery    Do NOT drive any vehicle or operate mechanical equipment for 24 hours following the end of your surgery.  Even though you may feel normal, your reactions may be affected by Anesthesia medication you received.     No Alcohol    Do NOT drink alcoholic beverages for 24 hours following your surgery and while taking pain medications.     Diet Instructions    Follow your surgeon's orders for any diet restrictions.  If you did not receive any diet restrictions, you may drink clear liquids (apple juice, ginger ale, 7-up, broth, etc.), and progress to your regular diet as you feel able. It is important to stay well-hydrated after surgery and drink plenty of water.     Discharge Instructions - Comfort and Pain Management    Pain after surgery is normal and expected. You will have some amount of pain after surgery. Your pain will improve with time. There are several things you can do to help reduce your pain including: rest, ice, and using pain medications as needed. Use pain interventions and don't wait until pain level is out of control. Contact your Surgeon Team if you have pain that persists or worsens after surgery despite rest, ice, and taking your medication(s) as prescribed. You may have a dry mouth, a sore throat, muscles aches or trouble sleeping, and these symptoms should go away after 24 hours.     Discharge Instructions -  Rest    Rest and relax for the next 24 hours. Make arrangements to have someone stay with you overnight, and avoid hazardous and strenuous activities.  Do NOT make any important decisions for the next 24 hours.     Return to normal activity as tolerated    Return to normal activity as tolerated     Reason for your hospital stay    Patient was admitted 8/16/2023 when she presented to the hospital with left flank pain and was found to have a ureteral kidney stone with hydronephrosis, UTI and LINDEN     Follow-up and recommended labs and tests     Follow up with primary care provider, Maribeth Avila, within 7 days for hospital follow- up and to follow up on results.  The following labs/tests are recommended: CBC, BMP, magnesium and phosphorus.     Activity    Your activity upon discharge: activity as tolerated     Diet    Follow this diet upon discharge: Orders Placed This Encounter      Regular Diet Adult       Significant Results and Procedures   Most Recent 3 CBC's:  Recent Labs   Lab Test 08/18/23  0654 08/17/23  0717 08/16/23  1044   WBC 7.9 7.7 9.2   HGB 13.4 13.4 16.1*   MCV 92 93 91   * 113* 149*     Most Recent 3 BMP's:  Recent Labs   Lab Test 08/18/23  0654 08/18/23  0621 08/17/23  0854 08/17/23  0717 08/16/23  1044     --   --  140 140   POTASSIUM 4.5  --   --  4.4 4.4   CHLORIDE 109*  --   --  109* 107   CO2 21*  --   --  23 23   BUN 20.4*  --   --  24.5* 27.8*   CR 1.79*  --   --  1.92* 1.90*   ANIONGAP 9  --   --  8 10   MARIA 8.9  --   --  8.7 9.8   * 100* 118* 101* 98   ,   Results for orders placed or performed during the hospital encounter of 08/16/23   XR Surgery ANDERSON L/T 5 Min Fluoro    Narrative    This exam was marked as non-reportable because it will not be read by a   radiologist or a [facility] non-radiologist provider.             Discharge Medications   Current Discharge Medication List        START taking these medications    Details   acetaminophen (TYLENOL) 325 MG tablet  Take 2 tablets (650 mg) by mouth every 4 hours as needed for other (mild pain)  Qty: 100 tablet, Refills: 0    Associated Diagnoses: Hydronephrosis with urinary obstruction due to ureteral calculus      cefpodoxime (VANTIN) 100 MG tablet Take 1 tablet (100 mg) by mouth 2 times daily for 7 days  Qty: 14 tablet, Refills: 0    Associated Diagnoses: Urinary tract infection with hematuria, site unspecified      ondansetron (ZOFRAN ODT) 4 MG ODT tab Take 1-2 tablets (4-8 mg) by mouth every 8 hours as needed for nausea Dissolve ON the tongue.  Qty: 10 tablet, Refills: 0    Associated Diagnoses: Hydronephrosis with urinary obstruction due to ureteral calculus      tamsulosin (FLOMAX) 0.4 MG capsule Take 1 capsule (0.4 mg) by mouth daily for 30 days  Qty: 30 capsule, Refills: 0    Associated Diagnoses: Hydronephrosis with urinary obstruction due to ureteral calculus           CONTINUE these medications which have NOT CHANGED    Details   cetirizine (ZYRTEC) 10 MG tablet Take 10 mg by mouth every evening      escitalopram (LEXAPRO) 10 MG tablet Take 1 tablet (10 mg) by mouth daily  Qty: 90 tablet, Refills: 3    Associated Diagnoses: Moderate episode of recurrent major depressive disorder (H); Anxiety      LORazepam (ATIVAN) 0.5 MG tablet Take 1 tablet (0.5 mg) by mouth every 8 hours as needed for anxiety  Qty: 30 tablet, Refills: 0    Associated Diagnoses: Anxiety      rosuvastatin (CRESTOR) 20 MG tablet Take 1 tablet (20 mg) by mouth daily  Qty: 90 tablet, Refills: 3    Associated Diagnoses: Hyperlipidemia LDL goal <130      SUMAtriptan (IMITREX) 50 MG tablet Take 1 tablet (50 mg) by mouth at onset of headache for migraine May repeat in 2 hours. Max 4 tablets/24 hours.  Qty: 18 tablet, Refills: 1    Associated Diagnoses: Chronic migraine without aura without status migrainosus, not intractable           Allergies   Allergies   Allergen Reactions    Gentamycin [Gentamicin Sulfate]     Nsaids Other (See Comments)     Avoids  due presence of 1 kidney and that kidney has decreased function   GFR 44    Skin Adhesives [Cyanoacrylate]

## 2023-08-18 NOTE — PLAN OF CARE
Problem: Plan of Care - These are the overarching goals to be used throughout the patient stay.    Goal: Plan of Care Review  Description: The Plan of Care Review/Shift note should be completed every shift.  The Outcome Evaluation is a brief statement about your assessment that the patient is improving, declining, or no change.  This information will be displayed automatically on your shift note.  Outcome: Progressing   Goal Outcome Evaluation:  Pt alert and oriented, VSS,  denies pain, slept between cares

## 2023-08-18 NOTE — UTILIZATION REVIEW
"Admission Status; Secondary Review Determination     Under the authority of the Utilization Management Committee, the utilization review process indicated a secondary review on the above patient.  The review outcome is based on review of the medical records, discussions with staff, and applying clinical experience noted on the date of the review.          (x) Observation Status Appropriate - This patient does not meet hospital inpatient criteria and is placed in observation status. If this patient's primary payer is Medicare and was admitted as an inpatient, Condition Code 44 should be used and patient status changed to \"observation\".       RATIONALE FOR DETERMINATION     Ms. Riddle is a 53 yo female with a PMH of recurrent kidney stones and h/o right nephrectomy who presented to the ED with left flank pain.  CT imaging with a 4mm obstructing stone in the distal left ureter with associated hydronephrosis.  Also noted to have elevated creat consistent with LINDEN. Urology consulted; underwent cystouretroscopy and placement of left ureteral stent.  Creat improving with IVF given, tolerating a regular diet and po pain meds.  On IV abx but no s/s of clinical sepsis and UC neg for predominant organism.  She will likely discharge home later today with close outpt follow-up.    The severity of illness, intensity of service provided, expected LOS and risk for adverse outcome make the care appropriate for further observation; however, doesn't meet criteria for hospital inpatient admission. Dr Gondal notified of this determination, awaiting a call back.         The information on this document is developed by the utilization review team in order for the business office to ensure compliance.  This only denotes the appropriateness of proper admission status and does not reflect the quality of care rendered.         The definitions of Inpatient Status and Observation Status used in making the determination above are those " provided in the CMS Coverage Manual, Chapter 1 and Chapter 6, section 70.4.        Sincerely,    Mattie Eldridge, DO  Utilization Review  Physician Advisor  [facility]

## 2023-08-21 ENCOUNTER — VIRTUAL VISIT (OUTPATIENT)
Dept: FAMILY MEDICINE | Facility: CLINIC | Age: 53
End: 2023-08-21
Payer: COMMERCIAL

## 2023-08-21 ENCOUNTER — TELEPHONE (OUTPATIENT)
Dept: UROLOGY | Facility: CLINIC | Age: 53
End: 2023-08-21

## 2023-08-21 ENCOUNTER — TELEPHONE (OUTPATIENT)
Dept: FAMILY MEDICINE | Facility: CLINIC | Age: 53
End: 2023-08-21

## 2023-08-21 DIAGNOSIS — N13.2 HYDRONEPHROSIS WITH URINARY OBSTRUCTION DUE TO URETERAL CALCULUS: Primary | ICD-10-CM

## 2023-08-21 DIAGNOSIS — Z09 HOSPITAL DISCHARGE FOLLOW-UP: ICD-10-CM

## 2023-08-21 PROCEDURE — 99214 OFFICE O/P EST MOD 30 MIN: CPT | Mod: VID | Performed by: NURSE PRACTITIONER

## 2023-08-21 RX ORDER — OXYCODONE HYDROCHLORIDE 5 MG/1
5 TABLET ORAL EVERY 6 HOURS PRN
Qty: 12 TABLET | Refills: 0 | Status: SHIPPED | OUTPATIENT
Start: 2023-08-21 | End: 2023-08-24

## 2023-08-21 NOTE — PROGRESS NOTES
Latoya is a 52 year old who is being evaluated via a billable video visit.      How would you like to obtain your AVS? MyChart  If the video visit is dropped, the invitation should be resent by: Text to cell phone: [phone]  Will anyone else be joining your video visit? No          Assessment & Plan     left Hydronephrosis with urinary obstruction due to ureteral calculus  - CBC with platelets; Future  - Basic metabolic panel  (Ca, Cl, CO2, Creat, Gluc, K, Na, BUN); Future  - Magnesium; Future  - Phosphorus; Future  - oxyCODONE (ROXICODONE) 5 MG tablet; Take 1 tablet (5 mg) by mouth every 6 hours as needed for pain    Hospital discharge follow-up  - CBC with platelets; Future  - Basic metabolic panel  (Ca, Cl, CO2, Creat, Gluc, K, Na, BUN); Future  - Magnesium; Future  - Phosphorus; Future  - oxyCODONE (ROXICODONE) 5 MG tablet; Take 1 tablet (5 mg) by mouth every 6 hours as needed for pain      52-year-old female with past medical history significant for congenital medullary sponge kidney and recurrent kidney stones, presented to the ER with pain.  Underwent cystoscopy with stent placement on August 16.  Will be having definitive stone management in 2 days.  We will recheck labs per discharge summary recommendations.  Prescription sent in for pain medication         MED REC REQUIRED  Post Medication Reconciliation Status: discharge medications reconciled and changed, per note/orders      The risks, benefits and treatment options of prescribed medications or other treatments have been discussed with the patient. The patient verbalized their understanding and should call or follow up if no improvement or if they develop further problems.  MICHELLE Sheehan [facility]              Subjective   Latoya is a 52 year old, presenting for the following health issues:  Hospital F/U      8/21/2023     3:42 PM   Additional Questions   Roomed by Dereje HAMILTON CMA   Accompanied by self       HPI        Hospital Follow-up Visit:    Hospital/Nursing Home/IP Rehab Facility: [facility]  Date of Admission: 8/16/23  Date of Discharge: 8/18/23  Reason(s) for Admission: left Hydronephrosis with urinary obstruction due to ureteral calculus     Was your hospitalization related to COVID-19? No   Problems taking medications regularly:  None  Medication changes since discharge: None  Problems adhering to non-medication therapy:  None    Summary of hospitalization:  [facility] discharge summary reviewed  Diagnostic Tests/Treatments reviewed.  Follow up needed: none  Other Healthcare Providers Involved in Patient s Care:         None  Update since discharge: stable.         Plan of care communicated with patient             HPI: 52-year-old female with history of congenital medullary sponge kidney with recurrent kidney stones, presenting to the ER on 8/16 with left hydronephrosis and urinary obstruction due to ureteral calculus.  She was transferred to [facility] and seen by urology.  She underwent a cystoscopy with stent placement on 8/16.  She is having definitive stone management in 2 days.  While hospitalized she had an acute kidney injury, renal function was improving by discharge.  Was treated for UTI with cefpodoxime.  No other complications while hospitalized.    Patient states that she is taking antibiotics as prescribed.  She did not receive any pain medication on discharge and is in a lot of pain today.              Review of Systems   Constitutional, HEENT, cardiovascular, pulmonary, gi and gu systems are negative, except as otherwise noted.      Objective           Vitals:  No vitals were obtained today due to virtual visit.    Physical Exam   GENERAL: healthy, alert, and mild distress  EYES: Eyes grossly normal to inspection.  No discharge or erythema, or obvious scleral/conjunctival abnormalities.  RESP: No audible wheeze, cough, or visible cyanosis.  No visible  retractions or increased work of breathing.    SKIN: Visible skin clear. No significant rash, abnormal pigmentation or lesions.  NEURO: Cranial nerves grossly intact.  Mentation and speech appropriate for age.  PSYCH: Mentation appears normal, affect normal/bright, judgement and insight intact, normal speech and appearance well-groomed.                Video-Visit Details    Type of service:  Video Visit   Video Start Time:  3:45pm  Video End Time:3:59 PM    Originating Location (pt. Location): Home    Distant Location (provider location):  On-site  Platform used for Video Visit: Rita

## 2023-08-21 NOTE — TELEPHONE ENCOUNTER
Pt was called for hosp f/u she has virtual appt today with PCP, pain from the ureteral stent is an issue as pain is 7/10, and she wasn't prescribed any pain medication at time of discharge from the hospital.  Ara Jameson RN

## 2023-08-23 ENCOUNTER — ANESTHESIA EVENT (OUTPATIENT)
Dept: SURGERY | Facility: HOSPITAL | Age: 53
End: 2023-08-23
Payer: COMMERCIAL

## 2023-08-23 ENCOUNTER — NURSE TRIAGE (OUTPATIENT)
Dept: NURSING | Facility: CLINIC | Age: 53
End: 2023-08-23

## 2023-08-23 ENCOUNTER — APPOINTMENT (OUTPATIENT)
Dept: RADIOLOGY | Facility: HOSPITAL | Age: 53
End: 2023-08-23
Attending: STUDENT IN AN ORGANIZED HEALTH CARE EDUCATION/TRAINING PROGRAM
Payer: COMMERCIAL

## 2023-08-23 ENCOUNTER — ANESTHESIA (OUTPATIENT)
Dept: SURGERY | Facility: HOSPITAL | Age: 53
End: 2023-08-23
Payer: COMMERCIAL

## 2023-08-23 ENCOUNTER — HOSPITAL ENCOUNTER (OUTPATIENT)
Facility: HOSPITAL | Age: 53
Discharge: HOME | End: 2023-08-23
Attending: STUDENT IN AN ORGANIZED HEALTH CARE EDUCATION/TRAINING PROGRAM | Admitting: STUDENT IN AN ORGANIZED HEALTH CARE EDUCATION/TRAINING PROGRAM
Payer: COMMERCIAL

## 2023-08-23 VITALS
HEART RATE: 77 BPM | WEIGHT: 192.4 LBS | SYSTOLIC BLOOD PRESSURE: 125 MMHG | DIASTOLIC BLOOD PRESSURE: 69 MMHG | TEMPERATURE: 99.5 F | BODY MASS INDEX: 31.05 KG/M2 | OXYGEN SATURATION: 96 % | RESPIRATION RATE: 17 BRPM

## 2023-08-23 DIAGNOSIS — N20.1 LEFT URETERAL STONE: Primary | ICD-10-CM

## 2023-08-23 PROCEDURE — 82365 CALCULUS SPECTROSCOPY: CPT | Performed by: STUDENT IN AN ORGANIZED HEALTH CARE EDUCATION/TRAINING PROGRAM

## 2023-08-23 PROCEDURE — 250N000009 HC RX 250: Performed by: NURSE ANESTHETIST, CERTIFIED REGISTERED

## 2023-08-23 PROCEDURE — 360N000082 HC SURGERY LEVEL 2 W/ FLUORO, PER MIN: Performed by: STUDENT IN AN ORGANIZED HEALTH CARE EDUCATION/TRAINING PROGRAM

## 2023-08-23 PROCEDURE — 999N000141 HC STATISTIC PRE-PROCEDURE NURSING ASSESSMENT: Performed by: STUDENT IN AN ORGANIZED HEALTH CARE EDUCATION/TRAINING PROGRAM

## 2023-08-23 PROCEDURE — C2617 STENT, NON-COR, TEM W/O DEL: HCPCS | Performed by: STUDENT IN AN ORGANIZED HEALTH CARE EDUCATION/TRAINING PROGRAM

## 2023-08-23 PROCEDURE — 272N000001 HC OR GENERAL SUPPLY STERILE: Performed by: STUDENT IN AN ORGANIZED HEALTH CARE EDUCATION/TRAINING PROGRAM

## 2023-08-23 PROCEDURE — 710N000012 HC RECOVERY PHASE 2, PER MINUTE: Performed by: STUDENT IN AN ORGANIZED HEALTH CARE EDUCATION/TRAINING PROGRAM

## 2023-08-23 PROCEDURE — 258N000003 HC RX IP 258 OP 636: Performed by: ANESTHESIOLOGY

## 2023-08-23 PROCEDURE — C1769 GUIDE WIRE: HCPCS | Performed by: STUDENT IN AN ORGANIZED HEALTH CARE EDUCATION/TRAINING PROGRAM

## 2023-08-23 PROCEDURE — 250N000011 HC RX IP 250 OP 636: Performed by: STUDENT IN AN ORGANIZED HEALTH CARE EDUCATION/TRAINING PROGRAM

## 2023-08-23 PROCEDURE — 370N000017 HC ANESTHESIA TECHNICAL FEE, PER MIN: Performed by: STUDENT IN AN ORGANIZED HEALTH CARE EDUCATION/TRAINING PROGRAM

## 2023-08-23 PROCEDURE — 52356 CYSTO/URETERO W/LITHOTRIPSY: CPT | Mod: LT | Performed by: STUDENT IN AN ORGANIZED HEALTH CARE EDUCATION/TRAINING PROGRAM

## 2023-08-23 PROCEDURE — 250N000013 HC RX MED GY IP 250 OP 250 PS 637: Performed by: ANESTHESIOLOGY

## 2023-08-23 PROCEDURE — 258N000003 HC RX IP 258 OP 636: Performed by: NURSE ANESTHETIST, CERTIFIED REGISTERED

## 2023-08-23 PROCEDURE — 74420 UROGRAPHY RTRGR +-KUB: CPT | Mod: 26 | Performed by: STUDENT IN AN ORGANIZED HEALTH CARE EDUCATION/TRAINING PROGRAM

## 2023-08-23 PROCEDURE — C1894 INTRO/SHEATH, NON-LASER: HCPCS | Performed by: STUDENT IN AN ORGANIZED HEALTH CARE EDUCATION/TRAINING PROGRAM

## 2023-08-23 PROCEDURE — 250N000011 HC RX IP 250 OP 636: Mod: JZ | Performed by: NURSE ANESTHETIST, CERTIFIED REGISTERED

## 2023-08-23 PROCEDURE — 999N000180 XR SURGERY CARM FLUORO LESS THAN 5 MIN

## 2023-08-23 PROCEDURE — 255N000002 HC RX 255 OP 636: Performed by: STUDENT IN AN ORGANIZED HEALTH CARE EDUCATION/TRAINING PROGRAM

## 2023-08-23 PROCEDURE — 710N000009 HC RECOVERY PHASE 1, LEVEL 1, PER MIN: Performed by: STUDENT IN AN ORGANIZED HEALTH CARE EDUCATION/TRAINING PROGRAM

## 2023-08-23 PROCEDURE — 999N000248 HC STATISTIC IV INSERT WITH US BY RN

## 2023-08-23 PROCEDURE — 250N000025 HC SEVOFLURANE, PER MIN: Performed by: STUDENT IN AN ORGANIZED HEALTH CARE EDUCATION/TRAINING PROGRAM

## 2023-08-23 PROCEDURE — 87088 URINE BACTERIA CULTURE: CPT | Performed by: STUDENT IN AN ORGANIZED HEALTH CARE EDUCATION/TRAINING PROGRAM

## 2023-08-23 DEVICE — URETERAL STENT
Type: IMPLANTABLE DEVICE | Site: URETER | Status: FUNCTIONAL
Brand: PERCUFLEX™ PLUS

## 2023-08-23 RX ORDER — CEFAZOLIN SODIUM/WATER 2 G/20 ML
2 SYRINGE (ML) INTRAVENOUS
Status: COMPLETED | OUTPATIENT
Start: 2023-08-23 | End: 2023-08-23

## 2023-08-23 RX ORDER — SODIUM CHLORIDE, SODIUM LACTATE, POTASSIUM CHLORIDE, CALCIUM CHLORIDE 600; 310; 30; 20 MG/100ML; MG/100ML; MG/100ML; MG/100ML
INJECTION, SOLUTION INTRAVENOUS CONTINUOUS
Status: DISCONTINUED | OUTPATIENT
Start: 2023-08-23 | End: 2023-08-23 | Stop reason: HOSPADM

## 2023-08-23 RX ORDER — AMOXICILLIN 250 MG
1-2 CAPSULE ORAL 2 TIMES DAILY
Qty: 30 TABLET | Refills: 0 | Status: SHIPPED | OUTPATIENT
Start: 2023-08-23 | End: 2024-01-30

## 2023-08-23 RX ORDER — HALOPERIDOL 5 MG/ML
1 INJECTION INTRAMUSCULAR
Status: DISCONTINUED | OUTPATIENT
Start: 2023-08-23 | End: 2023-08-23 | Stop reason: HOSPADM

## 2023-08-23 RX ORDER — CEFAZOLIN SODIUM/WATER 2 G/20 ML
2 SYRINGE (ML) INTRAVENOUS SEE ADMIN INSTRUCTIONS
Status: DISCONTINUED | OUTPATIENT
Start: 2023-08-23 | End: 2023-08-23 | Stop reason: HOSPADM

## 2023-08-23 RX ORDER — PROPOFOL 10 MG/ML
INJECTION, EMULSION INTRAVENOUS PRN
Status: DISCONTINUED | OUTPATIENT
Start: 2023-08-23 | End: 2023-08-23

## 2023-08-23 RX ORDER — ONDANSETRON 2 MG/ML
4 INJECTION INTRAMUSCULAR; INTRAVENOUS EVERY 30 MIN PRN
Status: DISCONTINUED | OUTPATIENT
Start: 2023-08-23 | End: 2023-08-23 | Stop reason: HOSPADM

## 2023-08-23 RX ORDER — OXYCODONE HYDROCHLORIDE 5 MG/1
10 TABLET ORAL
Status: DISCONTINUED | OUTPATIENT
Start: 2023-08-23 | End: 2023-08-23 | Stop reason: HOSPADM

## 2023-08-23 RX ORDER — ONDANSETRON 4 MG/1
4 TABLET, ORALLY DISINTEGRATING ORAL EVERY 30 MIN PRN
Status: DISCONTINUED | OUTPATIENT
Start: 2023-08-23 | End: 2023-08-23 | Stop reason: HOSPADM

## 2023-08-23 RX ORDER — DEXAMETHASONE SODIUM PHOSPHATE 10 MG/ML
INJECTION, SOLUTION INTRAMUSCULAR; INTRAVENOUS PRN
Status: DISCONTINUED | OUTPATIENT
Start: 2023-08-23 | End: 2023-08-23

## 2023-08-23 RX ORDER — FENTANYL CITRATE 50 UG/ML
25 INJECTION, SOLUTION INTRAMUSCULAR; INTRAVENOUS
Status: DISCONTINUED | OUTPATIENT
Start: 2023-08-23 | End: 2023-08-23 | Stop reason: HOSPADM

## 2023-08-23 RX ORDER — FENTANYL CITRATE 50 UG/ML
50 INJECTION, SOLUTION INTRAMUSCULAR; INTRAVENOUS EVERY 5 MIN PRN
Status: DISCONTINUED | OUTPATIENT
Start: 2023-08-23 | End: 2023-08-23 | Stop reason: HOSPADM

## 2023-08-23 RX ORDER — OXYCODONE HYDROCHLORIDE 5 MG/1
5 TABLET ORAL EVERY 6 HOURS PRN
Qty: 12 TABLET | Refills: 0 | Status: SHIPPED | OUTPATIENT
Start: 2023-08-23 | End: 2023-08-26

## 2023-08-23 RX ORDER — FENTANYL CITRATE 50 UG/ML
INJECTION, SOLUTION INTRAMUSCULAR; INTRAVENOUS PRN
Status: DISCONTINUED | OUTPATIENT
Start: 2023-08-23 | End: 2023-08-23

## 2023-08-23 RX ORDER — FENTANYL CITRATE 50 UG/ML
25 INJECTION, SOLUTION INTRAMUSCULAR; INTRAVENOUS EVERY 5 MIN PRN
Status: DISCONTINUED | OUTPATIENT
Start: 2023-08-23 | End: 2023-08-23 | Stop reason: HOSPADM

## 2023-08-23 RX ORDER — ONDANSETRON 2 MG/ML
INJECTION INTRAMUSCULAR; INTRAVENOUS PRN
Status: DISCONTINUED | OUTPATIENT
Start: 2023-08-23 | End: 2023-08-23

## 2023-08-23 RX ORDER — ONDANSETRON 4 MG/1
4 TABLET, ORALLY DISINTEGRATING ORAL EVERY 8 HOURS PRN
Qty: 4 TABLET | Refills: 0 | Status: SHIPPED | OUTPATIENT
Start: 2023-08-23 | End: 2024-01-30

## 2023-08-23 RX ORDER — OXYBUTYNIN CHLORIDE 5 MG/1
5 TABLET, EXTENDED RELEASE ORAL DAILY
Qty: 7 TABLET | Refills: 0 | Status: SHIPPED | OUTPATIENT
Start: 2023-08-23 | End: 2023-08-30

## 2023-08-23 RX ORDER — EPHEDRINE SULFATE 50 MG/ML
INJECTION, SOLUTION INTRAMUSCULAR; INTRAVENOUS; SUBCUTANEOUS PRN
Status: DISCONTINUED | OUTPATIENT
Start: 2023-08-23 | End: 2023-08-23

## 2023-08-23 RX ORDER — LIDOCAINE 40 MG/G
CREAM TOPICAL
Status: DISCONTINUED | OUTPATIENT
Start: 2023-08-23 | End: 2023-08-23 | Stop reason: HOSPADM

## 2023-08-23 RX ORDER — LIDOCAINE HYDROCHLORIDE 10 MG/ML
INJECTION, SOLUTION INFILTRATION; PERINEURAL PRN
Status: DISCONTINUED | OUTPATIENT
Start: 2023-08-23 | End: 2023-08-23

## 2023-08-23 RX ORDER — TAMSULOSIN HYDROCHLORIDE 0.4 MG/1
0.4 CAPSULE ORAL DAILY
Qty: 7 CAPSULE | Refills: 0 | Status: SHIPPED | OUTPATIENT
Start: 2023-08-23 | End: 2023-08-30

## 2023-08-23 RX ORDER — OXYCODONE HYDROCHLORIDE 5 MG/1
5 TABLET ORAL
Status: COMPLETED | OUTPATIENT
Start: 2023-08-23 | End: 2023-08-23

## 2023-08-23 RX ORDER — ACETAMINOPHEN 325 MG/1
975 TABLET ORAL ONCE
Status: COMPLETED | OUTPATIENT
Start: 2023-08-23 | End: 2023-08-23

## 2023-08-23 RX ORDER — HYDROMORPHONE HYDROCHLORIDE 1 MG/ML
0.4 INJECTION, SOLUTION INTRAMUSCULAR; INTRAVENOUS; SUBCUTANEOUS EVERY 5 MIN PRN
Status: DISCONTINUED | OUTPATIENT
Start: 2023-08-23 | End: 2023-08-23 | Stop reason: HOSPADM

## 2023-08-23 RX ORDER — HYDROMORPHONE HYDROCHLORIDE 1 MG/ML
0.2 INJECTION, SOLUTION INTRAMUSCULAR; INTRAVENOUS; SUBCUTANEOUS EVERY 5 MIN PRN
Status: DISCONTINUED | OUTPATIENT
Start: 2023-08-23 | End: 2023-08-23 | Stop reason: HOSPADM

## 2023-08-23 RX ADMIN — Medication 5 MG: at 11:12

## 2023-08-23 RX ADMIN — Medication 5 MG: at 11:16

## 2023-08-23 RX ADMIN — ONDANSETRON 4 MG: 2 INJECTION INTRAMUSCULAR; INTRAVENOUS at 11:06

## 2023-08-23 RX ADMIN — SODIUM CHLORIDE, POTASSIUM CHLORIDE, SODIUM LACTATE AND CALCIUM CHLORIDE: 600; 310; 30; 20 INJECTION, SOLUTION INTRAVENOUS at 10:26

## 2023-08-23 RX ADMIN — DEXMEDETOMIDINE HYDROCHLORIDE 16 MCG: 100 INJECTION, SOLUTION INTRAVENOUS at 11:49

## 2023-08-23 RX ADMIN — LIDOCAINE HYDROCHLORIDE 5 ML: 10 INJECTION, SOLUTION INFILTRATION; PERINEURAL at 11:01

## 2023-08-23 RX ADMIN — PROPOFOL 200 MG: 10 INJECTION, EMULSION INTRAVENOUS at 11:01

## 2023-08-23 RX ADMIN — SODIUM CHLORIDE, POTASSIUM CHLORIDE, SODIUM LACTATE AND CALCIUM CHLORIDE: 600; 310; 30; 20 INJECTION, SOLUTION INTRAVENOUS at 12:03

## 2023-08-23 RX ADMIN — MIDAZOLAM 2 MG: 1 INJECTION INTRAMUSCULAR; INTRAVENOUS at 10:57

## 2023-08-23 RX ADMIN — DEXAMETHASONE SODIUM PHOSPHATE 10 MG: 10 INJECTION, SOLUTION INTRAMUSCULAR; INTRAVENOUS at 11:06

## 2023-08-23 RX ADMIN — OXYCODONE HYDROCHLORIDE 5 MG: 5 TABLET ORAL at 13:04

## 2023-08-23 RX ADMIN — FENTANYL CITRATE 100 MCG: 50 INJECTION, SOLUTION INTRAMUSCULAR; INTRAVENOUS at 11:01

## 2023-08-23 RX ADMIN — Medication 2 G: at 11:04

## 2023-08-23 RX ADMIN — ACETAMINOPHEN 975 MG: 325 TABLET ORAL at 10:27

## 2023-08-23 ASSESSMENT — ACTIVITIES OF DAILY LIVING (ADL)
ADLS_ACUITY_SCORE: 35
ADLS_ACUITY_SCORE: 33
ADLS_ACUITY_SCORE: 35

## 2023-08-23 ASSESSMENT — LIFESTYLE VARIABLES: TOBACCO_USE: 1

## 2023-08-23 NOTE — ANESTHESIA PROCEDURE NOTES
Airway       Patient location during procedure: OR  Staff -        Anesthesiologist:  Jose Leiva MD       CRNA: Anel Cash APRN CRNA       Performed By: CRNAIndications and Patient Condition       Indications for airway management: hellen-procedural       Induction type:intravenous       Mask difficulty assessment: 1 - vent by mask    Final Airway Details       Final airway type: supraglottic airway    Supraglottic Airway Details        Type: LMA       Brand: Ambu AuraGain       LMA size: 4    Post intubation assessment        Placement verified by: capnometry, equal breath sounds and chest rise        Number of attempts at approach: 1       Secured with: silk tape       Ease of procedure: easy       Dentition: Intact and Unchanged

## 2023-08-23 NOTE — TELEPHONE ENCOUNTER
Nurse Triage SBAR    Is this a 2nd Level Triage? NO    Situation: Incontinence      Background: Recent renal stent placed today,     Assessment: incontinence of urine no pain, no blood, no fevers, stent still in place    Protocol Recommended Disposition:   Home Care    Recommendation: take medication as prescribed and if it does not improve call back.          Does the patient meet one of the following criteria for ADS visit consideration? 16+ years old, with an [facility] PCP     TIP  Providers, please consider if this condition is appropriate for management at one of our Acute and Diagnostic Services sites.     If patient is a good candidate, please use dotphrase <dot>triageresponse and select Refer to ADS to document.    Reason for Disposition   Kidney stones, questions about    Additional Information   Negative: Shock suspected (e.g., cold/pale/clammy skin, too weak to stand, low BP, rapid pulse)   Negative: Sounds like a life-threatening emergency to the triager   Negative: [1] Back pain AND [2] NOT recently diagnosed with a kidney stone   Negative: [1] Flank pain (i.e., pain in the side, over the lower ribs or just below the ribs) AND [2] NOT recently diagnosed with a kidney stone   Negative: [1] Unable to urinate (or only a few drops) > 4 hours AND [2] bladder feels very full (e.g., palpable bladder or strong urge to urinate)   Negative: [1] SEVERE pain (e.g., excruciating, scale 8-10) AND [2] not improved after pain medicine   Negative: SEVERE vomiting   Negative: Fever > 103 F (39.4 C)   Negative: Patient sounds very sick or weak to the triager   Negative: Severe chills (i.e., feeling extremely cold WITH shaking chills)   Negative: [1] MODERATE pain (e.g., interferes with normal activities) AND [2] constant AND [3] lasting longer than 4 hours AND [4] NOT improved with pain medicine   Negative: Passing blood or large blood clots (i.e., size > a dime)  (Exception: pink or tea-colored urine, flecks or small strands  of blood)   Negative: Fever > 100.4 F (38.0 C)   Negative: [1] Caller has URGENT question (includes prescribed medication questions) AND [2] triager unable to answer question   Negative: SEVERE burning or pain with passing urine (urination)   Negative: [1] MODERATE pain (e.g., interferes with normal activities) AND [2] pain comes and goes AND [3] present > 24 hours   Negative: Pregnant   Negative: Ureteral stent accidentally came out   Negative: [1] Caller has NON-URGENT question (includes prescribed medication questions) AND [2] triager unable to answer   Negative: Stone has not passed after 4 weeks   Negative: [1] Pain persists AND [2] stone passed > 3 days ago   Negative: [1] Blood in urine (red or tea-colored) AND [2] lasts > 3 days  (Exception: Has a ureteral stent in place.)   Negative: [1] MILD pain (e.g., does not interfere with normal activities) AND [2] pain comes and goes (cramps) AND [3] present > 7 days   Negative: [1] Kidney stone diagnosed by doctor (or NP/PA) AND [2] normal symptoms (e.g., nausea, pain) AND [3] no complications   Negative: [1] Recent ureteral STENT for kidney stone AND [2] normal post-op symptoms (e.g., blood in urine, nausea, pain) after ureteroscopy or ureteral STENT placement   Negative: [1] Recent shock wave LITHOTRIPSY for kidney stone AND [2] normal post-op symptoms (e.g., blood in urine, nausea, pain)    Protocols used: Kidney Stone Follow-up Call-A-

## 2023-08-23 NOTE — OP NOTE
OPERATIVE NOTE    PREOPERATIVE DIAGNOSIS:  Left-sided ureteral and kidney stones    POSTOPERATIVE DIAGNOSIS:  Same    PROCEDURES PERFORMED:   1. Cystourethroscopy  2.  Left ureteroscopy  3.  Left retrograde pyelogram with interpretation of intraoperative fluoroscopic imaging  4. Holmium laser lithotripsy with basket stone extraction left  5.  Left ureteral stent change      STAFF SURGEON:  Dr. Gustavo Newton MD, present for the entire case.     ANESTHESIA:  General    ESTIMATED BLOOD LOSS: 1 cc  DRAINS/TUBES:  6 Hong Konger x 26 cm double-J ureteral stent         IV FLUIDS:   Please see dictated anesthesia record  COMPLICATIONS:  None.   SPECIMEN:   Stones for analysis    SIGNIFICANT FINDINGS: Single small ureteral stone, multiple fragments of calculi in the lower pole of the left kidney.  Proximal curl of the ureteral stent seen in the renal pelvis under fluoroscopy and distal curl seen in the bladder fluoroscopically and under direct vision.     BRIEF OPERATIVE INDICATIONS:  Latoya Riddle is a(n) 52 year old female with left ureteral and multiple renal calculi with multiple ureteroscopy's in the past.  After a discussion of all risks, benefits, and alternatives, the patient elected to proceed with definitive stone management. The patient understands the potential need for more than one procedure to eliminate all stone burden.     DESCRIPTION OF PROCEDURE:  After informed consent was obtained, the patient was transported to the operating room & placed supine on the table. Pneumoboots were applied.  After adequate anesthesia was induced, she was placed in lithotomy and prepped and draped in the usual sterile fashion. A timeout was taken to confirm correct patient, procedure and laterality. Pre-operative IV antibiotics were administered.     A 22-Hong Konger rigid cystoscope was inserted into a well-lubricated urethra. The anterior urethra was unremarkable,  . The left ureteral orifice was identified and stent was  partially pulled out and then cannulated with a Sensor wire r. The wire passed without resistance into the upper pole and the distal ureter was evaluated with rigid ureteroscope to evaluate and identify the lower pole calculus which was basketed and removed.  The ureteroscope was used to establish access with a second, sensor wire. A 12/14 Serbian 28-cm ureteral access sheath was advanced up to the proximal ureter and a retrograde pyelogram was performed to serve as a roadmap.     The flexible ureteroscope was used to identify the stones which were multiple and small and located in the lower pole calyx which was dilated.  The entire pelvis was baggy. A 200 micron laser fiber was used at a setting of 0.  8 J and 8 hz and lithotripsy was performed and some of the calculi. A Halo basket was used to remove all fragments greater than 1 mm. Pullback ureteroscopy was performed and showed no retained stone fragments or ureteral injury. A 6 Serbian 26-cm double-J stent was advanced over the Sensor wire, and a good proximal curl was seen in the renal pelvis fluoroscopically and the distal curl was seen in the bladder fluoroscopically and under direct vision. The bladder was drained.  The patient tolerated the procedure well and there were no apparent complications. The patient  was transported to the postanesthesia care unit in stable condition.     POST-OPERATIVE PLAN: Following recovery in the PACU, the patient can be discharged.  Follow-up in urology clinic for review in 1 month.  She can remove her ureteral stent in 7 days on her own.      Gustavo Newton MD  [facility], Urology

## 2023-08-23 NOTE — ANESTHESIA PREPROCEDURE EVALUATION
Anesthesia Pre-Procedure Evaluation    Patient: Latoya Riddle   MRN: [id number] : 1970        Procedure : Procedure(s):  CYSTOURETEROSCOPY, WITH RETROGRADE PYELOGRAM, HOLMIUM LASER LITHOTRIPSY OF URETERAL CALCULUS, AND STENT exchange          Past Medical History:   Diagnosis Date    Anxiety and depression 2016    Chronic kidney disease, stage 3 (H) 2021    Congenital medullary sponge kidney 2005    Hyperlipidemia     Kidney stone     CATHY (obstructive sleep apnea) 2018    Mild to moderate CATHY without sleep-associated hypoxemia   - PSG performed 2008 with weight 180 lbs, AHI 0.3, RDI 41, mary SpO2 93%, PLMI 0, arousal index 57.9.      Past Surgical History:   Procedure Laterality Date     SECTION      COMBINED CYSTOSCOPY, INSERT STENT URETER(S) Left 11/10/2017    Procedure: COMBINED CYSTOSCOPY, INSERT STENT URETER(S);  Cystoscopy, Left Ureteral Stent Placement;  Surgeon: Yg Rodriguez MD;  Location: [facility]    COMBINED CYSTOSCOPY, RETROGRADES, EXCHANGE STENT URETER(S) Left 2023    Procedure: CYSTOSCOPY, WITH RETROGRADE PYELOGRAM AND URETERAL STENT REPLACEMENT;  Surgeon: Gustavo Newton MD;  Location: [facility] OR    ESOPHAGOSCOPY, GASTROSCOPY, DUODENOSCOPY (EGD), COMBINED N/A 2020    Procedure: ESOPHAGOGASTRODUODENOSCOPY (EGD), WITH BIOPSY;  Surgeon: Brian Grimes DO;  Location: [facility] OR    EXCISE TOENAIL(S) Left 2016    Procedure: EXCISE TOENAIL(S);  Surgeon: Ady Mejia DPM;  Location: [facility] OR    HEAD & NECK SURGERY  May 29th, 2021    IR MISCELLANEOUS PROCEDURE  2002    IR MISCELLANEOUS PROCEDURE  2002    IR MISCELLANEOUS PROCEDURE  2002    IR MISCELLANEOUS PROCEDURE  2002    NEPHRECTOMY Right 2017    SURGICAL HISTORY OF -           SURGICAL HISTORY OF -   -present    Lithothypsy X 49    SURGICAL HISTORY OF -       Percutaneous nephrostomy X2    SURGICAL HISTORY OF -   2011     Cystoscopy with bilateral ureteral pyeloscopy, stone basketing and stent placement      Allergies   Allergen Reactions    Gentamycin [Gentamicin Sulfate]     Nsaids Other (See Comments)     Avoids due presence of 1 kidney and that kidney has decreased function   GFR 44    Skin Adhesives [Cyanoacrylate]       Social History     Tobacco Use    Smoking status: Every Day     Packs/day: 1.00     Years: 30.00     Pack years: 30.00     Types: Cigarettes    Smokeless tobacco: Never    Tobacco comments:     trying to quit; cutting back on her own   Substance Use Topics    Alcohol use: No      Wt Readings from Last 1 Encounters:   08/23/23 87.3 kg (192 lb 6.4 oz)        Anesthesia Evaluation   Pt has had prior anesthetic.     No history of anesthetic complications       ROS/MED HX  ENT/Pulmonary:  - neg pulmonary ROS   (+) sleep apnea, uses CPAP,              tobacco use,                       Neurologic:  - neg neurologic ROS     Cardiovascular:  - neg cardiovascular ROS   (+) Dyslipidemia - -   -  - -                                      METS/Exercise Tolerance: >4 METS    Hematologic:  - neg hematologic  ROS     Musculoskeletal:  - neg musculoskeletal ROS     GI/Hepatic:  - neg GI/hepatic ROS     Renal/Genitourinary:  - neg Renal ROS   (+) renal disease (medullary sponge kidney), type: CRI, Pt does not require dialysis,    Nephrolithiasis ,       Endo:  - neg endo ROS     Psychiatric/Substance Use:  - neg psychiatric ROS   (+) psychiatric history anxiety and depression       Infectious Disease:  - neg infectious disease ROS     Malignancy:  - neg malignancy ROS     Other:  - neg other ROS          Physical Exam    Airway        Mallampati: II   TM distance: > 3 FB   Neck ROM: full   Mouth opening: > 3 cm    Respiratory Devices and Support         Dental     Comment: Upper plate removed    (+) Removable bridges or other hardware    B=Bridge, C=Chipped, L=Loose, M=Missing    Cardiovascular   cardiovascular exam normal           Pulmonary   pulmonary exam normal                OUTSIDE LABS:  CBC:   Lab Results   Component Value Date    WBC 7.9 08/18/2023    WBC 7.7 08/17/2023    HGB 13.4 08/18/2023    HGB 13.4 08/17/2023    HCT 42.8 08/18/2023    HCT 43.8 08/17/2023     (L) 08/18/2023     (L) 08/17/2023     BMP:   Lab Results   Component Value Date     08/18/2023     08/17/2023    POTASSIUM 4.5 08/18/2023    POTASSIUM 4.4 08/17/2023    CHLORIDE 109 (H) 08/18/2023    CHLORIDE 109 (H) 08/17/2023    CO2 21 (L) 08/18/2023    CO2 23 08/17/2023    BUN 20.4 (H) 08/18/2023    BUN 24.5 (H) 08/17/2023    CR 1.79 (H) 08/18/2023    CR 1.92 (H) 08/17/2023     (H) 08/18/2023     (H) 08/18/2023     COAGS:   Lab Results   Component Value Date    INR 1.03 10/16/2014     POC:   Lab Results   Component Value Date    HCG Negative 04/11/2020     HEPATIC:   Lab Results   Component Value Date    ALBUMIN 4.2 02/20/2023    PROTTOTAL 7.5 02/20/2023    ALT 16 02/20/2023    AST 21 02/20/2023    ALKPHOS 136 (H) 02/20/2023    BILITOTAL 0.3 02/20/2023     OTHER:   Lab Results   Component Value Date    PH 6.0 01/07/2013    MARIA 8.9 08/18/2023    PHOS 2.7 08/18/2023    MAG 2.0 08/18/2023    LIPASE 243 04/11/2020    TSH 1.84 10/01/2021    T4 0.90 03/17/2006    CRP <5.0 01/24/2013    SED 7 01/24/2013       Anesthesia Plan    ASA Status:  3    NPO Status:  NPO Appropriate    Anesthesia Type: General.     - Airway: LMA   Induction: Propofol.   Maintenance: Balanced.        Consents    Anesthesia Plan(s) and associated risks, benefits, and realistic alternatives discussed. Questions answered and patient/representative(s) expressed understanding.     - Discussed:     - Discussed with:  Patient            Postoperative Care    Pain management: Multi-modal analgesia.   PONV prophylaxis: Ondansetron (or other 5HT-3), Dexamethasone or Solumedrol     Comments:    Other Comments: Reviewed anesthetic options and risks, including risk of  dental trauma. Patient agrees to proceed.     NO TORADOL              Jose Leiva MD

## 2023-08-23 NOTE — H&P
Urology H and P Update  I have evaluated Latoya today and she is doing well though has bothersome stent related symptoms  She is fit to proceed with the planned procedure today.  Gustavo Newton MD

## 2023-08-23 NOTE — ANESTHESIA POSTPROCEDURE EVALUATION
Patient: Latoya Riddle    Procedure: Procedure(s):  CYSTOURETEROSCOPY, WITH RETROGRADE PYELOGRAM, HOLMIUM LASER LITHOTRIPSY OF LEFT URETERAL CALCULUSAND BASKET REMOVAL OF LEFT KIDNEY STONE , AND STENT exchange       Anesthesia Type:  General    Note:  Disposition: Outpatient   Postop Pain Control: Uneventful            Sign Out: Well controlled pain   PONV: No   Neuro/Psych: Uneventful            Sign Out: Acceptable/Baseline neuro status   Airway/Respiratory: Uneventful            Sign Out: Acceptable/Baseline resp. status   CV/Hemodynamics: Uneventful            Sign Out: Acceptable CV status; No obvious hypovolemia; No obvious fluid overload   Other NRE: NONE   DID A NON-ROUTINE EVENT OCCUR? No           Last vitals:  Vitals Value Taken Time   /58 08/23/23 1300   Temp 37.5  C (99.5  F) 08/23/23 1212   Pulse 77 08/23/23 1304   Resp 20 08/23/23 1304   SpO2 93 % 08/23/23 1304   Vitals shown include unvalidated device data.    Electronically Signed By: Jose Leiva MD  August 23, 2023  1:06 PM

## 2023-08-25 LAB — BACTERIA UR CULT: ABNORMAL

## 2023-08-26 LAB
APPEARANCE STONE: NORMAL
COMPN STONE: NORMAL
SPECIMEN WT: 160 MG

## 2023-08-28 ENCOUNTER — INFUSION THERAPY VISIT (OUTPATIENT)
Dept: INFUSION THERAPY | Facility: CLINIC | Age: 53
End: 2023-08-28
Attending: STUDENT IN AN ORGANIZED HEALTH CARE EDUCATION/TRAINING PROGRAM
Payer: COMMERCIAL

## 2023-08-28 ENCOUNTER — TELEPHONE (OUTPATIENT)
Dept: UROLOGY | Facility: CLINIC | Age: 53
End: 2023-08-28
Payer: COMMERCIAL

## 2023-08-28 VITALS — SYSTOLIC BLOOD PRESSURE: 110 MMHG | HEART RATE: 73 BPM | DIASTOLIC BLOOD PRESSURE: 73 MMHG

## 2023-08-28 DIAGNOSIS — Z90.5 ACQUIRED SOLITARY KIDNEY: Primary | ICD-10-CM

## 2023-08-28 DIAGNOSIS — Z90.5 ACQUIRED SOLITARY KIDNEY: ICD-10-CM

## 2023-08-28 DIAGNOSIS — N20.2 CALCULUS OF KIDNEY AND URETER: Primary | ICD-10-CM

## 2023-08-28 DIAGNOSIS — Q60.0 SOLITARY KIDNEY, CONGENITAL: ICD-10-CM

## 2023-08-28 DIAGNOSIS — Q61.5 CONGENITAL MEDULLARY SPONGE KIDNEY: ICD-10-CM

## 2023-08-28 PROCEDURE — 250N000011 HC RX IP 250 OP 636: Mod: JZ | Performed by: STUDENT IN AN ORGANIZED HEALTH CARE EDUCATION/TRAINING PROGRAM

## 2023-08-28 PROCEDURE — 250N000011 HC RX IP 250 OP 636

## 2023-08-28 PROCEDURE — 96365 THER/PROPH/DIAG IV INF INIT: CPT

## 2023-08-28 RX ORDER — HEPARIN SODIUM,PORCINE 10 UNIT/ML
VIAL (ML) INTRAVENOUS
Status: COMPLETED
Start: 2023-08-28 | End: 2023-08-28

## 2023-08-28 RX ORDER — ALBUTEROL SULFATE 0.83 MG/ML
2.5 SOLUTION RESPIRATORY (INHALATION)
Status: CANCELLED | OUTPATIENT
Start: 2023-08-29

## 2023-08-28 RX ORDER — ERTAPENEM 1 G/1
1 INJECTION, POWDER, LYOPHILIZED, FOR SOLUTION INTRAMUSCULAR; INTRAVENOUS ONCE
Status: CANCELLED | OUTPATIENT
Start: 2023-08-29 | End: 2023-08-29

## 2023-08-28 RX ORDER — ALBUTEROL SULFATE 90 UG/1
1-2 AEROSOL, METERED RESPIRATORY (INHALATION)
Status: CANCELLED
Start: 2023-08-29

## 2023-08-28 RX ORDER — METHYLPREDNISOLONE SODIUM SUCCINATE 125 MG/2ML
125 INJECTION, POWDER, LYOPHILIZED, FOR SOLUTION INTRAMUSCULAR; INTRAVENOUS
Status: CANCELLED
Start: 2023-08-28

## 2023-08-28 RX ORDER — HEPARIN SODIUM,PORCINE 10 UNIT/ML
5-20 VIAL (ML) INTRAVENOUS DAILY PRN
Status: CANCELLED | OUTPATIENT
Start: 2023-08-28

## 2023-08-28 RX ORDER — HEPARIN SODIUM (PORCINE) LOCK FLUSH IV SOLN 100 UNIT/ML 100 UNIT/ML
5 SOLUTION INTRAVENOUS
Status: CANCELLED | OUTPATIENT
Start: 2023-08-29

## 2023-08-28 RX ORDER — ALBUTEROL SULFATE 90 UG/1
1-2 AEROSOL, METERED RESPIRATORY (INHALATION)
Status: CANCELLED
Start: 2023-08-28

## 2023-08-28 RX ORDER — DIPHENHYDRAMINE HYDROCHLORIDE 50 MG/ML
50 INJECTION INTRAMUSCULAR; INTRAVENOUS
Status: CANCELLED
Start: 2023-08-29

## 2023-08-28 RX ORDER — DIPHENHYDRAMINE HYDROCHLORIDE 50 MG/ML
50 INJECTION INTRAMUSCULAR; INTRAVENOUS
Status: CANCELLED
Start: 2023-08-28

## 2023-08-28 RX ORDER — ERTAPENEM 1 G/1
1 INJECTION, POWDER, LYOPHILIZED, FOR SOLUTION INTRAMUSCULAR; INTRAVENOUS ONCE
Status: COMPLETED | OUTPATIENT
Start: 2023-08-28 | End: 2023-08-28

## 2023-08-28 RX ORDER — HEPARIN SODIUM (PORCINE) LOCK FLUSH IV SOLN 100 UNIT/ML 100 UNIT/ML
5 SOLUTION INTRAVENOUS
Status: CANCELLED | OUTPATIENT
Start: 2023-08-28

## 2023-08-28 RX ORDER — EPINEPHRINE 1 MG/ML
0.3 INJECTION, SOLUTION, CONCENTRATE INTRAVENOUS EVERY 5 MIN PRN
Status: CANCELLED | OUTPATIENT
Start: 2023-08-29

## 2023-08-28 RX ORDER — EPINEPHRINE 1 MG/ML
0.3 INJECTION, SOLUTION, CONCENTRATE INTRAVENOUS EVERY 5 MIN PRN
Status: CANCELLED | OUTPATIENT
Start: 2023-08-28

## 2023-08-28 RX ORDER — HEPARIN SODIUM,PORCINE 10 UNIT/ML
5-20 VIAL (ML) INTRAVENOUS DAILY PRN
Status: CANCELLED | OUTPATIENT
Start: 2023-08-29

## 2023-08-28 RX ORDER — METHYLPREDNISOLONE SODIUM SUCCINATE 125 MG/2ML
125 INJECTION, POWDER, LYOPHILIZED, FOR SOLUTION INTRAMUSCULAR; INTRAVENOUS
Status: CANCELLED
Start: 2023-08-29

## 2023-08-28 RX ORDER — ALBUTEROL SULFATE 0.83 MG/ML
2.5 SOLUTION RESPIRATORY (INHALATION)
Status: CANCELLED | OUTPATIENT
Start: 2023-08-28

## 2023-08-28 RX ADMIN — HEPARIN, PORCINE (PF) 10 UNIT/ML INTRAVENOUS SYRINGE 25 UNITS: at 15:43

## 2023-08-28 RX ADMIN — ERTAPENEM SODIUM 1 G: 1 INJECTION, POWDER, LYOPHILIZED, FOR SOLUTION INTRAMUSCULAR; INTRAVENOUS at 15:00

## 2023-08-28 NOTE — PROGRESS NOTES
Infusion Nursing Note:  Latoya Francismagda presents today for 1/5 Invanz.    Patient seen by provider today: No   present during visit today: Not Applicable.    Note: N/A.      Intravenous Access:  Peripheral IV placed and left in place. Pt states her IVs usually clot off if left in for any time period. heparin placed in lock.    Treatment Conditions:  Not Applicable.      Post Infusion Assessment:  Patient tolerated injection without incident.  Site patent and intact, free from redness, edema or discomfort.  No evidence of extravasations.  Access discontinued per protocol.       Discharge Plan:   Discharge instructions reviewed with: Patient.  Patient discharged in stable condition accompanied by: self.  Departure Mode: Ambulatory.      Deepika Oswald RN

## 2023-08-28 NOTE — TELEPHONE ENCOUNTER
Spoke with [facility] who can get patient in at 3pm today for antibiotic infusion.  Spoke with patient who will be there today.  Antibiotic to be given for a 5 day total.    Mindy Barriga RN

## 2023-08-29 ENCOUNTER — INFUSION THERAPY VISIT (OUTPATIENT)
Dept: INFUSION THERAPY | Facility: CLINIC | Age: 53
End: 2023-08-29
Attending: STUDENT IN AN ORGANIZED HEALTH CARE EDUCATION/TRAINING PROGRAM
Payer: COMMERCIAL

## 2023-08-29 VITALS
RESPIRATION RATE: 16 BRPM | SYSTOLIC BLOOD PRESSURE: 118 MMHG | DIASTOLIC BLOOD PRESSURE: 76 MMHG | HEART RATE: 73 BPM | TEMPERATURE: 98.7 F

## 2023-08-29 DIAGNOSIS — Z90.5 ACQUIRED SOLITARY KIDNEY: Primary | ICD-10-CM

## 2023-08-29 PROCEDURE — 96365 THER/PROPH/DIAG IV INF INIT: CPT

## 2023-08-29 PROCEDURE — 250N000011 HC RX IP 250 OP 636: Performed by: STUDENT IN AN ORGANIZED HEALTH CARE EDUCATION/TRAINING PROGRAM

## 2023-08-29 RX ORDER — DIPHENHYDRAMINE HYDROCHLORIDE 50 MG/ML
50 INJECTION INTRAMUSCULAR; INTRAVENOUS
Status: CANCELLED
Start: 2023-08-30

## 2023-08-29 RX ORDER — ALBUTEROL SULFATE 0.83 MG/ML
2.5 SOLUTION RESPIRATORY (INHALATION)
Status: CANCELLED | OUTPATIENT
Start: 2023-08-30

## 2023-08-29 RX ORDER — ALBUTEROL SULFATE 90 UG/1
1-2 AEROSOL, METERED RESPIRATORY (INHALATION)
Status: CANCELLED
Start: 2023-08-30

## 2023-08-29 RX ORDER — EPINEPHRINE 1 MG/ML
0.3 INJECTION, SOLUTION, CONCENTRATE INTRAVENOUS EVERY 5 MIN PRN
Status: CANCELLED | OUTPATIENT
Start: 2023-08-30

## 2023-08-29 RX ORDER — HEPARIN SODIUM (PORCINE) LOCK FLUSH IV SOLN 100 UNIT/ML 100 UNIT/ML
5 SOLUTION INTRAVENOUS
Status: CANCELLED | OUTPATIENT
Start: 2023-08-30

## 2023-08-29 RX ORDER — HEPARIN SODIUM,PORCINE 10 UNIT/ML
5-20 VIAL (ML) INTRAVENOUS DAILY PRN
Status: DISCONTINUED | OUTPATIENT
Start: 2023-08-29 | End: 2023-08-29 | Stop reason: HOSPADM

## 2023-08-29 RX ORDER — METHYLPREDNISOLONE SODIUM SUCCINATE 125 MG/2ML
125 INJECTION, POWDER, LYOPHILIZED, FOR SOLUTION INTRAMUSCULAR; INTRAVENOUS
Status: CANCELLED
Start: 2023-08-30

## 2023-08-29 RX ORDER — ERTAPENEM 1 G/1
1 INJECTION, POWDER, LYOPHILIZED, FOR SOLUTION INTRAMUSCULAR; INTRAVENOUS ONCE
Status: COMPLETED | OUTPATIENT
Start: 2023-08-29 | End: 2023-08-29

## 2023-08-29 RX ORDER — HEPARIN SODIUM,PORCINE 10 UNIT/ML
5-20 VIAL (ML) INTRAVENOUS DAILY PRN
Status: CANCELLED | OUTPATIENT
Start: 2023-08-30

## 2023-08-29 RX ORDER — ERTAPENEM 1 G/1
1 INJECTION, POWDER, LYOPHILIZED, FOR SOLUTION INTRAMUSCULAR; INTRAVENOUS ONCE
Status: CANCELLED | OUTPATIENT
Start: 2023-08-30 | End: 2023-08-30

## 2023-08-29 RX ADMIN — ERTAPENEM SODIUM 1 G: 1 INJECTION, POWDER, LYOPHILIZED, FOR SOLUTION INTRAMUSCULAR; INTRAVENOUS at 15:09

## 2023-08-29 RX ADMIN — Medication 3 ML: at 15:48

## 2023-08-29 NOTE — PROGRESS NOTES
Infusion Nursing Note:  Latoya Riddle presents today for Invanz.    Patient seen by provider today: No   present during visit today: Not Applicable.    Note: N/A.      Intravenous Access:  PIV patent.    Treatment Conditions:  Not Applicable.      Post Infusion Assessment:  Patient tolerated infusion without incident.  Site patent and intact, free from redness, edema or discomfort.  No evidence of extravasations.       Discharge Plan:   Patient discharged in stable condition accompanied by: self.  Departure Mode: Ambulatory.      Jewel Babin RN

## 2023-08-30 ENCOUNTER — LAB (OUTPATIENT)
Dept: LAB | Facility: CLINIC | Age: 53
End: 2023-08-30
Payer: COMMERCIAL

## 2023-08-30 ENCOUNTER — INFUSION THERAPY VISIT (OUTPATIENT)
Dept: INFUSION THERAPY | Facility: CLINIC | Age: 53
End: 2023-08-30
Attending: STUDENT IN AN ORGANIZED HEALTH CARE EDUCATION/TRAINING PROGRAM
Payer: COMMERCIAL

## 2023-08-30 DIAGNOSIS — E78.5 HYPERLIPIDEMIA LDL GOAL <130: ICD-10-CM

## 2023-08-30 DIAGNOSIS — Z90.5 ACQUIRED SOLITARY KIDNEY: Primary | ICD-10-CM

## 2023-08-30 DIAGNOSIS — N13.2 HYDRONEPHROSIS WITH URINARY OBSTRUCTION DUE TO URETERAL CALCULUS: ICD-10-CM

## 2023-08-30 DIAGNOSIS — Z11.4 SCREENING FOR HIV (HUMAN IMMUNODEFICIENCY VIRUS): ICD-10-CM

## 2023-08-30 DIAGNOSIS — Z09 HOSPITAL DISCHARGE FOLLOW-UP: ICD-10-CM

## 2023-08-30 DIAGNOSIS — Z11.59 NEED FOR HEPATITIS C SCREENING TEST: ICD-10-CM

## 2023-08-30 LAB
ALT SERPL W P-5'-P-CCNC: 26 U/L (ref 0–50)
ANION GAP SERPL CALCULATED.3IONS-SCNC: 9 MMOL/L (ref 7–15)
BUN SERPL-MCNC: 21.2 MG/DL (ref 6–20)
CALCIUM SERPL-MCNC: 10.1 MG/DL (ref 8.6–10)
CHLORIDE SERPL-SCNC: 107 MMOL/L (ref 98–107)
CREAT SERPL-MCNC: 1.81 MG/DL (ref 0.51–0.95)
DEPRECATED HCO3 PLAS-SCNC: 24 MMOL/L (ref 22–29)
ERYTHROCYTE [DISTWIDTH] IN BLOOD BY AUTOMATED COUNT: 13.9 % (ref 10–15)
GFR SERPL CREATININE-BSD FRML MDRD: 33 ML/MIN/1.73M2
GLUCOSE SERPL-MCNC: 113 MG/DL (ref 70–99)
HCT VFR BLD AUTO: 47.6 % (ref 35–47)
HGB BLD-MCNC: 15 G/DL (ref 11.7–15.7)
MAGNESIUM SERPL-MCNC: 2.2 MG/DL (ref 1.7–2.3)
MCH RBC QN AUTO: 28.1 PG (ref 26.5–33)
MCHC RBC AUTO-ENTMCNC: 31.5 G/DL (ref 31.5–36.5)
MCV RBC AUTO: 89 FL (ref 78–100)
PHOSPHATE SERPL-MCNC: 2.8 MG/DL (ref 2.5–4.5)
PLATELET # BLD AUTO: 146 10E3/UL (ref 150–450)
POTASSIUM SERPL-SCNC: 4.3 MMOL/L (ref 3.4–5.3)
RBC # BLD AUTO: 5.34 10E6/UL (ref 3.8–5.2)
SODIUM SERPL-SCNC: 140 MMOL/L (ref 136–145)
WBC # BLD AUTO: 10.1 10E3/UL (ref 4–11)

## 2023-08-30 PROCEDURE — 87389 HIV-1 AG W/HIV-1&-2 AB AG IA: CPT

## 2023-08-30 PROCEDURE — 85027 COMPLETE CBC AUTOMATED: CPT

## 2023-08-30 PROCEDURE — 80048 BASIC METABOLIC PNL TOTAL CA: CPT

## 2023-08-30 PROCEDURE — 250N000011 HC RX IP 250 OP 636: Performed by: STUDENT IN AN ORGANIZED HEALTH CARE EDUCATION/TRAINING PROGRAM

## 2023-08-30 PROCEDURE — 84460 ALANINE AMINO (ALT) (SGPT): CPT

## 2023-08-30 PROCEDURE — 84100 ASSAY OF PHOSPHORUS: CPT

## 2023-08-30 PROCEDURE — 36415 COLL VENOUS BLD VENIPUNCTURE: CPT

## 2023-08-30 PROCEDURE — 96365 THER/PROPH/DIAG IV INF INIT: CPT

## 2023-08-30 PROCEDURE — 86803 HEPATITIS C AB TEST: CPT

## 2023-08-30 PROCEDURE — 83735 ASSAY OF MAGNESIUM: CPT

## 2023-08-30 RX ORDER — DIPHENHYDRAMINE HYDROCHLORIDE 50 MG/ML
50 INJECTION INTRAMUSCULAR; INTRAVENOUS
Status: CANCELLED
Start: 2023-08-31

## 2023-08-30 RX ORDER — HEPARIN SODIUM,PORCINE 10 UNIT/ML
3 VIAL (ML) INTRAVENOUS DAILY
Status: DISCONTINUED | OUTPATIENT
Start: 2023-08-30 | End: 2023-08-30 | Stop reason: HOSPADM

## 2023-08-30 RX ORDER — ERTAPENEM 1 G/1
1 INJECTION, POWDER, LYOPHILIZED, FOR SOLUTION INTRAMUSCULAR; INTRAVENOUS ONCE
Status: CANCELLED | OUTPATIENT
Start: 2023-08-31 | End: 2023-08-31

## 2023-08-30 RX ORDER — ERTAPENEM 1 G/1
1 INJECTION, POWDER, LYOPHILIZED, FOR SOLUTION INTRAMUSCULAR; INTRAVENOUS ONCE
Status: COMPLETED | OUTPATIENT
Start: 2023-08-30 | End: 2023-08-30

## 2023-08-30 RX ORDER — HEPARIN SODIUM (PORCINE) LOCK FLUSH IV SOLN 100 UNIT/ML 100 UNIT/ML
5 SOLUTION INTRAVENOUS
Status: CANCELLED | OUTPATIENT
Start: 2023-08-31

## 2023-08-30 RX ORDER — HEPARIN SODIUM,PORCINE 10 UNIT/ML
5-20 VIAL (ML) INTRAVENOUS DAILY PRN
Status: CANCELLED | OUTPATIENT
Start: 2023-08-31

## 2023-08-30 RX ORDER — EPINEPHRINE 1 MG/ML
0.3 INJECTION, SOLUTION, CONCENTRATE INTRAVENOUS EVERY 5 MIN PRN
Status: CANCELLED | OUTPATIENT
Start: 2023-08-31

## 2023-08-30 RX ORDER — METHYLPREDNISOLONE SODIUM SUCCINATE 125 MG/2ML
125 INJECTION, POWDER, LYOPHILIZED, FOR SOLUTION INTRAMUSCULAR; INTRAVENOUS
Status: CANCELLED
Start: 2023-08-31

## 2023-08-30 RX ORDER — ALBUTEROL SULFATE 0.83 MG/ML
2.5 SOLUTION RESPIRATORY (INHALATION)
Status: CANCELLED | OUTPATIENT
Start: 2023-08-31

## 2023-08-30 RX ORDER — ALBUTEROL SULFATE 90 UG/1
1-2 AEROSOL, METERED RESPIRATORY (INHALATION)
Status: CANCELLED
Start: 2023-08-31

## 2023-08-30 RX ADMIN — ERTAPENEM SODIUM 1 G: 1 INJECTION, POWDER, LYOPHILIZED, FOR SOLUTION INTRAMUSCULAR; INTRAVENOUS at 15:02

## 2023-08-30 RX ADMIN — HEPARIN, PORCINE (PF) 10 UNIT/ML INTRAVENOUS SYRINGE 3 ML: at 15:47

## 2023-08-30 NOTE — PROGRESS NOTES
Infusion Nursing Note:  Latoya Riddle presents today for Invanz.    Patient seen by provider today: No   present during visit today: Not Applicable.    Note: Pt denies any new health changes or concerns.      Intravenous Access:  Peripheral IV placed.    Treatment Conditions:  Not Applicable.      Post Infusion Assessment:  Patient tolerated infusion without incident.  Blood return noted pre and post infusion.  Site patent and intact, free from redness, edema or discomfort.  No evidence of extravasations.  Access SL and gauze wrapped for appointment tomorrow.       Discharge Plan:   Discharge instructions reviewed with: Patient.  Patient and/or family verbalized understanding of discharge instructions and all questions answered.  Patient discharged in stable condition accompanied by: self.  Departure Mode: Ambulatory.      Steffi Zuniga RN

## 2023-08-31 ENCOUNTER — INFUSION THERAPY VISIT (OUTPATIENT)
Dept: INFUSION THERAPY | Facility: CLINIC | Age: 53
End: 2023-08-31
Attending: STUDENT IN AN ORGANIZED HEALTH CARE EDUCATION/TRAINING PROGRAM
Payer: COMMERCIAL

## 2023-08-31 VITALS — TEMPERATURE: 98.2 F | HEART RATE: 105 BPM | SYSTOLIC BLOOD PRESSURE: 109 MMHG | DIASTOLIC BLOOD PRESSURE: 67 MMHG

## 2023-08-31 DIAGNOSIS — Z90.5 ACQUIRED SOLITARY KIDNEY: Primary | ICD-10-CM

## 2023-08-31 LAB
HCV AB SERPL QL IA: NONREACTIVE
HIV 1+2 AB+HIV1 P24 AG SERPL QL IA: NONREACTIVE

## 2023-08-31 PROCEDURE — 250N000011 HC RX IP 250 OP 636: Mod: JZ | Performed by: STUDENT IN AN ORGANIZED HEALTH CARE EDUCATION/TRAINING PROGRAM

## 2023-08-31 PROCEDURE — 96365 THER/PROPH/DIAG IV INF INIT: CPT

## 2023-08-31 RX ORDER — HEPARIN SODIUM (PORCINE) LOCK FLUSH IV SOLN 100 UNIT/ML 100 UNIT/ML
5 SOLUTION INTRAVENOUS
Status: CANCELLED | OUTPATIENT
Start: 2023-09-01

## 2023-08-31 RX ORDER — ERTAPENEM 1 G/1
1 INJECTION, POWDER, LYOPHILIZED, FOR SOLUTION INTRAMUSCULAR; INTRAVENOUS ONCE
Status: CANCELLED | OUTPATIENT
Start: 2023-09-01 | End: 2023-09-01

## 2023-08-31 RX ORDER — METHYLPREDNISOLONE SODIUM SUCCINATE 125 MG/2ML
125 INJECTION, POWDER, LYOPHILIZED, FOR SOLUTION INTRAMUSCULAR; INTRAVENOUS
Status: CANCELLED
Start: 2023-09-01

## 2023-08-31 RX ORDER — DIPHENHYDRAMINE HYDROCHLORIDE 50 MG/ML
50 INJECTION INTRAMUSCULAR; INTRAVENOUS
Status: CANCELLED
Start: 2023-09-01

## 2023-08-31 RX ORDER — ALBUTEROL SULFATE 90 UG/1
1-2 AEROSOL, METERED RESPIRATORY (INHALATION)
Status: CANCELLED
Start: 2023-09-01

## 2023-08-31 RX ORDER — EPINEPHRINE 1 MG/ML
0.3 INJECTION, SOLUTION, CONCENTRATE INTRAVENOUS EVERY 5 MIN PRN
Status: CANCELLED | OUTPATIENT
Start: 2023-09-01

## 2023-08-31 RX ORDER — ALBUTEROL SULFATE 0.83 MG/ML
2.5 SOLUTION RESPIRATORY (INHALATION)
Status: CANCELLED | OUTPATIENT
Start: 2023-09-01

## 2023-08-31 RX ORDER — ERTAPENEM 1 G/1
1 INJECTION, POWDER, LYOPHILIZED, FOR SOLUTION INTRAMUSCULAR; INTRAVENOUS ONCE
Status: COMPLETED | OUTPATIENT
Start: 2023-08-31 | End: 2023-08-31

## 2023-08-31 RX ORDER — HEPARIN SODIUM,PORCINE 10 UNIT/ML
5-20 VIAL (ML) INTRAVENOUS DAILY PRN
Status: CANCELLED | OUTPATIENT
Start: 2023-09-01

## 2023-08-31 RX ADMIN — ERTAPENEM SODIUM 1 G: 1 INJECTION, POWDER, LYOPHILIZED, FOR SOLUTION INTRAMUSCULAR; INTRAVENOUS at 14:59

## 2023-08-31 NOTE — PROGRESS NOTES
Infusion Nursing Note:  Latoya Riddle presents today for 4/5 Invanz.    Patient seen by provider today: No   present during visit today: Not Applicable.    Note: N/A.      Intravenous Access:  Peripheral IV patent. Hep lock after infusion.    Treatment Conditions:  Not Applicable.      Post Infusion Assessment:  Patient tolerated infusion without incident.  Site patent and intact, free from redness, edema or discomfort.  No evidence of extravasations.       Discharge Plan:   Discharge instructions reviewed with: Patient.  Patient discharged in stable condition accompanied by: self.  Departure Mode: Ambulatory.      Deepika Oswald RN

## 2023-09-01 ENCOUNTER — INFUSION THERAPY VISIT (OUTPATIENT)
Dept: INFUSION THERAPY | Facility: CLINIC | Age: 53
End: 2023-09-01
Attending: STUDENT IN AN ORGANIZED HEALTH CARE EDUCATION/TRAINING PROGRAM
Payer: COMMERCIAL

## 2023-09-01 VITALS
RESPIRATION RATE: 16 BRPM | SYSTOLIC BLOOD PRESSURE: 115 MMHG | HEART RATE: 83 BPM | DIASTOLIC BLOOD PRESSURE: 75 MMHG | TEMPERATURE: 99.1 F

## 2023-09-01 DIAGNOSIS — Z90.5 ACQUIRED SOLITARY KIDNEY: Primary | ICD-10-CM

## 2023-09-01 PROCEDURE — 250N000011 HC RX IP 250 OP 636: Mod: JZ | Performed by: STUDENT IN AN ORGANIZED HEALTH CARE EDUCATION/TRAINING PROGRAM

## 2023-09-01 PROCEDURE — 96365 THER/PROPH/DIAG IV INF INIT: CPT

## 2023-09-01 RX ORDER — HEPARIN SODIUM (PORCINE) LOCK FLUSH IV SOLN 100 UNIT/ML 100 UNIT/ML
5 SOLUTION INTRAVENOUS
Status: CANCELLED | OUTPATIENT
Start: 2023-09-01

## 2023-09-01 RX ORDER — DIPHENHYDRAMINE HYDROCHLORIDE 50 MG/ML
50 INJECTION INTRAMUSCULAR; INTRAVENOUS
Status: CANCELLED
Start: 2023-09-01

## 2023-09-01 RX ORDER — HEPARIN SODIUM,PORCINE 10 UNIT/ML
5-20 VIAL (ML) INTRAVENOUS DAILY PRN
Status: CANCELLED | OUTPATIENT
Start: 2023-09-01

## 2023-09-01 RX ORDER — METHYLPREDNISOLONE SODIUM SUCCINATE 125 MG/2ML
125 INJECTION, POWDER, LYOPHILIZED, FOR SOLUTION INTRAMUSCULAR; INTRAVENOUS
Status: CANCELLED
Start: 2023-09-01

## 2023-09-01 RX ORDER — ERTAPENEM 1 G/1
1 INJECTION, POWDER, LYOPHILIZED, FOR SOLUTION INTRAMUSCULAR; INTRAVENOUS ONCE
Status: COMPLETED | OUTPATIENT
Start: 2023-09-01 | End: 2023-09-01

## 2023-09-01 RX ORDER — ALBUTEROL SULFATE 90 UG/1
1-2 AEROSOL, METERED RESPIRATORY (INHALATION)
Status: CANCELLED
Start: 2023-09-01

## 2023-09-01 RX ORDER — EPINEPHRINE 1 MG/ML
0.3 INJECTION, SOLUTION, CONCENTRATE INTRAVENOUS EVERY 5 MIN PRN
Status: CANCELLED | OUTPATIENT
Start: 2023-09-01

## 2023-09-01 RX ORDER — ERTAPENEM 1 G/1
1 INJECTION, POWDER, LYOPHILIZED, FOR SOLUTION INTRAMUSCULAR; INTRAVENOUS ONCE
Status: CANCELLED | OUTPATIENT
Start: 2023-09-01 | End: 2023-09-01

## 2023-09-01 RX ORDER — ALBUTEROL SULFATE 0.83 MG/ML
2.5 SOLUTION RESPIRATORY (INHALATION)
Status: CANCELLED | OUTPATIENT
Start: 2023-09-01

## 2023-09-01 RX ADMIN — ERTAPENEM SODIUM 1 G: 1 INJECTION, POWDER, LYOPHILIZED, FOR SOLUTION INTRAMUSCULAR; INTRAVENOUS at 14:58

## 2023-09-01 ASSESSMENT — PAIN SCALES - GENERAL: PAINLEVEL: NO PAIN (0)

## 2023-09-14 ENCOUNTER — VIRTUAL VISIT (OUTPATIENT)
Dept: UROLOGY | Facility: CLINIC | Age: 53
End: 2023-09-14
Payer: COMMERCIAL

## 2023-09-14 VITALS — WEIGHT: 191 LBS | HEIGHT: 67 IN | BODY MASS INDEX: 29.98 KG/M2

## 2023-09-14 DIAGNOSIS — Z90.5 ACQUIRED SOLITARY KIDNEY: ICD-10-CM

## 2023-09-14 DIAGNOSIS — N30.00 ACUTE CYSTITIS WITHOUT HEMATURIA: ICD-10-CM

## 2023-09-14 DIAGNOSIS — N20.2 CALCULUS OF KIDNEY AND URETER: Primary | ICD-10-CM

## 2023-09-14 PROCEDURE — 99214 OFFICE O/P EST MOD 30 MIN: CPT | Mod: VID | Performed by: STUDENT IN AN ORGANIZED HEALTH CARE EDUCATION/TRAINING PROGRAM

## 2023-09-14 ASSESSMENT — PAIN SCALES - GENERAL: PAINLEVEL: NO PAIN (0)

## 2023-09-14 NOTE — LETTER
9/14/2023       RE: Latoya Riddle  [address]  [place] [address]     Dear Colleague,    Thank you for referring your patient, Latoya Riddle, to the [facility] [place] [facility]. Please see a copy of my visit note below.    ** Send link to cell phone    Latoya is a 52 year old who is being evaluated via a billable video visit.      How would you like to obtain your AVS? MyChart  If the video visit is dropped, the invitation should be resent by: Text to cell phone: [phone]  Will anyone else be joining your video visit? No        Video-Visit Details    Type of service:  Video Visit   Video Start Time: 1:52 PM  Video End Time:2:01 PM    Originating Location (pt. Location): Home    Distant Location (provider location):  On-site  Platform used for Video Visit: [employer] HPI:  Latoya Riddle is a 52 year old female being seen for kidney stone follow-up.  Duration of problem: 16 years  Previous treatments: Uteroscopy 1 month ago      Reviewed previous notes  She had developed UTI just prior to stent removal and needed to be on IV antibiotics with stent removal done at that point in time  Currently she was feeling better but now she feels that she her urinary symptoms are worsening again  No fever chills or rigors but some frequency and urgency         Review of Systems:  From intake questionnaire     Skin: negative  Eyes: negative  Ears/Nose/Throat: negative  Respiratory: No shortness of breath, dyspnea on exertion, cough, or hemoptysis  Cardiovascular: No chest pain or palpitations  Gastrointestinal: negative; no nausea/vomiting, constipation or diarrhea  Genitourinary: as per HPI  Musculoskeletal: negative  Neurologic: negative  Psychiatric: negative  Hematologic/Lymphatic/Immunologic: negative  Endocrine: negative         Physical Exam:   This is a virtual visit    Alert, no acute distress, oriented, conversant    Ears/nose/mouth:  mouth:normal, good dentition  Respiratory: no respiratory distress, or pursed lip breathing  Cardiovascular:no obvious jugular venous distension present  Skin: no suspicious lesions or rashes on Visible body parts on the Screen  Neuro: Alert, oriented, speech and mentation normal  Psych: affect and mood normal, alert and oriented to person, place and time  Review of Imaging:  The following imaging exams were independently viewed and interpreted by me and discussed with patient:      Review of Labs:  The following labs were reviewed by me and discussed with the patient:  Stone analysis: Abnormal: 10% calcium oxalate 90% calcium phosphate  Urine culture: Abnormal    >100,000 CFU/mL Pseudomonas aeruginosa, mucoid strain Abnormal            Resulting Agency: [facility]     Susceptibility     Pseudomonas aeruginosa, mucoid strain     BAKARI     Amikacin <=16 ug/mL Susceptible     Cefepime 8 ug/mL Susceptible     Ceftazidime 4 ug/mL Susceptible     Ciprofloxacin >2 ug/mL Resistant     Gentamicin 2 ug/mL Susceptible     Levofloxacin >4 ug/mL Resistant     Meropenem <=1 ug/mL Susceptible     Piperacillin/Tazobactam 16 ug/mL Susceptible     Tobramycin <=1 ug/mL Susceptible               Assessment & Plan    Acquired solitary kidney  Single functioning kidney with recurrent kidney stones  Continue stone prevention /prophylaxis with nephrology  Recommend repeating urine evaluation for concerns of UTI last time had grown Pseudomonas so I want to look at her culture before giving her any additional antibiotics  We will update her on the results of urine culture once available  Past history of C. difficile infection present    - Urine Culture Aerobic Bacterial [FWI528]  - UA with Microscopic; Future    Acute cystitis without hematuria  Above  - Urine Culture Aerobic Bacterial [[id number]]  - UA with Microscopic; Future    Calculus of kidney and ureter  We will follow-up with CT in 6 months to assess this      Gustavo Newton MD   HEALTH  [facility] [place]      ==========================    Additional Billing and Coding Information:  Review of external notes as documented above   Review of the result(s) of each unique test - Stone analysis, urine culture    Independent interpretation of a test performed by another physician/other qualified health care professional (not separately reported) -     Discussion of management or test interpretation with external physician/other qualified healthcare professional/appropriate source -       12 minutes spent by me on the date of the encounter doing chart review, review of test results, interpretation of tests, patient visit, and documentation

## 2023-09-14 NOTE — PATIENT INSTRUCTIONS
Ua and urine culture to be repeated  Follow-up in 6 months for CT  Follow-up with Nephrology on a regular basis for       Please call [phone] if you need to karen.     Please call [phone] to schedule CT Scan.

## 2023-09-14 NOTE — PROGRESS NOTES
** Send link to cell phone    Latoya is a 52 year old who is being evaluated via a billable video visit.      How would you like to obtain your AVS? [email or web address]  If the video visit is dropped, the invitation should be resent by: Text to cell phone: [phone]  Will anyone else be joining your video visit? No        Video-Visit Details    Type of service:  Video Visit   Video Start Time: 1:52 PM  Video End Time:2:01 PM    Originating Location (pt. Location): Home    Distant Location (provider location):  On-site  Platform used for Video Visit: [employer] HPI:  Latoya Riddle is a 52 year old female being seen for kidney stone follow-up.  Duration of problem: 16 years  Previous treatments: Uteroscopy 1 month ago      Reviewed previous notes  She had developed UTI just prior to stent removal and needed to be on IV antibiotics with stent removal done at that point in time  Currently she was feeling better but now she feels that she her urinary symptoms are worsening again  No fever chills or rigors but some frequency and urgency         Review of Systems:  From intake questionnaire     Skin: negative  Eyes: negative  Ears/Nose/Throat: negative  Respiratory: No shortness of breath, dyspnea on exertion, cough, or hemoptysis  Cardiovascular: No chest pain or palpitations  Gastrointestinal: negative; no nausea/vomiting, constipation or diarrhea  Genitourinary: as per HPI  Musculoskeletal: negative  Neurologic: negative  Psychiatric: negative  Hematologic/Lymphatic/Immunologic: negative  Endocrine: negative         Physical Exam:   This is a virtual visit    Alert, no acute distress, oriented, conversant    Ears/nose/mouth: mouth:normal, good dentition  Respiratory: no respiratory distress, or pursed lip breathing  Cardiovascular:no obvious jugular venous distension present  Skin: no suspicious lesions or rashes on Visible body parts on the Screen  Neuro: Alert, oriented, speech and mentation normal  Psych: affect and mood  normal, alert and oriented to person, place and time  Review of Imaging:  The following imaging exams were independently viewed and interpreted by me and discussed with patient:      Review of Labs:  The following labs were reviewed by me and discussed with the patient:  Stone analysis: Abnormal: 10% calcium oxalate 90% calcium phosphate  Urine culture: Abnormal    >100,000 CFU/mL Pseudomonas aeruginosa, mucoid strain Abnormal            Resulting Agency: [facility]     Susceptibility     Pseudomonas aeruginosa, mucoid strain     BAKARI     Amikacin <=16 ug/mL Susceptible     Cefepime 8 ug/mL Susceptible     Ceftazidime 4 ug/mL Susceptible     Ciprofloxacin >2 ug/mL Resistant     Gentamicin 2 ug/mL Susceptible     Levofloxacin >4 ug/mL Resistant     Meropenem <=1 ug/mL Susceptible     Piperacillin/Tazobactam 16 ug/mL Susceptible     Tobramycin <=1 ug/mL Susceptible               Assessment & Plan     Acquired solitary kidney  Single functioning kidney with recurrent kidney stones  Continue stone prevention /prophylaxis with nephrology  Recommend repeating urine evaluation for concerns of UTI last time had grown Pseudomonas so I want to look at her culture before giving her any additional antibiotics  We will update her on the results of urine culture once available  Past history of C. difficile infection present    - Urine Culture Aerobic Bacterial [ZGD687]  - UA with Microscopic; Future    Acute cystitis without hematuria  Above  - Urine Culture Aerobic Bacterial [TPT087]  - UA with Microscopic; Future    Calculus of kidney and ureter  We will follow-up with CT in 6 months to assess this      Gustavojasiel Newton MD  [facility] [place]      ==========================    Additional Billing and Coding Information:  Review of external notes as documented above   Review of the result(s) of each unique test - Stone analysis, urine culture    Independent interpretation of a test performed by another  physician/other qualified health care professional (not separately reported) -     Discussion of management or test interpretation with external physician/other qualified healthcare professional/appropriate source -       12 minutes spent by me on the date of the encounter doing chart review, review of test results, interpretation of tests, patient visit, and documentation

## 2023-10-02 ENCOUNTER — LAB (OUTPATIENT)
Dept: LAB | Facility: CLINIC | Age: 53
End: 2023-10-02
Payer: COMMERCIAL

## 2023-10-02 DIAGNOSIS — N30.00 ACUTE CYSTITIS WITHOUT HEMATURIA: ICD-10-CM

## 2023-10-02 DIAGNOSIS — Z90.5 ACQUIRED SOLITARY KIDNEY: ICD-10-CM

## 2023-10-02 LAB
ALBUMIN UR-MCNC: ABNORMAL MG/DL
APPEARANCE UR: ABNORMAL
BACTERIA #/AREA URNS HPF: ABNORMAL /HPF
BILIRUB UR QL STRIP: NEGATIVE
COLOR UR AUTO: YELLOW
GLUCOSE UR STRIP-MCNC: NEGATIVE MG/DL
HGB UR QL STRIP: ABNORMAL
KETONES UR STRIP-MCNC: NEGATIVE MG/DL
LEUKOCYTE ESTERASE UR QL STRIP: ABNORMAL
NITRATE UR QL: POSITIVE
PH UR STRIP: 6 [PH] (ref 5–7)
RBC #/AREA URNS AUTO: ABNORMAL /HPF
SP GR UR STRIP: 1.01 (ref 1–1.03)
SQUAMOUS #/AREA URNS AUTO: ABNORMAL /LPF
UROBILINOGEN UR STRIP-ACNC: 0.2 E.U./DL
WBC #/AREA URNS AUTO: ABNORMAL /HPF
WBC CLUMPS #/AREA URNS HPF: PRESENT /HPF

## 2023-10-02 PROCEDURE — 81001 URINALYSIS AUTO W/SCOPE: CPT

## 2023-10-02 PROCEDURE — 87086 URINE CULTURE/COLONY COUNT: CPT | Performed by: STUDENT IN AN ORGANIZED HEALTH CARE EDUCATION/TRAINING PROGRAM

## 2023-10-04 LAB — BACTERIA UR CULT: NORMAL

## 2023-10-11 ENCOUNTER — ALLIED HEALTH/NURSE VISIT (OUTPATIENT)
Dept: UROLOGY | Facility: CLINIC | Age: 53
End: 2023-10-11
Payer: COMMERCIAL

## 2023-10-11 DIAGNOSIS — N39.0 UTI (URINARY TRACT INFECTION): Primary | ICD-10-CM

## 2023-10-11 LAB
ALBUMIN UR-MCNC: 100 MG/DL
APPEARANCE UR: CLEAR
BILIRUB UR QL STRIP: NEGATIVE
COLOR UR AUTO: YELLOW
GLUCOSE UR STRIP-MCNC: NEGATIVE MG/DL
HGB UR QL STRIP: ABNORMAL
KETONES UR STRIP-MCNC: NEGATIVE MG/DL
LEUKOCYTE ESTERASE UR QL STRIP: ABNORMAL
NITRATE UR QL: POSITIVE
PH UR STRIP: 6.5 [PH] (ref 5–8)
SP GR UR STRIP: 1.01 (ref 1–1.03)
UROBILINOGEN UR STRIP-ACNC: 0.2 E.U./DL

## 2023-10-11 PROCEDURE — 87086 URINE CULTURE/COLONY COUNT: CPT

## 2023-10-11 PROCEDURE — 99207 PR NO CHARGE NURSE ONLY: CPT

## 2023-10-11 PROCEDURE — 81003 URINALYSIS AUTO W/O SCOPE: CPT | Mod: QW

## 2023-10-11 NOTE — PROGRESS NOTES
Patient presents to the office today for a straight catheterization urine culture collection.  Patient is familiar with straight cath in the past.  No questions  or concerns at this time.  Patient was straight cathed without difficulty and urine specimen was sent for urine culture per provider orders.  Patient tolerated well.

## 2023-10-12 LAB — BACTERIA UR CULT: NO GROWTH

## 2023-10-16 DIAGNOSIS — E78.2 MIXED HYPERLIPIDEMIA: ICD-10-CM

## 2023-10-16 DIAGNOSIS — Q61.5 SPONGE KIDNEY: ICD-10-CM

## 2023-10-16 DIAGNOSIS — N20.0 KIDNEY STONE: ICD-10-CM

## 2023-10-16 DIAGNOSIS — R39.89 BLADDER PAIN: ICD-10-CM

## 2023-10-16 DIAGNOSIS — N30.00 ACUTE CYSTITIS WITHOUT HEMATURIA: ICD-10-CM

## 2023-10-16 DIAGNOSIS — N18.32 CHRONIC KIDNEY DISEASE (CKD) STAGE G3B/A1, MODERATELY DECREASED GLOMERULAR FILTRATION RATE (GFR) BETWEEN 30-44 ML/MIN/1.73 SQUARE METER AND ALBUMINURIA CREATININE RATIO LESS THAN 30 MG/G (H): Primary | ICD-10-CM

## 2023-11-06 ENCOUNTER — LAB (OUTPATIENT)
Dept: LAB | Facility: CLINIC | Age: 53
End: 2023-11-06
Payer: COMMERCIAL

## 2023-11-06 DIAGNOSIS — E78.2 MIXED HYPERLIPIDEMIA: ICD-10-CM

## 2023-11-06 DIAGNOSIS — N20.0 KIDNEY STONE: ICD-10-CM

## 2023-11-06 DIAGNOSIS — Q61.5 SPONGE KIDNEY: ICD-10-CM

## 2023-11-06 DIAGNOSIS — R39.89 BLADDER PAIN: ICD-10-CM

## 2023-11-06 DIAGNOSIS — E78.5 HYPERLIPIDEMIA LDL GOAL <130: ICD-10-CM

## 2023-11-06 DIAGNOSIS — N30.00 ACUTE CYSTITIS WITHOUT HEMATURIA: ICD-10-CM

## 2023-11-06 DIAGNOSIS — N18.32 CHRONIC KIDNEY DISEASE (CKD) STAGE G3B/A1, MODERATELY DECREASED GLOMERULAR FILTRATION RATE (GFR) BETWEEN 30-44 ML/MIN/1.73 SQUARE METER AND ALBUMINURIA CREATININE RATIO LESS THAN 30 MG/G (H): ICD-10-CM

## 2023-11-06 LAB
ALBUMIN MFR UR ELPH: 17.7 MG/DL
ALBUMIN SERPL BCG-MCNC: 4.1 G/DL (ref 3.5–5.2)
ANION GAP SERPL CALCULATED.3IONS-SCNC: 10 MMOL/L (ref 7–15)
BUN SERPL-MCNC: 24.1 MG/DL (ref 6–20)
CALCIUM SERPL-MCNC: 9.7 MG/DL (ref 8.6–10)
CHLORIDE SERPL-SCNC: 107 MMOL/L (ref 98–107)
CHOLEST SERPL-MCNC: 167 MG/DL
CREAT SERPL-MCNC: 1.88 MG/DL (ref 0.51–0.95)
CREAT UR-MCNC: 59.1 MG/DL
DEPRECATED HCO3 PLAS-SCNC: 23 MMOL/L (ref 22–29)
EGFRCR SERPLBLD CKD-EPI 2021: 32 ML/MIN/1.73M2
ERYTHROCYTE [DISTWIDTH] IN BLOOD BY AUTOMATED COUNT: 15.4 % (ref 10–15)
GLUCOSE SERPL-MCNC: 94 MG/DL (ref 70–99)
HCT VFR BLD AUTO: 49.7 % (ref 35–47)
HDLC SERPL-MCNC: 42 MG/DL
HGB BLD-MCNC: 15.1 G/DL (ref 11.7–15.7)
LDLC SERPL CALC-MCNC: 76 MG/DL
MCH RBC QN AUTO: 27.9 PG (ref 26.5–33)
MCHC RBC AUTO-ENTMCNC: 30.4 G/DL (ref 31.5–36.5)
MCV RBC AUTO: 92 FL (ref 78–100)
NONHDLC SERPL-MCNC: 125 MG/DL
PHOSPHATE SERPL-MCNC: 3.5 MG/DL (ref 2.5–4.5)
PLATELET # BLD AUTO: 135 10E3/UL (ref 150–450)
POTASSIUM SERPL-SCNC: 4.6 MMOL/L (ref 3.4–5.3)
PROT/CREAT 24H UR: 0.3 MG/MG CR (ref 0–0.2)
PTH-INTACT SERPL-MCNC: 42 PG/ML (ref 15–65)
RBC # BLD AUTO: 5.41 10E6/UL (ref 3.8–5.2)
SODIUM SERPL-SCNC: 140 MMOL/L (ref 135–145)
TRIGL SERPL-MCNC: 244 MG/DL
WBC # BLD AUTO: 8.3 10E3/UL (ref 4–11)

## 2023-11-06 PROCEDURE — 36415 COLL VENOUS BLD VENIPUNCTURE: CPT

## 2023-11-06 PROCEDURE — 85027 COMPLETE CBC AUTOMATED: CPT

## 2023-11-06 PROCEDURE — 83970 ASSAY OF PARATHORMONE: CPT

## 2023-11-06 PROCEDURE — 80061 LIPID PANEL: CPT

## 2023-11-06 PROCEDURE — 84156 ASSAY OF PROTEIN URINE: CPT

## 2023-11-06 PROCEDURE — 80069 RENAL FUNCTION PANEL: CPT

## 2023-11-14 ENCOUNTER — DOCUMENTATION ONLY (OUTPATIENT)
Dept: FAMILY MEDICINE | Facility: CLINIC | Age: 53
End: 2023-11-14
Payer: COMMERCIAL

## 2023-11-14 DIAGNOSIS — G47.33 OSA (OBSTRUCTIVE SLEEP APNEA): Primary | ICD-10-CM

## 2023-12-20 NOTE — PATIENT INSTRUCTIONS
Schedule physical therapy.    Tylenol 1000 mg every 8 hours.    Flexeril at bedtime if needed.        Thank you for choosing [facility].  You may be receiving an email and/or telephone survey request from [facility] regarding your visit today.  Please take a few minutes to respond to the survey to let us know how we are doing.      If you have questions or concerns, please contact us via [employer] or you can contact your care team at [phone].    Our Clinic hours are:  Monday 6:40 am  to 7:00 pm  Tuesday -Friday 6:40 am to 5:00 pm    The [place] outpatient lab hours are:  Monday - Friday 6:10 am to 4:45 pm  Saturdays 7:00 am to 11:00 am  Appointments are required, call [phone]    If you have clinical questions after hours or would like to schedule an appointment,  call the clinic at [phone].     VTE Assessment already completed for this visit

## 2023-12-26 ENCOUNTER — PATIENT OUTREACH (OUTPATIENT)
Dept: CARE COORDINATION | Facility: CLINIC | Age: 53
End: 2023-12-26
Payer: COMMERCIAL

## 2024-01-23 ENCOUNTER — PATIENT OUTREACH (OUTPATIENT)
Dept: CARE COORDINATION | Facility: CLINIC | Age: 54
End: 2024-01-23
Payer: COMMERCIAL

## 2024-01-30 ENCOUNTER — OFFICE VISIT (OUTPATIENT)
Dept: SLEEP MEDICINE | Facility: CLINIC | Age: 54
End: 2024-01-30
Payer: COMMERCIAL

## 2024-01-30 VITALS
WEIGHT: 192 LBS | HEART RATE: 61 BPM | BODY MASS INDEX: 30.13 KG/M2 | DIASTOLIC BLOOD PRESSURE: 65 MMHG | OXYGEN SATURATION: 97 % | SYSTOLIC BLOOD PRESSURE: 113 MMHG | HEIGHT: 67 IN

## 2024-01-30 DIAGNOSIS — G47.8 UPPER AIRWAY RESISTANCE SYNDROME: Primary | ICD-10-CM

## 2024-01-30 PROBLEM — G47.33 OBSTRUCTIVE SLEEP APNEA SYNDROME: Status: RESOLVED | Noted: 2018-02-09 | Resolved: 2024-01-30

## 2024-01-30 PROBLEM — F32.A DEPRESSION: Status: ACTIVE | Noted: 2023-10-04

## 2024-01-30 PROBLEM — G47.33 OSA (OBSTRUCTIVE SLEEP APNEA): Status: RESOLVED | Noted: 2018-02-09 | Resolved: 2024-01-30

## 2024-01-30 PROCEDURE — 99205 OFFICE O/P NEW HI 60 MIN: CPT | Performed by: NURSE PRACTITIONER

## 2024-01-30 RX ORDER — ERGOCALCIFEROL 1.25 MG/1
50000 CAPSULE ORAL
COMMUNITY
Start: 2023-11-13 | End: 2024-04-29

## 2024-01-30 RX ORDER — ZOLPIDEM TARTRATE 5 MG/1
TABLET ORAL
Qty: 1 TABLET | Refills: 0 | Status: SHIPPED | OUTPATIENT
Start: 2024-01-30 | End: 2024-05-14

## 2024-01-30 ASSESSMENT — SLEEP AND FATIGUE QUESTIONNAIRES
HOW LIKELY ARE YOU TO NOD OFF OR FALL ASLEEP WHEN YOU ARE A PASSENGER IN A CAR FOR AN HOUR WITHOUT A BREAK: WOULD NEVER DOZE
HOW LIKELY ARE YOU TO NOD OFF OR FALL ASLEEP WHILE SITTING AND READING: MODERATE CHANCE OF DOZING
HOW LIKELY ARE YOU TO NOD OFF OR FALL ASLEEP WHILE SITTING QUIETLY AFTER LUNCH WITHOUT ALCOHOL: SLIGHT CHANCE OF DOZING
HOW LIKELY ARE YOU TO NOD OFF OR FALL ASLEEP WHILE SITTING INACTIVE IN A PUBLIC PLACE: WOULD NEVER DOZE
HOW LIKELY ARE YOU TO NOD OFF OR FALL ASLEEP WHILE SITTING AND TALKING TO SOMEONE: WOULD NEVER DOZE
HOW LIKELY ARE YOU TO NOD OFF OR FALL ASLEEP WHILE LYING DOWN TO REST IN THE AFTERNOON WHEN CIRCUMSTANCES PERMIT: SLIGHT CHANCE OF DOZING
HOW LIKELY ARE YOU TO NOD OFF OR FALL ASLEEP WHILE WATCHING TV: SLIGHT CHANCE OF DOZING
HOW LIKELY ARE YOU TO NOD OFF OR FALL ASLEEP IN A CAR, WHILE STOPPED FOR A FEW MINUTES IN TRAFFIC: WOULD NEVER DOZE

## 2024-01-30 NOTE — PROGRESS NOTES
Outpatient Sleep Medicine Consultation:      Name: Latoya Riddle MRN# [id number]   Age: 53 year old YOB: 1970     Date of Consultation: January 30, 2024  Consultation is requested by: No referring provider defined for this encounter. No ref. provider found  Primary care provider: Maribeth Avila       Reason for Sleep Consult:     Latoya Riddle is sent by No ref. provider found for a sleep consultation regarding snoring.    Patient s Reason for visit  Latoya Riddle main reason for visit: follow up  Patient states problem(s) started: seven years ago  Latoya Riddle's goals for this visit: continue with the cpap           Assessment and Plan:     Summary Sleep Diagnoses and Recommendations:  Obstructive sleep apnea   Comprehensive sleep study    Zolpidem 5 mg p.o. x 1 tablet to promote sleepiness on the night of the sleep study    Comorbid Diagnoses:  Depression  Anxiety  Chronic migraines  Hyperlipidemia  Tobacco use disorder  Class I obesity      Summary Counseling:    Sleep Testing Reviewed  Obstructive Sleep Apnea Reviewed  Complications of Untreated Sleep Apnea Reviewed      Patient will follow up with a GroupT[employer] message after completing sleep study.  Patient will be contacted and therapy will be initiated if indicated  MICHELLE Zhong CNP    Total time spent reviewing medical records, history and physical examination, review of previous testing and interpretation as well as documentation on this date: 60 minutes    CC: No ref. provider found          History of Present Illness:     Latoya Riddle presents to the sleep medicine clinic with concerns about reestablishing care with sleep medicine provider in order to obtain needed supplies and equipment as well as replacement CPAP machine.    Patient is a diligent user of her CPAP therapy and realizes much benefit from its use.  She denies any symptoms of sleep disordered breathing including afternoon sleepiness and has not  had any intrusion of sleep into her driving abilities.  Unfortunately, Latoya actually has upper airway resistance syndrome, and not obstructive sleep apnea.    She is interested in pursuing the diagnosis of obstructive sleep apnea.  For that, we will obtain a diagnostic polysomnogram for its sensitivity.    Latoya Riddle has a significant history of recurrent kidney stones as well as a solitary left kidney having lost her right kidney to kidney stones.    Per note dated 2/9/2018.    Weight on that day, 172 pounds with BMI of 27.76  Neck circumference 35 cm.  Weight today 192 pounds, BMI of 30.30.  Neck circumference 39 cm      Social History:          Past Sleep Evaluations:    Sleep study 4/1/08: No CATHY. .5 minutes, sleep latency  normal 11.7 minutes. REM latency 154.5 minutes. Sleep efficiency at 89.4%. The sleep architecture was periodically disrupted with frequent sleep stage changes and arousals. Snoring: moderately loud. RDI 41.0,  AHI of 0.3. Lowest O2 93.0%.       SLEEP-WAKE SCHEDULE:     Work/School Days: Patient goes to school/work: No   Usually gets into bed at 11 pm  Takes patient about minutes to fall asleep  Has trouble falling asleep one nights per week  Wakes up in the middle of the night two to three times.  Wakes up due to Use the bathroom  She has trouble falling back asleep   times a week.   It usually takes   to get back to sleep  Patient is usually up at 7  Uses alarm: Yes    Weekends/Non-work Days/All Other Days:  Usually gets into bed at midnight   Takes patient about minutes to fall asleep  Patient is usually up at 9  Uses alarm: Yes    Sleep Need  Patient gets  on average seven hours sleep on average Depending on night, sleep can be fragmented  Patient thinks she needs about 7 hours sleep    Latoya Riddle prefers to sleep in this position(s): Side   Patient states they do the following activities in bed:      Naps  Patient takes a purposeful nap 0 times a week and naps are  usually   in duration  She feels better after a nap:    She dozes off unintentionally   days per week  Patient has had a driving accident or near-miss due to sleepiness/drowsiness: No      SLEEP DISRUPTIONS:    Breathing/Snoring  Patient snores:Yes May snore w/mask on  Other people complain about her snoring: Yes  Patient has been told she stops breathing in her sleep:Yes Has not been told she has apnea w/mask on  She has issues with the following: Morning mouth dryness;Stuffy nose when you wake up;Getting up to urinate more than once. no morning headaches. no nocturnal heartburn or reflux. yes nasal congestion at night. All w/mask on    Movement:  Patient gets pain, discomfort, with an urge to move:  Yes restless legs symptoms  It happens when she is resting:  No  It happens more at night:  Yes  Patient has been told she kicks her legs at night:  No     Behaviors in Sleep:  Latoya Riddle has experienced the following behaviors while sleeping:    She has experienced sudden muscle weakness during the day: No  Pt denies bruxism, sleep talking, sleep walking, and dream enactment behavior. Pt denies sleep paralysis, hypnagogue and cataplexy.       Is there anything else you would like your sleep provider to know:        CAFFEINE AND OTHER SUBSTANCES:    Patient consumes caffeinated beverages per day:  1  Last caffeine use is usually: noon  List of any prescribed or over the counter stimulants that patient takes:    List of any prescribed or over the counter sleep medication patient takes:    List of previous sleep medications that patient has tried:    Patient drinks alcohol to help them sleep: No  Patient drinks alcohol near bedtime: No    Family History:  Patient has a family member been diagnosed with a sleep disorder: Yes  father and both sisters Father has passed away but used CPAP. Both sisters use CPAP        SCALES:    EPWORTH SLEEPINESS SCALE         1/30/2024     9:57 AM    [place] Sleepiness Scale ( M.W.  Jonny  [id number]<br>ESS - [place]/English - Final version - 21 Nov 07 - [facility] [place].)   Sitting and reading Moderate chance of dozing   Watching TV Slight chance of dozing   Sitting, inactive in a public place (e.g. a theatre or a meeting) Would never doze   As a passenger in a car for an hour without a break Would never doze   Lying down to rest in the afternoon when circumstances permit Slight chance of dozing   Sitting and talking to someone Would never doze   Sitting quietly after a lunch without alcohol Slight chance of dozing   In a car, while stopped for a few minutes in traffic Would never doze   [place] Score (MC) 5   [place] Score (Sleep) 5         INSOMNIA SEVERITY INDEX (ELAINE)          1/30/2024     9:46 AM   Insomnia Severity Index (ELAINE)   Difficulty falling asleep 1   Difficulty staying asleep 1   Problems waking up too early 0   How SATISFIED/DISSATISFIED are you with your CURRENT sleep pattern? 1   How NOTICEABLE to others do you think your sleep problem is in terms of impairing the quality of your life? 4   How WORRIED/DISTRESSED are you about your current sleep problem? 2   To what extent do you consider your sleep problem to INTERFERE with your daily functioning (e.g. daytime fatigue, mood, ability to function at work/daily chores, concentration, memory, mood, etc.) CURRENTLY? 1   ELAINE Total Score 10       Guidelines for Scoring/Interpretation:  Total score categories:  0-7 = No clinically significant insomnia   8-14 = Subthreshold insomnia   15-21 = Clinical insomnia (moderate severity)  22-28 = Clinical insomnia (severe)  Used via courtesy of www.Unisfairealth.va.gov with permission from Rex Argueta PhD., [facility]      STOP BANG         1/30/2024     9:57 AM   STOP BANG Questionnaire (  2008, the American Society of Anesthesiologists, Inc. [employer]   1. Snoring - Do you snore loudly (louder than talking or loud enough to be heard through closed doors)?  "Yes   2. Tired - Do you often feel tired, fatigued, or sleepy during daytime? Yes   3. Observed - Has anyone observed you stop breathing during your sleep? Yes   4. Blood pressure - Do you have or are you being treated for high blood pressure? No   5. BMI - BMI more than 35 kg/m2? Yes   6. Age - Age over 50 yr old? Yes   7. Neck circumference - Neck circumference greater than 40 cm? Yes   8. Gender - Gender male? No   STOP BANG Score (MC): 7 (High risk of CATHY)         GAD7        2/1/2023     3:59 PM   ALFREDITO-7    1. Feeling nervous, anxious, or on edge 2   2. Not being able to stop or control worrying 1   3. Worrying too much about different things 1   4. Trouble relaxing 1   5. Being so restless that it is hard to sit still 0   6. Becoming easily annoyed or irritable 3   7. Feeling afraid, as if something awful might happen 0   ALFREDITO-7 Total Score 8   If you checked any problems, how difficult have they made it for you to do your work, take care of things at home, or get along with other people? Somewhat difficult         CAGE-AID         No data to display                CAGE-AID reprinted with permission from the [place] Medical Journal, SIS [employer]o. and ANU Jorge, \"Conjoint screening questionnaires for alcohol and drug abuse\" [place] Medical Journal 94: 135-140, 1995.      PATIENT HEALTH QUESTIONNAIRE-9 (PHQ - 9)        4/3/2023     9:57 AM   PHQ-9 ([employer])   1.  Little interest or pleasure in doing things 1   2.  Feeling down, depressed, or hopeless 1   3.  Trouble falling or staying asleep, or sleeping too much 0   4.  Feeling tired or having little energy 3   5.  Poor appetite or overeating 0   6.  Feeling bad about yourself - or that you are a failure or have let yourself or your family down 0   7.  Trouble concentrating on things, such as reading the newspaper or watching television 0   8.  Moving or speaking so slowly that other people could have noticed. Or the opposite - being so fidgety or restless that " you have been moving around a lot more than usual 0   9.  Thoughts that you would be better off dead, or of hurting yourself in some way 0   PHQ-9 Total Score 5   6.  Feeling bad about yourself 0   7.  Trouble concentrating 0   8.  Moving slowly or restless 0   9.  Suicidal or self-harm thoughts 0   1.  Little interest or pleasure in doing things Several days   2.  Feeling down, depressed, or hopeless Several days   3.  Trouble falling or staying asleep, or sleeping too much Not at all   4.  Feeling tired or having little energy Nearly every day   5.  Poor appetite or overeating Not at all   6.  Feeling bad about yourself Not at all   7.  Trouble concentrating Not at all   8.  Moving slowly or restless Not at all   9.  Suicidal or self-harm thoughts Not at all   PHQ-9 via [employer] TOTAL SCORE-----> 5 (Mild depression)   Difficulty at work, home, or with people Not difficult at all       Developed by Fabian Taylor, Nhi Holloway, Subhash Ann and colleagues, with an educational blayne from [employer] No permission required to reproduce, translate, display or distribute.        Allergies:    Allergies   Allergen Reactions    Gentamycin [Gentamicin Sulfate]     Nsaids Other (See Comments)     Avoids due presence of 1 kidney and that kidney has decreased function   GFR 44    Skin Adhesives [Cyanoacrylate]        Medications:    Current Outpatient Medications   Medication Sig Dispense Refill    acetaminophen (TYLENOL) 325 MG tablet Take 2 tablets (650 mg) by mouth every 4 hours as needed for other (mild pain) 100 tablet 0    cetirizine (ZYRTEC) 10 MG tablet Take 10 mg by mouth every evening      escitalopram (LEXAPRO) 10 MG tablet Take 1 tablet (10 mg) by mouth daily (Patient taking differently: Take 10 mg by mouth every evening) 90 tablet 3    LORazepam (ATIVAN) 0.5 MG tablet Take 1 tablet (0.5 mg) by mouth every 8 hours as needed for anxiety 30 tablet 0    ondansetron (ZOFRAN ODT) 4 MG ODT tab Take 1  tablet (4 mg) by mouth every 8 hours as needed for nausea (Patient not taking: Reported on 9/14/2023) 4 tablet 0    ondansetron (ZOFRAN ODT) 4 MG ODT tab Take 1-2 tablets (4-8 mg) by mouth every 8 hours as needed for nausea Dissolve ON the tongue. (Patient not taking: Reported on 9/14/2023) 10 tablet 0    rosuvastatin (CRESTOR) 20 MG tablet Take 1 tablet (20 mg) by mouth daily (Patient taking differently: Take 20 mg by mouth every evening) 90 tablet 3    senna-docusate (SENOKOT-S/PERICOLACE) 8.6-50 MG tablet Take 1-2 tablets by mouth 2 times daily (Patient not taking: Reported on 9/14/2023) 30 tablet 0    SUMAtriptan (IMITREX) 50 MG tablet Take 1 tablet (50 mg) by mouth at onset of headache for migraine May repeat in 2 hours. Max 4 tablets/24 hours. 18 tablet 1       Problem List:  Patient Active Problem List    Diagnosis Date Noted    Acquired solitary kidney 08/28/2023     Priority: Medium    LINDEN (acute kidney injury) (H24) 08/17/2023     Priority: Medium    Urinary tract infection with hematuria, site unspecified 08/16/2023     Priority: Medium    Left ureteral stone 08/16/2023     Priority: Medium    left Hydronephrosis with urinary obstruction due to ureteral calculus 08/16/2023     Priority: Medium    Moderate episode of recurrent major depressive disorder (H) 07/12/2022     Priority: Medium    Chronic kidney disease, stage 3 (H) 01/22/2021     Priority: Medium    Chronic migraine without aura without status migrainosus, not intractable 04/21/2020     Priority: Medium    CATHY (obstructive sleep apnea) 02/09/2018     Priority: Medium     Mild to moderate CATHY without sleep-associated hypoxemia   - PSG performed 4/1/2008 with weight 180 lbs, AHI 0.3, RDI 41, mary SpO2 93%, PLMI 0, arousal index 57.9.      Anxiety and depression 01/11/2016     Priority: Medium    Acute kidney injury (H24) 06/25/2015     Priority: Medium    Pyelonephritis 08/16/2013     Priority: Medium    Health Care Home 05/06/2013     Priority:  Medium     EMERGENCY CARE PLAN  May 6, 2013: No current Care Coordination follow up planned. Please refer if Care Coordination services are needed.    Presenting Problem Signs and Symptoms Treatment Plan   Questions or concerns   during clinic hours   I will call my clinic directly:  [facility]  [address] [place] [address]  [phone].   Questions or concerns outside clinic hours   I will call the 24 hour nurse line at   [phone] or [address] [place] [address].   Need to schedule an appointment   I will call the 24 hour scheduling team at [phone] or my clinic directly at [phone].   Same day treatment     I will call my clinic first, nurse line if after hours, urgent care and express care if needed.     Clinic care coordination services (regular clinic hours)   I will call a clinic care coordinator directly:     Italia Keith RN  [phone]    Rosette Valle, SW:    [phone]    Or call my clinic at [phone] and ask to speak with care coordination.     Crisis Services: Behavioral or Mental Health  [facility] 24 Hour Phone Line  [phone]    [facility] 24 Hour Crisis Services  [phone]    [facility] (Behavioral Health Providers[facility] [phone]    [facility]   [phone]       Emergency treatment -- Immediately    [facility] 911           Lower urinary tract infectious disease 05/12/2012     Priority: Medium     Overview:   Resistant   Pseudomonas       Hydronephrosis, right 05/12/2012     Priority: Medium    Hyperlipemia 01/06/2011     Priority: Medium    Tobacco abuse 04/19/2010     Priority: Medium    Dyspnea and respiratory abnormality 04/07/2008     Priority: Medium     Sleep study 4/1/08: No CATHY. .5 minutes, sleep latency  normal 11.7 minutes. REM latency 154.5 minutes. Sleep efficiency at 89.4%. The sleep architecture was periodically disrupted with frequent sleep stage changes and arousals. Snoring: moderately loud. RDI 41.0,  AHI of  0.3. Lowest O2 93.0%. PLM index 0.0.  Problem list name updated by automated process. Provider to review      Calculus of kidney 2005     Priority: Medium     Due to RTA and medullary sponge kidney. [id number] ( See's Dr. Tavo Contreras)- calcium oxalate and calcium phosphate. S/p multiple procedures, >50      Congenital medullary sponge kidney 2005     Priority: Medium    Renal osteodystrophy 2005     Priority: Medium     Tubular acidosis          Past Medical/Surgical History:  Past Medical History:   Diagnosis Date    Anxiety and depression 2016    Chronic kidney disease, stage 3 (H) 2021    Congenital medullary sponge kidney 2005    Hyperlipidemia     Kidney stone     CATHY (obstructive sleep apnea) 2018    Mild to moderate CATHY without sleep-associated hypoxemia   - PSG performed 2008 with weight 180 lbs, AHI 0.3, RDI 41, mary SpO2 93%, PLMI 0, arousal index 57.9.     Past Surgical History:   Procedure Laterality Date     SECTION      COMBINED CYSTOSCOPY, INSERT STENT URETER(S) Left 11/10/2017    Procedure: COMBINED CYSTOSCOPY, INSERT STENT URETER(S);  Cystoscopy, Left Ureteral Stent Placement;  Surgeon: Yg Rodriguez MD;  Location: [facility] OR    COMBINED CYSTOSCOPY, RETROGRADES, EXCHANGE STENT URETER(S) Left 2023    Procedure: CYSTOSCOPY, WITH RETROGRADE PYELOGRAM AND URETERAL STENT REPLACEMENT;  Surgeon: Gustavo Newton MD;  Location: [facility] OR    CYSTOURETEROSCOPY, WITH LITHOTRIPSY USING RAMONA 120P LASER AND URETERAL STENT INSERTION Left 2023    Procedure: CYSTOURETEROSCOPY, WITH RETROGRADE PYELOGRAM, HOLMIUM LASER LITHOTRIPSY OF LEFT URETERAL CALCULUSAND BASKET REMOVAL OF LEFT KIDNEY STONE , AND STENT exchange;  Surgeon: Gustavo Newton MD;  Location: [facility] OR    ESOPHAGOSCOPY, GASTROSCOPY, DUODENOSCOPY (EGD), COMBINED N/A 2020    Procedure: ESOPHAGOGASTRODUODENOSCOPY (EGD), WITH BIOPSY;  Surgeon: Cornelio  Brian Mullins DO;  Location: [facility] OR    EXCISE TOENAIL(S) Left 2016    Procedure: EXCISE TOENAIL(S);  Surgeon: Ady Mejia DPM;  Location: [facility] OR    HEAD & NECK SURGERY  May 29th, 2021    IR MISCELLANEOUS PROCEDURE  2002    IR MISCELLANEOUS PROCEDURE  2002    IR MISCELLANEOUS PROCEDURE  2002    IR MISCELLANEOUS PROCEDURE  2002    NEPHRECTOMY Right 2017    SURGICAL HISTORY OF -           SURGICAL HISTORY OF -   -present    Lithothypsy X 49    SURGICAL HISTORY OF -       Percutaneous nephrostomy X2    SURGICAL HISTORY OF -   2011    Cystoscopy with bilateral ureteral pyeloscopy, stone basketing and stent placement       Social History:  Social History     Socioeconomic History    Marital status:      Spouse name: Not on file    Number of children: 1    Years of education: Not on file    Highest education level: Not on file   Occupational History     Employer: UNEMPLOYED     Employer: DISABLED     Comment: due to kidney disease   Tobacco Use    Smoking status: Every Day     Packs/day: 1.00     Years: 30.00     Additional pack years: 0.00     Total pack years: 30.00     Types: Cigarettes    Smokeless tobacco: Never    Tobacco comments:     trying to quit; cutting back on her own   Vaping Use    Vaping Use: Never used   Substance and Sexual Activity    Alcohol use: No    Drug use: No    Sexual activity: Yes     Partners: Male     Birth control/protection: None   Other Topics Concern    Parent/sibling w/ CABG, MI or angioplasty before 65F 55M? Yes   Social History Narrative    Not on file     Social Determinants of Health     Financial Resource Strain: Not on file   Food Insecurity: Not on file   Transportation Needs: Not on file   Physical Activity: Not on file   Stress: Not on file   Social Connections: Not on file   Interpersonal Safety: Not on file   Housing Stability: Not on file       Family History:  Family History   Problem Relation Age of Onset  "   Respiratory Father         CATHY    C.A.D. Father     Heart Failure Father     Coronary Artery Disease Father     Hypertension Father     Depression Father     Anxiety Disorder Father     Diabetes Father         type 2    Cerebrovascular Disease Father     Lipids Mother         high cholesterol    Allergies Mother     Arthritis Mother         RA    Hyperlipidemia Mother     Lipids Sister         high cholesterol    Allergies Sister     Asthma Sister     Hypertension Sister     Allergies Sister     Respiratory Sister         cathy    Genitourinary Problems Maternal Grandmother         passed from complications from renal failure    Arthritis Maternal Grandfather         rheumatoid    Diabetes Paternal Grandmother         adult onset    Cardiovascular Paternal Grandfather         AAA    Hypertension Sister     Hyperlipidemia Sister     Anxiety Disorder Sister     Asthma Sister     Depression Sister        Review of Systems:  A complete review of systems reviewed by me is negative with the exeption of what has been mentioned in the history of present illness.  In the last TWO WEEKS have you experienced any of the following symptoms?  Fevers: No  Night Sweats: Yes  Weight Gain: No  Pain at Night: Yes  Double Vision: No  Changes in Vision: No  Difficulty Breathing through Nose: Yes  Sore Throat in Morning: No  Dry Mouth in the Morning: No  Shortness of Breath Lying Flat: No  Shortness of Breath With Activity: Yes  Awakening with Shortness of Breath: No  Increased Cough: No  Heart Racing at Night: No  Swelling in Feet or Legs: No  Diarrhea at Night: No  Heartburn at Night: No  Urinating More than Once at Night: Yes  Losing Control of Urine at Night: No  Joint Pains at Night: No  Headaches in Morning: No  Weakness in Arms or Legs: No  Depressed Mood: Yes  Anxiety: Yes     Physical Examination:  Vitals: /65   Pulse 61   Ht 1.695 m (5' 6.75\")   Wt 87.1 kg (192 lb)   LMP 07/02/2013   SpO2 97%   BMI 30.30 kg/m  "   BMI= Body mass index is 30.3 kg/m .           Physical Exam  Vitals and nursing note reviewed.   Constitutional:       General: She is awake.      Appearance: Normal appearance. She is well-developed and well-groomed. She is obese.   HENT:      Head: Normocephalic and atraumatic.      Right Ear: External ear normal.      Left Ear: External ear normal.      Nose: Nose normal.      Mouth/Throat:      Mouth: Mucous membranes are moist.      Pharynx: Oropharynx is clear.   Eyes:      Conjunctiva/sclera: Conjunctivae normal.   Cardiovascular:      Rate and Rhythm: Normal rate and regular rhythm.      Pulses: Normal pulses.      Heart sounds: Normal heart sounds.   Pulmonary:      Effort: Pulmonary effort is normal.      Breath sounds: Normal breath sounds.   Musculoskeletal:         General: Normal range of motion.      Cervical back: Normal range of motion and neck supple.   Skin:     General: Skin is warm and dry.      Capillary Refill: Capillary refill takes less than 2 seconds.   Neurological:      General: No focal deficit present.      Mental Status: She is alert and oriented to person, place, and time.   Psychiatric:         Mood and Affect: Mood normal.         Behavior: Behavior normal. Behavior is cooperative.         Thought Content: Thought content normal.         Judgment: Judgment normal.             All Labs Personally Reviewed         Data: All pertinent previous laboratory data reviewed     Recent Labs   Lab Test 11/06/23  1124 08/30/23  1447    140   POTASSIUM 4.6 4.3   CHLORIDE 107 107   CO2 23 24   ANIONGAP 10 9   GLC 94 113*   BUN 24.1* 21.2*   CR 1.88* 1.81*   MARIA 9.7 10.1*       Recent Labs   Lab Test 11/06/23  1124   WBC 8.3   RBC 5.41*   HGB 15.1   HCT 49.7*   MCV 92   MCH 27.9   MCHC 30.4*   RDW 15.4*   *       Recent Labs   Lab Test 11/06/23  1124 08/30/23  1447 02/20/23  2322   PROTTOTAL  --   --  7.5   ALBUMIN 4.1  --  4.2   BILITOTAL  --   --  0.3   ALKPHOS  --   --  136*  "  AST  --   --  21   ALT  --  26 16       TSH (mU/L)   Date Value   10/01/2021 1.84   2018 2.40   2013 1.48       No results found for: \"UAMP\", \"UBARB\", \"BENZODIAZEUR\", \"UCANN\", \"UCOC\", \"OPIT\", \"UPCP\"    No results found for: \"IRONSAT\", \"BP03009\", \"BENI\"    pH Arterial (no units)   Date Value   2013 6.0   2012 7.0       @LABRCNTIPR(phv:4,pco2v:4,po2v:4,hco3v:4,deanne:4,o2per:4)@    Echocardiology:  [facility]  Echocardiography Laboratory  [address] [place] [address]        Name: DAVID ANNE  MRN: [id number]  : 1970  Study Date: 09/15/2016 10:02 AM  Age: 45 yrs  Gender: Female  Patient Location: [facility] [facility]  Reason For Study: Chest pain  Ordering Physician: VANESSA GONSALVES  Referring Physician: Vanessa Gonsalves  Performed By: Octavia Cohn RDCS     BSA: 1.8 m2  Height: 66 in  Weight: 165 lb  HR: 68  BP: 112/70 mmHg  ______________________________________________________________________________        Procedure  Stress Echo Complete. Contrast Optison.  ______________________________________________________________________________     Interpretation Summary  The patient exercised 7 minutes 36 seconds.  The patient exhibited no chest pain during exercise.  The EKG portion of this stress test was negative for inducible ischemia (see  echo results below).  Normal resting wall motion and no stress-induced wall motion abnormality.  This was a normal stress echocardiogram with no evidence of stress-induced  ischemia.  ______________________________________________________________________________     Stress  The patient exercised 7 minutes 36 seconds.  The patient exhibited no chest pain during exercise.  There was a normal BP response to exercise.  Exercise was stopped due to leg pain.  The EKG portion of this stress test was negative for inducible ischemia (see  echo results below).  The visual ejection fraction is estimated at 65-70%.  Left ventricular cavity " "size decreases with exercise.  Global LV systolic function augments with exercise.  Normal resting wall motion and no stress-induced wall motion abnormality.  This was a normal stress echocardiogram with no evidence of stress-induced  ischemia.     Baseline  sinus rhythm.  No regional wall motion abnormalities noted.  The visual ejection fraction is estimated at 55-60%.       Chest x-ray: No results found for this or any previous visit from the past 365 days.      Chest CT: No results found for this or any previous visit from the past 365 days.      PFT: Most Recent [facility] Pulmonary Function Testing    No results found for: \"20001\"  No results found for: \"20002\"  No results found for: \"20003\"  No results found for: \"20015\"  No results found for: \"20016\"  No results found for: \"20027\"  No results found for: \"20028\"  No results found for: \"20029\"  No results found for: \"20079\"  No results found for: \"20080\"  No results found for: \"20081\"  No results found for: \"20335\"  No results found for: \"20105\"  No results found for: \"20053\"  No results found for: \"20054\"  No results found for: \"20055\"      Krystin Tineo, MICHELLE CNP 1/30/2024   Sleep Medicine    This note was written with the assistance of the Dragon voice-dictation technology software. The final document, although reviewed, may contain errors. For corrections, please contact the office.          "

## 2024-01-30 NOTE — NURSING NOTE
"Chief Complaint   Patient presents with    Sleep Apnea       Initial /65   Pulse 61   Ht 1.695 m (5' 6.75\")   Wt 87.1 kg (192 lb)   LMP 07/02/2013   SpO2 97%   BMI 30.30 kg/m   Estimated body mass index is 30.3 kg/m  as calculated from the following:    Height as of this encounter: 1.695 m (5' 6.75\").    Weight as of this encounter: 87.1 kg (192 lb).    Medication Reconciliation: complete    Neck circumference:  inches / 38 centimeters.    DME: n/a      PSG and follow-up appt scheduled.     Ivette Gutierres MA  "

## 2024-01-31 ENCOUNTER — HOSPITAL ENCOUNTER (OUTPATIENT)
Dept: MAMMOGRAPHY | Facility: CLINIC | Age: 54
Discharge: HOME | End: 2024-01-31
Attending: NURSE PRACTITIONER | Admitting: NURSE PRACTITIONER
Payer: COMMERCIAL

## 2024-01-31 DIAGNOSIS — Z12.31 VISIT FOR SCREENING MAMMOGRAM: ICD-10-CM

## 2024-01-31 PROCEDURE — 77063 BREAST TOMOSYNTHESIS BI: CPT

## 2024-02-01 ENCOUNTER — TELEPHONE (OUTPATIENT)
Dept: FAMILY MEDICINE | Facility: CLINIC | Age: 54
End: 2024-02-01
Payer: COMMERCIAL

## 2024-02-01 DIAGNOSIS — R06.83 SNORES: Primary | ICD-10-CM

## 2024-02-01 NOTE — TELEPHONE ENCOUNTER
----- Message from Malini Navas sent at 1/25/2024  2:23 PM CST -----  Regarding: New Diagnostic PSG Needed  Good afternoon MICHELLE Sheehan CNP,      Patient's account recently went under a Medicare review/audit and patient does not qualify for CATHY under Medicare guidelines. Patient had a [place] Sleep Study in 2008. At that time, patient had an AHI of 0.3 and RDI of 41. Unfortunately, we cannot use the RDI to qualify patient under Medicare guidelines due to RERAs are included. With an AHI of 0.3, patient does not qualify for CPAP with a CATHY diagnosis. We are unable to dispense CPAP supplies to bill insurance. Currently, without a qualifying sleep study, patient's only option would be to pay out of pocket for supplies until patient re-qualifies.     Could you please reach out to the patient to talk about scheduling a sleep visit to discuss her options? Patient could have another diagnostic sleep study in hopes of qualifying under Medicare guidelines.     Please let me know if you have questions! I also sent a staff message to the [facility] Pool in hopes that the sleep clinic contact patient. However, they may want a sleep referral order entered for the patient.     Patient's current phone number: [phone]    Thank you,   Malini

## 2024-02-06 NOTE — PATIENT INSTRUCTIONS
Your BMI is Body mass index is 30.3 kg/m .    What is BMI?  Body mass index (BMI) is one way to tell whether you are at a healthy weight, overweight, or obese. It measures your weight in relation to your height.  A BMI of 18.5 to 24.9 is in the healthy range. A person with a BMI of 25 to 29.9 is considered overweight, and someone with a BMI of 30 or greater is considered obese.  Another way to find out if you are at risk for health problems caused by overweight and obesity is to measure your waist. If you are a woman and your waist is more than 35 inches, or if you are a man and your waist is more than 40 inches, your risk of disease may be higher.  More than two-thirds of American adults are considered overweight or obese. Being overweight or obese increases the risk for further weight gain.  Excess weight may lead to heart disease and diabetes. Creating and following plans for healthy eating and physical activity may help you improve your health.    Methods for maintaining or losing weight.  Weight control is part of healthy lifestyle and includes exercise, emotional health, and healthy eating habits.  Careful eating habits lifelong is the mainstay of weight control.  Though there are significant health benefits from weight loss, long-term weight loss with diet alone may be very difficult to achieve- studies show long-term success with dietary management in less than 10% of people. Attaining a healthy weight may be especially difficult to achieve in those with severe obesity. In some cases, medications, devices and surgical management might be considered.    What can you do?  If you are overweight or obese and are interested in methods for weight loss, you should discuss this with your provider. In addition, we recommend that you review healthy life styles and methods for weight loss available through the National Institutes of Health patient information sites:   http://win.niddk.nih.gov/publications/index.htm      Drive Safe... Drive Alive     Sleep health profoundly affects your health, mood, and your safety. 33% of the population (one in three of us) is not getting enough sleep and many have a sleep disorder. Not getting enough sleep or having an untreated / undertreated sleep condition may make us sleepy without even knowing it. In fact, our driving could be dramatically impaired due to our sleep health. As your provider, here are some things I would like you to know about driving:     Here are some warning signs for impairment and dangerous drowsy driving:              -Having been awake more than 16 hours               -Looking tired               -Eyelid drooping              -Head nodding (it could be too late at this point)              -Driving for more than 30 minutes     Some things you could do to make the driving safer if you are experiencing some drowsiness:              -Stop driving and rest              -Call for transportation              -Make sure your sleep disorder is adequately treated     Some things that have been shown NOT to work when experiencing drowsiness while driving:              -Turning on the radio              -Opening windows              -Eating any  distracting  /  entertaining  foods (e.g., sunflower seeds, candy, or any other)              -Talking on the phone      Your decision may not only impact your life, but also the life of others. Please, remember to drive safe for yourself and all of us.

## 2024-02-07 DIAGNOSIS — F33.1 MODERATE EPISODE OF RECURRENT MAJOR DEPRESSIVE DISORDER (H): ICD-10-CM

## 2024-02-07 DIAGNOSIS — F41.9 ANXIETY: ICD-10-CM

## 2024-02-07 NOTE — TELEPHONE ENCOUNTER
Pending Prescriptions:                       Disp   Refills    escitalopram (LEXAPRO) 10 MG tablet [Phar*90 tab*0            Sig: Take 1 tablet by mouth once daily    Routing refill request to provider for review/approval because:  PHQ-9 score:        4/3/2023     9:57 AM   PHQ   PHQ-9 Total Score 5   Q9: Thoughts of better off dead/self-harm past 2 weeks Not at all            Andres Styles, RN

## 2024-02-08 RX ORDER — ESCITALOPRAM OXALATE 10 MG/1
10 TABLET ORAL DAILY
Qty: 90 TABLET | Refills: 0 | Status: SHIPPED | OUTPATIENT
Start: 2024-02-08 | End: 2024-05-14 | Stop reason: DRUGHIGH

## 2024-02-08 NOTE — TELEPHONE ENCOUNTER
Patient due for an appointment with me for her annual Medicare wellness in April.  Please remind patient to schedule this appointment and we will address further refills at that time.  Maribeth Avila, CNP

## 2024-02-08 NOTE — TELEPHONE ENCOUNTER
Called and left message for pt regarding refill and to darion and make appointment.    [place] Souza on 2/8/2024 at 1:05 PM

## 2024-02-21 ENCOUNTER — TELEPHONE (OUTPATIENT)
Dept: FAMILY MEDICINE | Facility: CLINIC | Age: 54
End: 2024-02-21
Payer: COMMERCIAL

## 2024-02-21 DIAGNOSIS — F41.9 ANXIETY: ICD-10-CM

## 2024-02-21 NOTE — TELEPHONE ENCOUNTER
Medication Question or Refill        What medication are you calling about (include dose and sig)?: Pending Prescriptions:                       Disp   Refills    LORazepam (ATIVAN) 0.5 MG tablet          30 tab*0            Sig: Take 1 tablet (0.5 mg) by mouth every 8 hours as           needed for anxiety        Preferred Pharmacy:   [id number] - [place], [place] - [address]   [address] [facility] [address]  Phone: [phone] Fax: [phone]      Controlled Substance Agreement on file:   CSA -- Patient Level:    CSA: None found at the patient level.         Do you need a refill? Yes    When did you use the medication last? 6+ months ago    Patient offered an appointment? No    Do you have any questions or concerns?  Yes: pharmacy stated nothing on file, pt going on an airplane 2/24 and requesting a fill before her flight      Could we send this information to you in [facility] or would you prefer to receive a phone call?:   Patient would prefer a phone call   Okay to leave a detailed message?: Yes at Home number on file [phone] (home)

## 2024-02-21 NOTE — TELEPHONE ENCOUNTER
Pending Prescriptions:                       Disp   Refills    LORazepam (ATIVAN) 0.5 MG tablet          30 tab*0            Sig: Take 1 tablet (0.5 mg) by mouth every 8 hours as           needed for anxiety    Routing refill request to provider for review/approval because:  Drug not on the [facility] refill protocol     Pt going on airplane 2/24      Andres Styles RN

## 2024-02-22 RX ORDER — LORAZEPAM 0.5 MG/1
0.5 TABLET ORAL EVERY 8 HOURS PRN
Qty: 30 TABLET | Refills: 0 | Status: SHIPPED | OUTPATIENT
Start: 2024-02-22

## 2024-02-22 NOTE — TELEPHONE ENCOUNTER
Called & left detailed msg on pt identified voicemail & read providers message.    Mindy Sandhu RN

## 2024-03-04 ENCOUNTER — PATIENT OUTREACH (OUTPATIENT)
Dept: CARE COORDINATION | Facility: CLINIC | Age: 54
End: 2024-03-04
Payer: COMMERCIAL

## 2024-03-13 ENCOUNTER — HOSPITAL ENCOUNTER (OUTPATIENT)
Dept: CT IMAGING | Facility: CLINIC | Age: 54
Discharge: HOME | End: 2024-03-13
Attending: STUDENT IN AN ORGANIZED HEALTH CARE EDUCATION/TRAINING PROGRAM | Admitting: STUDENT IN AN ORGANIZED HEALTH CARE EDUCATION/TRAINING PROGRAM
Payer: COMMERCIAL

## 2024-03-13 DIAGNOSIS — N20.2 CALCULUS OF KIDNEY AND URETER: ICD-10-CM

## 2024-03-13 PROCEDURE — 74176 CT ABD & PELVIS W/O CONTRAST: CPT

## 2024-03-14 ENCOUNTER — VIRTUAL VISIT (OUTPATIENT)
Dept: UROLOGY | Facility: CLINIC | Age: 54
End: 2024-03-14
Payer: COMMERCIAL

## 2024-03-14 DIAGNOSIS — N32.81 OVERACTIVE BLADDER: ICD-10-CM

## 2024-03-14 DIAGNOSIS — N20.2 CALCULUS OF KIDNEY AND URETER: Primary | ICD-10-CM

## 2024-03-14 PROCEDURE — 99213 OFFICE O/P EST LOW 20 MIN: CPT | Mod: 95 | Performed by: STUDENT IN AN ORGANIZED HEALTH CARE EDUCATION/TRAINING PROGRAM

## 2024-03-14 RX ORDER — OXYBUTYNIN CHLORIDE 5 MG/1
5 TABLET, EXTENDED RELEASE ORAL DAILY
Qty: 90 TABLET | Refills: 0 | Status: SHIPPED | OUTPATIENT
Start: 2024-03-14 | End: 2024-05-14

## 2024-03-14 ASSESSMENT — PATIENT HEALTH QUESTIONNAIRE - PHQ9: SUM OF ALL RESPONSES TO PHQ QUESTIONS 1-9: 6

## 2024-03-14 ASSESSMENT — PAIN SCALES - GENERAL: PAINLEVEL: NO PAIN (0)

## 2024-03-14 NOTE — LETTER
3/14/2024       RE: Latoya Riddle  [address]  [place] [address]     Dear Colleague,    Thank you for referring your patient, Latoya Riddle, to the [facility] [place] [facility]. Please see a copy of my visit note below.    Virtual Visit Details    Type of service:  Video Visit   Video Start Time: 1:28 PM  Video End Time:1:33 PM    Originating Location (pt. Location): Home    Distant Location (provider location):  On-site  Platform used for Video Visit: [facility]    HPI:  Latoya Riddle is a 53 year old female being seen for follow-up with CT.  Duration of problem: Many years  Previous treatments: Ureteroscopy      Reviewed previous notes  Has been having some frequency and urgency  No other symptoms related to UTI like fever or chills  Always has had asymptomatic bacteriuria which does not respond to any antibiotics         Review of Systems:  From intake questionnaire     Skin: negative  Eyes: negative  Ears/Nose/Throat: negative  Respiratory: No shortness of breath, dyspnea on exertion, cough, or hemoptysis  Cardiovascular: No chest pain or palpitations  Gastrointestinal: negative; no nausea/vomiting, constipation or diarrhea  Genitourinary: as per HPI  Musculoskeletal: negative  Neurologic: negative  Psychiatric: negative  Hematologic/Lymphatic/Immunologic: negative  Endocrine: negative         Physical Exam:   This is a virtual visit    Alert, no acute distress, oriented, conversant    Ears/nose/mouth: mouth:normal, good dentition  Respiratory: no respiratory distress, or pursed lip breathing  Cardiovascular:no obvious jugular venous distension present  Skin: no suspicious lesions or rashes on Visible body parts on the Screen  Neuro: Alert, oriented, speech and mentation normal  Psych: affect and mood normal, alert and oriented to person, place and time  Review of Imaging:  The following imaging exams were independently viewed  and interpreted by me and discussed with patient:  CT Scan Abd/Pelvis: Abnormal: Single functioning left kidney  Some calcifications seen in the lower pole not sure whether this represents stones or cortical calcifications  This was not reported on the CT result from radiology    Review of Labs:  The following labs were reviewed by me and discussed with the patient:  Creatinine: 1.88    Assessment & Plan    Calculus of kidney and ureter  Discussed about her kidney stone status and recommended continued follow-up with nephrology for preventive strategies as well  Do not recommend any additional medications for now or additional workup  Follow-up in 6 months for CT and we will update results on MyChart  Based on the appearance we will plan for the follow-up  - CT Abdomen Pelvis w/o Contrast [HPF814]; Future    Overactive bladder  Discussed about overactive bladder versus urinary tract infections  She tells me that she does not feel that she has an active infection though her urine always grows bacteria which is multidrug-resistant  She would want something to help her with her overactive bladder like symptoms and at this recommended starting oxybutynin  Possible  side effects include dryness of mouth and constipation    - oxyBUTYnin ER (DITROPAN XL) 5 MG 24 hr tablet; Take 1 tablet (5 mg) by mouth daily for 90 days      Gustavo Newton MD  [facility] [place]      ==========================    Additional Billing and Coding Information:  Review of external notes as documented above   Review of the result(s) of each unique test - creatinine,    Independent interpretation of a test performed by another physician/other qualified health care professional (not separately reported) -CT abdomen pelvis was independently reviewed by me and additional findings that were not noted on the prior report were documented and discussed with the patient          10 minutes spent by me on the date of the  encounter doing chart review, review of test results, interpretation of tests, patient visit, and documentation

## 2024-03-14 NOTE — NURSING NOTE
Is the patient currently in the state of [place]? YES    Visit mode:VIDEO    If the visit is dropped, the patient can be reconnected by: VIDEO VISIT: Text to cell phone:   Telephone Information:   Mobile [phone]       Will anyone else be joining the visit? NO  (If patient encounters technical issues they should call [phone])    How would you like to obtain your AVS? MyChart    Are changes needed to the allergy or medication list? No    Reason for visit: RECHECK    [place] CARO

## 2024-03-14 NOTE — PROGRESS NOTES
Virtual Visit Details    Type of service:  Video Visit   Video Start Time: 1:28 PM  Video End Time:1:33 PM    Originating Location (pt. Location): Home    Distant Location (provider location):  On-site  Platform used for Video Visit: [facility]    HPI:  Latoya Riddle is a 53 year old female being seen for follow-up with CT.  Duration of problem: Many years  Previous treatments: Ureteroscopy      Reviewed previous notes  Has been having some frequency and urgency  No other symptoms related to UTI like fever or chills  Always has had asymptomatic bacteriuria which does not respond to any antibiotics         Review of Systems:  From intake questionnaire     Skin: negative  Eyes: negative  Ears/Nose/Throat: negative  Respiratory: No shortness of breath, dyspnea on exertion, cough, or hemoptysis  Cardiovascular: No chest pain or palpitations  Gastrointestinal: negative; no nausea/vomiting, constipation or diarrhea  Genitourinary: as per HPI  Musculoskeletal: negative  Neurologic: negative  Psychiatric: negative  Hematologic/Lymphatic/Immunologic: negative  Endocrine: negative         Physical Exam:   This is a virtual visit    Alert, no acute distress, oriented, conversant    Ears/nose/mouth: mouth:normal, good dentition  Respiratory: no respiratory distress, or pursed lip breathing  Cardiovascular:no obvious jugular venous distension present  Skin: no suspicious lesions or rashes on Visible body parts on the Screen  Neuro: Alert, oriented, speech and mentation normal  Psych: affect and mood normal, alert and oriented to person, place and time  Review of Imaging:  The following imaging exams were independently viewed and interpreted by me and discussed with patient:  CT Scan Abd/Pelvis: Abnormal: Single functioning left kidney  Some calcifications seen in the lower pole not sure whether this represents stones or cortical calcifications  This was not reported on the CT result from radiology    Review of Labs:  The  following labs were reviewed by me and discussed with the patient:  Creatinine: 1.88    Assessment & Plan     Calculus of kidney and ureter  Discussed about her kidney stone status and recommended continued follow-up with nephrology for preventive strategies as well  Do not recommend any additional medications for now or additional workup  Follow-up in 6 months for CT and we will update results on MyChart  Based on the appearance we will plan for the follow-up  - CT Abdomen Pelvis w/o Contrast [[id number]]; Future    Overactive bladder  Discussed about overactive bladder versus urinary tract infections  She tells me that she does not feel that she has an active infection though her urine always grows bacteria which is multidrug-resistant  She would want something to help her with her overactive bladder like symptoms and at this recommended starting oxybutynin  Possible  side effects include dryness of mouth and constipation    - oxyBUTYnin ER (DITROPAN XL) 5 MG 24 hr tablet; Take 1 tablet (5 mg) by mouth daily for 90 days      Gustavo Newton MD  [facility] [place]      ==========================    Additional Billing and Coding Information:  Review of external notes as documented above   Review of the result(s) of each unique test - creatinine,    Independent interpretation of a test performed by another physician/other qualified health care professional (not separately reported) -CT abdomen pelvis was independently reviewed by me and additional findings that were not noted on the prior report were documented and discussed with the patient          10 minutes spent by me on the date of the encounter doing chart review, review of test results, interpretation of tests, patient visit, and documentation

## 2024-03-18 ENCOUNTER — PATIENT OUTREACH (OUTPATIENT)
Dept: CARE COORDINATION | Facility: CLINIC | Age: 54
End: 2024-03-18
Payer: COMMERCIAL

## 2024-04-26 ENCOUNTER — TELEPHONE (OUTPATIENT)
Dept: UROLOGY | Facility: CLINIC | Age: 54
End: 2024-04-26
Payer: COMMERCIAL

## 2024-04-26 ENCOUNTER — HOSPITAL ENCOUNTER (OUTPATIENT)
Dept: CT IMAGING | Facility: HOSPITAL | Age: 54
Discharge: HOME | End: 2024-04-26
Attending: STUDENT IN AN ORGANIZED HEALTH CARE EDUCATION/TRAINING PROGRAM | Admitting: STUDENT IN AN ORGANIZED HEALTH CARE EDUCATION/TRAINING PROGRAM
Payer: COMMERCIAL

## 2024-04-26 DIAGNOSIS — N20.2 CALCULUS OF KIDNEY AND URETER: Primary | ICD-10-CM

## 2024-04-26 DIAGNOSIS — N20.2 CALCULUS OF KIDNEY AND URETER: ICD-10-CM

## 2024-04-26 PROCEDURE — 74176 CT ABD & PELVIS W/O CONTRAST: CPT

## 2024-04-26 RX ORDER — OXYCODONE HYDROCHLORIDE 5 MG/1
5 TABLET ORAL EVERY 6 HOURS PRN
Qty: 6 TABLET | Refills: 0 | Status: SHIPPED | OUTPATIENT
Start: 2024-04-26 | End: 2024-04-29

## 2024-04-26 NOTE — TELEPHONE ENCOUNTER
[facility] Call Center    Phone Message    May a detailed message be left on voicemail: no     Reason for Call: Other: Patient called to let the clinic know that she is passing a stone and would like to know what to do next. Patient also would like to also switch her care to the [place] location. Please call patient back to discuss.     Action Taken: Other: [facility]    Travel Screening: Not Applicable

## 2024-04-26 NOTE — TELEPHONE ENCOUNTER
Called patient  having L flank pain  as with past stones . Right now the pain is manageable but she is concerned  it will worsen. She does have Flomax at home  from previous stones . She is asking for :    1.Some pain medication  to have on hand  if needed .? [facility]     2.CT scan to check for stone . Is it ok to order?    Does not want [place] appointments but would go to [place] for follow up .    Instructed to go to ED if sever pain . Fever or uncontrolled N&V

## 2024-04-26 NOTE — TELEPHONE ENCOUNTER
Left patient message to call back to discuss next steps of having a stone analysis completed.      Kesha Shea, RN, BSN  Care Coordinator  [facility]

## 2024-04-26 NOTE — TELEPHONE ENCOUNTER
"Per MD- \"  Pain pills sent  CT order signed  Okay to go to [facility] in case of emergency\"    Called patient and informed.     Clover Leong LPN       "

## 2024-05-13 ASSESSMENT — ANXIETY QUESTIONNAIRES
5. BEING SO RESTLESS THAT IT IS HARD TO SIT STILL: NOT AT ALL
3. WORRYING TOO MUCH ABOUT DIFFERENT THINGS: SEVERAL DAYS
7. FEELING AFRAID AS IF SOMETHING AWFUL MIGHT HAPPEN: SEVERAL DAYS
6. BECOMING EASILY ANNOYED OR IRRITABLE: NEARLY EVERY DAY
GAD7 TOTAL SCORE: 10
GAD7 TOTAL SCORE: 10
8. IF YOU CHECKED OFF ANY PROBLEMS, HOW DIFFICULT HAVE THESE MADE IT FOR YOU TO DO YOUR WORK, TAKE CARE OF THINGS AT HOME, OR GET ALONG WITH OTHER PEOPLE?: SOMEWHAT DIFFICULT
IF YOU CHECKED OFF ANY PROBLEMS ON THIS QUESTIONNAIRE, HOW DIFFICULT HAVE THESE PROBLEMS MADE IT FOR YOU TO DO YOUR WORK, TAKE CARE OF THINGS AT HOME, OR GET ALONG WITH OTHER PEOPLE: SOMEWHAT DIFFICULT
4. TROUBLE RELAXING: SEVERAL DAYS
1. FEELING NERVOUS, ANXIOUS, OR ON EDGE: MORE THAN HALF THE DAYS
2. NOT BEING ABLE TO STOP OR CONTROL WORRYING: MORE THAN HALF THE DAYS
7. FEELING AFRAID AS IF SOMETHING AWFUL MIGHT HAPPEN: SEVERAL DAYS
GAD7 TOTAL SCORE: 10

## 2024-05-13 ASSESSMENT — PATIENT HEALTH QUESTIONNAIRE - PHQ9
SUM OF ALL RESPONSES TO PHQ QUESTIONS 1-9: 6
SUM OF ALL RESPONSES TO PHQ QUESTIONS 1-9: 6
10. IF YOU CHECKED OFF ANY PROBLEMS, HOW DIFFICULT HAVE THESE PROBLEMS MADE IT FOR YOU TO DO YOUR WORK, TAKE CARE OF THINGS AT HOME, OR GET ALONG WITH OTHER PEOPLE: SOMEWHAT DIFFICULT

## 2024-05-14 ENCOUNTER — VIRTUAL VISIT (OUTPATIENT)
Dept: FAMILY MEDICINE | Facility: CLINIC | Age: 54
End: 2024-05-14
Payer: COMMERCIAL

## 2024-05-14 DIAGNOSIS — F41.9 ANXIETY: ICD-10-CM

## 2024-05-14 DIAGNOSIS — F33.1 MODERATE EPISODE OF RECURRENT MAJOR DEPRESSIVE DISORDER (H): ICD-10-CM

## 2024-05-14 DIAGNOSIS — N18.32 STAGE 3B CHRONIC KIDNEY DISEASE (H): ICD-10-CM

## 2024-05-14 DIAGNOSIS — E78.5 HYPERLIPIDEMIA LDL GOAL <130: ICD-10-CM

## 2024-05-14 PROBLEM — N18.30 CHRONIC KIDNEY DISEASE, STAGE 3 (H): Status: RESOLVED | Noted: 2021-01-22 | Resolved: 2024-05-14

## 2024-05-14 PROCEDURE — 99214 OFFICE O/P EST MOD 30 MIN: CPT | Mod: 95 | Performed by: NURSE PRACTITIONER

## 2024-05-14 PROCEDURE — G2211 COMPLEX E/M VISIT ADD ON: HCPCS | Mod: 95 | Performed by: NURSE PRACTITIONER

## 2024-05-14 PROCEDURE — 96127 BRIEF EMOTIONAL/BEHAV ASSMT: CPT | Mod: 95 | Performed by: NURSE PRACTITIONER

## 2024-05-14 RX ORDER — LORAZEPAM 0.5 MG/1
0.5 TABLET ORAL EVERY 8 HOURS PRN
Qty: 30 TABLET | Refills: 0 | Status: CANCELLED | OUTPATIENT
Start: 2024-05-14

## 2024-05-14 RX ORDER — ESCITALOPRAM OXALATE 20 MG/1
20 TABLET ORAL DAILY
Qty: 90 TABLET | Refills: 3 | Status: SHIPPED | OUTPATIENT
Start: 2024-05-14

## 2024-05-14 RX ORDER — ESCITALOPRAM OXALATE 10 MG/1
10 TABLET ORAL DAILY
Qty: 90 TABLET | Refills: 0 | Status: CANCELLED | OUTPATIENT
Start: 2024-05-14

## 2024-05-14 RX ORDER — ROSUVASTATIN CALCIUM 20 MG/1
20 TABLET, COATED ORAL EVERY EVENING
Qty: 90 TABLET | Refills: 3 | Status: SHIPPED | OUTPATIENT
Start: 2024-05-14

## 2024-05-14 NOTE — PROGRESS NOTES
Latoya is a 53 year old who is being evaluated via a billable video visit.    How would you like to obtain your AVS? MyChart  If the video visit is dropped, the invitation should be resent by: Text to cell phone: [phone]  Will anyone else be joining your video visit? No            Assessment & Plan     Moderate episode of recurrent major depressive disorder (H)  - escitalopram (LEXAPRO) 20 MG tablet; Take 1 tablet (20 mg) by mouth daily    Anxiety  - escitalopram (LEXAPRO) 20 MG tablet; Take 1 tablet (20 mg) by mouth daily    Poorly controlled anxiety and depression.  Options discussed.  Recommend increasing Lexapro to 20 mg daily.  If no improvement in 1 month, patient should follow-up with me.    Hyperlipidemia LDL goal <130  Lipid panel checked in November.  Cholesterol is well-controlled.  - rosuvastatin (CRESTOR) 20 MG tablet; Take 1 tablet (20 mg) by mouth every evening    Stage 3b chronic kidney disease (H)  Known issue that I take into account for their medical decisions, no current exacerbations or new concerns      The risks, benefits and treatment options of prescribed medications or other treatments have been discussed with the patient. The patient verbalized their understanding and should call or follow up if no improvement or if they develop further problems.  Maribeth Avila, HAYDEE            Subjective   Latoya is a 53 year old, presenting for the following health issues:  Anxiety, Depression, and Lipids (Due for medication refills.)        5/14/2024    11:03 AM   Additional Questions   Roomed by Carline MONROY CMA - Virtual visit.         5/14/2024    11:03 AM   Patient Reported Additional Medications   Patient reports taking the following new medications none       Video Start Time: 11:12 AM    History of Present Illness       Mental Health Follow-up:  Patient presents to follow-up on Depression & Anxiety.Patient's depression since last visit has been:  Medium  The patient is not having other symptoms  "associated with depression.  Patient's anxiety since last visit has been:  Medium  The patient is not having other symptoms associated with anxiety.  Any significant life events: relationship concerns, financial concerns and grief or loss  Patient is feeling anxious or having panic attacks.  Patient has no concerns about alcohol or drug use.    Hyperlipidemia:  She presents for follow up of hyperlipidemia.   She is taking medication to lower cholesterol. She is not having myalgia or other side effects to statin medications.    She eats 2-3 servings of fruits and vegetables daily.She consumes 1 sweetened beverage(s) daily.She exercises with enough effort to increase her heart rate 9 or less minutes per day.  She exercises with enough effort to increase her heart rate 3 or less days per week.   She is taking medications regularly.         4/3/2023     9:57 AM 3/14/2024    12:58 PM 5/13/2024     9:58 AM   PHQ   PHQ-9 Total Score 5 6 6   Q9: Thoughts of better off dead/self-harm past 2 weeks Not at all Not at all Not at all         11/3/2021     4:11 PM 2/1/2023     3:59 PM 5/13/2024    10:02 AM   ALFREDITO-7 SCORE   Total Score 7 (mild anxiety) 8 (mild anxiety) 10 (moderate anxiety)   Total Score 7 8 10                   Review of Systems  Constitutional, HEENT, cardiovascular, pulmonary, gi and gu systems are negative, except as otherwise noted.      Objective    Vitals - Patient Reported  Weight (Patient Reported): 86.2 kg (190 lb)  Height (Patient Reported): 170.2 cm (5' 7\")  BMI (Based on Pt Reported Ht/Wt): 29.76      Vitals:  No vitals were obtained today due to virtual visit.    Physical Exam   GENERAL: alert and no distress  EYES: Eyes grossly normal to inspection.  No discharge or erythema, or obvious scleral/conjunctival abnormalities.  RESP: No audible wheeze, cough, or visible cyanosis.    SKIN: Visible skin clear. No significant rash, abnormal pigmentation or lesions.  NEURO: Cranial nerves grossly intact.  " Mentation and speech appropriate for age.  PSYCH: Appropriate affect, tone, and pace of words          Video-Visit Details    Type of service:  Video Visit   Video End Time:11:30 AM  Originating Location (pt. Location): Home    Distant Location (provider location):  On-site  Platform [facility] for Video Visit: [facility]      The longitudinal plan of care for the diagnosis(es)/condition(s) as documented were addressed during this visit. Due to the added complexity in care, I will continue to support Latoya in the subsequent management and with ongoing continuity of care.    Signed Electronically by: MICHELLE Sheehan CNP

## 2024-05-16 DIAGNOSIS — N20.0 KIDNEY STONE: ICD-10-CM

## 2024-05-16 DIAGNOSIS — Q61.5 SPONGE KIDNEY: ICD-10-CM

## 2024-05-16 DIAGNOSIS — N39.0 RECURRENT UTI: ICD-10-CM

## 2024-05-16 DIAGNOSIS — N18.32 STAGE 3B CHRONIC KIDNEY DISEASE (H): Primary | ICD-10-CM

## 2024-05-23 ENCOUNTER — OFFICE VISIT (OUTPATIENT)
Dept: FAMILY MEDICINE | Facility: CLINIC | Age: 54
End: 2024-05-23
Payer: COMMERCIAL

## 2024-05-23 VITALS
WEIGHT: 187 LBS | BODY MASS INDEX: 30.05 KG/M2 | TEMPERATURE: 97.4 F | HEART RATE: 74 BPM | SYSTOLIC BLOOD PRESSURE: 100 MMHG | OXYGEN SATURATION: 95 % | HEIGHT: 66 IN | DIASTOLIC BLOOD PRESSURE: 68 MMHG | RESPIRATION RATE: 20 BRPM

## 2024-05-23 DIAGNOSIS — N39.0 RECURRENT UTI: ICD-10-CM

## 2024-05-23 DIAGNOSIS — N20.0 KIDNEY STONE: ICD-10-CM

## 2024-05-23 DIAGNOSIS — Q61.5 SPONGE KIDNEY: ICD-10-CM

## 2024-05-23 DIAGNOSIS — Z00.00 ENCOUNTER FOR MEDICARE ANNUAL WELLNESS EXAM: Primary | ICD-10-CM

## 2024-05-23 DIAGNOSIS — R53.83 FATIGUE, UNSPECIFIED TYPE: ICD-10-CM

## 2024-05-23 DIAGNOSIS — N18.32 STAGE 3B CHRONIC KIDNEY DISEASE (H): ICD-10-CM

## 2024-05-23 DIAGNOSIS — Q61.5 CONGENITAL MEDULLARY SPONGE KIDNEY: ICD-10-CM

## 2024-05-23 LAB
ALBUMIN SERPL BCG-MCNC: 4.1 G/DL (ref 3.5–5.2)
ALBUMIN UR-MCNC: 100 MG/DL
ANION GAP SERPL CALCULATED.3IONS-SCNC: 15 MMOL/L (ref 7–15)
APPEARANCE UR: ABNORMAL
BACTERIA #/AREA URNS HPF: ABNORMAL /HPF
BILIRUB UR QL STRIP: NEGATIVE
BUN SERPL-MCNC: 27.1 MG/DL (ref 6–20)
CALCIUM SERPL-MCNC: 9.8 MG/DL (ref 8.6–10)
CHLORIDE SERPL-SCNC: 108 MMOL/L (ref 98–107)
COLOR UR AUTO: YELLOW
CREAT SERPL-MCNC: 1.84 MG/DL (ref 0.51–0.95)
DEPRECATED HCO3 PLAS-SCNC: 20 MMOL/L (ref 22–29)
EGFRCR SERPLBLD CKD-EPI 2021: 32 ML/MIN/1.73M2
ERYTHROCYTE [DISTWIDTH] IN BLOOD BY AUTOMATED COUNT: 14.7 % (ref 10–15)
GLUCOSE SERPL-MCNC: 102 MG/DL (ref 70–99)
GLUCOSE UR STRIP-MCNC: NEGATIVE MG/DL
HCT VFR BLD AUTO: 49.8 % (ref 35–47)
HGB BLD-MCNC: 15.5 G/DL (ref 11.7–15.7)
HGB UR QL STRIP: ABNORMAL
IRON SERPL-MCNC: 63 UG/DL (ref 37–145)
KETONES UR STRIP-MCNC: NEGATIVE MG/DL
LEUKOCYTE ESTERASE UR QL STRIP: ABNORMAL
MCH RBC QN AUTO: 28.2 PG (ref 26.5–33)
MCHC RBC AUTO-ENTMCNC: 31.1 G/DL (ref 31.5–36.5)
MCV RBC AUTO: 91 FL (ref 78–100)
NITRATE UR QL: POSITIVE
PH UR STRIP: 6 [PH] (ref 5–7)
PHOSPHATE SERPL-MCNC: 3.8 MG/DL (ref 2.5–4.5)
PLATELET # BLD AUTO: 131 10E3/UL (ref 150–450)
POTASSIUM SERPL-SCNC: 4.4 MMOL/L (ref 3.4–5.3)
RBC # BLD AUTO: 5.49 10E6/UL (ref 3.8–5.2)
RBC #/AREA URNS AUTO: ABNORMAL /HPF
SODIUM SERPL-SCNC: 143 MMOL/L (ref 135–145)
SP GR UR STRIP: 1.01 (ref 1–1.03)
TSH SERPL DL<=0.005 MIU/L-ACNC: 1.84 UIU/ML (ref 0.3–4.2)
UROBILINOGEN UR STRIP-ACNC: 0.2 E.U./DL
WBC # BLD AUTO: 8.6 10E3/UL (ref 4–11)
WBC #/AREA URNS AUTO: >100 /HPF

## 2024-05-23 PROCEDURE — 99213 OFFICE O/P EST LOW 20 MIN: CPT | Mod: 25 | Performed by: NURSE PRACTITIONER

## 2024-05-23 PROCEDURE — 83540 ASSAY OF IRON: CPT | Performed by: NURSE PRACTITIONER

## 2024-05-23 PROCEDURE — 80069 RENAL FUNCTION PANEL: CPT | Performed by: NURSE PRACTITIONER

## 2024-05-23 PROCEDURE — 81001 URINALYSIS AUTO W/SCOPE: CPT | Performed by: NURSE PRACTITIONER

## 2024-05-23 PROCEDURE — 82306 VITAMIN D 25 HYDROXY: CPT | Performed by: NURSE PRACTITIONER

## 2024-05-23 PROCEDURE — 85027 COMPLETE CBC AUTOMATED: CPT | Performed by: NURSE PRACTITIONER

## 2024-05-23 PROCEDURE — 82607 VITAMIN B-12: CPT | Performed by: NURSE PRACTITIONER

## 2024-05-23 PROCEDURE — 36415 COLL VENOUS BLD VENIPUNCTURE: CPT | Performed by: NURSE PRACTITIONER

## 2024-05-23 PROCEDURE — 84443 ASSAY THYROID STIM HORMONE: CPT | Performed by: NURSE PRACTITIONER

## 2024-05-23 PROCEDURE — G0439 PPPS, SUBSEQ VISIT: HCPCS | Performed by: NURSE PRACTITIONER

## 2024-05-23 SDOH — HEALTH STABILITY: PHYSICAL HEALTH: ON AVERAGE, HOW MANY DAYS PER WEEK DO YOU ENGAGE IN MODERATE TO STRENUOUS EXERCISE (LIKE A BRISK WALK)?: 1 DAY

## 2024-05-23 ASSESSMENT — SOCIAL DETERMINANTS OF HEALTH (SDOH): HOW OFTEN DO YOU GET TOGETHER WITH FRIENDS OR RELATIVES?: ONCE A WEEK

## 2024-05-23 ASSESSMENT — PAIN SCALES - GENERAL: PAINLEVEL: MILD PAIN (3)

## 2024-05-23 NOTE — PATIENT INSTRUCTIONS
"Preventive Care Advice   This is general advice we often give to help people stay healthy. Your care team may have specific advice just for you. Please talk to your care team about your own preventive care needs.  Lifestyle  Exercise at least 150 minutes each week (30 minutes a day, 5 days a week).  Do muscle strengthening activities 2 days a week. These help control your weight and prevent disease.  No smoking.  Wear sunscreen to prevent skin cancer.  Have your home tested for radon every 2 to 5 years. Radon is a colorless, odorless gas that can harm your lungs. To learn more, go to www.health.[facility].mn. and search for \"Radon in Homes.\"  Keep guns unloaded and locked up in a safe place like a safe or gun vault, or, use a gun lock and hide the keys. Always lock away bullets separately. To learn more, visit [employer].mn.gov and search for \"safe gun storage.\"  Nutrition  Eat 5 or more servings of fruits and vegetables each day.  Try wheat bread, brown rice and whole grain pasta (instead of white bread, rice, and pasta).  Get enough calcium and vitamin D. Check the label on foods and aim for 100% of the RDA (recommended daily allowance).  Regular exams  Have a dental exam and cleaning every 6 months.  See your health care team every year to talk about:  Any changes in your health.  Any medicines your care team has prescribed.  Preventive care, family planning, and ways to prevent chronic diseases.  Shots (vaccines)   HPV shots (up to age 26), if you've never had them before.  Hepatitis B shots (up to age 59), if you've never had them before.  COVID-19 shot: Get this shot when it's due.  Flu shot: Get a flu shot every year.  Tetanus shot: Get a tetanus shot every 10 years.  Pneumococcal, hepatitis A, and RSV shots: Ask your care team if you need these based on your risk.  Shingles shot (for age 50 and up).  General health tests  Diabetes screening:  Starting at age 35, Get screened for diabetes at least every 3 years.  If " you are younger than age 35, ask your care team if you should be screened for diabetes.  Cholesterol test: At age 39, start having a cholesterol test every 5 years, or more often if advised.  Bone density scan (DEXA): At age 50, ask your care team if you should have this scan for osteoporosis (brittle bones).  Hepatitis C: Get tested at least once in your life.  Abdominal aortic aneurysm screening: Talk to your doctor about having this screening if you:  Have ever smoked; and  Are biologically male; and  Are between the ages of 65 and 75.  STIs (sexually transmitted infections)  Before age 24: Ask your care team if you should be screened for STIs.  After age 24: Get screened for STIs if you're at risk. You are at risk for STIs (including HIV) if:  You are sexually active with more than one person.  You don't use condoms every time.  You or a partner was diagnosed with a sexually transmitted infection.  If you are at risk for HIV, ask about PrEP medicine to prevent HIV.  Get tested for HIV at least once in your life, whether you are at risk for HIV or not.  Cancer screening tests  Cervical cancer screening: If you have a cervix, begin getting regular cervical cancer screening tests at age 21. Most people who have regular screenings with normal results can stop after age 65. Talk about this with your provider.  Breast cancer scan (mammogram): If you've ever had breasts, begin having regular mammograms starting at age 40. This is a scan to check for breast cancer.  Colon cancer screening: It is important to start screening for colon cancer at age 45.  Have a colonoscopy test every 10 years (or more often if you're at risk) Or, ask your provider about stool tests like a FIT test every year or Cologuard test every 3 years.  To learn more about your testing options, visit: [email or web address]/044044.pdf.  For help making a decision, visit: [email or web address].  Prostate cancer screening test: If you have a prostate and are age 55  to 69, ask your provider if you would benefit from a yearly prostate cancer screening test.  Lung cancer screening: If you are a current or former smoker age 50 to 80, ask your care team if ongoing lung cancer screenings are right for you.  For informational purposes only. Not to replace the advice of your health care provider. Copyright   2023 [employer]. All rights reserved. Clinically reviewed by the [facility] Transitions Program. [employer] [id number] - REV 04/24.    Hearing Loss: Care Instructions  Overview     Hearing loss is a sudden or slow decrease in how well you hear. It can range from slight to profound. Permanent hearing loss can occur with aging. It also can happen when you are exposed long-term to loud noise. Examples include listening to loud music, riding motorcycles, or being around other loud machines.  Hearing loss can affect your work and home life. It can make you feel lonely or depressed. You may feel that you have lost your independence. But hearing aids and other devices can help you hear better and feel connected to others.  Follow-up care is a key part of your treatment and safety. Be sure to make and go to all appointments, and call your doctor if you are having problems. It's also a good idea to know your test results and keep a list of the medicines you take.  How can you care for yourself at home?  Avoid loud noises whenever possible. This helps keep your hearing from getting worse.  Always wear hearing protection around loud noises.  Wear a hearing aid as directed.  A professional can help you pick a hearing aid that will work best for you.  You can also get hearing aids over the counter for mild to moderate hearing loss.  Have hearing tests as your doctor suggests. They can show whether your hearing has changed. Your hearing aid may need to be adjusted.  Use other devices as needed. These may include:  Telephone amplifiers and hearing aids that can connect to a  "television, stereo, radio, or microphone.  Devices that use lights or vibrations. These alert you to the doorbell, a ringing telephone, or a baby monitor.  Television closed-captioning. This shows the words at the bottom of the screen. Most new TVs can do this.  TTY (text telephone). This lets you type messages back and forth on the telephone instead of talking or listening. These devices are also called TDD. When messages are typed on the keyboard, they are sent over the phone line to a receiving TTY. The message is shown on a monitor.  Use text messaging, social media, and email if it is hard for you to communicate by telephone.  Try to learn a listening technique called speechreading. It is not lipreading. You pay attention to people's gestures, expressions, posture, and tone of voice. These clues can help you understand what a person is saying. Face the person you are talking to, and have them face you. Make sure the lighting is good. You need to see the other person's face clearly.  Think about counseling if you need help to adjust to your hearing loss.  When should you call for help?  Watch closely for changes in your health, and be sure to contact your doctor if:    You think your hearing is getting worse.     You have new symptoms, such as dizziness or nausea.   Where can you learn more?  Go to https://www.[employer].net/patiented  Enter R798 in the search box to learn more about \"Hearing Loss: Care Instructions.\"  Current as of: September 27, 2023               Content Version: 14.0    3277-1619 [employer].   Care instructions adapted under license by your healthcare professional. If you have questions about a medical condition or this instruction, always ask your healthcare professional. Health[employer], [employer] disclaims any warranty or liability for your use of this information.      Learning About Stress  What is stress?     Stress is your body's response to a hard situation. Your body can " have a physical, emotional, or mental response. Stress is a fact of life for most people, and it affects everyone differently. What causes stress for you may not be stressful for someone else.  A lot of things can cause stress. You may feel stress when you go on a job interview, take a test, or run a race. This kind of short-term stress is normal and even useful. It can help you if you need to work hard or react quickly. For example, stress can help you finish an important job on time.  Long-term stress is caused by ongoing stressful situations or events. Examples of long-term stress include long-term health problems, ongoing problems at work, or conflicts in your family. Long-term stress can harm your health.  How does stress affect your health?  When you are stressed, your body responds as though you are in danger. It makes hormones that speed up your heart, make you breathe faster, and give you a burst of energy. This is called the fight-or-flight stress response. If the stress is over quickly, your body goes back to normal and no harm is done.  But if stress happens too often or lasts too long, it can have bad effects. Long-term stress can make you more likely to get sick, and it can make symptoms of some diseases worse. If you tense up when you are stressed, you may develop neck, shoulder, or low back pain. Stress is linked to high blood pressure and heart disease.  Stress also harms your emotional health. It can make you silva, tense, or depressed. Your relationships may suffer, and you may not do well at work or school.  What can you do to manage stress?  You can try these things to help manage stress:   Do something active. Exercise or activity can help reduce stress. Walking is a great way to get started. Even everyday activities such as housecleaning or yard work can help.  Try yoga or brian chi. These techniques combine exercise and meditation. You may need some training at first to learn them.  Do  "something you enjoy. For example, listen to music or go to a movie. Practice your hobby or do volunteer work.  Meditate. This can help you relax, because you are not worrying about what happened before or what may happen in the future.  Do guided imagery. Imagine yourself in any setting that helps you feel calm. You can use online videos, books, or a teacher to guide you.  Do breathing exercises. For example:  From a standing position, bend forward from the waist with your knees slightly bent. Let your arms dangle close to the floor.  Breathe in slowly and deeply as you return to a standing position. Roll up slowly and lift your head last.  Hold your breath for just a few seconds in the standing position.  Breathe out slowly and bend forward from the waist.  Let your feelings out. Talk, laugh, cry, and express anger when you need to. Talking with supportive friends or family, a counselor, or a mehul leader about your feelings is a healthy way to relieve stress. Avoid discussing your feelings with people who make you feel worse.  Write. It may help to write about things that are bothering you. This helps you find out how much stress you feel and what is causing it. When you know this, you can find better ways to cope.  What can you do to prevent stress?  You might try some of these things to help prevent stress:  Manage your time. This helps you find time to do the things you want and need to do.  Get enough sleep. Your body recovers from the stresses of the day while you are sleeping.  Get support. Your family, friends, and community can make a difference in how you experience stress.  Limit your news feed. Avoid or limit time on social media or news that may make you feel stressed.  Do something active. Exercise or activity can help reduce stress. Walking is a great way to get started.  Where can you learn more?  Go to https://www.healthwise.net/patiented  Enter N032 in the search box to learn more about \"Learning " "About Stress.\"  Current as of: October 24, 2023               Content Version: 14.0    7874-8794 [employer].   Care instructions adapted under license by your healthcare professional. If you have questions about a medical condition or this instruction, always ask your healthcare professional. [employer] disclaims any warranty or liability for your use of this information.      Learning About Sleeping Well  What does sleeping well mean?     Sleeping well means getting enough sleep to feel good and stay healthy. How much sleep is enough varies among people.  The number of hours you sleep and how you feel when you wake up are both important. If you do not feel refreshed, you probably need more sleep. Another sign of not getting enough sleep is feeling tired during the day.  Experts recommend that adults get at least 7 or more hours of sleep per day. Children and older adults need more sleep.  Why is getting enough sleep important?  Getting enough quality sleep is a basic part of good health. When your sleep suffers, your physical health, mood, and your thoughts can suffer too. You may find yourself feeling more grumpy or stressed. Not getting enough sleep also can lead to serious problems, including injury, accidents, anxiety, and depression.  What might cause poor sleeping?  Many things can cause sleep problems, including:  Changes to your sleep schedule.  Stress. Stress can be caused by fear about a single event, such as giving a speech. Or you may have ongoing stress, such as worry about work or school.  Depression, anxiety, and other mental or emotional conditions.  Changes in your sleep habits or surroundings. This includes changes that happen where you sleep, such as noise, light, or sleeping in a different bed. It also includes changes in your sleep pattern, such as having jet lag or working a late shift.  Health problems, such as pain, breathing problems, and restless legs " "syndrome.  Lack of regular exercise.  Using alcohol, nicotine, or caffeine before bed.  How can you help yourself?  Here are some tips that may help you sleep more soundly and wake up feeling more refreshed.  Your sleeping area   Use your bedroom only for sleeping and sex. A bit of light reading may help you fall asleep. But if it doesn't, do your reading elsewhere in the house. Try not to use your TV, computer, smartphone, or tablet while you are in bed.  Be sure your bed is big enough to stretch out comfortably, especially if you have a sleep partner.  Keep your bedroom quiet, dark, and cool. Use curtains, blinds, or a sleep mask to block out light. To block out noise, use earplugs, soothing music, or a \"white noise\" machine.  Your evening and bedtime routine   Create a relaxing bedtime routine. You might want to take a warm shower or bath, or listen to soothing music.  Go to bed at the same time every night. And get up at the same time every morning, even if you feel tired.  What to avoid   Limit caffeine (coffee, tea, caffeinated sodas) during the day, and don't have any for at least 6 hours before bedtime.  Avoid drinking alcohol before bedtime. Alcohol can cause you to wake up more often during the night.  Try not to smoke or use tobacco, especially in the evening. Nicotine can keep you awake.  Limit naps during the day, especially close to bedtime.  Avoid lying in bed awake for too long. If you can't fall asleep or if you wake up in the middle of the night and can't get back to sleep within about 20 minutes, get out of bed and go to another room until you feel sleepy.  Avoid taking medicine right before bed that may keep you awake or make you feel hyper or energized. Your doctor can tell you if your medicine may do this and if you can take it earlier in the day.  If you can't sleep   Imagine yourself in a peaceful, pleasant scene. Focus on the details and feelings of being in a place that is relaxing.  Get up " "and do a quiet or boring activity until you feel sleepy.  Avoid drinking any liquids before going to bed to help prevent waking up often to use the bathroom.  Where can you learn more?  Go to https://www.[employer].net/patiented  Enter J942 in the search box to learn more about \"Learning About Sleeping Well.\"  Current as of: July 10, 2023               Content Version: 14.0    8713-8947 [employer].   Care instructions adapted under license by your healthcare professional. If you have questions about a medical condition or this instruction, always ask your healthcare professional. [employer] disclaims any warranty or liability for your use of this information.      Bladder Training: Care Instructions  Your Care Instructions     Bladder training is used to treat urge incontinence and stress incontinence. Urge incontinence means that the need to urinate comes on so fast that you can't get to a toilet in time. Stress incontinence means that you leak urine because of pressure on your bladder. For example, it may happen when you laugh, cough, or lift something heavy.  Bladder training can increase how long you can wait before you have to urinate. It can also help your bladder hold more urine. And it can give you better control over the urge to urinate.  It is important to remember that bladder training takes a few weeks to a few months to make a difference. You may not see results right away, but don't give up.  Follow-up care is a key part of your treatment and safety. Be sure to make and go to all appointments, and call your doctor if you are having problems. It's also a good idea to know your test results and keep a list of the medicines you take.  How can you care for yourself at home?  Work with your doctor to come up with a bladder training program that is right for you. You may use one or more of the following methods.  Delayed urination  In the beginning, try to keep from urinating for " "5 minutes after you first feel the need to go.  While you wait, take deep, slow breaths to relax. Kegel exercises can also help you delay the need to go to the bathroom.  After some practice, when you can easily wait 5 minutes to urinate, try to wait 10 minutes before you urinate.  Slowly increase the waiting period until you are able to control when you have to urinate.  Scheduled urination  Empty your bladder when you first wake up in the morning.  Schedule times throughout the day when you will urinate.  Start by going to the bathroom every hour, even if you don't need to go.  Slowly increase the time between trips to the bathroom.  When you have found a schedule that works well for you, keep doing it.  If you wake up during the night and have to urinate, do it. Apply your schedule to waking hours only.  Kegel exercises  These tighten and strengthen pelvic muscles, which can help you control the flow of urine. (If doing these exercises causes pain, stop doing them and talk with your doctor.) To do Kegel exercises:  Squeeze your muscles as if you were trying not to pass gas. Or squeeze your muscles as if you were stopping the flow of urine. Your belly, legs, and buttocks shouldn't move.  Hold the squeeze for 3 seconds, then relax for 5 to 10 seconds.  Start with 3 seconds, then add 1 second each week until you are able to squeeze for 10 seconds.  Repeat the exercise 10 times a session. Do 3 to 8 sessions a day.  When should you call for help?  Watch closely for changes in your health, and be sure to contact your doctor if:    Your incontinence is getting worse.     You do not get better as expected.   Where can you learn more?  Go to https://www.[employer].net/patiented  Enter V684 in the search box to learn more about \"Bladder Training: Care Instructions.\"  Current as of: November 15, 2023               Content Version: 14.0    2693-1250 [employer].   Care instructions adapted under license by your " healthcare professional. If you have questions about a medical condition or this instruction, always ask your healthcare professional. Healthwise, [facility] disclaims any warranty or liability for your use of this information.      Chronic Pain: Care Instructions  Your Care Instructions     Chronic pain is pain that lasts a long time (months or even years) and may or may not have a clear cause. It is different from acute pain, which usually does have a clear cause--like an injury or illness--and gets better over time. Chronic pain:  Lasts over time but may vary from day to day.  Does not go away despite efforts to end it.  May disrupt your sleep and lead to fatigue.  May cause depression or anxiety.  May make your muscles tense, causing more pain.  Can disrupt your work, hobbies, home life, and relationships with friends and family.  Chronic pain is a very real condition. It is not just in your head. Treatment can help and usually includes several methods used together, such as medicines, physical therapy, exercise, and other treatments. Learning how to relax and changing negative thought patterns can also help you cope.  Chronic pain is complex. Taking an active role in your treatment will help you better manage your pain. Tell your doctor if you have trouble dealing with your pain. You may have to try several things before you find what works best for you.  Follow-up care is a key part of your treatment and safety. Be sure to make and go to all appointments, and call your doctor if you are having problems. It's also a good idea to know your test results and keep a list of the medicines you take.  How can you care for yourself at home?  Pace yourself. Break up large jobs into smaller tasks. Save harder tasks for days when you have less pain, or go back and forth between hard tasks and easier ones. Take rest breaks.  Relax, and reduce stress. Relaxation techniques such as deep breathing or meditation can  help.  Keep moving. Gentle, daily exercise can help reduce pain over the long run. Try low- or no-impact exercises such as walking, swimming, and stationary biking. Do stretches to stay flexible.  Try heat, cold packs, and massage.  Get enough sleep. Chronic pain can make you tired and drain your energy. Talk with your doctor if you have trouble sleeping because of pain.  Think positive. Your thoughts can affect your pain level. Do things that you enjoy to distract yourself when you have pain instead of focusing on the pain. See a movie, read a book, listen to music, or spend time with a friend.  If you think you are depressed, talk to your doctor about treatment.  Keep a daily pain diary. Record how your moods, thoughts, sleep patterns, activities, and medicine affect your pain. You may find that your pain is worse during or after certain activities or when you are feeling a certain emotion. Having a record of your pain can help you and your doctor find the best ways to treat your pain.  Take pain medicines exactly as directed.  If the doctor gave you a prescription medicine for pain, take it as prescribed.  If you are not taking a prescription pain medicine, ask your doctor if you can take an over-the-counter medicine.  Reducing constipation caused by pain medicine  Talk to your doctor about a laxative. If a laxative doesn't work, your doctor may suggest a prescription medicine.  Include fruits, vegetables, beans, and whole grains in your diet each day. These foods are high in fiber.  If your doctor recommends it, get more exercise. Walking is a good choice. Bit by bit, increase the amount you walk every day. Try for at least 30 minutes on most days of the week.  Schedule time each day for a bowel movement. A daily routine may help. Take your time and do not strain when having a bowel movement.  When should you call for help?   Call your doctor now or seek immediate medical care if:    Your pain gets worse or is  "out of control.     You feel down or blue, or you do not enjoy things like you once did. You may be depressed, which is common in people with chronic pain. Depression can be treated.     You have vomiting or cramps for more than 2 hours.   Watch closely for changes in your health, and be sure to contact your doctor if:    You cannot sleep because of pain.     You are very worried or anxious about your pain.     You have trouble taking your pain medicine.     You have any concerns about your pain medicine.     You have trouble with bowel movements, such as:  No bowel movement in 3 days.  Blood in the anal area, in your stool, or on the toilet paper.  Diarrhea for more than 24 hours.   Where can you learn more?  Go to [email or web address]  Enter N004 in the search box to learn more about \"Chronic Pain: Care Instructions.\"  Current as of: July 10, 2023               Content Version: 14.0    5050-2040 [employer].   Care instructions adapted under license by your healthcare professional. If you have questions about a medical condition or this instruction, always ask your healthcare professional. [employer] disclaims any warranty or liability for your use of this information.      "

## 2024-05-23 NOTE — PROGRESS NOTES
"Preventive Care Visit  [facility]  MICHELLE Sheehan CNP, Family Medicine  May 23, 2024        Assessment & Plan     Encounter for Medicare annual wellness exam      Stage 3b chronic kidney disease (H)  Follows with nephrology  - CBC with platelets; Future  - TSH with free T4 reflex; Future  - Iron; Future  - Vitamin B12; Future  - Vitamin D Deficiency; Future  - OFFICE/OUTPT VISIT,EST,LEVL IV    Congenital medullary sponge kidney  Follows with nephrology  - OFFICE/OUTPT VISIT,EST,LEVL IV    Fatigue, unspecified type  Etiology unclear.  Labs today:  - CBC with platelets; Future  - TSH with free T4 reflex; Future  - Iron; Future  - Vitamin B12; Future  - Vitamin D Deficiency; Future  - OFFICE/OUTPT VISIT,EST,LEVL IV    BMI  Estimated body mass index is 30.18 kg/m  as calculated from the following:    Height as of this encounter: 1.676 m (5' 6\").    Weight as of this encounter: 84.8 kg (187 lb).   Weight management plan: Discussed healthy diet and exercise guidelines    Counseling  Appropriate preventive services were discussed with this patient, including applicable screening as appropriate for fall prevention, nutrition, physical activity, Tobacco-use cessation, weight loss and cognition.  Checklist reviewing preventive services available has been given to the patient.  Reviewed patient's diet, addressing concerns and/or questions.   She is at risk for lack of exercise and has been provided with information to increase physical activity for the benefit of her well-being.   Discussed possible causes of fatigue. The patient was provided with written information regarding signs of hearing loss.   Information on urinary incontinence and treatment options given to patient.   I have reviewed Opioid Use Disorder and Substance Use Disorder risk factors and made any needed referrals.       The risks, benefits and treatment options of prescribed medications or other treatments have been " discussed with the patient. The patient verbalized their understanding and should call or follow up if no improvement or if they develop further problems.  Maribeth Avila, CNP                Subjective   Latoya is a 53 year old, presenting for the following:  Physical (Not due for any medication refills;  just medicare wellness) and Health Maintenance (Patient declined immunizations today)        5/23/2024    12:44 PM   Additional Questions   Roomed by Carline WEINSTEIN         5/23/2024    12:44 PM   Patient Reported Additional Medications   Patient reports taking the following new medications none      not in her first year.    Health Care Directive  Patient does not have a Health Care Directive or Living Will: Discussed advance care planning with patient; however, patient declined at this time.    HPI    Fatigue:  Significant  Worse for 6 months.  She thinks it may be related to depression  Wants lab work to r/o other things.            5/23/2024   General Health   How would you rate your overall physical health? Good   Feel stress (tense, anxious, or unable to sleep) Rather much   (!) STRESS CONCERN      5/23/2024   Nutrition   Diet: Regular (no restrictions)         5/23/2024   Exercise   Days per week of moderate/strenous exercise 1 day   (!) EXERCISE CONCERN      5/23/2024   Social Factors   Frequency of gathering with friends or relatives Once a week   Worry food won't last until get money to buy more No   Food not last or not have enough money for food? No   Do you have housing?  Yes   Are you worried about losing your housing? No   Lack of transportation? No   Unable to get utilities (heat,electricity)? No         5/23/2024   Fall Risk   Fallen 2 or more times in the past year? No   Trouble with walking or balance? No          5/23/2024   Activities of Daily Living- Home Safety   Needs help with the following daily activites None of the above   Safety concerns in the home None of the above         5/23/2024    Dental   Dentist two times every year? Yes         5/23/2024   Hearing Screening   Hearing concerns? (!) I NEED TO ASK PEOPLE TO SPEAK UP OR REPEAT THEMSELVES.    (!) IT'S HARD TO FOLLOW A CONVERSATION IN A NOISY RESTAURANT OR CROWDED ROOM.    (!) TROUBLE UNDERSTANDING SOFT OR WHISPERED SPEECH.         5/23/2024   Driving Risk Screening   Patient/family members have concerns about driving No         5/23/2024   General Alertness/Fatigue Screening   Have you been more tired than usual lately? (!) YES         5/23/2024   Urinary Incontinence Screening   Bothered by leaking urine in past 6 months Yes         5/23/2024   TB Screening   Were you born outside of the [place]? No         Today's PHQ-9 Score:       5/13/2024     9:58 AM   PHQ-9 SCORE   PHQ-9 Total Score MyChart 6 (Mild depression)   PHQ-9 Total Score 6           5/23/2024   Substance Use   Alcohol more than 3/day or more than 7/wk Not Applicable   Do you have a current opioid prescription? (!) YES   How severe/bad is pain from 1 to 10? 5/10   Do you use any other substances recreationally? No     Social History     Tobacco Use    Smoking status: Every Day     Current packs/day: 1.00     Average packs/day: 1 pack/day for 30.0 years (30.0 ttl pk-yrs)     Types: Cigarettes    Smokeless tobacco: Never    Tobacco comments:     trying to quit; cutting back on her own   Vaping Use    Vaping status: Never Used   Substance Use Topics    Alcohol use: No    Drug use: No           1/31/2024   LAST FHS-7 RESULTS   1st degree relative breast or ovarian cancer No   Any relative bilateral breast cancer No   Any male have breast cancer No   Any ONE woman have BOTH breast AND ovarian cancer No   Any woman with breast cancer before 50yrs No   2 or more relatives with breast AND/OR ovarian cancer No   2 or more relatives with breast AND/OR bowel cancer No              History of abnormal Pap smear: No - age 30- 64 PAP with HPV every 5 years recommended        Latest Ref Rng &  Units 4/3/2023    10:29 AM 5/26/2016    12:00 AM 3/21/2013     4:12 PM   PAP / HPV   PAP  Negative for Intraepithelial Lesion or Malignancy (NILM)      PAP (Historical)   NIL  NIL    HPV 16 DNA Negative Negative      HPV 18 DNA Negative Negative      Other HR HPV Negative Negative        ASCVD Risk   The 10-year ASCVD risk score (Madai STEVENS, et al., 2019) is: 2.7%    Values used to calculate the score:      Age: 53 years      Sex: Female      Is Non- : No      Diabetic: No      Tobacco smoker: Yes      Systolic Blood Pressure: 100 mmHg      Is BP treated: No      HDL Cholesterol: 42 mg/dL      Total Cholesterol: 167 mg/dL            Reviewed and updated as needed this visit by Provider                      Current providers sharing in care for this patient include:  Patient Care Team:  Maribeth Avila APRN CNP as PCP - General (Nurse Practitioner)  Tavo Contreras MD as MD (Urology)  Irvin Quezada MD as MD (Internal Medicine)  Maribeth Avila APRN CNP as Assigned PCP  Ann Campbell APRN CNP as Nurse Practitioner (Nurse Practitioner - Family)  Mami Garcia PA-C as Physician Assistant (Dermatology)  Dara Tamayo PA-C as Physician Assistant (Urology)  Gustavo Newton MD as Assigned Surgical Provider  Krystin Tineo APRN CNP as Assigned Pulmonology Provider    The following health maintenance items are reviewed in Epic and correct as of today:  Health Maintenance   Topic Date Due    URINE DRUG SCREEN  Never done    DEPRESSION ACTION PLAN  Never done    Pneumococcal Vaccine: Pediatrics (0 to 5 Years) and At-Risk Patients (6 to 64 Years) (1 of 2 - PCV) Never done    HEPATITIS B IMMUNIZATION (1 of 3 - 19+ 3-dose series) Never done    DTAP/TDAP/TD IMMUNIZATION (2 - Td or Tdap) 03/28/2018    ZOSTER IMMUNIZATION (1 of 2) Never done    LUNG CANCER SCREENING  Never done    COVID-19 Vaccine (1 - 2023-24 season) Never done    MEDICARE ANNUAL WELLNESS  "VISIT  04/03/2024    INFLUENZA VACCINE (Season Ended) 09/01/2024    LIPID  11/06/2024    PHQ-9  11/14/2024    MAMMO SCREENING  01/31/2025    NICOTINE/TOBACCO CESSATION COUNSELING Q 1 YR  05/14/2025    ANNUAL REVIEW OF HM ORDERS  05/14/2025    COLORECTAL CANCER SCREENING  11/01/2025    GLUCOSE  11/06/2026    HPV TEST  04/03/2028    ADVANCE CARE PLANNING  04/03/2028    PAP  04/03/2028    HEPATITIS C SCREENING  Completed    HIV SCREENING  Completed    IPV IMMUNIZATION  Aged Out    HPV IMMUNIZATION  Aged Out    MENINGITIS IMMUNIZATION  Aged Out    RSV MONOCLONAL ANTIBODY  Aged Out         Review of Systems  Constitutional, neuro, ENT, endocrine, pulmonary, cardiac, gastrointestinal, genitourinary, musculoskeletal, integument and psychiatric systems are negative, except as otherwise noted.         Objective    Exam  /68 (BP Location: Right arm, Patient Position: Sitting, Cuff Size: Adult Large)   Pulse 74   Temp 97.4  F (36.3  C) (Tympanic)   Resp 20   Ht 1.676 m (5' 6\")   Wt 84.8 kg (187 lb)   LMP 07/02/2013 (Exact Date)   SpO2 95%   BMI 30.18 kg/m     Estimated body mass index is 30.18 kg/m  as calculated from the following:    Height as of this encounter: 1.676 m (5' 6\").    Weight as of this encounter: 84.8 kg (187 lb).    Physical Exam  GENERAL: alert and no distress  EYES: Eyes grossly normal to inspection, PERRL and conjunctivae and sclerae normal  HENT: ear canals and TM's normal, nose and mouth without ulcers or lesions  NECK: no adenopathy, no asymmetry, masses, or scars  RESP: lungs clear to auscultation - no rales, rhonchi or wheezes  BREAST: normal without masses, tenderness or nipple discharge and no palpable axillary masses or adenopathy  CV: regular rate and rhythm, normal S1 S2, no S3 or S4, no murmur, click or rub, no peripheral edema  ABDOMEN: soft, nontender, no hepatosplenomegaly, no masses and bowel sounds normal  MS: no gross musculoskeletal defects noted, no edema  SKIN: no " suspicious lesions or rashes  NEURO: Normal strength and tone, mentation intact and speech normal  PSYCH: mentation appears normal, affect normal/bright         5/23/2024   Mini Cog   Clock Draw Score 2 Normal   3 Item Recall 2 objects recalled   Mini Cog Total Score 4              Signed Electronically by: MICHELLE Sheehan CNP

## 2024-05-24 LAB
VIT B12 SERPL-MCNC: 1362 PG/ML (ref 232–1245)
VIT D+METAB SERPL-MCNC: 39 NG/ML (ref 20–50)

## 2024-06-04 ENCOUNTER — THERAPY VISIT (OUTPATIENT)
Dept: SLEEP MEDICINE | Facility: CLINIC | Age: 54
End: 2024-06-04
Payer: COMMERCIAL

## 2024-06-04 DIAGNOSIS — G47.8 UPPER AIRWAY RESISTANCE SYNDROME: ICD-10-CM

## 2024-06-04 PROCEDURE — 95810 POLYSOM 6/> YRS 4/> PARAM: CPT | Performed by: INTERNAL MEDICINE

## 2024-06-04 ASSESSMENT — SLEEP AND FATIGUE QUESTIONNAIRES
HOW LIKELY ARE YOU TO NOD OFF OR FALL ASLEEP WHILE LYING DOWN TO REST IN THE AFTERNOON WHEN CIRCUMSTANCES PERMIT: HIGH CHANCE OF DOZING
HOW LIKELY ARE YOU TO NOD OFF OR FALL ASLEEP WHILE SITTING INACTIVE IN A PUBLIC PLACE: SLIGHT CHANCE OF DOZING
HOW LIKELY ARE YOU TO NOD OFF OR FALL ASLEEP WHILE SITTING QUIETLY AFTER LUNCH WITHOUT ALCOHOL: SLIGHT CHANCE OF DOZING
HOW LIKELY ARE YOU TO NOD OFF OR FALL ASLEEP WHILE WATCHING TV: MODERATE CHANCE OF DOZING
HOW LIKELY ARE YOU TO NOD OFF OR FALL ASLEEP WHEN YOU ARE A PASSENGER IN A CAR FOR AN HOUR WITHOUT A BREAK: WOULD NEVER DOZE
HOW LIKELY ARE YOU TO NOD OFF OR FALL ASLEEP IN A CAR, WHILE STOPPED FOR A FEW MINUTES IN TRAFFIC: WOULD NEVER DOZE
HOW LIKELY ARE YOU TO NOD OFF OR FALL ASLEEP WHILE SITTING AND READING: MODERATE CHANCE OF DOZING
HOW LIKELY ARE YOU TO NOD OFF OR FALL ASLEEP WHILE SITTING AND TALKING TO SOMEONE: WOULD NEVER DOZE

## 2024-06-06 LAB — SLPCOMP: NORMAL

## 2024-07-13 ENCOUNTER — HOSPITAL ENCOUNTER (EMERGENCY)
Facility: CLINIC | Age: 54
Discharge: HOME | End: 2024-07-13
Attending: FAMILY MEDICINE | Admitting: FAMILY MEDICINE
Payer: COMMERCIAL

## 2024-07-13 ENCOUNTER — APPOINTMENT (OUTPATIENT)
Dept: CT IMAGING | Facility: CLINIC | Age: 54
End: 2024-07-13
Attending: FAMILY MEDICINE
Payer: COMMERCIAL

## 2024-07-13 VITALS
SYSTOLIC BLOOD PRESSURE: 120 MMHG | OXYGEN SATURATION: 96 % | TEMPERATURE: 98.3 F | DIASTOLIC BLOOD PRESSURE: 68 MMHG | RESPIRATION RATE: 16 BRPM | HEART RATE: 71 BPM

## 2024-07-13 DIAGNOSIS — E87.5 HYPERKALEMIA: ICD-10-CM

## 2024-07-13 DIAGNOSIS — N39.0 URINARY TRACT INFECTION WITHOUT HEMATURIA, SITE UNSPECIFIED: ICD-10-CM

## 2024-07-13 LAB
ALBUMIN UR-MCNC: NEGATIVE MG/DL
ANION GAP SERPL CALCULATED.3IONS-SCNC: 10 MMOL/L (ref 7–15)
APPEARANCE UR: ABNORMAL
BACTERIA #/AREA URNS HPF: ABNORMAL /HPF
BASOPHILS # BLD AUTO: 0.1 10E3/UL (ref 0–0.2)
BASOPHILS NFR BLD AUTO: 1 %
BILIRUB UR QL STRIP: NEGATIVE
BUN SERPL-MCNC: 30.2 MG/DL (ref 6–20)
CALCIUM SERPL-MCNC: 10 MG/DL (ref 8.6–10)
CHLORIDE SERPL-SCNC: 107 MMOL/L (ref 98–107)
COLOR UR AUTO: YELLOW
CREAT SERPL-MCNC: 1.9 MG/DL (ref 0.51–0.95)
DEPRECATED HCO3 PLAS-SCNC: 23 MMOL/L (ref 22–29)
EGFRCR SERPLBLD CKD-EPI 2021: 31 ML/MIN/1.73M2
EOSINOPHIL # BLD AUTO: 0.1 10E3/UL (ref 0–0.7)
EOSINOPHIL NFR BLD AUTO: 1 %
ERYTHROCYTE [DISTWIDTH] IN BLOOD BY AUTOMATED COUNT: 14.8 % (ref 10–15)
GLUCOSE SERPL-MCNC: 91 MG/DL (ref 70–99)
GLUCOSE UR STRIP-MCNC: NEGATIVE MG/DL
HCT VFR BLD AUTO: 52.3 % (ref 35–47)
HGB BLD-MCNC: 17.3 G/DL (ref 11.7–15.7)
HGB UR QL STRIP: ABNORMAL
IMM GRANULOCYTES # BLD: 0 10E3/UL
IMM GRANULOCYTES NFR BLD: 0 %
KETONES UR STRIP-MCNC: NEGATIVE MG/DL
LEUKOCYTE ESTERASE UR QL STRIP: ABNORMAL
LYMPHOCYTES # BLD AUTO: 2.1 10E3/UL (ref 0.8–5.3)
LYMPHOCYTES NFR BLD AUTO: 30 %
MCH RBC QN AUTO: 29.4 PG (ref 26.5–33)
MCHC RBC AUTO-ENTMCNC: 33.1 G/DL (ref 31.5–36.5)
MCV RBC AUTO: 89 FL (ref 78–100)
MONOCYTES # BLD AUTO: 0.4 10E3/UL (ref 0–1.3)
MONOCYTES NFR BLD AUTO: 5 %
MUCOUS THREADS #/AREA URNS LPF: PRESENT /LPF
NEUTROPHILS # BLD AUTO: 4.3 10E3/UL (ref 1.6–8.3)
NEUTROPHILS NFR BLD AUTO: 63 %
NITRATE UR QL: NEGATIVE
NRBC # BLD AUTO: 0 10E3/UL
NRBC BLD AUTO-RTO: 0 /100
PH UR STRIP: 6 [PH] (ref 5–7)
PLATELET # BLD AUTO: 111 10E3/UL (ref 150–450)
POTASSIUM SERPL-SCNC: 5.4 MMOL/L (ref 3.4–5.3)
RBC # BLD AUTO: 5.88 10E6/UL (ref 3.8–5.2)
RBC URINE: 17 /HPF
SODIUM SERPL-SCNC: 140 MMOL/L (ref 135–145)
SP GR UR STRIP: 1 (ref 1–1.03)
SQUAMOUS EPITHELIAL: 1 /HPF
UROBILINOGEN UR STRIP-MCNC: NORMAL MG/DL
WBC # BLD AUTO: 6.9 10E3/UL (ref 4–11)
WBC CLUMPS #/AREA URNS HPF: PRESENT /HPF
WBC URINE: >182 /HPF

## 2024-07-13 PROCEDURE — 99284 EMERGENCY DEPT VISIT MOD MDM: CPT | Performed by: FAMILY MEDICINE

## 2024-07-13 PROCEDURE — 99285 EMERGENCY DEPT VISIT HI MDM: CPT | Mod: 25 | Performed by: FAMILY MEDICINE

## 2024-07-13 PROCEDURE — 74176 CT ABD & PELVIS W/O CONTRAST: CPT

## 2024-07-13 PROCEDURE — 36415 COLL VENOUS BLD VENIPUNCTURE: CPT | Performed by: FAMILY MEDICINE

## 2024-07-13 PROCEDURE — 80048 BASIC METABOLIC PNL TOTAL CA: CPT | Performed by: FAMILY MEDICINE

## 2024-07-13 PROCEDURE — 87086 URINE CULTURE/COLONY COUNT: CPT | Performed by: FAMILY MEDICINE

## 2024-07-13 PROCEDURE — 85004 AUTOMATED DIFF WBC COUNT: CPT | Performed by: FAMILY MEDICINE

## 2024-07-13 PROCEDURE — 76705 ECHO EXAM OF ABDOMEN: CPT | Performed by: FAMILY MEDICINE

## 2024-07-13 PROCEDURE — 76705 ECHO EXAM OF ABDOMEN: CPT | Mod: 26 | Performed by: FAMILY MEDICINE

## 2024-07-13 PROCEDURE — 81001 URINALYSIS AUTO W/SCOPE: CPT | Performed by: FAMILY MEDICINE

## 2024-07-13 RX ORDER — CEPHALEXIN 500 MG/1
500 CAPSULE ORAL 2 TIMES DAILY
Qty: 14 CAPSULE | Refills: 0 | Status: SHIPPED | OUTPATIENT
Start: 2024-07-13 | End: 2024-07-20

## 2024-07-13 ASSESSMENT — ACTIVITIES OF DAILY LIVING (ADL)
ADLS_ACUITY_SCORE: 36
ADLS_ACUITY_SCORE: 36

## 2024-07-13 NOTE — ED TRIAGE NOTES
"Pt presents with L flank pain.  She has a long history of kidney stones. She states \"I just want a CT to make sure it is passable.\"         "

## 2024-07-13 NOTE — DISCHARGE INSTRUCTIONS
RETURN TO THE EMERGENCY ROOM FOR THE FOLLOWING:    Severely worsened pain, fever greater than 101, repeated vomiting, fainting, concerning changes in behavior/confusion, or at anytime for any concern.    FOLLOW UP:    Primary clinic follow-up next week.  Referral order was placed at the time of discharge, expect a phone call within the next few days to help with scheduling.  This should be done to discuss your UTI symptoms but more importantly the fact that your potassium was slightly elevated at 5.4.  Your kidney function was similar to baseline.    TREATMENT RECOMMENDATIONS:    Keflex 500 mg twice a day for 7 days.    NURSE ADVICE LINE:  [phone] or [phone]

## 2024-07-13 NOTE — ED PROVIDER NOTES
"  HPI   Patient is a 53-year-old female presenting with left flank pain.  Per my chart review, the patient has acquired solitary kidney with the right 1 removed.  She has congenital medullary sponge kidney, CKD stage IIIb.  Known history of recurring ureteral stones.  Known history of UTI with pyelonephritis.    The patient had sudden onset of left flank pain starting 2 days ago.  The pain has been constant since onset.  She has associated nausea but no vomiting.  She describes \"passing sand in my urine\" multiple times every 3 to 4 weeks.  However, this episode is different and more consistent with larger stones that have been difficult to pass.  She has required numerous stenting and lithotripsy procedures.  No fever.  No vomiting.  No fainting.  No confusion.      Allergies:  Allergies   Allergen Reactions    Gentamycin [Gentamicin Sulfate]     Nsaids Other (See Comments)     Avoids due presence of 1 kidney and that kidney has decreased function   GFR 44    Skin Adhesives [Cyanoacrylate]      Problem List:    Patient Active Problem List    Diagnosis Date Noted    Depression 10/04/2023     Priority: Medium    Acquired solitary kidney 08/28/2023     Priority: Medium    LINDEN (acute kidney injury) (H24) 08/17/2023     Priority: Medium    Urinary tract infection with hematuria, site unspecified 08/16/2023     Priority: Medium    Left ureteral stone 08/16/2023     Priority: Medium    left Hydronephrosis with urinary obstruction due to ureteral calculus 08/16/2023     Priority: Medium    Moderate episode of recurrent major depressive disorder (H) 07/12/2022     Priority: Medium    Stage 3b chronic kidney disease (H) 01/22/2021     Priority: Medium    Chronic migraine without aura without status migrainosus, not intractable 04/21/2020     Priority: Medium    Anxiety and depression 01/11/2016     Priority: Medium    Acute kidney injury (H24) 06/25/2015     Priority: Medium    Pyelonephritis 08/16/2013     Priority: Medium "    Lower urinary tract infectious disease 2012     Priority: Medium     Overview:   Resistant   Pseudomonas       Hydronephrosis, right 2012     Priority: Medium    Hyperlipemia 2011     Priority: Medium    Hyperlipidemia 2011     Priority: Medium    Tobacco abuse 2010     Priority: Medium    Dyspnea and respiratory abnormality 2008     Priority: Medium     Sleep study 08: No CATHY. .5 minutes, sleep latency  normal 11.7 minutes. REM latency 154.5 minutes. Sleep efficiency at 89.4%. The sleep architecture was periodically disrupted with frequent sleep stage changes and arousals. Snoring: moderately loud. RDI 41.0,  AHI of 0.3. Lowest O2 93.0%. PLM index 0.0.  Problem list name updated by automated process. Provider to review      Calculus of kidney 2005     Priority: Medium     Due to RTA and medullary sponge kidney. [id number] ( See's Dr. Tavo Contreras)- calcium oxalate and calcium phosphate. S/p multiple procedures, >50      Congenital medullary sponge kidney 2005     Priority: Medium    Renal osteodystrophy 2005     Priority: Medium     Tubular acidosis        Past Medical History:    Past Medical History:   Diagnosis Date    Anxiety and depression 2016    Chronic kidney disease, stage 3 (H) 2021    Congenital medullary sponge kidney 2005    Hyperlipidemia     Kidney stone     Obstructive sleep apnea syndrome 2018    CATHY (obstructive sleep apnea) 2018     Past Surgical History:    Past Surgical History:   Procedure Laterality Date     SECTION      COMBINED CYSTOSCOPY, INSERT STENT URETER(S) Left 11/10/2017    Procedure: COMBINED CYSTOSCOPY, INSERT STENT URETER(S);  Cystoscopy, Left Ureteral Stent Placement;  Surgeon: Yg Rodrgiuez MD;  Location: [facility] OR    COMBINED CYSTOSCOPY, RETROGRADES, EXCHANGE STENT URETER(S) Left 2023    Procedure: CYSTOSCOPY, WITH RETROGRADE PYELOGRAM AND URETERAL STENT  REPLACEMENT;  Surgeon: Gustavo Newton MD;  Location: [facility] OR    CYSTOURETEROSCOPY, WITH LITHOTRIPSY USING RAMONA 120P LASER AND URETERAL STENT INSERTION Left 2023    Procedure: CYSTOURETEROSCOPY, WITH RETROGRADE PYELOGRAM, HOLMIUM LASER LITHOTRIPSY OF LEFT URETERAL CALCULUSAND BASKET REMOVAL OF LEFT KIDNEY STONE , AND STENT exchange;  Surgeon: Gustavo Newton MD;  Location: [facility] OR    ESOPHAGOSCOPY, GASTROSCOPY, DUODENOSCOPY (EGD), COMBINED N/A 2020    Procedure: ESOPHAGOGASTRODUODENOSCOPY (EGD), WITH BIOPSY;  Surgeon: Brian Grimes DO;  Location: [facility] OR    EXCISE TOENAIL(S) Left 2016    Procedure: EXCISE TOENAIL(S);  Surgeon: Ady Mejia DPM;  Location: [facility] OR    HEAD & NECK SURGERY  May 29th, 2021    IR MISCELLANEOUS PROCEDURE  2002    IR MISCELLANEOUS PROCEDURE  2002    IR MISCELLANEOUS PROCEDURE  2002    IR MISCELLANEOUS PROCEDURE  2002    NEPHRECTOMY Right 2017    SURGICAL HISTORY OF -           SURGICAL HISTORY OF -   -present    Lithothypsy X 49    SURGICAL HISTORY OF -       Percutaneous nephrostomy X2    SURGICAL HISTORY OF -   2011    Cystoscopy with bilateral ureteral pyeloscopy, stone basketing and stent placement     Family History:    Family History   Problem Relation Age of Onset    Respiratory Father         CATHY    C.A.D. Father     Heart Failure Father     Coronary Artery Disease Father     Hypertension Father     Depression Father     Anxiety Disorder Father     Diabetes Father         type 2    Cerebrovascular Disease Father     Lipids Mother         high cholesterol    Allergies Mother     Arthritis Mother         RA    Hyperlipidemia Mother     Lipids Sister         high cholesterol    Allergies Sister     Asthma Sister     Hypertension Sister     Allergies Sister     Respiratory Sister         cathy    Genitourinary Problems Maternal Grandmother         passed from complications from  renal failure    Arthritis Maternal Grandfather         rheumatoid    Diabetes Paternal Grandmother         adult onset    Cardiovascular Paternal Grandfather         AAA    Hypertension Sister     Hyperlipidemia Sister     Anxiety Disorder Sister     Asthma Sister     Depression Sister      Social History:  Marital Status:   [2]  Social History     Tobacco Use    Smoking status: Every Day     Current packs/day: 1.00     Average packs/day: 1 pack/day for 30.0 years (30.0 ttl pk-yrs)     Types: Cigarettes    Smokeless tobacco: Never    Tobacco comments:     trying to quit; cutting back on her own   Vaping Use    Vaping status: Never Used   Substance Use Topics    Alcohol use: No    Drug use: No      Medications:    cephALEXin (KEFLEX) 500 MG capsule  acetaminophen (TYLENOL) 325 MG tablet  cetirizine (ZYRTEC) 10 MG tablet  escitalopram (LEXAPRO) 20 MG tablet  LORazepam (ATIVAN) 0.5 MG tablet  rosuvastatin (CRESTOR) 20 MG tablet      Review of Systems   All other systems reviewed and are negative.      PE   BP: 120/68  Pulse: 71  Temp: 98.3  F (36.8  C)  Resp: 16  SpO2: 96 %  Physical Exam  Vitals reviewed.   Constitutional:       General: She is not in acute distress.     Appearance: She is well-developed.   HENT:      Head: Normocephalic and atraumatic.      Right Ear: External ear normal.      Left Ear: External ear normal.      Nose: Nose normal.      Mouth/Throat:      Mouth: Mucous membranes are moist.      Pharynx: Oropharynx is clear.   Eyes:      Extraocular Movements: Extraocular movements intact.      Conjunctiva/sclera: Conjunctivae normal.      Pupils: Pupils are equal, round, and reactive to light.   Cardiovascular:      Rate and Rhythm: Normal rate and regular rhythm.   Pulmonary:      Effort: Pulmonary effort is normal.   Abdominal:      Palpations: Abdomen is soft.      Tenderness: There is no abdominal tenderness.   Musculoskeletal:         General: Normal range of motion.      Cervical back:  Normal range of motion.   Skin:     General: Skin is warm and dry.   Neurological:      Mental Status: She is alert and oriented to person, place, and time.   Psychiatric:         Behavior: Behavior normal.         ED COURSE and MDM   1400.  Patient has left flank pain with nausea.  Symptoms are consistent with prior episodes of passing ureteral stones.  Bedside ultrasound not helpful in distinguishing between medullary sponge disease and hydronephrosis.  CT scan pending, patient is known to have a solitary kidney.  Notably, the patient continues to urinate.    1524.  No ureteral obstruction.  No hydronephrosis.  Concern for infection migrating up the ureter, as dictated by radiology when looking at the CT scan.  Urine analysis is quite abnormal.  No evidence for sepsis.  Kidney function similar to previous.  Potassium slightly elevated at 5.4.  Patient agrees with plan for discharge.  Return for worsening as discussed.    Electronic medical chart reviewed, including medical problems, medications, medical allergies, social history.  Recent hospitalizations and surgical procedures reviewed.  Recent clinic visits and consultations reviewed.  Recent labs and test results reviewed.  Nursing notes reviewed.    The patient, their parent if applicable, and/or their medical decision maker(s) and I have reviewed all of the available historical information, applicable PMH, physical exam findings, and objective diagnostic data gathered during this ED visit.  We then discussed all work-up options and then together agreed upon the course taken during this visit.  The ultimate disposition and plan was a cooperative decision made between myself and the patient, their parent if applicable, and/or their legal decision maker(s).  The risks and benefits of all decisions made during this visit were discussed to the best of my abilities given the circumstances, and all parties are understanding of the pertinent ramifications of these  decisions.      LABS  Labs Ordered and Resulted from Time of ED Arrival to Time of ED Departure   ROUTINE UA WITH MICROSCOPIC REFLEX TO CULTURE - Abnormal       Result Value    Color Urine Yellow      Appearance Urine Cloudy (*)     Glucose Urine Negative      Bilirubin Urine Negative      Ketones Urine Negative      Specific Gravity Urine 1.004      Blood Urine Moderate (*)     pH Urine 6.0      Protein Albumin Urine Negative      Urobilinogen Urine Normal      Nitrite Urine Negative      Leukocyte Esterase Urine Large (*)     Bacteria Urine Few (*)     WBC Clumps Urine Present (*)     Mucus Urine Present (*)     RBC Urine 17 (*)     WBC Urine >182 (*)     Squamous Epithelials Urine 1     BASIC METABOLIC PANEL - Abnormal    Sodium 140      Potassium 5.4 (*)     Chloride 107      Carbon Dioxide (CO2) 23      Anion Gap 10      Urea Nitrogen 30.2 (*)     Creatinine 1.90 (*)     GFR Estimate 31 (*)     Calcium 10.0      Glucose 91     CBC WITH PLATELETS AND DIFFERENTIAL - Abnormal    WBC Count 6.9      RBC Count 5.88 (*)     Hemoglobin 17.3 (*)     Hematocrit 52.3 (*)     MCV 89      MCH 29.4      MCHC 33.1      RDW 14.8      Platelet Count 111 (*)     % Neutrophils 63      % Lymphocytes 30      % Monocytes 5      % Eosinophils 1      % Basophils 1      % Immature Granulocytes 0      NRBCs per 100 WBC 0      Absolute Neutrophils 4.3      Absolute Lymphocytes 2.1      Absolute Monocytes 0.4      Absolute Eosinophils 0.1      Absolute Basophils 0.1      Absolute Immature Granulocytes 0.0      Absolute NRBCs 0.0     URINE CULTURE       IMAGING  Images reviewed by me.  Radiology report also reviewed.  CT Abdomen Pelvis w/o Contrast   Final Result   IMPRESSION:    Findings suspicious for urinary tract infection involving the left ureter          POC US ABDOMEN LIMITED   Preliminary Result   [facility] Procedure Note     Limited Bedside ED Renal Ultrasound:      PERFORMED BY: Dr. Pawan Rodriguez MD   INDICATIONS:   Flank Pain   PROBE: Low frequency convex probe   BODY LOCATION:  Abdomen   FINDINGS:  The ultrasound was performed with longitudinal and transverse views.    Left Kidney: Hydronephrosis and/or cystic structures within the kidney on the left.   INTERPRETATION: Hydronephrosis and/or cystic structures within the left kidney.   IMAGE DOCUMENTATION: Images were archived to hard drive.                Procedures    Medications - No data to display      IMPRESSION       ICD-10-CM    1. Urinary tract infection without hematuria, site unspecified  N39.0 Primary Care Referral      2. Hyperkalemia  E87.5                Medication List        Started      cephALEXin 500 MG capsule  Commonly known as: KEFLEX  500 mg, Oral, 2 TIMES DAILY                                  Pawan Rodriguez MD  07/13/24 1521

## 2024-07-14 LAB — BACTERIA UR CULT: NORMAL

## 2024-07-22 ENCOUNTER — TELEPHONE (OUTPATIENT)
Dept: FAMILY MEDICINE | Facility: CLINIC | Age: 54
End: 2024-07-22

## 2024-07-22 ENCOUNTER — OFFICE VISIT (OUTPATIENT)
Dept: FAMILY MEDICINE | Facility: CLINIC | Age: 54
End: 2024-07-22
Attending: FAMILY MEDICINE
Payer: COMMERCIAL

## 2024-07-22 VITALS
HEART RATE: 64 BPM | OXYGEN SATURATION: 96 % | WEIGHT: 185.5 LBS | DIASTOLIC BLOOD PRESSURE: 70 MMHG | BODY MASS INDEX: 29.11 KG/M2 | SYSTOLIC BLOOD PRESSURE: 102 MMHG | RESPIRATION RATE: 16 BRPM | HEIGHT: 67 IN | TEMPERATURE: 97.2 F

## 2024-07-22 DIAGNOSIS — Z72.0 TOBACCO ABUSE: ICD-10-CM

## 2024-07-22 DIAGNOSIS — N39.0 URINARY TRACT INFECTION WITHOUT HEMATURIA, SITE UNSPECIFIED: Primary | ICD-10-CM

## 2024-07-22 DIAGNOSIS — N18.32 STAGE 3B CHRONIC KIDNEY DISEASE (H): ICD-10-CM

## 2024-07-22 PROCEDURE — 99214 OFFICE O/P EST MOD 30 MIN: CPT | Performed by: NURSE PRACTITIONER

## 2024-07-22 RX ORDER — CIPROFLOXACIN 250 MG/1
250 TABLET, FILM COATED ORAL 2 TIMES DAILY
Qty: 14 TABLET | Refills: 0 | Status: SHIPPED | OUTPATIENT
Start: 2024-07-22 | End: 2024-07-29

## 2024-07-22 ASSESSMENT — PATIENT HEALTH QUESTIONNAIRE - PHQ9
SUM OF ALL RESPONSES TO PHQ QUESTIONS 1-9: 6
SUM OF ALL RESPONSES TO PHQ QUESTIONS 1-9: 6
10. IF YOU CHECKED OFF ANY PROBLEMS, HOW DIFFICULT HAVE THESE PROBLEMS MADE IT FOR YOU TO DO YOUR WORK, TAKE CARE OF THINGS AT HOME, OR GET ALONG WITH OTHER PEOPLE: NOT DIFFICULT AT ALL

## 2024-07-22 ASSESSMENT — PAIN SCALES - GENERAL: PAINLEVEL: MODERATE PAIN (4)

## 2024-07-22 NOTE — PROGRESS NOTES
Assessment & Plan     Urinary tract infection without hematuria, site unspecified  No improvement with antibiotics given in ER. Culture was negative, although CT scan showed infection in ureter. Will switch to cipro.  Has h/o recurrent UTIs and recurrent stones - needs a new urologist and referral placed.  - Primary Care Referral  - ciprofloxacin (CIPRO) 250 MG tablet; Take 1 tablet (250 mg) by mouth 2 times daily for 7 days  - Adult Urology  Referral; Future    Stage 3b chronic kidney disease (H)    Tobacco abuse  Patient would like to quit.  Asking for nicotrol - has used in the past with success.  - nicotine (NICOTROL) 10 MG inhaler; Use 1 cartridge as needed for urge to smoke by puffing over course of 20min.  Use 6-16 cart/day; reduce number of cart/day over 6-12 weeks.      The risks, benefits and treatment options of prescribed medications or other treatments have been discussed with the patient. The patient verbalized their understanding and should call or follow up if no improvement or if they develop further problems.  Maribeth Avila, CNP                Subjective   Latoya is a 53 year old, presenting for the following health issues:  ER F/U (7/13 )        7/22/2024     9:46 AM   Additional Questions   Roomed by Dereje HAMILTON CMA   Accompanied by self     HPI       ED/UC Followup:    Facility:  [facility]   Date of visit: 7/13/24  Reason for visit: UTI   Current Status: still having flank pain, Left side, urinary frequency, urgency, pain . Finished antibiotic 2 days ago, she states feels like she is passing a stone    No improvement in symptoms with the antibiotic  Culture was negative.          Review of Systems  Constitutional, HEENT, cardiovascular, pulmonary, gi and gu systems are negative, except as otherwise noted.      Objective    /70 (BP Location: Right arm, Patient Position: Sitting, Cuff Size: Adult Regular)   Pulse 64   Temp 97.2  F (36.2  C) (Tympanic)   Resp 16    "Ht 1.695 m (5' 6.75\")   Wt 84.1 kg (185 lb 8 oz)   LMP 07/02/2013 (Exact Date)   SpO2 96%   BMI 29.27 kg/m    Body mass index is 29.27 kg/m .  Physical Exam   GENERAL: alert and no distress  NECK: no adenopathy, no asymmetry, masses, or scars  RESP: lungs clear to auscultation - no rales, rhonchi or wheezes  CV: regular rate and rhythm, normal S1 S2, no S3 or S4, no murmur, click or rub, no peripheral edema  ABDOMEN: soft, nontender, no hepatosplenomegaly, no masses and bowel sounds normal  MS: no gross musculoskeletal defects noted, no edema            Signed Electronically by: MICEHLLE Sheehan CNP    "

## 2024-07-22 NOTE — TELEPHONE ENCOUNTER
NICOTROL INHALER IS NO LONGER AVAILABLE, WOULD YOU LIKE TO PRESCRIBE THE NICOTROL *NASAL* SPRAY OR OTHER NICOTINE PRODUCT?      Thank you,   Jona Boykin [facility]

## 2024-07-22 NOTE — TELEPHONE ENCOUNTER
"Patient called, she does not want the gum or patches.  States she will \"figure something else out\".  "

## 2024-08-15 ENCOUNTER — OFFICE VISIT (OUTPATIENT)
Dept: UROLOGY | Facility: CLINIC | Age: 54
End: 2024-08-15
Payer: COMMERCIAL

## 2024-08-15 VITALS
HEIGHT: 67 IN | DIASTOLIC BLOOD PRESSURE: 82 MMHG | TEMPERATURE: 97.5 F | BODY MASS INDEX: 29.03 KG/M2 | OXYGEN SATURATION: 97 % | SYSTOLIC BLOOD PRESSURE: 135 MMHG | HEART RATE: 64 BPM | WEIGHT: 185 LBS

## 2024-08-15 DIAGNOSIS — N39.0 URINARY TRACT INFECTION WITHOUT HEMATURIA, SITE UNSPECIFIED: ICD-10-CM

## 2024-08-15 PROCEDURE — 99213 OFFICE O/P EST LOW 20 MIN: CPT | Performed by: STUDENT IN AN ORGANIZED HEALTH CARE EDUCATION/TRAINING PROGRAM

## 2024-08-15 ASSESSMENT — PAIN SCALES - GENERAL: PAINLEVEL: MILD PAIN (2)

## 2024-08-15 NOTE — PROGRESS NOTES
"    UROLOGY FOLLOW-UP NOTE          Chief Complaint:   Today I had the pleasure of seeing Ms. Latoya Riddle in follow-up for a chief complaint of kidney stones.          Interval Update   Latoya Riddle is a very pleasant 53 year old female  with a history of CKD stage 3, congenital medullary sponge kidney, CATHY, and HLD.    Brief History: Ms. Latoya Riddle has followed with urology for kidney stones. The patient has a very complex medical history. Information is obtained through chart review from [facility]. She has a history of kidney stones, requiring many stone clearance procedures.      In February 2017, she developed gross hematuria and reported RLQ pain. Cystoscopy noted significant bleeding from the R ureteral orifice. 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 She underwent a right nephroureterectomy. Pathology ultimately demonstrated pyelonephritis.     She underwent urgent left ureteral stent place with Dr. Newton on 8/16/2023 and stone clearance on 8/23/2023.     Today's notes: She is doing well. She presents today to re-establish care with urology in [place]. Her most recent CT abdomen pelvis from 7/13/2024 showed a 4 mm stone in the left left pole. This is a decreased stone burden from previous. She had evidence of left pyelonephritis and was treated with antibiotics.     She is feeling back to baseline today.          Physical Exam:   Patient is a 53 year old  female   Vitals: Blood pressure 135/82, pulse 64, temperature 97.5  F (36.4  C), temperature source Tympanic, height 1.695 m (5' 6.75\"), weight 83.9 kg (185 lb), last menstrual period 07/02/2013, SpO2 97%, not currently breastfeeding.  General: Alert and oriented x 3, no acute distress.  Respiratory: Non-labored breathing.  Cardiac: Regular rate.        Labs and Pathology:    I personally reviewed all applicable laboratory data " and went over findings with patient  Significant for:    CBC RESULTS:  Recent Labs   Lab Test 07/13/24  1443 05/23/24  1329 11/06/23  1124 08/30/23  1447   WBC 6.9 8.6 8.3 10.1   HGB 17.3* 15.5 15.1 15.0   * 131* 135* 146*        BMP RESULTS:  Recent Labs   Lab Test 07/13/24  1443 05/23/24  1329 11/06/23  1124 08/30/23  1447 02/02/23  1330 05/10/21  1406 01/22/21  1515 04/12/20  0517 04/11/20  1955    143 140 140   < > 141 140 137 139   POTASSIUM 5.4* 4.4 4.6 4.3   < > 3.6 3.6 4.0 4.4   CHLORIDE 107 108* 107 107   < > 110* 108 109 109   CO2 23 20* 23 24   < > 24 27 23 25   ANIONGAP 10 15 10 9   < > 7 5 5 5   GLC 91 102* 94 113*   < > 117* 103* 105* 97   BUN 30.2* 27.1* 24.1* 21.2*   < > 25 19 22 24   CR 1.90* 1.84* 1.88* 1.81*   < > 1.49* 1.52* 1.55* 1.81*   GFRESTIMATED 31* 32* 32* 33*   < > 41* 40* 39* 32*   GFRESTBLACK  --   --   --   --   --  47* 46* 45* 38*   MARIA 10.0 9.8 9.7 10.1*   < > 9.1 9.4 9.0 9.3    < > = values in this interval not displayed.       UA RESULTS:   Recent Labs   Lab Test 07/13/24  1321 05/23/24  1329 10/11/23  1107 10/02/23  1429   SG 1.004 1.015 1.015 1.010   URINEPH 6.0 6.0 6.5 6.0   NITRITE Negative Positive* Positive* Positive*   RBCU 17* *  --  5-10*   WBCU >182* >100*  --  25-50*         Imaging:    I personally reviewed all applicable imaging and went over the below findings with patient.    CT Abdomen Pelvis w/o Contrast    Narrative    EXAM: CT ABDOMEN PELVIS W/O CONTRAST  LOCATION: [facility]  DATE: 7/13/2024    INDICATION: left flank pain, known history of congenital medullary sponge kidney with acquired solitary kidney, history of ureteral stones  COMPARISON: 4/26/2024   TECHNIQUE: CT scan of the abdomen and pelvis was performed without IV contrast. Multiplanar reformats were obtained. Dose reduction techniques were used.  CONTRAST: None.    FINDINGS:   LOWER CHEST: Scarring in the right middle lobe.     HEPATOBILIARY:  Normal.    PANCREAS: Normal.    SPLEEN: Normal.    ADRENAL GLANDS: Normal.    KIDNEYS/BLADDER:  Right nephrectomy. Wall thickening of the left ureter and renal pelvis with no hydroureteronephrosis or ureteral stones. Stable 4 mm nonobstructing stone at the lower pole. Left kidney cysts, requiring no follow-up.     BOWEL: Scattered sigmoid diverticula. No diverticulitis. Normal appendix.     LYMPH NODES: Normal.    VASCULATURE: Mild atherosclerosis.     PELVIC ORGANS: Normal.    MUSCULOSKELETAL: Scattered benign bone islands. Minimal degenerative change in the spine.       Impression    IMPRESSION:   Findings suspicious for urinary tract infection involving the left ureter                 Assessment/Plan   53 year old female seen in follow up for kidney stones. The patient has a very complex medical history. Information is obtained through chart review from [facility]. She has a history of kidney stones, requiring many stone clearance procedures.      In February 2017, she developed gross hematuria and reported RLQ pain. Cystoscopy noted significant bleeding from the R ureteral orifice. 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 She underwent a right nephroureterectomy. Pathology ultimately demonstrated pyelonephritis.     She underwent urgent left ureteral stent place with Dr. Newton on 8/16/2023 and stone clearance on 8/23/2023. This was a 10% calcium oxalate, 90% calcium phosphate stone.     She presents today to re-establish care with urology in [place]. Her most recent CT abdomen pelvis from 7/13/2024 showed a 4 mm stone in the left left pole. This is a decreased stone burden from previous. She had evidence of left pyelonephritis and was treated with antibiotics.     She is feeling back to baseline today. We discussed that given her solitary kidney, we would have a low threshold for re-imaging if she  develops left flank pain. We discussed that if she ever becomes anuric, she needs to seek emergent medical care.     We will plan for a repeat CT scan and Litholink before the end of the year with follow up to review results.     Plan:  CT abdomen pelvis without contrast in 2024.   Litholink urine collection in November-2024.   Follow up to review results of above.            Past Medical History:     Past Medical History:   Diagnosis Date    Anxiety and depression 2016    Chronic kidney disease, stage 3 (H) 2021    Congenital medullary sponge kidney 2005    Hyperlipidemia     Kidney stone     Obstructive sleep apnea syndrome 2018    Formatting of this note might be different from the original.   Formatting of this note might be different from the original.   Mild to moderate CATHY without sleep-associated hypoxemia    - PSG performed 2008 with weight 180 lbs, AHI 0.3, RDI 41, mary SpO2 93%, PLMI 0, arousal index 57.9.    CATHY (obstructive sleep apnea) 2018    Mild to moderate CATHY without sleep-associated hypoxemia   - PSG performed 2008 with weight 180 lbs, AHI 0.3, RDI 41, mary SpO2 93%, PLMI 0, arousal index 57.9.            Past Surgical History:     Past Surgical History:   Procedure Laterality Date     SECTION      COMBINED CYSTOSCOPY, INSERT STENT URETER(S) Left 11/10/2017    Procedure: COMBINED CYSTOSCOPY, INSERT STENT URETER(S);  Cystoscopy, Left Ureteral Stent Placement;  Surgeon: Yg Rodriguez MD;  Location: [facility] OR    COMBINED CYSTOSCOPY, RETROGRADES, EXCHANGE STENT URETER(S) Left 2023    Procedure: CYSTOSCOPY, WITH RETROGRADE PYELOGRAM AND URETERAL STENT REPLACEMENT;  Surgeon: Gustavo Newton MD;  Location: [facility] OR    CYSTOURETEROSCOPY, WITH LITHOTRIPSY USING RAMONA 120P LASER AND URETERAL STENT INSERTION Left 2023    Procedure: CYSTOURETEROSCOPY, WITH RETROGRADE PYELOGRAM, HOLMIUM LASER LITHOTRIPSY OF LEFT  URETERAL CALCULUSAND BASKET REMOVAL OF LEFT KIDNEY STONE , AND STENT exchange;  Surgeon: Gustavo Newton MD;  Location: [facility] OR    ESOPHAGOSCOPY, GASTROSCOPY, DUODENOSCOPY (EGD), COMBINED N/A 2020    Procedure: ESOPHAGOGASTRODUODENOSCOPY (EGD), WITH BIOPSY;  Surgeon: Brian Grimes DO;  Location: [facility] OR    EXCISE TOENAIL(S) Left 2016    Procedure: EXCISE TOENAIL(S);  Surgeon: Ady Mejia DPM;  Location: [facility] OR    HEAD & NECK SURGERY  May 29th, 2021    IR MISCELLANEOUS PROCEDURE  2002    IR MISCELLANEOUS PROCEDURE  2002    IR MISCELLANEOUS PROCEDURE  2002    IR MISCELLANEOUS PROCEDURE  2002    NEPHRECTOMY Right 2017    SURGICAL HISTORY OF -           SURGICAL HISTORY OF -   -present    Lithothypsy X 49    SURGICAL HISTORY OF -       Percutaneous nephrostomy X2    SURGICAL HISTORY OF -   2011    Cystoscopy with bilateral ureteral pyeloscopy, stone basketing and stent placement            Medications     Current Outpatient Medications   Medication Sig Dispense Refill    cetirizine (ZYRTEC) 10 MG tablet Take 10 mg by mouth every evening      escitalopram (LEXAPRO) 20 MG tablet Take 1 tablet (20 mg) by mouth daily 90 tablet 3    rosuvastatin (CRESTOR) 20 MG tablet Take 1 tablet (20 mg) by mouth every evening 90 tablet 3    acetaminophen (TYLENOL) 325 MG tablet Take 2 tablets (650 mg) by mouth every 4 hours as needed for other (mild pain) 100 tablet 0    LORazepam (ATIVAN) 0.5 MG tablet Take 1 tablet (0.5 mg) by mouth every 8 hours as needed for anxiety 30 tablet 0     No current facility-administered medications for this visit.            Family History:     Family History   Problem Relation Age of Onset    Respiratory Father         CATHY    C.A.D. Father     Heart Failure Father     Coronary Artery Disease Father     Hypertension Father     Depression Father     Anxiety Disorder Father     Diabetes Father         type 2     Cerebrovascular Disease Father     Lipids Mother         high cholesterol    Allergies Mother     Arthritis Mother         RA    Hyperlipidemia Mother     Lipids Sister         high cholesterol    Allergies Sister     Asthma Sister     Hypertension Sister     Allergies Sister     Respiratory Sister         moshe    Genitourinary Problems Maternal Grandmother         passed from complications from renal failure    Arthritis Maternal Grandfather         rheumatoid    Diabetes Paternal Grandmother         adult onset    Cardiovascular Paternal Grandfather         AAA    Hypertension Sister     Hyperlipidemia Sister     Anxiety Disorder Sister     Asthma Sister     Depression Sister             Social History:     Social History     Socioeconomic History    Marital status:      Spouse name: Not on file    Number of children: 1    Years of education: Not on file    Highest education level: Not on file   Occupational History     Employer: UNEMPLOYED     Employer: DISABLED     Comment: due to kidney disease   Tobacco Use    Smoking status: Every Day     Current packs/day: 1.00     Average packs/day: 1 pack/day for 30.0 years (30.0 ttl pk-yrs)     Types: Cigarettes    Smokeless tobacco: Never    Tobacco comments:     trying to quit; cutting back on her own   Vaping Use    Vaping status: Never Used   Substance and Sexual Activity    Alcohol use: No    Drug use: No    Sexual activity: Yes     Partners: Male     Birth control/protection: None   Other Topics Concern    Parent/sibling w/ CABG, MI or angioplasty before 65F 55M? Yes   Social History Narrative    Not on file     Social Determinants of Health     Financial Resource Strain: Low Risk  (5/23/2024)    Financial Resource Strain     Within the past 12 months, have you or your family members you live with been unable to get utilities (heat, electricity) when it was really needed?: No   Food Insecurity: Low Risk  (5/23/2024)    Food Insecurity     Within the past 12  months, did you worry that your food would run out before you got money to buy more?: No     Within the past 12 months, did the food you bought just not last and you didn t have money to get more?: No   Transportation Needs: Low Risk  (5/23/2024)    Transportation Needs     Within the past 12 months, has lack of transportation kept you from medical appointments, getting your medicines, non-medical meetings or appointments, work, or from getting things that you need?: No   Physical Activity: Unknown (5/23/2024)    Exercise Vital Sign     Days of Exercise per Week: 1 day     Minutes of Exercise per Session: Not on file   Stress: Stress Concern Present (5/23/2024)    [employer] [place] [employer] - Occupational Stress Questionnaire     Feeling of Stress : Rather much   Social Connections: Unknown (5/23/2024)    Social Connection and Isolation Panel [NHANES]     Frequency of Communication with Friends and Family: Not on file     Frequency of Social Gatherings with Friends and Family: Once a week     Attends Jew Services: Not on file     Active Member of Clubs or Organizations: Not on file     Attends Club or Organization Meetings: Not on file     Marital Status: Not on file   Interpersonal Safety: Low Risk  (5/23/2024)    Interpersonal Safety     Do you feel physically and emotionally safe where you currently live?: Yes     Within the past 12 months, have you been hit, slapped, kicked or otherwise physically hurt by someone?: No     Within the past 12 months, have you been humiliated or emotionally abused in other ways by your partner or ex-partner?: No   Housing Stability: Low Risk  (5/23/2024)    Housing Stability     Do you have housing? : Yes     Are you worried about losing your housing?: No            Allergies:   Gentamycin [gentamicin sulfate], Nsaids, and Skin adhesives [cyanoacrylate]         Review of Systems:  From intake questionnaire   Negative 14 system review except as noted on HPI,  nurse's note.        SHWETA WAGNER PA-C  Department of Urology

## 2024-08-15 NOTE — PATIENT INSTRUCTIONS
24 hour urine kit, should be arriving before or beginning of December. If no kit arrives, please call clinic to inform us

## 2024-08-28 NOTE — PATIENT INSTRUCTIONS
Drive Safe... Drive Alive     Sleep health profoundly affects your health, mood, and your safety. 33% of the population (one in three of us) is not getting enough sleep and many have a sleep disorder. Not getting enough sleep or having an untreated / undertreated sleep condition may make us sleepy without even knowing it. In fact, our driving could be dramatically impaired due to our sleep health. As your provider, here are some things I would like you to know about driving:     Here are some warning signs for impairment and dangerous drowsy driving:              -Having been awake more than 16 hours               -Looking tired               -Eyelid drooping              -Head nodding (it could be too late at this point)              -Driving for more than 30 minutes     Some things you could do to make the driving safer if you are experiencing some drowsiness:              -Stop driving and rest              -Call for transportation              -Make sure your sleep disorder is adequately treated     Some things that have been shown NOT to work when experiencing drowsiness while driving:              -Turning on the radio              -Opening windows              -Eating any  distracting  /  entertaining  foods (e.g., sunflower seeds, candy, or any other)              -Talking on the phone      Your decision may not only impact your life, but also the life of others. Please, remember to drive safe for yourself and all of us.       Your BMI is Body mass index is 29.19 kg/m .    What is BMI?  Body mass index (BMI) is one way to tell whether you are at a healthy weight, overweight, or obese. It measures your weight in relation to your height.  A BMI of 18.5 to 24.9 is in the healthy range. A person with a BMI of 25 to 29.9 is considered overweight, and someone with a BMI of 30 or greater is considered obese.  Another way to find out if you are at risk for health problems caused by overweight and obesity is to  measure your waist. If you are a woman and your waist is more than 35 inches, or if you are a man and your waist is more than 40 inches, your risk of disease may be higher.  More than two-thirds of American adults are considered overweight or obese. Being overweight or obese increases the risk for further weight gain.  Excess weight may lead to heart disease and diabetes. Creating and following plans for healthy eating and physical activity may help you improve your health.    Methods for maintaining or losing weight.  Weight control is part of healthy lifestyle and includes exercise, emotional health, and healthy eating habits.  Careful eating habits lifelong is the mainstay of weight control.  Though there are significant health benefits from weight loss, long-term weight loss with diet alone may be very difficult to achieve- studies show long-term success with dietary management in less than 10% of people. Attaining a healthy weight may be especially difficult to achieve in those with severe obesity. In some cases, medications, devices and surgical management might be considered.    What can you do?  If you are overweight or obese and are interested in methods for weight loss, you should discuss this with your provider. In addition, we recommend that you review healthy life styles and methods for weight loss available through the National Institutes of Health patient information sites:   http://win.niddk.nih.gov/publications/index.htm     Drive Safe... Drive Alive     Sleep health profoundly affects your health, mood, and your safety. 33% of the population (one in three of us) is not getting enough sleep and many have a sleep disorder. Not getting enough sleep or having an untreated / undertreated sleep condition may make us sleepy without even knowing it. In fact, our driving could be dramatically impaired due to our sleep health. As your provider, here are some things I would like you to know about driving:      Here are some warning signs for impairment and dangerous drowsy driving:              -Having been awake more than 16 hours               -Looking tired               -Eyelid drooping              -Head nodding (it could be too late at this point)              -Driving for more than 30 minutes     Some things you could do to make the driving safer if you are experiencing some drowsiness:              -Stop driving and rest              -Call for transportation              -Make sure your sleep disorder is adequately treated     Some things that have been shown NOT to work when experiencing drowsiness while driving:              -Turning on the radio              -Opening windows              -Eating any  distracting  /  entertaining  foods (e.g., sunflower seeds, candy, or any other)              -Talking on the phone      Your decision may not only impact your life, but also the life of others. Please, remember to drive safe for yourself and all of us.     Your There is no height or weight on file to calculate BMI.  Weight management is a personal decision.  If you are interested in exploring weight loss strategies, the following discussion covers the approaches that may be successful. Body mass index (BMI) is one way to tell whether you are at a healthy weight, overweight, or obese. It measures your weight in relation to your height.  A BMI of 18.5 to 24.9 is in the healthy range. A person with a BMI of 25 to 29.9 is considered overweight, and someone with a BMI of 30 or greater is considered obese. More than two-thirds of American adults are considered overweight or obese.  Being overweight or obese increases the risk for further weight gain. Excess weight may lead to heart disease and diabetes.  Creating and following plans for healthy eating and physical activity may help you improve your health.  Weight control is part of healthy lifestyle and includes exercise, emotional health, and healthy eating habits.  Careful eating habits lifelong are the mainstay of weight control. Though there are significant health benefits from weight loss, long-term weight loss with diet alone may be very difficult to achieve- studies show long-term success with dietary management in less than 10% of people. Attaining a healthy weight may be especially difficult to achieve in those with severe obesity. In some cases, medications, devices and surgical management might be considered.  What can you do?  If you are overweight or obese and are interested in methods for weight loss, you should discuss this with your provider.   Consider reducing daily calorie intake by 500 calories.   Keep a food journal.   Avoiding skipping meals, consider cutting portions instead.    Diet combined with exercise helps maintain muscle while optimizing fat loss. Strength training is particularly important for building and maintaining muscle mass. Exercise helps reduce stress, increase energy, and improves fitness. Increasing exercise without diet control, however, may not burn enough calories to loose weight.     Start walking three days a week 10-20 minutes at a time  Work towards walking thirty minutes five days a week   Eventually, increase the speed of your walking for 1-2 minutes at time    In addition, we recommend that you review healthy lifestyles and methods for weight loss available through the National Institutes of Health patient information sites:  http://win.niddk.nih.gov/publications/index.htm    And look into health and wellness programs that may be available through your health insurance provider, employer, local community center, or yamile club.

## 2024-08-28 NOTE — PROGRESS NOTES
Virtual Visit Details    Type of service:  Video Visit   Video Start Time:  2:00 PM  Video End Time:2:17 PM    Originating Location (pt. Location): Home    Distant Location (provider location):  Off-site  Platform used for Video Visit: Well      Obstructive Sleep Apnea - PAP Follow-Up Visit:    Chief Complaint   Patient presents with    RECHECK       Latoya Riddle comes in today for follow-up of their severe sleep apnea, managed with CPAP.     Latoya Riddle has a significant history of recurrent kidney stones as well as a solitary left kidney having lost her right kidney to kidney stones.  More also with a diagnosis of hyperlipidemia, recurrent major depressive disorder, stage IIIb chronic kidney disease, migraines, anxiety, and tobacco misuse.    Do you use a CPAP Machine at home: Yes  Overall, on a scale of 0-10 how would you rate your CPAP (0 poor, 10 great): 9  Is your mask comfortable: Yes  If not, why:    Is you mask leaking: No  If yes, where do you feel it:    How many night per week does the mask leak (0-7):    Do you notice snoring with mask on:    Do you notice gasping arousals with mask on: No  Are you having significant oral or nasal dryness: No  Is the pressure setting comfortable: Yes  In not, why:    What type of mask do you use: Nasal Pillow  What is your typical bedtime: 11 pm  How long does it take you to go to sleep on PAP therapy: 5 min  What time do you typically get out of bed for the day: 8 am  How many hours on average per night are you using PAP therapy: Usually 6-7  How many hours are you sleeping per night: Usually 6-7  Do you feel well rested in the morning: No    EPWORTH SLEEPINESS SCALE         8/29/2024    11:41 AM    [place] Sleepiness Scale ( SLADE Fuller  [id number]<br>ESS - [place]/English - Final version - 21 Nov 07 - [facility] [place].)   Sitting and reading Moderate chance of dozing   Watching TV Slight chance of dozing   Sitting, inactive in a public place (e.g. a  theatre or a meeting) Slight chance of dozing   As a passenger in a car for an hour without a break Would never doze   Lying down to rest in the afternoon when circumstances permit High chance of dozing   Sitting and talking to someone Would never doze   Sitting quietly after a lunch without alcohol Slight chance of dozing   In a car, while stopped for a few minutes in traffic Would never doze   [place] Score (MC) 8   [place] Score (Sleep) 8       INSOMNIA SEVERITY INDEX (ELAINE)          8/29/2024    11:38 AM   Insomnia Severity Index (ELAINE)   Difficulty falling asleep 1   Difficulty staying asleep 1   Problems waking up too early 0   How SATISFIED/DISSATISFIED are you with your CURRENT sleep pattern? 1   How NOTICEABLE to others do you think your sleep problem is in terms of impairing the quality of your life? 4   How WORRIED/DISTRESSED are you about your current sleep problem? 3   To what extent do you consider your sleep problem to INTERFERE with your daily functioning (e.g. daytime fatigue, mood, ability to function at work/daily chores, concentration, memory, mood, etc.) CURRENTLY? 3   ELAINE Total Score 13       Guidelines for Scoring/Interpretation:  Total score categories:  0-7 = No clinically significant insomnia   8-14 = Subthreshold insomnia   15-21 = Clinical insomnia (moderate severity)  22-28 = Clinical insomnia (severe)  Used via courtesy of www.IRIS.TVth.va.gov with permission from Rex Argueta PhD., [facility]    ResMed   Auto-PAP 5.0 - 15.0 cmH2O 30 day usage data:    100% of days with > 4 hours of use. 0/30 days with no use.   Average use 477 minutes per day.   95%ile Leak 13.94 L/min.   CPAP 95% pressure 10 cm.   AHI 3.41 events per hour.       Previous sleep studies:  Per note dated 2/9/2018.    Weight on that day, 172 pounds with BMI of 27.76  Neck circumference 35 cm.  Weight today 192 pounds, BMI of 30.30.  Neck circumference 39 cm       6/4/2024 [place] [facility] Sleep Study (192.0 lbs)  - AHI 43.5, RDI 46.1, Supine AHI 74.0, REM AHI 3.6, Low O2 87.0%, Time Spent ?88% 3.9 minutes / Time Spent ?89% 20.6 minutes.    Weight gain since previous study: -7      Reviewed by team:  Tobacco  Allergies  Meds  Problems  Med Hx  Surg Hx  Fam Hx        Reviewed by provider:  Tobacco  Allergies  Meds  Problems  Med Hx  Surg Hx  Fam Hx           Problem List:  Patient Active Problem List    Diagnosis Date Noted    Depression 10/04/2023     Priority: Medium    Acquired solitary kidney 08/28/2023     Priority: Medium    LINDEN (acute kidney injury) (H24) 08/17/2023     Priority: Medium    Urinary tract infection with hematuria, site unspecified 08/16/2023     Priority: Medium    Left ureteral stone 08/16/2023     Priority: Medium    left Hydronephrosis with urinary obstruction due to ureteral calculus 08/16/2023     Priority: Medium    Moderate episode of recurrent major depressive disorder (H) 07/12/2022     Priority: Medium    Stage 3b chronic kidney disease (H) 01/22/2021     Priority: Medium    Chronic migraine without aura without status migrainosus, not intractable 04/21/2020     Priority: Medium    Anxiety and depression 01/11/2016     Priority: Medium    Acute kidney injury (H24) 06/25/2015     Priority: Medium    Pyelonephritis 08/16/2013     Priority: Medium    Lower urinary tract infectious disease 05/12/2012     Priority: Medium     Overview:   Resistant   Pseudomonas       Hydronephrosis, right 05/12/2012     Priority: Medium    Hyperlipemia 01/06/2011     Priority: Medium    Hyperlipidemia 01/06/2011     Priority: Medium    Tobacco abuse 04/19/2010     Priority: Medium    Dyspnea and respiratory abnormality 04/07/2008     Priority: Medium     Sleep study 4/1/08: No CATHY. .5 minutes, sleep latency  normal 11.7 minutes. REM latency 154.5 minutes. Sleep efficiency at 89.4%. The sleep architecture was periodically disrupted with frequent sleep stage changes and arousals. Snoring:  "moderately loud. RDI 41.0,  AHI of 0.3. Lowest O2 93.0%. PLM index 0.0.  Problem list name updated by automated process. Provider to review      Calculus of kidney 06/20/2005     Priority: Medium     Due to RTA and medullary sponge kidney. [id number] ( See's Dr. Tavo Contreras)- calcium oxalate and calcium phosphate. S/p multiple procedures, >50      Congenital medullary sponge kidney 06/20/2005     Priority: Medium    Renal osteodystrophy 06/20/2005     Priority: Medium     Tubular acidosis            /82   Ht 1.695 m (5' 6.75\")   Wt 83.9 kg (185 lb)   LMP 07/02/2013 (Exact Date)   BMI 29.19 kg/m      Impression/Plan:    ICD-10-CM    1. CATHY (obstructive sleep apnea)  G47.33       2. Tobacco abuse  Z72.0       3. Stage 3b chronic kidney disease (H)  N18.32       4. Mixed hyperlipidemia  E78.2       5. Moderate episode of recurrent major depressive disorder (H)  F33.1       6. Dyspnea and respiratory abnormality  R06.00     R06.89       Latoya Riddle is a very pleasant 53-year-old female who presents to the sleep medicine center with severe obstructive sleep apnea well treated with her CPAP therapy.  Patient appreciates the use of her CPAP and was congratulated for her consistent diligent use.  Her apnea is well corrected with a residual AHI of 3.41 events/hour.  She denies any intrusion of sleepiness into her driving capability.  She does admit to being fatigued, but feels that it is more related to depression.  We did review her labs together, and noticed nothing abnormal for her.  She does have a history of stage IIIb chronic kidney disease.  Medications which may be causative factors would be rosuvastatin and escitalopram.  Patient denies any changes in her sleeping habits as well.  [place] sleepiness score is 8, ELAINE score is 13.  A comprehensive order for needed supplies and equipment was placed today.  Recommend patient follow-up in 1 year, sooner if needed.  Recommend patient optimize her sleep " schedule as well as her sleep hygiene practices to mitigate any further sleep disruption.  Recommend patient employ safe driving practices such as not driving a motor vehicle should she become drowsy.  Recommend patient maintain her BMI below 30.    16 minutes spent with patient, all of which were spent face-to-face counseling, consulting, coordinating plan of care.      MICHELLE Guerra CNP    CC:  Maribeth Avila,

## 2024-08-29 ENCOUNTER — VIRTUAL VISIT (OUTPATIENT)
Dept: SLEEP MEDICINE | Facility: CLINIC | Age: 54
End: 2024-08-29
Payer: COMMERCIAL

## 2024-08-29 VITALS
WEIGHT: 185 LBS | HEIGHT: 67 IN | BODY MASS INDEX: 29.03 KG/M2 | DIASTOLIC BLOOD PRESSURE: 82 MMHG | SYSTOLIC BLOOD PRESSURE: 135 MMHG

## 2024-08-29 DIAGNOSIS — G47.33 OSA (OBSTRUCTIVE SLEEP APNEA): Primary | ICD-10-CM

## 2024-08-29 DIAGNOSIS — Z72.0 TOBACCO ABUSE: ICD-10-CM

## 2024-08-29 DIAGNOSIS — N18.32 STAGE 3B CHRONIC KIDNEY DISEASE (H): ICD-10-CM

## 2024-08-29 DIAGNOSIS — F33.1 MODERATE EPISODE OF RECURRENT MAJOR DEPRESSIVE DISORDER (H): ICD-10-CM

## 2024-08-29 DIAGNOSIS — E78.2 MIXED HYPERLIPIDEMIA: ICD-10-CM

## 2024-08-29 DIAGNOSIS — R06.89 DYSPNEA AND RESPIRATORY ABNORMALITY: ICD-10-CM

## 2024-08-29 DIAGNOSIS — R06.00 DYSPNEA AND RESPIRATORY ABNORMALITY: ICD-10-CM

## 2024-08-29 PROCEDURE — 99212 OFFICE O/P EST SF 10 MIN: CPT | Mod: 95 | Performed by: NURSE PRACTITIONER

## 2024-08-29 ASSESSMENT — SLEEP AND FATIGUE QUESTIONNAIRES
HOW LIKELY ARE YOU TO NOD OFF OR FALL ASLEEP WHILE LYING DOWN TO REST IN THE AFTERNOON WHEN CIRCUMSTANCES PERMIT: HIGH CHANCE OF DOZING
HOW LIKELY ARE YOU TO NOD OFF OR FALL ASLEEP WHILE WATCHING TV: SLIGHT CHANCE OF DOZING
HOW LIKELY ARE YOU TO NOD OFF OR FALL ASLEEP WHEN YOU ARE A PASSENGER IN A CAR FOR AN HOUR WITHOUT A BREAK: WOULD NEVER DOZE
HOW LIKELY ARE YOU TO NOD OFF OR FALL ASLEEP WHILE SITTING INACTIVE IN A PUBLIC PLACE: SLIGHT CHANCE OF DOZING
HOW LIKELY ARE YOU TO NOD OFF OR FALL ASLEEP WHILE SITTING AND TALKING TO SOMEONE: WOULD NEVER DOZE
HOW LIKELY ARE YOU TO NOD OFF OR FALL ASLEEP WHILE SITTING AND READING: MODERATE CHANCE OF DOZING
HOW LIKELY ARE YOU TO NOD OFF OR FALL ASLEEP IN A CAR, WHILE STOPPED FOR A FEW MINUTES IN TRAFFIC: WOULD NEVER DOZE
HOW LIKELY ARE YOU TO NOD OFF OR FALL ASLEEP WHILE SITTING QUIETLY AFTER LUNCH WITHOUT ALCOHOL: SLIGHT CHANCE OF DOZING

## 2024-08-29 ASSESSMENT — PAIN SCALES - GENERAL: PAINLEVEL: NO PAIN (0)

## 2024-08-29 ASSESSMENT — PATIENT HEALTH QUESTIONNAIRE - PHQ9: SUM OF ALL RESPONSES TO PHQ QUESTIONS 1-9: 6

## 2024-08-29 NOTE — NURSING NOTE
Current patient location: [address] [place] [address]  [place] [address]    Is the patient currently in the state of [place]? YES    Visit mode:VIDEO    If the visit is dropped, the patient can be reconnected by: VIDEO VISIT: Text to cell phone:   Telephone Information:   Mobile [phone]       Will anyone else be joining the visit? NO  (If patient encounters technical issues they should call [phone])    How would you like to obtain your AVS? MyChart    Are changes needed to the allergy or medication list? No    Are refills needed on medications prescribed by this physician? NO    Rooming Documentation:  Questionnaire(s) completed      Reason for visit: RECHECK    Jaky KENDALLF

## 2024-09-04 NOTE — NURSING NOTE
1 year appointment reminder message was created and will be sent out 2 - 3 months prior to next appointment due date. [employer]

## 2024-11-25 ENCOUNTER — MEDICAL CORRESPONDENCE (OUTPATIENT)
Dept: HEALTH INFORMATION MANAGEMENT | Facility: CLINIC | Age: 54
End: 2024-11-25
Payer: COMMERCIAL

## 2024-12-09 ENCOUNTER — TRANSFERRED RECORDS (OUTPATIENT)
Dept: HEALTH INFORMATION MANAGEMENT | Facility: CLINIC | Age: 54
End: 2024-12-09
Payer: COMMERCIAL

## 2024-12-10 ENCOUNTER — HOSPITAL ENCOUNTER (OUTPATIENT)
Dept: CT IMAGING | Facility: CLINIC | Age: 54
Discharge: HOME | End: 2024-12-10
Attending: STUDENT IN AN ORGANIZED HEALTH CARE EDUCATION/TRAINING PROGRAM
Payer: COMMERCIAL

## 2024-12-10 DIAGNOSIS — N39.0 URINARY TRACT INFECTION WITHOUT HEMATURIA, SITE UNSPECIFIED: ICD-10-CM

## 2024-12-10 PROCEDURE — 74176 CT ABD & PELVIS W/O CONTRAST: CPT

## 2024-12-17 ENCOUNTER — TELEPHONE (OUTPATIENT)
Dept: UROLOGY | Facility: CLINIC | Age: 54
End: 2024-12-17

## 2024-12-17 ENCOUNTER — OFFICE VISIT (OUTPATIENT)
Dept: UROLOGY | Facility: CLINIC | Age: 54
End: 2024-12-17
Payer: COMMERCIAL

## 2024-12-17 VITALS
SYSTOLIC BLOOD PRESSURE: 105 MMHG | HEART RATE: 62 BPM | DIASTOLIC BLOOD PRESSURE: 70 MMHG | BODY MASS INDEX: 29.66 KG/M2 | HEIGHT: 67 IN | WEIGHT: 189 LBS | TEMPERATURE: 97.6 F | OXYGEN SATURATION: 96 %

## 2024-12-17 DIAGNOSIS — N20.0 NEPHROLITHIASIS: ICD-10-CM

## 2024-12-17 DIAGNOSIS — N95.2 ATROPHIC VAGINITIS: Primary | ICD-10-CM

## 2024-12-17 PROCEDURE — 99213 OFFICE O/P EST LOW 20 MIN: CPT | Performed by: STUDENT IN AN ORGANIZED HEALTH CARE EDUCATION/TRAINING PROGRAM

## 2024-12-17 RX ORDER — ESTRADIOL 0.1 MG/G
2 CREAM VAGINAL
Qty: 42.5 G | Refills: 3 | Status: SHIPPED | OUTPATIENT
Start: 2024-12-19

## 2024-12-17 RX ORDER — OXYCODONE HYDROCHLORIDE 5 MG/1
5 TABLET ORAL EVERY 6 HOURS PRN
Qty: 5 TABLET | Refills: 0 | Status: SHIPPED | OUTPATIENT
Start: 2024-12-17

## 2024-12-17 ASSESSMENT — PAIN SCALES - GENERAL: PAINLEVEL_OUTOF10: MILD PAIN (2)

## 2024-12-17 NOTE — TELEPHONE ENCOUNTER
Per Dara Tamayo, litholink results are still not in, ask patient if she had sent in the kit or when she sent it in. Per Dara, may have to reschedule appointment if results are not finalized yet.     Sent straight to voicemail. Left voicemail for patient at 9:30am stating 24 hour urine results have not been sent to clinic yet but that writer will try to call company to get results prior to visit.    Per Dara Tamayo, results are in but will need to discuss with Dr. Self more to come up with plan. Per Dara, ask patient if appointment can be pushed out to a further date. If patient can only do today for appointment, Dara can discuss CT but will need to hold off on litholink  results. Per Dara, patient can be squeezed into a spot if patient does not want to wait until next available opening.     Sent straight to voicemail. Left voicemail for patient asking if appointment can be rescheduled due to results needing to be discussed further with doctor.    Elva GALLEGO CMA 12/17/24 12:07 PM

## 2024-12-17 NOTE — PROGRESS NOTES
"    UROLOGY FOLLOW-UP NOTE          Chief Complaint:   Today I had the pleasure of seeing Ms. Latoya Riddle in follow-up for a chief complaint of kidney stones.          Interval Update   Latoya Riddle is a very pleasant 54 year old female with a history of CKD stage 3, congenital medullary sponge kidney, CATHY, and HLD.     Brief History: Ms. Latoya Riddle has followed with urology for kidney stones. The patient has a very complex medical history. Information is obtained through chart review from [facility]. She has a history of kidney stones, requiring many stone clearance procedures.      In February 2017, she developed gross hematuria and reported RLQ pain. Cystoscopy noted significant bleeding from the R ureteral orifice. 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 She underwent a right nephroureterectomy. Pathology ultimately demonstrated pyelonephritis.      She underwent urgent left ureteral stent place with Dr. Newton on 8/16/2023 and stone clearance on 8/23/2023.     Today's notes: She is doing well. She continues to report passage of kidney stones. She states she took potassium citrate in the past and it caused an upset stomach. She was then drinking baking soda, but found this hard to do.          Physical Exam:   Patient is a 54 year old  female   Vitals: Blood pressure 105/70, pulse 62, temperature 97.6  F (36.4  C), temperature source Tympanic, height 1.695 m (5' 6.75\"), weight 85.7 kg (189 lb), last menstrual period 07/02/2013, SpO2 96%, not currently breastfeeding.  General: Alert and oriented x 3, no acute distress.  Respiratory: Non-labored breathing.  Cardiac: Regular rate.      Labs and Pathology:    I personally reviewed all applicable laboratory data and went over findings with patient  Significant for:    CBC RESULTS:  Recent Labs   Lab Test 07/13/24  1443 05/23/24  1329 " 11/06/23  1124 08/30/23  1447   WBC 6.9 8.6 8.3 10.1   HGB 17.3* 15.5 15.1 15.0   * 131* 135* 146*        BMP RESULTS:  Recent Labs   Lab Test 07/13/24  1443 05/23/24  1329 11/06/23  1124 08/30/23  1447 02/02/23  1330 05/10/21  1406 01/22/21  1515 04/12/20  0517 04/11/20  1955    143 140 140   < > 141 140 137 139   POTASSIUM 5.4* 4.4 4.6 4.3   < > 3.6 3.6 4.0 4.4   CHLORIDE 107 108* 107 107   < > 110* 108 109 109   CO2 23 20* 23 24   < > 24 27 23 25   ANIONGAP 10 15 10 9   < > 7 5 5 5   GLC 91 102* 94 113*   < > 117* 103* 105* 97   BUN 30.2* 27.1* 24.1* 21.2*   < > 25 19 22 24   CR 1.90* 1.84* 1.88* 1.81*   < > 1.49* 1.52* 1.55* 1.81*   GFRESTIMATED 31* 32* 32* 33*   < > 41* 40* 39* 32*   GFRESTBLACK  --   --   --   --   --  47* 46* 45* 38*   MARIA 10.0 9.8 9.7 10.1*   < > 9.1 9.4 9.0 9.3    < > = values in this interval not displayed.       UA RESULTS:   Recent Labs   Lab Test 07/13/24  1321 05/23/24  1329 10/11/23  1107 10/02/23  1429   SG 1.004 1.015 1.015 1.010   URINEPH 6.0 6.0 6.5 6.0   NITRITE Negative Positive* Positive* Positive*   RBCU 17* *  --  5-10*   WBCU >182* >100*  --  25-50*         Imaging:    I personally reviewed all applicable imaging and went over the below findings with patient.    Results for orders placed or performed during the hospital encounter of 12/10/24   CT Abdomen Pelvis w/o Contrast [[id number]]    Narrative    CT ABDOMEN PELVIS W/O CONTRAST 12/10/2024 2:03 PM    CLINICAL HISTORY: History of kidney stones, solitary kidney, low dose;  Urinary tract infection without hematuria, site unspecified.    TECHNIQUE: CT scan of the abdomen and pelvis was performed without IV  contrast. Multiplanar reformats were obtained. Dose reduction  techniques were used.    CONTRAST: None.    COMPARISON: 7/13/2024, 4/26/2024, 3/13/2024.    FINDINGS:   LOWER CHEST: Mild suspected atelectasis versus scarring in the  lingular segment of left upper lobe and medial right middle lobe. A  tiny  low-density observation in the inferior right hepatic lobe (3-78)  is too small to characterize but most likely represents a cyst or  hemangioma and does not require follow-up. No signs of cholelithiasis,  acute cholecystitis, or bile duct dilation.    HEPATOBILIARY: Normal.    PANCREAS: Normal.    SPLEEN: Normal.    ADRENAL GLANDS: Normal.    KIDNEYS/BLADDER: Evaluation for pyelonephritis is limited without IV  contrast. There are several simple appearing left renal cortical cysts  present, which do not require follow-up. Minimal left-sided  hydronephrosis without obstructing stone or mass seen. Several  nonobstructing left lower pole intrarenal calculi are present with the  largest measuring 6 x 5 mm demonstrating an average density of 466  Hounsfield units (3-97). The right kidney is absent. Urinary bladder  is decompressed and otherwise normal-appearing.    BOWEL: Diverticulosis in the colon. No acute inflammatory change. No  obstruction.     LYMPH NODES: Normal.    VASCULATURE: Mild atherosclerosis of the abdominal aorta and iliac  arteries. No aneurysmal dilation.    PELVIC ORGANS: Normal.    OTHER: None.    MUSCULOSKELETAL: Normal.      Impression    IMPRESSION:   1.  Minimal left-sided hydronephrosis, improved to comparison, without  obstructing stone or mass.  2.  Left lower pole intrarenal calculi.  3.  Simple appearing left renal cysts. No follow-up is necessary.  4.  Prior right nephrectomy.  5.  Colonic diverticulosis without signs of diverticulitis.    ARIELLE MICHEL MD         SYSTEM ID:  [id number]              Assessment/Plan   54 year old female  seen in follow up for kidney stones. The patient has a very complex medical history. Information is obtained through chart review from [facility]. She has a history of kidney stones, requiring many stone clearance procedures.      In February 2017, she developed gross hematuria and reported RLQ pain. Cystoscopy noted significant bleeding from the R  ureteral orifice. 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 She underwent a right nephroureterectomy. Pathology ultimately demonstrated pyelonephritis.      She underwent urgent left ureteral stent place with Dr. Newton on 8/16/2023 and stone clearance on 8/23/2023. This was a 10% calcium oxalate, 90% calcium phosphate stone.     CT on 12/10/2024 is not significantly changed. She has at least two left intrarenal stones.     Litholink shows low urine citrate and high urine pH. She states she took potassium citrate in the past and it caused an upset stomach. She was then drinking baking soda, but found this hard to do. I will speak with Dr. Self regarding the best plan for stone prevention.     She is traveling to [place] in a few weeks and asks if she can take a few tablets of oxycodone with her in case she passes a stone.     Plan:  Vaginal estrogen cream, apply two times per week for atrophic vaginitis.   Follow up pending Dr. Self recommendations.              Past Medical History:     Past Medical History:   Diagnosis Date    Anxiety and depression 01/11/2016    Chronic kidney disease, stage 3 (H) 01/22/2021    Congenital medullary sponge kidney 06/20/2005    Hyperlipidemia     Kidney stone     Obstructive sleep apnea syndrome 02/09/2018    Formatting of this note might be different from the original.   Formatting of this note might be different from the original.   Mild to moderate CATHY without sleep-associated hypoxemia    - PSG performed 4/1/2008 with weight 180 lbs, AHI 0.3, RDI 41, mary SpO2 93%, PLMI 0, arousal index 57.9.    CATHY (obstructive sleep apnea) 02/09/2018    Mild to moderate CATHY without sleep-associated hypoxemia   - PSG performed 4/1/2008 with weight 180 lbs, AHI 0.3, RDI 41, mary SpO2 93%, PLMI 0, arousal index 57.9.            Past Surgical History:     Past Surgical History:    Procedure Laterality Date     SECTION      COMBINED CYSTOSCOPY, INSERT STENT URETER(S) Left 11/10/2017    Procedure: COMBINED CYSTOSCOPY, INSERT STENT URETER(S);  Cystoscopy, Left Ureteral Stent Placement;  Surgeon: Yg Rodriguez MD;  Location: [facility]    COMBINED CYSTOSCOPY, RETROGRADES, EXCHANGE STENT URETER(S) Left 2023    Procedure: CYSTOSCOPY, WITH RETROGRADE PYELOGRAM AND URETERAL STENT REPLACEMENT;  Surgeon: Gustavo Newton MD;  [place] [facility] [place]    CYSTOURETEROSCOPY, WITH LITHOTRIPSY USING RAMONA 120P LASER AND URETERAL STENT INSERTION Left 2023    Procedure: CYSTOURETEROSCOPY, WITH RETROGRADE PYELOGRAM, HOLMIUM LASER LITHOTRIPSY OF LEFT URETERAL CALCULUSAND BASKET REMOVAL OF LEFT KIDNEY STONE , AND STENT exchange;  Surgeon: Gustavo Newton MD;  [place] [facility] [place]    ESOPHAGOSCOPY, GASTROSCOPY, DUODENOSCOPY (EGD), COMBINED N/A 2020    Procedure: ESOPHAGOGASTRODUODENOSCOPY (EGD), WITH BIOPSY;  Surgeon: Brian Grimes DO;  Location: [facility]    EXCISE TOENAIL(S) Left 2016    Procedure: EXCISE TOENAIL(S);  Surgeon: Ady Mejia DPM;  Location: [facility]    HEAD & NECK SURGERY  May 29th, 2021    IR MISCELLANEOUS PROCEDURE  2002    IR MISCELLANEOUS PROCEDURE  2002    IR MISCELLANEOUS PROCEDURE  2002    IR MISCELLANEOUS PROCEDURE  2002    NEPHRECTOMY Right 2017    SURGICAL HISTORY OF -           SURGICAL HISTORY OF -   -present    Lithothypsy X 49    SURGICAL HISTORY OF -       Percutaneous nephrostomy X2    SURGICAL HISTORY OF -   2011    Cystoscopy with bilateral ureteral pyeloscopy, stone basketing and stent placement            Medications     Current Outpatient Medications   Medication Sig Dispense Refill    cetirizine (ZYRTEC) 10 MG tablet Take 10 mg by mouth every evening      escitalopram (LEXAPRO) 20 MG tablet Take 1 tablet (20 mg) by mouth daily 90 tablet 3    rosuvastatin (CRESTOR) 20  MG tablet Take 1 tablet (20 mg) by mouth every evening 90 tablet 3    acetaminophen (TYLENOL) 325 MG tablet Take 2 tablets (650 mg) by mouth every 4 hours as needed for other (mild pain) 100 tablet 0    LORazepam (ATIVAN) 0.5 MG tablet Take 1 tablet (0.5 mg) by mouth every 8 hours as needed for anxiety 30 tablet 0     No current facility-administered medications for this visit.            Family History:     Family History   Problem Relation Age of Onset    Respiratory Father         CATHY    C.A.D. Father     Heart Failure Father     Coronary Artery Disease Father     Hypertension Father     Depression Father     Anxiety Disorder Father     Diabetes Father         type 2    Cerebrovascular Disease Father     Lipids Mother         high cholesterol    Allergies Mother     Arthritis Mother         RA    Hyperlipidemia Mother     Lipids Sister         high cholesterol    Allergies Sister     Asthma Sister     Hypertension Sister     Allergies Sister     Respiratory Sister         cathy    Genitourinary Problems Maternal Grandmother         passed from complications from renal failure    Arthritis Maternal Grandfather         rheumatoid    Diabetes Paternal Grandmother         adult onset    Cardiovascular Paternal Grandfather         AAA    Hypertension Sister     Hyperlipidemia Sister     Anxiety Disorder Sister     Asthma Sister     Depression Sister             Social History:     Social History     Socioeconomic History    Marital status:      Spouse name: Not on file    Number of children: 1    Years of education: Not on file    Highest education level: Not on file   Occupational History     Employer: UNEMPLOYED     Employer: DISABLED     Comment: due to kidney disease   Tobacco Use    Smoking status: Every Day     Current packs/day: 1.00     Average packs/day: 1 pack/day for 30.0 years (30.0 ttl pk-yrs)     Types: Cigarettes     Passive exposure: Never    Smokeless tobacco: Never    Tobacco comments:      trying to quit; cutting back on her own   Vaping Use    Vaping status: Never Used   Substance and Sexual Activity    Alcohol use: No    Drug use: No    Sexual activity: Yes     Partners: Male     Birth control/protection: None   Other Topics Concern    Parent/sibling w/ CABG, MI or angioplasty before 65F 55M? Yes   Social History Narrative    Not on file     Social Drivers of Health     Financial Resource Strain: Low Risk  (5/23/2024)    Financial Resource Strain     Within the past 12 months, have you or your family members you live with been unable to get utilities (heat, electricity) when it was really needed?: No   Food Insecurity: Low Risk  (5/23/2024)    Food Insecurity     Within the past 12 months, did you worry that your food would run out before you got money to buy more?: No     Within the past 12 months, did the food you bought just not last and you didn t have money to get more?: No   Transportation Needs: Low Risk  (5/23/2024)    Transportation Needs     Within the past 12 months, has lack of transportation kept you from medical appointments, getting your medicines, non-medical meetings or appointments, work, or from getting things that you need?: No   Physical Activity: Unknown (5/23/2024)    Exercise Vital Sign     Days of Exercise per Week: 1 day     Minutes of Exercise per Session: Not on file   Stress: Stress Concern Present (5/23/2024)    [employer] - Occupational Stress Questionnaire     Feeling of Stress : Rather much   Social Connections: Unknown (5/23/2024)    Social Connection and Isolation Panel [NHANES]     Frequency of Communication with Friends and Family: Not on file     Frequency of Social Gatherings with Friends and Family: Once a week     Attends [employer] Services: Not on file     Active Member of Clubs or Organizations: Not on file     Attends Club or Organization Meetings: Not on file     Marital Status: Not on file   Interpersonal Safety: Low Risk   (5/23/2024)    Interpersonal Safety     Do you feel physically and emotionally safe where you currently live?: Yes     Within the past 12 months, have you been hit, slapped, kicked or otherwise physically hurt by someone?: No     Within the past 12 months, have you been humiliated or emotionally abused in other ways by your partner or ex-partner?: No   Housing Stability: Low Risk  (5/23/2024)    Housing Stability     Do you have housing? : Yes     Are you worried about losing your housing?: No            Allergies:   Gentamycin [gentamicin sulfate], Nsaids, and Skin adhesives [cyanoacrylate]         Review of Systems:  From intake questionnaire   Negative 14 system review except as noted on HPI, nurse's note.        SHWETA WAGNER PA-C  Department of Urology

## 2025-01-02 ENCOUNTER — PATIENT OUTREACH (OUTPATIENT)
Dept: CARE COORDINATION | Facility: CLINIC | Age: 55
End: 2025-01-02
Payer: COMMERCIAL

## 2025-01-30 ENCOUNTER — PATIENT OUTREACH (OUTPATIENT)
Dept: CARE COORDINATION | Facility: CLINIC | Age: 55
End: 2025-01-30
Payer: COMMERCIAL

## 2025-03-25 ENCOUNTER — ANCILLARY PROCEDURE (OUTPATIENT)
Dept: GENERAL RADIOLOGY | Facility: CLINIC | Age: 55
End: 2025-03-25
Attending: NURSE PRACTITIONER
Payer: COMMERCIAL

## 2025-03-25 ENCOUNTER — OFFICE VISIT (OUTPATIENT)
Dept: FAMILY MEDICINE | Facility: CLINIC | Age: 55
End: 2025-03-25
Payer: COMMERCIAL

## 2025-03-25 VITALS
TEMPERATURE: 98.5 F | DIASTOLIC BLOOD PRESSURE: 80 MMHG | RESPIRATION RATE: 16 BRPM | SYSTOLIC BLOOD PRESSURE: 132 MMHG | WEIGHT: 189 LBS | OXYGEN SATURATION: 98 % | BODY MASS INDEX: 30.37 KG/M2 | HEART RATE: 75 BPM | HEIGHT: 66 IN

## 2025-03-25 DIAGNOSIS — M54.41 ACUTE MIDLINE LOW BACK PAIN WITH BILATERAL SCIATICA: Primary | ICD-10-CM

## 2025-03-25 DIAGNOSIS — Z12.31 VISIT FOR SCREENING MAMMOGRAM: ICD-10-CM

## 2025-03-25 DIAGNOSIS — M54.42 ACUTE MIDLINE LOW BACK PAIN WITH BILATERAL SCIATICA: Primary | ICD-10-CM

## 2025-03-25 DIAGNOSIS — M54.42 ACUTE MIDLINE LOW BACK PAIN WITH BILATERAL SCIATICA: ICD-10-CM

## 2025-03-25 DIAGNOSIS — M54.41 ACUTE MIDLINE LOW BACK PAIN WITH BILATERAL SCIATICA: ICD-10-CM

## 2025-03-25 PROCEDURE — 3075F SYST BP GE 130 - 139MM HG: CPT | Performed by: NURSE PRACTITIONER

## 2025-03-25 PROCEDURE — 72100 X-RAY EXAM L-S SPINE 2/3 VWS: CPT | Mod: TC | Performed by: STUDENT IN AN ORGANIZED HEALTH CARE EDUCATION/TRAINING PROGRAM

## 2025-03-25 PROCEDURE — 3079F DIAST BP 80-89 MM HG: CPT | Performed by: NURSE PRACTITIONER

## 2025-03-25 PROCEDURE — 99214 OFFICE O/P EST MOD 30 MIN: CPT | Performed by: NURSE PRACTITIONER

## 2025-03-25 PROCEDURE — 1125F AMNT PAIN NOTED PAIN PRSNT: CPT | Performed by: NURSE PRACTITIONER

## 2025-03-25 ASSESSMENT — PAIN SCALES - GENERAL: PAINLEVEL_OUTOF10: MODERATE PAIN (4)
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
## 2025-03-26 ENCOUNTER — PATIENT OUTREACH (OUTPATIENT)
Dept: CARE COORDINATION | Facility: CLINIC | Age: 55
End: 2025-03-26
Payer: COMMERCIAL

## 2025-04-11 ENCOUNTER — HOSPITAL ENCOUNTER (OUTPATIENT)
Dept: MRI IMAGING | Facility: CLINIC | Age: 55
Discharge: HOME | End: 2025-04-11
Attending: NURSE PRACTITIONER | Admitting: NURSE PRACTITIONER
Payer: COMMERCIAL

## 2025-04-11 DIAGNOSIS — M54.42 ACUTE MIDLINE LOW BACK PAIN WITH BILATERAL SCIATICA: ICD-10-CM

## 2025-04-11 DIAGNOSIS — M54.41 ACUTE MIDLINE LOW BACK PAIN WITH BILATERAL SCIATICA: ICD-10-CM

## 2025-04-11 PROCEDURE — 72148 MRI LUMBAR SPINE W/O DYE: CPT

## 2025-04-13 ENCOUNTER — HEALTH MAINTENANCE LETTER (OUTPATIENT)
Age: 55
End: 2025-04-13

## 2025-04-15 DIAGNOSIS — M54.16 LUMBAR RADICULOPATHY: Primary | ICD-10-CM

## 2025-04-17 ENCOUNTER — TELEPHONE (OUTPATIENT)
Dept: PALLIATIVE MEDICINE | Facility: CLINIC | Age: 55
End: 2025-04-17
Payer: COMMERCIAL

## 2025-04-17 NOTE — TELEPHONE ENCOUNTER
"Screening Questions for Radiology Injections:    Injection to be done at which interventional clinic site? [facility] [place] [facility] [place]    If choosing [facility] for location, please inform patient:  \"[facility] is a Hospital based clinic. Before your visit, you should check with your insurance about how it covers the charges for facility services in a hospital-based clinic.     Procedure ordered by     Maribeth Avila APRN CNP in [facility]       Procedure ordered? LESI  Transforaminal Cervical SHANNEN - Send to [facility]) - No [facility] Site providers perform this procedure    What insurance would patient like us to bill for this procedure? [facility]  IF SCHEDULING IN [facility] PLEASE SCHEDULE AT LEAST 7-10 BUSINESS DAYS OUT SO A PA CAN BE OBTAINED  Worker's comp or MVA (motor vehicle accident) -Any injection DO NOT SCHEDULE and route to Rosario Singer.    [employer] insurance - For ALL INJECTIONS DO NOT SCHEDULE and route to Gabriela Rosenbaum.     ALL BC[employer], [employer] and [employer] - DO NOT SCHEDULE and route to Gabriela Rosenbaum  MEDICA- ALL INJECTIONS- route to Gabriela Rosenbaum    Is patient scheduled at [facility]? NO    If YES, route every encounter to [facility] SPINE CENTER CARE NAVIGATION POOL [[phone]]    Is an  needed? No     Patient has a  home? (Review Grid) YES: Informed     Any chance of pregnancy? NO   If YES, do NOT schedule and route to RN pool  - Dr. Salamanca route to PM&R Nurse  [[id number]]      Is patient actively being treated for cancer or immunocompromised? No  If YES, do NOT schedule and route to RN pool/ Dr. Salamanca's Team    Does the patient have a bleeding or clotting disorder? No   If YES, okay to schedule AND route to RN nurse / Dr. Salamanca's Team   (For any patients with platelet count <100, RN must forward to provider)    Is patient taking any Blood Thinners OR Antiplatelet medication?  No   If hold needed, do NOT schedule, route to RN " marianna/ Dr. Salamanca's Team  Examples:   Blood Thinners: (Coumadin, Warfarin, Jantoven, Pradaxa, Xarelto, Eliquis, Edoxaban, Enoxaparin, Lovenox, Heparin, Arixtra, Fondaparinux or Fragmin)  Antiplatelet Medications: (Plavix, Brilinta or Effient)     Is patient taking any aspirin products (includes Excedrin and Fiorinal)? No.    If yes route to RN pool/ Dr. Salamanca's Team - Do not schedule    Is patient taking any GLP-1 Antagonist (hold needed for sedation patients only) No   (semaglutide (Ozempic, Wegovy), dulaglutide (Trulicity), exenatide ER (Bydureon), tirzepatide (Mounjaro), Liraglutide (Saxenda, Victoza), semaglutide (Rybelsus), Terzepatide (Zepbound)  If YES, okay to schedule AND route to RN nurse / Dr. Salamanca's Team      Any allergies to contrast dye, iodine, shellfish, or numbing and steroid medications? No  If YES, schedule and add allergy information to appointment notes AND route to the RN pool/ Dr. Salamanca's Team  If SHANNEN and Contrast Dye / Iodine Allergy? DO NOT SCHEDULE, route to RN pool/ Dr. Salamanca's Team  Allergies: Gentamycin [gentamicin sulfate], Nsaids, and Skin adhesives [cyanoacrylate]     Does patient have an active infection or treated for one within the past week? No  Is patient currently taking any antibiotics or steroid medications?  No   For patients on chronic, preventative, or prophylactic antibiotics, procedures may be scheduled.   For patients on antibiotics for active or recent infection, schedule 4 days after completed.  For patients on steroid medications, schedule 4 days after completed.     Has the patient had a flu shot or any other vaccinations within the past 7 days? No  If yes, explain that for the vaccine to work best they need to:     wait 1 week before and 1 week after getting any Vaccine  wait 1 week before and 2 weeks after getting any Covid Vaccine   If patient has concerns about the timing, send to RN pool/ Dr. Salamanca's Team    Does patient have an MRI/CT?  YES: 4/11/25  Include Date and Check Procedure Scheduling Grid to see if required.  Was the MRI/CT done within the last 3 years?  Yes   If no route to RN Pool/ Dr. Salamanca's Team  If yes, where was the MRI/CT done? [facility]   Refer to PACS Transmissions list for approved external locations and route to RN Pool High Priority/ Dr. Grewlas Team  If MRI was not done at approved external location do NOT schedule and route to RN pool/ Dr. Grewals Team    If patient has an imaging disc, the injection MAY be scheduled but patient must bring disc to appt or appt will be cancelled.    Is patient able to transfer to a procedure table with minimal or no assistance? Yes   If no, do NOT schedule and route to  Pool/ Dr. Salamanca's Team    Procedure Specific Instructions:  If celiac plexus block, informed patient NPO for 6 hours and that it is okay to take medications with sips of water, especially blood pressure medications Not Applicable       If this is for a cervical procedure, informed patient that aspirin needs to be held for 6 days.   Not Applicable    Sedation, If Sedation is ordered for any procedure, patient must be NPO for 6 hours prior to procedure Not Applicable    If IV needed:  Do not schedule procedures requiring IV placement in the first appointment of the day or first appointment after lunch. Do NOT schedule at 0745, 0815 or 1245.     Instructed patient to arrive 30 minutes early for IV start if required. (Check Procedure Scheduling Grid)  Not Applicable    Reminders:  If you are started on any steroids or antibiotics between now and your appointment, you must contact us because the procedure may need to be cancelled.  Yes    As a reminder, receiving steroids can decrease your body's ability to fight infection.   Would you still like to move forward with scheduling the injection?  Yes    IV Sedation is not provided for procedures. If oral anti-anxiety medication is needed, the patient should request this from their referring  provider.    Instruct patient to arrive as directed prior to the scheduled appointment time:  If IV needed 30 minutes before appointment time     For patients 85 or older we recommend having an adult stay w/ them for the remainder of the day.     If the patient is Diabetic, remind them to bring their glucometer.    Dr. Esposito Pt's - Imaging Orders Needed   Please send all injections to RN Pool Not Applicable   Red Flags? Not Applicable    Does the patient have any questions?  NO  Lacey Salmeron  [facility]

## 2025-04-23 ENCOUNTER — PATIENT OUTREACH (OUTPATIENT)
Dept: CARE COORDINATION | Facility: CLINIC | Age: 55
End: 2025-04-23

## 2025-04-23 ENCOUNTER — RADIOLOGY INJECTION OFFICE VISIT (OUTPATIENT)
Dept: PALLIATIVE MEDICINE | Facility: CLINIC | Age: 55
End: 2025-04-23
Attending: NURSE PRACTITIONER
Payer: COMMERCIAL

## 2025-04-23 VITALS — OXYGEN SATURATION: 98 % | DIASTOLIC BLOOD PRESSURE: 76 MMHG | HEART RATE: 59 BPM | SYSTOLIC BLOOD PRESSURE: 149 MMHG

## 2025-04-23 DIAGNOSIS — M54.16 LUMBAR RADICULOPATHY: ICD-10-CM

## 2025-04-23 PROCEDURE — 64483 NJX AA&/STRD TFRM EPI L/S 1: CPT | Mod: 50 | Performed by: PAIN MEDICINE

## 2025-04-23 RX ADMIN — DEXAMETHASONE SODIUM PHOSPHATE 10 MG: 10 INJECTION, SOLUTION INTRAMUSCULAR; INTRAVENOUS at 13:17

## 2025-04-23 ASSESSMENT — PAIN SCALES - GENERAL
PAINLEVEL_OUTOF10: MODERATE PAIN (5)
PAINLEVEL_OUTOF10: MILD PAIN (2)

## 2025-04-23 NOTE — PATIENT INSTRUCTIONS
[facility]   Procedure Discharge Instructions    Today you saw:    Dr. Marck Lemus,         You had a(n):  Lumbar epidural steroid injection Transforaminal      Medications used for lumbar TFESI: Lidocaine, Omnipaque, Dexamethasone, Bupivicaine, normal saline          Be cautious with walking. Numbness and/or weakness in the lower extremities may occur for up to 6-8 hours after the procedure due to effect of the local anesthetic  Do not drive for 6 hours. The effect of the local anesthetic could slow your reflexes.   You may resume your regular activities after 24 hours  Avoid strenuous activity for the first 24 hours  You may shower, however avoid swimming, tub baths or hot tubs for 24 hours following your procedure  You may have a mild to moderate increase in pain for several days following the injection.  It may take up to 14 days for the steroid medication to start working although you may feel the effect as early as a few days after the procedure.     You may use ice packs for 10-15 minutes, 3 to 4 times a day at the injection site for comfort  Do not use heat to painful areas for 6 to 8 hours. This will give the local anesthetic time to wear off and prevent you from accidentally burning your skin.   Unless you have been directed to avoid the use of anti-inflammatory medications (NSAIDS), you may use medications such as ibuprofen, Aleve or Tylenol for pain control if needed.   If you have diabetes, check your blood sugar more frequently than usual as your blood sugar may be higher than normal for 10-14 days following a steroid injection. Contact your doctor who manages your diabetes if your blood sugar is higher than usual  Possible side effects of steroids that you may experience include flushing, elevated blood pressure, increased appetite, mild headaches and restlessness.  All of these symptoms will get better with time.  If you experience any of the following, call the  Pain Clinic during work hours (Mon-Friday 8-4:30 pm) at [phone] or the Provider Line after hours at [phone]:  -Fever over 100 degree F  -Swelling, bleeding, redness, drainage, warmth at the injection site  -Progressive weakness or numbness in your legs  -Loss of bowel or bladder function  -Unusual headache not relieved by Tylenol or other pain reliever  -Unusual new onset of pain that is not improving

## 2025-04-23 NOTE — NURSING NOTE
Discharge Information    IV Discontiued Time:  NA    Amount of Fluid Infused:  NA    Discharge Criteria = When patient returns to baseline or as per MD order    Consciousness:  Pt is fully awake    Circulation:  BP +/- 20% of pre-procedure level    Respiration:  Patient is able to breathe deeply    O2 Sat:  Patient is able to maintain O2 Sat >92% on room air    Activity:  Moves 4 extremities on command    Ambulation:  Patient is able to stand and walk or stand and pivot into wheelchair    Dressing:  Clean/dry or No Dressing    Notes:   Discharge instructions and AVS given to patient    Patient meets criteria for discharge?  YES    Admitted to PCU?  No    Responsible adult present to accompany patient home?  Yes    Signature/Title:    Anel Salgado RN  RN Care Coordinator  [facility]

## 2025-04-23 NOTE — NURSING NOTE
Pre-procedure Intake  If YES to any questions or NO to having a   Please complete laminated checklist and leave on the computer keyboard for Provider, verbally inform provider if able.    For SCS Trial, RFA's or any sedation procedure:  Have you been fasting? NA  If yes, for how long? na    Are you taking any any blood thinners such as Coumadin, Warfarin, Jantoven, Pradaxa Xarelto, Eliquis, Edoxaban, Enoxaparin, Lovenox, Heparin, Arixtra, Fondaparinux, or Fragmin? OR Antiplatelet medication such as Plavix, Brilinta, or Effient?   No   If yes, when did you take your last dose? na    Do you take aspirin?  No  If cervical procedure, have you held aspirin for 6 days?   NA    Is the Pt taking any GLP-1 Antagonist (hold needed for sedation patients only)  (semaglutide (Ozempic, Wegovy), dulaglutide (Trulicity), exenatide ER (Bydureon), tirzepatide (Mounjaro), Liraglutide (Saxenda, Victoza), semaglutide (Rybelsus)     NA  If yes, when did you take your last dose? na    Do you have any allergies to contrast dye, iodine, steroid and/or numbing medications?  NO    Are you currently taking antibiotics or have an active infection?  NO    Have you had a fever/elevated temperature within the past week? NO    Are you currently taking oral steroids? NO    Do you have a ? Yes    Are you pregnant or breastfeeding?  NO    Have you received any vaccinations in the last week? NO    Notify provider and RNs if systolic BP >170, diastolic BP >100, P >100 or O2 sats < 90%    Chen Barrientos MA

## 2025-04-29 RX ORDER — DEXAMETHASONE SODIUM PHOSPHATE 10 MG/ML
10 INJECTION, SOLUTION INTRAMUSCULAR; INTRAVENOUS ONCE
Status: COMPLETED | OUTPATIENT
Start: 2025-04-23 | End: 2025-04-23

## 2025-04-29 NOTE — PROGRESS NOTES
Pre procedure Diagnosis: lumbar radiculopathy, lumbar degenerative disc disease   Post procedure Diagnosis: Same  Procedure performed: lumbar transforaminal epidural steroid injection at bilateral L5, fluoroscopically guided, contrast controlled  Anesthesia: none  Complications: none  Operators: Marck Lemus MD     Indications:   Latoya Riddle is a 54 year old female.  They have a history of lbp rad to her LE.  Exam shows + slump and they have tried conservative treatment including meds/pt.    MRI reviewed  Options/alternatives, benefits and risks were discussed with the patient including bleeding, infection, tissue trauma, numbness, weakness, paralysis, spinal cord injury, radiation exposure, headache and reaction to medications. Questions were answered to her satisfaction and she agrees to proceed. Voluntary informed consent was obtained and signed.     Vitals were reviewed: Yes  BP (!) 149/76   Pulse 59   LMP 07/02/2013 (Exact Date)   SpO2 98%   Allergies were reviewed:  Yes   Medications were reviewed:  Yes   Pre-procedure pain score: 5/10    Procedure:  After getting informed consent, patient was brought into the procedure suite and was placed in a prone position on the procedure table.   A Pause for the Cause was performed.  Patient was prepped and draped in sterile fashion.     After identifying the bilateral L5 neuroforamen, the C-arm was rotated to a bilateral lateral oblique angle.  A total of 3ml of Lidocaine 1% was used to anesthetize the skin and the needle track at a skin entry site coaxial with the fluoroscopy beam, and overriding the superior aspect of the neuroforamen.  A 22 gauge 3.5 inch spinal needle was advanced under intermittent fluoroscopy until it entered the foramen superiorly.    The position was then inspected from anteroposterior and lateral views, and the needle adjusted appropriately.  A total of 1ml of Omnipaque-300 was injected, confirming appropriate position, with  spread into the nerve root sheath and the epidural space, with no intravascular uptake. 9ml was wasted    Then, after repeated negative aspiration, a combination of Decadron 10 mg, 0.25% bupivacaine 2 ml, diluted with 3ml of normal saline was injected.     Hemostasis was achieved, the area was cleaned, and bandaids were placed when appropriate.  The patient tolerated the procedure well, and was taken to the recovery room.    Images were saved to PACS.    Post-procedure pain score: 2/10  Follow-up includes:   -f/u with referring provider    Marck Lemus MD  [facility]

## 2025-04-30 ENCOUNTER — HOSPITAL ENCOUNTER (EMERGENCY)
Facility: CLINIC | Age: 55
Discharge: HOME | End: 2025-04-30
Payer: COMMERCIAL

## 2025-04-30 VITALS
SYSTOLIC BLOOD PRESSURE: 134 MMHG | RESPIRATION RATE: 18 BRPM | DIASTOLIC BLOOD PRESSURE: 82 MMHG | HEART RATE: 71 BPM | OXYGEN SATURATION: 100 % | TEMPERATURE: 97.4 F

## 2025-04-30 DIAGNOSIS — N39.0 UTI (URINARY TRACT INFECTION): ICD-10-CM

## 2025-04-30 LAB
ALBUMIN UR-MCNC: 50 MG/DL
APPEARANCE UR: ABNORMAL
BILIRUB UR QL STRIP: NEGATIVE
COLOR UR AUTO: ABNORMAL
GLUCOSE UR STRIP-MCNC: NEGATIVE MG/DL
HGB UR QL STRIP: ABNORMAL
KETONES UR STRIP-MCNC: NEGATIVE MG/DL
LEUKOCYTE ESTERASE UR QL STRIP: ABNORMAL
NITRATE UR QL: POSITIVE
PH UR STRIP: 6 [PH] (ref 5–7)
RBC URINE: 151 /HPF
SP GR UR STRIP: 1.01 (ref 1–1.03)
SQUAMOUS EPITHELIAL: 2 /HPF
UROBILINOGEN UR STRIP-MCNC: NORMAL MG/DL
WBC CLUMPS #/AREA URNS HPF: PRESENT /HPF
WBC URINE: >182 /HPF

## 2025-04-30 PROCEDURE — G0463 HOSPITAL OUTPT CLINIC VISIT: HCPCS

## 2025-04-30 PROCEDURE — 81003 URINALYSIS AUTO W/O SCOPE: CPT

## 2025-04-30 PROCEDURE — 99213 OFFICE O/P EST LOW 20 MIN: CPT

## 2025-04-30 RX ORDER — CIPROFLOXACIN 500 MG/1
500 TABLET, FILM COATED ORAL 2 TIMES DAILY
Qty: 20 TABLET | Refills: 0 | Status: SHIPPED | OUTPATIENT
Start: 2025-04-30 | End: 2025-05-10

## 2025-04-30 ASSESSMENT — COLUMBIA-SUICIDE SEVERITY RATING SCALE - C-SSRS
6. HAVE YOU EVER DONE ANYTHING, STARTED TO DO ANYTHING, OR PREPARED TO DO ANYTHING TO END YOUR LIFE?: NO
1. IN THE PAST MONTH, HAVE YOU WISHED YOU WERE DEAD OR WISHED YOU COULD GO TO SLEEP AND NOT WAKE UP?: NO
2. HAVE YOU ACTUALLY HAD ANY THOUGHTS OF KILLING YOURSELF IN THE PAST MONTH?: NO

## 2025-04-30 ASSESSMENT — ACTIVITIES OF DAILY LIVING (ADL): ADLS_ACUITY_SCORE: 47

## 2025-05-01 NOTE — ED PROVIDER NOTES
History     Chief Complaint   Patient presents with    Dysuria     HPI  Latoya Riddle is a 54 year old female who presents urgent care with chief complaint of painful urination.  Patient reports she has chronic UTIs for the past 7 years.  She reports worsening pain with urination, urinary frequency and urgency over the past few days.  Denies hematuria, fever, chills, abdominal pain.    Allergies:  Allergies   Allergen Reactions    Gentamycin [Gentamicin Sulfate]     Nsaids Other (See Comments)     Avoids due presence of 1 kidney and that kidney has decreased function   GFR 44    Skin Adhesives [Cyanoacrylate]        Problem List:    Patient Active Problem List    Diagnosis Date Noted    Depression 10/04/2023     Priority: Medium    Acquired solitary kidney 08/28/2023     Priority: Medium    LINDEN (acute kidney injury) 08/17/2023     Priority: Medium    Urinary tract infection with hematuria, site unspecified 08/16/2023     Priority: Medium    Left ureteral stone 08/16/2023     Priority: Medium    left Hydronephrosis with urinary obstruction due to ureteral calculus 08/16/2023     Priority: Medium    Moderate episode of recurrent major depressive disorder (H) 07/12/2022     Priority: Medium    Stage 3b chronic kidney disease (H) 01/22/2021     Priority: Medium    Chronic migraine without aura without status migrainosus, not intractable 04/21/2020     Priority: Medium    Anxiety and depression 01/11/2016     Priority: Medium    Acute kidney injury 06/25/2015     Priority: Medium    Pyelonephritis 08/16/2013     Priority: Medium    Lower urinary tract infectious disease 05/12/2012     Priority: Medium     Overview:   Resistant   Pseudomonas       Hydronephrosis, right 05/12/2012     Priority: Medium    Hyperlipemia 01/06/2011     Priority: Medium    Hyperlipidemia 01/06/2011     Priority: Medium    Tobacco abuse 04/19/2010     Priority: Medium    Dyspnea and respiratory abnormality 04/07/2008     Priority: Medium      Sleep study 08: No CATHY. .5 minutes, sleep latency  normal 11.7 minutes. REM latency 154.5 minutes. Sleep efficiency at 89.4%. The sleep architecture was periodically disrupted with frequent sleep stage changes and arousals. Snoring: moderately loud. RDI 41.0,  AHI of 0.3. Lowest O2 93.0%. PLM index 0.0.  Problem list name updated by automated process. Provider to review      Calculus of kidney 2005     Priority: Medium     Due to RTA and medullary sponge kidney. [id number] ( See's Dr. Tavo Contreras)- calcium oxalate and calcium phosphate. S/p multiple procedures, >50      Congenital medullary sponge kidney 2005     Priority: Medium    Renal osteodystrophy 2005     Priority: Medium     Tubular acidosis          Past Medical History:    Past Medical History:   Diagnosis Date    Anxiety and depression 2016    Chronic kidney disease, stage 3 (H) 2021    Congenital medullary sponge kidney 2005    Hyperlipidemia     Kidney stone     Obstructive sleep apnea syndrome 2018    CATHY (obstructive sleep apnea) 2018       Past Surgical History:    Past Surgical History:   Procedure Laterality Date     SECTION      COMBINED CYSTOSCOPY, INSERT STENT URETER(S) Left 11/10/2017    Procedure: COMBINED CYSTOSCOPY, INSERT STENT URETER(S);  Cystoscopy, Left Ureteral Stent Placement;  Surgeon: Yg Rodriguez MD;  Location: [facility]    COMBINED CYSTOSCOPY, RETROGRADES, EXCHANGE STENT URETER(S) Left 2023    Procedure: CYSTOSCOPY, WITH RETROGRADE PYELOGRAM AND URETERAL STENT REPLACEMENT;  Surgeon: Gustavo Newton MD;  Location: [facility] OR    CYSTOURETEROSCOPY, WITH LITHOTRIPSY USING RAMONA 120P LASER AND URETERAL STENT INSERTION Left 2023    Procedure: CYSTOURETEROSCOPY, WITH RETROGRADE PYELOGRAM, HOLMIUM LASER LITHOTRIPSY OF LEFT URETERAL CALCULUSAND BASKET REMOVAL OF LEFT KIDNEY STONE , AND STENT exchange;  Surgeon: Gustavo Newton MD;   Location: [facility] OR    ESOPHAGOSCOPY, GASTROSCOPY, DUODENOSCOPY (EGD), COMBINED N/A 2020    Procedure: ESOPHAGOGASTRODUODENOSCOPY (EGD), WITH BIOPSY;  Surgeon: Brian Grimes DO;  Location: [facility] OR    EXCISE TOENAIL(S) Left 2016    Procedure: EXCISE TOENAIL(S);  Surgeon: Ady Mejia DPM;  Location: [facility] OR    HEAD & NECK SURGERY  May 29th, 2021    IR MISCELLANEOUS PROCEDURE  2002    IR MISCELLANEOUS PROCEDURE  2002    IR MISCELLANEOUS PROCEDURE  2002    IR MISCELLANEOUS PROCEDURE  2002    NEPHRECTOMY Right 2017    SURGICAL HISTORY OF -           SURGICAL HISTORY OF -   -present    Lithothypsy X 49    SURGICAL HISTORY OF -       Percutaneous nephrostomy X2    SURGICAL HISTORY OF -   2011    Cystoscopy with bilateral ureteral pyeloscopy, stone basketing and stent placement       Family History:    Family History   Problem Relation Age of Onset    Respiratory Father         CATHY    C.A.D. Father     Heart Failure Father     Coronary Artery Disease Father     Hypertension Father     Depression Father     Anxiety Disorder Father     Diabetes Father         type 2    Cerebrovascular Disease Father     Lipids Mother         high cholesterol    Allergies Mother     Arthritis Mother         RA    Hyperlipidemia Mother     Lipids Sister         high cholesterol    Allergies Sister     Asthma Sister     Hypertension Sister     Allergies Sister     Respiratory Sister         cathy    Genitourinary Problems Maternal Grandmother         passed from complications from renal failure    Arthritis Maternal Grandfather         rheumatoid    Diabetes Paternal Grandmother         adult onset    Cardiovascular Paternal Grandfather         AAA    Hypertension Sister     Hyperlipidemia Sister     Anxiety Disorder Sister     Asthma Sister     Depression Sister        Social History:  Marital Status:   [2]  Social History     Tobacco Use    Smoking status:  Every Day     Current packs/day: 1.00     Average packs/day: 1 pack/day for 30.0 years (30.0 ttl pk-yrs)     Types: Cigarettes     Passive exposure: Never    Smokeless tobacco: Never    Tobacco comments:     trying to quit; cutting back on her own   Vaping Use    Vaping status: Never Used   Substance Use Topics    Alcohol use: No    Drug use: No        Medications:    ciprofloxacin (CIPRO) 500 MG tablet  acetaminophen (TYLENOL) 325 MG tablet  cetirizine (ZYRTEC) 10 MG tablet  ciprofloxacin (CIPRO) 500 MG tablet  escitalopram (LEXAPRO) 20 MG tablet  estradiol (ESTRACE) 0.1 MG/GM vaginal cream  LORazepam (ATIVAN) 0.5 MG tablet  oxyCODONE (ROXICODONE) 5 MG tablet  potassium citrate (UROCIT-K) 5 MEQ (540 MG) CR tablet  rosuvastatin (CRESTOR) 20 MG tablet          Review of Systems   All other systems reviewed and are negative.      Physical Exam   BP: 134/82  Pulse: 71  Temp: 97.4  F (36.3  C)  Resp: 18  SpO2: 100 %      Physical Exam  Vitals and nursing note reviewed.   Constitutional:       General: She is not in acute distress.     Appearance: She is not ill-appearing.   HENT:      Head: Normocephalic.   Cardiovascular:      Rate and Rhythm: Normal rate.      Pulses: Normal pulses.   Pulmonary:      Effort: Pulmonary effort is normal.   Abdominal:      Tenderness: There is no right CVA tenderness or left CVA tenderness.   Neurological:      Mental Status: She is alert.   Psychiatric:         Mood and Affect: Mood normal.         ED Course        Procedures           Results for orders placed or performed during the hospital encounter of 04/30/25 (from the past 24 hours)   UA with Microscopic reflex to Culture    Specimen: Urine, Clean Catch   Result Value Ref Range    Color Urine Orange (A) Colorless, Straw, Light Yellow, Yellow    Appearance Urine Cloudy (A) Clear    Glucose Urine Negative Negative mg/dL    Bilirubin Urine Negative Negative    Ketones Urine Negative Negative mg/dL    Specific Gravity Urine 1.012  1.003 - 1.035    Blood Urine Moderate (A) Negative    pH Urine 6.0 5.0 - 7.0    Protein Albumin Urine 50 (A) Negative mg/dL    Urobilinogen Urine Normal Normal mg/dL    Nitrite Urine Positive (A) Negative    Leukocyte Esterase Urine Large (A) Negative    WBC Clumps Urine Present (A) None Seen /HPF    RBC Urine 151 (H) <=2 /HPF    WBC Urine >182 (H) <=5 /HPF    Squamous Epithelials Urine 2 (H) <=1 /HPF    Narrative    Urine Culture ordered based on laboratory criteria       Medications - No data to display    Assessments & Plan (with Medical Decision Making)     I have reviewed the nursing notes.    I have reviewed the findings, diagnosis, plan and need for follow up with the patient.      Medical Decision Making     54 year old female who presents urgent care with chief complaint of painful urination.  Patient reports she has chronic UTIs for the past 7 years.  She reports worsening pain with urination, urinary frequency and urgency over the past few days.  Denies hematuria, fever, chills, abdominal pain.    Urinalysis positive for infection.  No signs or symptoms concerning for pyelonephritis or nephrolithiasis at this time.  Patient will be discharged home stable on ciprofloxacin pending urine culture results from today's visit.  She was instructed to follow up with PCP if no improvement in three days.  Worrisome reasons to seek care sooner discussed.        Prior to making a final disposition on this patient the results of patient's tests and other diagnostic studies were discussed with the patient. All questions were answered. Patient expressed understanding of the plan and was amenable to it.     Disclaimer: This note consists of symbols derived from keyboarding, dictation and/or voice recognition software. As a result, there may be errors in the script that have gone undetected. Please consider this when interpreting information found in this chart.    Discharge Medication List as of 4/30/2025  7:37 PM         START taking these medications    Details   !! ciprofloxacin (CIPRO) 500 MG tablet Take 1 tablet (500 mg) by mouth 2 times daily for 10 days., Disp-20 tablet, R-0, E-Prescribe       !! - Potential duplicate medications found. Please discuss with provider.          Final diagnoses:   UTI (urinary tract infection)       4/30/2025   [facility] EMERGENCY DEPT       Cynthia Miles PA-C  04/30/25 1949

## 2025-05-01 NOTE — ED TRIAGE NOTES
Pt reports painful urination, and frequency   symptom onset couple days     Pt states has 1 kidney

## 2025-05-07 ENCOUNTER — PATIENT OUTREACH (OUTPATIENT)
Dept: CARE COORDINATION | Facility: CLINIC | Age: 55
End: 2025-05-07
Payer: COMMERCIAL

## 2025-05-13 DIAGNOSIS — F41.9 ANXIETY: ICD-10-CM

## 2025-05-13 DIAGNOSIS — F33.1 MODERATE EPISODE OF RECURRENT MAJOR DEPRESSIVE DISORDER (H): ICD-10-CM

## 2025-05-13 DIAGNOSIS — E78.5 HYPERLIPIDEMIA LDL GOAL <130: ICD-10-CM

## 2025-05-13 RX ORDER — ESCITALOPRAM OXALATE 20 MG/1
20 TABLET ORAL DAILY
Qty: 90 TABLET | Refills: 0 | Status: SHIPPED | OUTPATIENT
Start: 2025-05-13

## 2025-05-13 RX ORDER — ROSUVASTATIN CALCIUM 20 MG/1
20 TABLET, COATED ORAL EVERY EVENING
Qty: 90 TABLET | Refills: 0 | Status: SHIPPED | OUTPATIENT
Start: 2025-05-13

## 2025-07-06 ENCOUNTER — HEALTH MAINTENANCE LETTER (OUTPATIENT)
Age: 55
End: 2025-07-06

## 2025-07-30 ENCOUNTER — PATIENT OUTREACH (OUTPATIENT)
Dept: CARE COORDINATION | Facility: CLINIC | Age: 55
End: 2025-07-30
Payer: COMMERCIAL

## 2025-08-14 ENCOUNTER — HOSPITAL ENCOUNTER (EMERGENCY)
Facility: CLINIC | Age: 55
Discharge: HOME | End: 2025-08-14
Payer: COMMERCIAL

## 2025-08-14 VITALS
OXYGEN SATURATION: 98 % | DIASTOLIC BLOOD PRESSURE: 79 MMHG | TEMPERATURE: 98.3 F | RESPIRATION RATE: 18 BRPM | HEART RATE: 71 BPM | SYSTOLIC BLOOD PRESSURE: 128 MMHG

## 2025-08-14 DIAGNOSIS — G89.29 ACUTE EXACERBATION OF CHRONIC LOW BACK PAIN: Primary | ICD-10-CM

## 2025-08-14 DIAGNOSIS — M54.50 ACUTE EXACERBATION OF CHRONIC LOW BACK PAIN: Primary | ICD-10-CM

## 2025-08-14 PROCEDURE — G0463 HOSPITAL OUTPT CLINIC VISIT: HCPCS

## 2025-08-14 RX ORDER — METHYLPREDNISOLONE 4 MG/1
TABLET ORAL
Qty: 21 TABLET | Refills: 0 | Status: SHIPPED | OUTPATIENT
Start: 2025-08-14

## 2025-08-14 ASSESSMENT — ACTIVITIES OF DAILY LIVING (ADL): ADLS_ACUITY_SCORE: 47

## 2025-08-16 DIAGNOSIS — E78.5 HYPERLIPIDEMIA LDL GOAL <130: ICD-10-CM

## 2025-08-18 ENCOUNTER — PATIENT OUTREACH (OUTPATIENT)
Dept: CARE COORDINATION | Facility: CLINIC | Age: 55
End: 2025-08-18
Payer: COMMERCIAL

## 2025-08-18 RX ORDER — ROSUVASTATIN CALCIUM 20 MG/1
TABLET, COATED ORAL
Qty: 90 TABLET | Refills: 0 | Status: SHIPPED | OUTPATIENT
Start: 2025-08-18

## 2025-09-02 ENCOUNTER — NURSE TRIAGE (OUTPATIENT)
Dept: FAMILY MEDICINE | Facility: CLINIC | Age: 55
End: 2025-09-02
Payer: COMMERCIAL

## 2025-09-03 ENCOUNTER — HOSPITAL ENCOUNTER (EMERGENCY)
Facility: CLINIC | Age: 55
Discharge: HOME | End: 2025-09-03
Payer: COMMERCIAL

## 2025-09-03 VITALS
TEMPERATURE: 97.6 F | RESPIRATION RATE: 18 BRPM | HEART RATE: 71 BPM | DIASTOLIC BLOOD PRESSURE: 77 MMHG | OXYGEN SATURATION: 96 % | SYSTOLIC BLOOD PRESSURE: 126 MMHG

## 2025-09-03 DIAGNOSIS — M54.50 LOW BACK PAIN: Primary | ICD-10-CM

## 2025-09-03 PROCEDURE — G0463 HOSPITAL OUTPT CLINIC VISIT: HCPCS

## 2025-09-03 RX ORDER — LIDOCAINE 50 MG/G
1 PATCH TOPICAL EVERY 24 HOURS
Qty: 5 PATCH | Refills: 0 | Status: SHIPPED | OUTPATIENT
Start: 2025-09-03 | End: 2025-09-08

## 2025-09-03 RX ORDER — PREDNISONE 20 MG/1
TABLET ORAL
Qty: 10 TABLET | Refills: 0 | Status: SHIPPED | OUTPATIENT
Start: 2025-09-03

## 2025-09-03 RX ORDER — OXYCODONE HYDROCHLORIDE 5 MG/1
5 TABLET ORAL EVERY 6 HOURS PRN
Qty: 6 TABLET | Refills: 0 | Status: SHIPPED | OUTPATIENT
Start: 2025-09-03

## 2025-09-03 RX ORDER — CYCLOBENZAPRINE HCL 10 MG
10 TABLET ORAL 3 TIMES DAILY PRN
Qty: 21 TABLET | Refills: 0 | Status: SHIPPED | OUTPATIENT
Start: 2025-09-03 | End: 2025-09-10

## 2025-09-03 RX ORDER — OXYCODONE HYDROCHLORIDE 5 MG/1
5 TABLET ORAL EVERY 6 HOURS PRN
Qty: 6 TABLET | Refills: 0 | Status: SHIPPED | OUTPATIENT
Start: 2025-09-03 | End: 2025-09-03

## 2025-09-03 ASSESSMENT — COLUMBIA-SUICIDE SEVERITY RATING SCALE - C-SSRS
6. HAVE YOU EVER DONE ANYTHING, STARTED TO DO ANYTHING, OR PREPARED TO DO ANYTHING TO END YOUR LIFE?: NO
2. HAVE YOU ACTUALLY HAD ANY THOUGHTS OF KILLING YOURSELF IN THE PAST MONTH?: NO
1. IN THE PAST MONTH, HAVE YOU WISHED YOU WERE DEAD OR WISHED YOU COULD GO TO SLEEP AND NOT WAKE UP?: NO

## 2025-09-03 ASSESSMENT — ACTIVITIES OF DAILY LIVING (ADL)
ADLS_ACUITY_SCORE: 47
ADLS_ACUITY_SCORE: 47

## (undated) DEVICE — DRAPE C-ARM W/STRAPS 42X72" 07-CA104

## (undated) DEVICE — LASER FIBER HOLMIUM MOSES 200 D/F/L AC-10030100

## (undated) DEVICE — JELLY LUBRICATING SURGILUBE 2OZ TUBE

## (undated) DEVICE — Device

## (undated) DEVICE — CATH TRAY FOLEY SURESTEP 16FR WDRAIN BAG STLK LATEX A300316A

## (undated) DEVICE — SOL NACL 0.9% IRRIG 1000ML BOTTLE 2F7124

## (undated) DEVICE — SOLUTION IRRIG 2B7127 .9NS 3000ML BAG

## (undated) DEVICE — TUBING SET THERMEDX UROLOGY SGL USE LL0006

## (undated) DEVICE — DRSG TEGADERM 4X4 3/4" 1626W

## (undated) DEVICE — GUIDEWIRE SENSOR DUAL FLEX STR 0.035"X150CM M0066703080

## (undated) DEVICE — LINEN TOWEL PACK X5 5464

## (undated) DEVICE — CUSTOM PACK CYSTO PREFERRED SOT5BCYHEA

## (undated) DEVICE — GOWN IMPERVIOUS BREATHABLE SMART XLG 89045

## (undated) DEVICE — LINEN GOWN X4 5410

## (undated) DEVICE — PAD CHUX UNDERPAD 30X30"

## (undated) DEVICE — SUCTION MANIFOLD DORNOCH ULTRA CART UL-CL500

## (undated) DEVICE — SYR 30ML LL W/O NDL 302832

## (undated) DEVICE — MAT FLOOR SURGICAL 40X38 0702140238

## (undated) DEVICE — SOL WATER IRRIG 1000ML BOTTLE 2F7114

## (undated) DEVICE — BASKET NITINOL TIPLESS HALO  1.5FRX120CM 554120

## (undated) DEVICE — SPECIMEN CONTAINER 5OZ STERILE 2600SA

## (undated) DEVICE — SOL NACL 0.9% IRRIG 3000ML BAG 2B7477

## (undated) DEVICE — STRAP KNEE/BODY 31143004

## (undated) DEVICE — TUBING SUCTION MEDI-VAC 1/4"X20' N620A - HE

## (undated) DEVICE — KIT ENDO FIRST STEP DISINFECTANT 200ML W/POUCH EP-4

## (undated) DEVICE — SUCTION MANIFOLD NEPTUNE 2 SYS 1 PORT 702-025-000

## (undated) DEVICE — PREP DYNA-HEX 4% CHG SCRUB 4OZ BOTTLE MDS098710

## (undated) DEVICE — CATH URETERAL OPEN END TIP 05FR 70CM 134105LT / 134105RT

## (undated) DEVICE — CATH URETERAL OPEN END 5FRX70CM M0064002010

## (undated) DEVICE — RAD RX CONRAY 60% (50ML) 095305 CHARGE PER ML

## (undated) DEVICE — GLOVE PROTEXIS W/NEU-THERA 7.5  2D73TE75

## (undated) DEVICE — CONTAINER URINE SPEC 4OZ STRL 1053

## (undated) DEVICE — SHEATH URETERAL ACCESS 12/14 28CM NAV HD 250-224

## (undated) RX ORDER — FENTANYL CITRATE 50 UG/ML
INJECTION, SOLUTION INTRAMUSCULAR; INTRAVENOUS
Status: DISPENSED
Start: 2023-08-16

## (undated) RX ORDER — BUPIVACAINE HYDROCHLORIDE 2.5 MG/ML
INJECTION, SOLUTION EPIDURAL; INFILTRATION; INTRACAUDAL
Status: DISPENSED
Start: 2021-03-25

## (undated) RX ORDER — FENTANYL CITRATE 50 UG/ML
INJECTION, SOLUTION INTRAMUSCULAR; INTRAVENOUS
Status: DISPENSED
Start: 2017-11-10

## (undated) RX ORDER — DEXAMETHASONE SODIUM PHOSPHATE 10 MG/ML
INJECTION, SOLUTION INTRAMUSCULAR; INTRAVENOUS
Status: DISPENSED
Start: 2021-03-25

## (undated) RX ORDER — LIDOCAINE HYDROCHLORIDE 20 MG/ML
INJECTION, SOLUTION EPIDURAL; INFILTRATION; INTRACAUDAL; PERINEURAL
Status: DISPENSED
Start: 2017-11-10

## (undated) RX ORDER — PROPOFOL 10 MG/ML
INJECTION, EMULSION INTRAVENOUS
Status: DISPENSED
Start: 2017-11-10

## (undated) RX ORDER — LIDOCAINE HYDROCHLORIDE 10 MG/ML
INJECTION, SOLUTION EPIDURAL; INFILTRATION; INTRACAUDAL; PERINEURAL
Status: DISPENSED
Start: 2020-04-13

## (undated) RX ORDER — FENTANYL CITRATE 50 UG/ML
INJECTION, SOLUTION INTRAMUSCULAR; INTRAVENOUS
Status: DISPENSED
Start: 2023-08-23

## (undated) RX ORDER — EPHEDRINE SULFATE 50 MG/ML
INJECTION, SOLUTION INTRAMUSCULAR; INTRAVENOUS; SUBCUTANEOUS
Status: DISPENSED
Start: 2017-11-10

## (undated) RX ORDER — CEFAZOLIN SODIUM 1 G/3ML
INJECTION, POWDER, FOR SOLUTION INTRAMUSCULAR; INTRAVENOUS
Status: DISPENSED
Start: 2017-11-10

## (undated) RX ORDER — EPHEDRINE SULFATE 50 MG/ML
INJECTION, SOLUTION INTRAMUSCULAR; INTRAVENOUS; SUBCUTANEOUS
Status: DISPENSED
Start: 2023-08-23

## (undated) RX ORDER — PHENYLEPHRINE HCL IN 0.9% NACL 1 MG/10 ML
SYRINGE (ML) INTRAVENOUS
Status: DISPENSED
Start: 2017-11-10

## (undated) RX ORDER — GLYCOPYRROLATE 0.2 MG/ML
INJECTION, SOLUTION INTRAMUSCULAR; INTRAVENOUS
Status: DISPENSED
Start: 2020-04-13

## (undated) RX ORDER — ONDANSETRON 2 MG/ML
INJECTION INTRAMUSCULAR; INTRAVENOUS
Status: DISPENSED
Start: 2023-08-16

## (undated) RX ORDER — PROPOFOL 10 MG/ML
INJECTION, EMULSION INTRAVENOUS
Status: DISPENSED
Start: 2023-08-16

## (undated) RX ORDER — PROPOFOL 10 MG/ML
INJECTION, EMULSION INTRAVENOUS
Status: DISPENSED
Start: 2020-04-13